# Patient Record
Sex: MALE | Race: WHITE
[De-identification: names, ages, dates, MRNs, and addresses within clinical notes are randomized per-mention and may not be internally consistent; named-entity substitution may affect disease eponyms.]

---

## 2017-08-23 ENCOUNTER — HOSPITAL ENCOUNTER (OUTPATIENT)
Dept: HOSPITAL 47 - RADUSWWP | Age: 77
Discharge: HOME | End: 2017-08-23
Payer: MEDICARE

## 2017-08-23 DIAGNOSIS — N28.89: Primary | ICD-10-CM

## 2017-08-23 DIAGNOSIS — D49.4: ICD-10-CM

## 2017-08-23 PROCEDURE — 76770 US EXAM ABDO BACK WALL COMP: CPT

## 2017-08-23 NOTE — US
EXAMINATION TYPE: US kidneys/renal and bladder

 

DATE OF EXAM: 8/23/2017

 

COMPARISON: 10/4/2016

 

CLINICAL HISTORY: D41.01 Neoplasm of unspecified behavior of Giuseppe.

 

EXAM MEASUREMENTS:

 

Right Kidney:  9.4 x 4.9 x 5.7 cm

Left Kidney: 12.2 x 6.1 x 6.3 cm

 

Right Kidney: 3.6 x 2.4 x 3.8 cm mass upper/mid pole . This has enlarged from the prior exam of 10/4/
2016 where it measured 3.0 x 2.8 x 2.7 cm. 

Left Kidney: No hydronephrosis or masses seen  

Bladder: slightly distended

**Bilateral Jets seen: no

 

There is no evidence for hydronephrosis at this point in time.  No nephrolithiasis is seen.  No vernon
s are identified.  The urinary bladder is anechoic.  Bilateral ureteral jets are seen.

 

IMPRESSION:

Enlarging suspicious right renal mass measuring up to 3.8 cm. Again dynamic enhanced CT or MR are rec
ommended for further characterization.

 

A [place] message has been communicated to Conrad Davenport MD via the [employer] Critical Result 
system on 8/23/2017 10:12 AM, Message ID [id number].

## 2019-02-21 ENCOUNTER — HOSPITAL ENCOUNTER (OUTPATIENT)
Dept: HOSPITAL 47 - RADCTMAIN | Age: 79
Discharge: HOME | End: 2019-02-21
Attending: UROLOGY
Payer: MEDICARE

## 2019-02-21 DIAGNOSIS — N13.30: ICD-10-CM

## 2019-02-21 DIAGNOSIS — N13.4: ICD-10-CM

## 2019-02-21 DIAGNOSIS — C64.1: Primary | ICD-10-CM

## 2019-02-21 LAB — BUN SERPL-SCNC: 36 MG/DL (ref 9–20)

## 2019-02-21 PROCEDURE — 84520 ASSAY OF UREA NITROGEN: CPT

## 2019-02-21 PROCEDURE — 82565 ASSAY OF CREATININE: CPT

## 2019-02-21 PROCEDURE — 36415 COLL VENOUS BLD VENIPUNCTURE: CPT

## 2019-02-21 PROCEDURE — 74160 CT ABDOMEN W/CONTRAST: CPT

## 2019-02-22 NOTE — CT
EXAMINATION TYPE: CT abdomen w con

 

DATE OF EXAM: 2/21/2019

 

COMPARISON: Ultrasound kidneys 8/23/2017. No interval films are available at this location.

 

INDICATION: Malignant neoplasm of RT kidney

 

DLP: 1308 mGycm, Automated exposure control for dose reduction was used.

 

CONTRAST:  80 mL of Isovue 300. 

                        Study performed with Oral Contrast

 

TECHNIQUE: Axial images were obtained from above the diaphragm to the iliac crests in the axial plane
 at 5 mm thick sections.  Reconstructed images are reviewed on the computer in the coronal plane.  

 

FINDINGS:

 

Limited CT sections are obtained the lung bases.  There may be some lingular atelectasis..  Coronary 
artery calcification is noted.

 

CT ABDOMEN:

 

Liver: Normal

 

Spleen: Normal

 

Pancreas: Normal

 

Adrenal glands: The adrenal glands are normal.

 

Gallbladder: Normal  

 

Kidneys: No masses are evident. There is moderate right hydronephrosis. Hydroureter is present to the
 pelvic inlet. The dilated distal ureter extends out of the field-of-view. There is a hypodense lesio
n on the right kidney measuring 1.8 x 1.2 cm measuring 4 Hounsfield units may be a cyst. This appears
 to correspond to prior site of solid mass by ultrasound. Delayed images were obtained through the 
dneys, which remain unremarkable.

 

Aorta: Vascular calcification is within the aorta. 

 

Inferior vena cava: Normal.

 

Loops of bowel distended with oral contrast normal. There are loops of bowel lacking oral contrast or
 with limited distention limiting their evaluation.

 

IMPRESSIONS:

1.  Moderate right hydronephrosis. Hydroureter extends out of the field-of-view on the right. Conside
r evaluation of the distal ureter.

2. Low density lesion on the lateral mid right kidney likely a cyst measuring 4 Hounsfield units. Thi
s appears to be at the prior mass site.

3. No suspicious masses on the kidneys or suspicious changes to suggest metastatic disease.

## 2019-02-25 ENCOUNTER — HOSPITAL ENCOUNTER (OUTPATIENT)
Dept: HOSPITAL 47 - CATHEP | Age: 79
LOS: 1 days | Discharge: HOME | End: 2019-02-26
Attending: INTERNAL MEDICINE
Payer: MEDICARE

## 2019-02-25 VITALS — BODY MASS INDEX: 34.8 KG/M2

## 2019-02-25 VITALS — RESPIRATION RATE: 18 BRPM

## 2019-02-25 DIAGNOSIS — E11.9: ICD-10-CM

## 2019-02-25 DIAGNOSIS — I25.5: Primary | ICD-10-CM

## 2019-02-25 DIAGNOSIS — Z79.899: ICD-10-CM

## 2019-02-25 DIAGNOSIS — Z79.4: ICD-10-CM

## 2019-02-25 DIAGNOSIS — I11.0: ICD-10-CM

## 2019-02-25 DIAGNOSIS — Z95.1: ICD-10-CM

## 2019-02-25 DIAGNOSIS — I50.42: ICD-10-CM

## 2019-02-25 DIAGNOSIS — E78.5: ICD-10-CM

## 2019-02-25 DIAGNOSIS — I25.10: ICD-10-CM

## 2019-02-25 DIAGNOSIS — Z79.82: ICD-10-CM

## 2019-02-25 DIAGNOSIS — Z87.891: ICD-10-CM

## 2019-02-25 DIAGNOSIS — E78.00: ICD-10-CM

## 2019-02-25 LAB
GLUCOSE BLD-MCNC: 187 MG/DL (ref 75–99)
GLUCOSE BLD-MCNC: 318 MG/DL (ref 75–99)
GLUCOSE BLD-MCNC: 368 MG/DL (ref 75–99)

## 2019-02-25 PROCEDURE — 33249 INSJ/RPLCMT DEFIB W/LEAD(S): CPT

## 2019-02-25 PROCEDURE — 71046 X-RAY EXAM CHEST 2 VIEWS: CPT

## 2019-02-25 PROCEDURE — 93641 EP EVL 1/2CHMB PAC CVDFB TST: CPT

## 2019-02-25 RX ADMIN — CEFAZOLIN SCH GM: 10 INJECTION, POWDER, FOR SOLUTION INTRAVENOUS at 23:35

## 2019-02-25 RX ADMIN — VENLAFAXINE HYDROCHLORIDE SCH MG: 75 TABLET ORAL at 20:13

## 2019-02-25 RX ADMIN — CARVEDILOL SCH MG: 3.12 TABLET, FILM COATED ORAL at 17:21

## 2019-02-25 RX ADMIN — CEFAZOLIN SCH GM: 10 INJECTION, POWDER, FOR SOLUTION INTRAVENOUS at 17:21

## 2019-02-25 RX ADMIN — POTASSIUM CHLORIDE SCH: 14.9 INJECTION, SOLUTION INTRAVENOUS at 16:11

## 2019-02-25 RX ADMIN — INSULIN DETEMIR SCH UNIT: 100 INJECTION, SOLUTION SUBCUTANEOUS at 20:13

## 2019-02-25 RX ADMIN — CEFAZOLIN SCH MLS/HR: 330 INJECTION, POWDER, FOR SOLUTION INTRAMUSCULAR; INTRAVENOUS at 12:45

## 2019-02-25 RX ADMIN — LIDOCAINE HYDROCHLORIDE ONE ML: 10 INJECTION, SOLUTION INFILTRATION; PERINEURAL at 11:02

## 2019-02-25 RX ADMIN — SODIUM CHLORIDE, PRESERVATIVE FREE SCH ML: 5 INJECTION INTRAVENOUS at 20:13

## 2019-02-25 RX ADMIN — LIDOCAINE HYDROCHLORIDE ONE ML: 10 INJECTION, SOLUTION INFILTRATION; PERINEURAL at 11:11

## 2019-02-25 NOTE — P.PCN
Date of Procedure: 02/25/19


Preoperative Diagnosis: 


Ischemic cardiomyopathy, congestive heart failure


Postoperative Diagnosis: 


The same


Procedure(s) Performed: 


Axillary venography, insertion of single coil single-chamber AICD


Description of Procedure: 


HISTORY: This is a 79-year-old gentleman with history of ischemic heart disease

, previous bypass surgery, ischemic cardiomyopathy and CHF.  Patient's LV 

function showed ejection fraction less than 30%.  Patient is advised to have 

prophylactic AICD implantation.








CONSENT:I have discussed the risks, benefits and alternative therapies for the 

above-mentioned procedure and for both sedation/analgesia as well as necessary 

blood product administration, if indicated, as they pertain to this patient.  

The patient has indicated understanding and acceptance of the risks and 

procedures discussed.








PROCEDURE: Patient was brought to the lab in a fasting state.  Patient was 

prepped and draped in the usual fashion.  Patient was given sedation by 

department of anesthesia.  The skin below the left clavicle was infiltrated 

with lidocaine.  An incision was made parallel to deltopectoral groove was 

deepened until the pectoral fascia was exposed.  A pocket was created by blunt 

dissection and cautery.  Axillary venography was performed to delineate the 

course of the axillary vein.  1 venous stick was performed into extrathoracic 

portion of the axillary vein and a single sheath  was advanced over the 

guidewire and left in subclavian vein. 


Conscious Sedation: As per anesthesia





Duration []minutes    


LEADS: 


                       


                       VENTRICULAR: This is manufactured by [employer].  The 

model number is [id number] and the serial number is [id number].





THE DEVICE: This is manufactured by [employer].  Model number is [id number] and the 

serial number is [id number].


               


                         The ventricular lead is maneuvered l with help of a 

straight and curved stylets into the left ventricle apical region.  

Satisfactory position was obtained and threshold measurements were made.  


              THRESHOLDS: 


                        VENTRICLE: The minimal patient threshold is 0.5 V at 

pulse width of 0.5 ms.  R-wave: 10 mV and the impedance is 704.  The shock 

impedance is 63.  He did





DFT testing: Patient was on deep anesthesia by department of anesthesia.  Went 

for fibrillation was induced with T shock.  A 15 J shock converted patient back 

to sinus rhythm.  Patient tolerated the procedure well.


                The lead and pulse generator remained in the pocket after it 

was washed with antibiotics.  Pocket was closed in the usual fashion.  The 

fascia was closed with 2-0 Prolene ,the subcutaneous tissue was closed with 3-0 

Prolene and the skin was closed with 4-0 Prolene.





                  PROGRAMMING:     Luis programming                       MODE

: VVI RATE: 40 OUTPUT:     Ventricle: 3.5 V





                                            Tachycardia  Programming: The VF 

zone was programmed to a rate of 200 bpm.  Initial detection is programmed to 

30 out of 40 and the redirect is programmed to follow out of 16.  The therapies 

were programmed to 25 J shock followed by 355.





                                                                                

The VT zone is programmed to a rate of 1 76 bpm.  The initial detection was 

programmed to 16 and the redirect was programmed to 12.  The therapies were 

programmed to burst pacing followed by scooby pacing followed by cardioversion 

with 50 J followed by shocks of 353.  The monitor zone was programmed to a 

rate of 150.








FINAL IMPRESSION: #1.  Axillary venography #2.  Insertion of single coil single-

chamber AICD #3.  DFT testing.  COMPLICATIONS: None


PLAN:.  Patient will be monitored on the telemetry unit.  If stable patient be 

discharged home tomorrow.  Prophylactic antibiotics to be continued.  Chest x-

ray in the morning

## 2019-02-26 VITALS — SYSTOLIC BLOOD PRESSURE: 111 MMHG | DIASTOLIC BLOOD PRESSURE: 53 MMHG | TEMPERATURE: 97.6 F | HEART RATE: 86 BPM

## 2019-02-26 LAB
GLUCOSE BLD-MCNC: 202 MG/DL (ref 75–99)
GLUCOSE BLD-MCNC: 241 MG/DL (ref 75–99)

## 2019-02-26 RX ADMIN — INSULIN DETEMIR SCH UNIT: 100 INJECTION, SOLUTION SUBCUTANEOUS at 07:32

## 2019-02-26 RX ADMIN — CEFAZOLIN SCH GM: 10 INJECTION, POWDER, FOR SOLUTION INTRAVENOUS at 11:45

## 2019-02-26 RX ADMIN — CEFAZOLIN SCH GM: 10 INJECTION, POWDER, FOR SOLUTION INTRAVENOUS at 05:56

## 2019-02-26 RX ADMIN — CARVEDILOL SCH MG: 3.12 TABLET, FILM COATED ORAL at 07:38

## 2019-02-26 RX ADMIN — CEFAZOLIN SCH: 330 INJECTION, POWDER, FOR SOLUTION INTRAMUSCULAR; INTRAVENOUS at 03:15

## 2019-02-26 RX ADMIN — VENLAFAXINE HYDROCHLORIDE SCH MG: 75 TABLET ORAL at 07:38

## 2019-02-26 RX ADMIN — POTASSIUM CHLORIDE SCH: 14.9 INJECTION, SOLUTION INTRAVENOUS at 03:16

## 2019-02-26 RX ADMIN — SODIUM CHLORIDE, PRESERVATIVE FREE SCH ML: 5 INJECTION INTRAVENOUS at 07:39

## 2019-02-26 NOTE — P.DS
Providers


Date of admission: 


02/25/2019





Attending physician: 


Kelsi Fleming





Primary care physician: 


Ruy Duckworth








- Discharge Diagnosis(es)


(1) Ischemic cardiomyopathy


Current Visit: Yes   Status: Acute   





(2) Congestive heart failure


Current Visit: No   Status: Acute   





(3) Coronary artery disease


Current Visit: No   Status: Acute   Priority: High   





(4) Diabetes mellitus


Current Visit: No   Status: Acute   





(5) HTN (hypertension)


Current Visit: No   Status: Acute   





(6) Hyperlipemia


Current Visit: No   Status: Acute   


Hospital Course: 


This patient was brought in for outpatient, prophylactic insertion of AICD for 

ischemic cardiomyopathy and congestive heart failure.  Patient had a single 

coil single-chamber AICD implantation yesterday.  Patient tolerated the 

procedure well.  Patient remained stable overnight.  Had mild headache which is 

resolving.  Vital signs are stable except blood pressure being slightly high.  

Lungs are clear.  Heart is regular.  Chest x-ray showed proper lead position.  

There is no evidence of pneumothorax.  Patient is completing antibiotic course.

  After evaluation by [employer] [facility],  this morning, patient will be discharged 

home to continue home medications.  We'll hold metformin for another 24 hours.  

Will increase the dose of the Coreg to 6.25 mg by mouth twice a day.  Follow-up 

in the office in one week.  Patient is given the usual instructions.  He is 

advised to keep the dressing dry until seen in the office.  Advised the next 

heavy lifting, pushing or pulling and advised to keep the left arm below the 

left shoulder level.  Follow-up in the office in one week








Plan - Discharge Summary


Discharge Rx Participant: No


New Discharge Prescriptions: 


New


   Fluticasone Nasal Spray [Flonase Nasal Spray] 2 spray EA NOSTRIL DAILY PRN  

spr


     PRN Reason: Allergy Symptoms


   Cephalexin [Keflex] 500 mg PO Q8HR #10 cap





Continue


   Venlafaxine HCl [Effexor] 75 mg PO BID  tab


   metFORMIN HCL [Glucophage] 1,000 mg PO BID-W/MEALS  tab


   Atorvastatin [Lipitor] 80 mg PO DAILY  tab


   Digoxin [Digitek] 125 mcg PO DAILY


   Cholecalciferol [Vitamin D3] 50 mcg PO DAILY


   Cyanocobalamin [Vitamin B-12] 1,000 mcg PO DAILY


   Carvedilol [Coreg] 3.125 mg PO BID


   lamoTRIgine [LaMICtal] 100 mg PO HS


   Furosemide [Lasix] 20 mg PO DAILY


   glipiZIDE [Glucotrol] 5 mg PO AC-BID


   Lisinopril [Zestril] 2.5 mg PO DAILY


   Potassium Chloride 10 meq PO DAILY


   Aspirin 162 mg PO DAILY


   Insulin Glargine [Lantus] 70 unit SQ BID


Discharge Medication List





Atorvastatin [Lipitor] 80 mg PO DAILY  tab 04/08/15 [Rx]


Venlafaxine HCl [Effexor] 75 mg PO BID  tab 04/08/15 [Rx]


metFORMIN HCL [Glucophage] 1,000 mg PO BID-W/MEALS  tab 04/08/15 [Rx]


Carvedilol [Coreg] 3.125 mg PO BID 09/27/16 [History]


Cholecalciferol [Vitamin D3] 50 mcg PO DAILY 09/27/16 [History]


Cyanocobalamin [Vitamin B-12] 1,000 mcg PO DAILY 09/27/16 [History]


Digoxin [Digitek] 125 mcg PO DAILY 09/27/16 [History]


lamoTRIgine [LaMICtal] 100 mg PO HS 09/27/16 [History]


Aspirin 162 mg PO DAILY 02/19/19 [History]


Furosemide [Lasix] 20 mg PO DAILY 02/19/19 [History]


Insulin Glargine [Lantus] 70 unit SQ BID 02/19/19 [History]


Lisinopril [Zestril] 2.5 mg PO DAILY 02/19/19 [History]


Potassium Chloride 10 meq PO DAILY 02/19/19 [History]


glipiZIDE [Glucotrol] 5 mg PO AC-BID 02/19/19 [History]


Cephalexin [Keflex] 500 mg PO Q8HR #10 cap 02/26/19 [Rx]


Fluticasone Nasal Spray [Flonase Nasal Spray] 2 spray EA NOSTRIL DAILY PRN  spr 

02/26/19 [Rx]








Follow up Appointment(s)/Referral(s): 


Kelsi Fleming MD [STAFF PHYSICIAN] - 1 Week


Discharge Disposition: HOME SELF-CARE

## 2019-02-26 NOTE — XR
EXAMINATION TYPE: XR chest 2V

 

DATE OF EXAM: 2/26/2019

 

COMPARISON: Chest x-ray November 28, 2016.

 

HISTORY: Lead placement check after defibrillator placement.

 

TECHNIQUE:  Frontal and lateral views of the chest are obtained.

 

FINDINGS: There is chronic parenchymal change with elevated left hemidiaphragm and left basilar linea
r scarring redemonstrated. There is no new focal air space opacity, pleural effusion, or pneumothorax
 seen.  The cardiac silhouette size is mildly enlarged with atherosclerotic thoracic aorta.  Overlyin
g sternal wires and mediastinal clips are redemonstrated. There is new single lead pacemaker/AICD ter
minating in right ventricle. Overlying EKG leads are seen on current study. Bridging spurs in the mid
 to lower thoracic spine are noted.

 

IMPRESSION:  New single lead pacemaker/AICD terminating in right ventricle. Chronic changes and mild 
cardiomegaly without acute pulmonary process.

## 2019-03-14 ENCOUNTER — HOSPITAL ENCOUNTER (OUTPATIENT)
Dept: HOSPITAL 47 - RADCTMAIN | Age: 79
Discharge: HOME | End: 2019-03-14
Attending: UROLOGY
Payer: MEDICARE

## 2019-03-14 DIAGNOSIS — N32.89: Primary | ICD-10-CM

## 2019-03-14 PROCEDURE — 74176 CT ABD & PELVIS W/O CONTRAST: CPT

## 2019-03-14 NOTE — CT
EXAMINATION TYPE: CT abdomen pelvis wo con

 

DATE OF EXAM: 3/14/2019

 

COMPARISON: Prior CT 2/21/2018

 

HISTORY: Hydronephrosis

 

CT DLP: 1383 mGycm

Automated exposure control for dose reduction was used.

 

TECHNIQUE:  Helical acquisition of images from the lung bases through the pelvis.

 

FINDINGS: There are dense coronary artery calcifications. There is a lead within the right ventricle.
 Lack of contrast could compromise sensitivity. Heart size is stable. Patient is post median sternoto
my. Small umbilical hernia contains fat.

 

LUNG BASES: No significant interval change is appreciated. There are interstitial changes present.

 

AORTA:  No significant abnormality is appreciated.

 

LIVER/GB: No significant abnormality is appreciated.

 

PANCREAS: No significant abnormality is seen.

 

SPLEEN: No significant abnormality is seen.

 

ADRENALS: No significant abnormality is seen.

 

KIDNEYS: Again seen is right-sided hydronephrosis, some high attenuation is present along the lateral
 margin of the right kidney with some similar-appearing exophytic abnormal density as on prior. The r
ight hydronephrosis extends to the level of the abnormal thickening along the right aspect of the roxanne
dder wall.

 

 

REPRODUCTIVE ORGANS: Prostate is enlarged. URINARY BLADDER:  There is abnormal thickening along the r
ight lateral margin of the urinary bladder which is not included on the previous exam. Circumferentia
l bladder wall thickening could be due to chronic bladder outlet obstruction

 

 

BOWEL:  Colonic interposition anterior to the liver is again seen. Diverticular change along the sigm
oid colon.

 

FREE AIR:  No Free Air is visible.

 

ASCITES:  None visible.

 

PELVIC ADENOPATHY:  None visualized.

 

RETROPERITONEAL ADENOPATHY:  No Retroperitoneal Adenopathy visible.

 

OSSEOUS STRUCTURES:  No significant interval change is seen. The spinal listhesis, degenerative disc 
changes are noted especially in the lumbar spine. Sclerosis of the sacroiliac joints may be due to st
ress change, osteoarthritic change.

 

IMPRESSION: 

EXTRINSIC ABNORMAL SOFT TISSUE ALONG THE RIGHT LATERAL MARGIN OF THE URINARY BLADDER IS LIKELY BECAUS
E OF PATIENT'S RIGHT-SIDED HYDRONEPHROSIS.

## 2019-07-18 ENCOUNTER — HOSPITAL ENCOUNTER (OUTPATIENT)
Dept: HOSPITAL 47 - RADUSWWP | Age: 79
Discharge: HOME | End: 2019-07-18
Attending: UROLOGY
Payer: MEDICARE

## 2019-07-18 DIAGNOSIS — N13.30: Primary | ICD-10-CM

## 2019-07-18 PROCEDURE — 76770 US EXAM ABDO BACK WALL COMP: CPT

## 2019-07-18 NOTE — US
EXAMINATION TYPE: US kidneys/renal and bladder

 

DATE OF EXAM: 7/18/2019

 

COMPARISON: CT March 14, 2019 and February 21, 2019

 

CLINICAL HISTORY: R93.4 HX OF HYDRONEPHROSIS.

 

EXAM MEASUREMENTS:

 

Right Kidney:  10.7 x 5.3 x 4.3 cm

Left Kidney: 10.8 x 5.3 x 5.8 cm

 

 

 

 

Right Kidney: Difficult to image, there appears to be a soft tissue density laterally that measures 2
.2 x 1.9 x 2.1 cm.    

Left Kidney: No hydronephrosis or masses seen  

Bladder: wnl

**Bilateral Jets seen: No

 

Suboptimal evaluation of right kidney with vague 2.0 cm hypoechoic area marked laterally, previously 
visualized moderate right-sided hydronephrosis is less well seen. Left kidney shows no hydronephrosis
 or concerning masses. Bladder shows no intraluminal mass.

 

 

 

IMPRESSION: Suboptimal study without obvious persistent moderate right-sided hydronephrosis. Eccentri
c wall thickening causing hydronephrosis felt present on prior CT studies could be a product of prior
 bladder infection or neoplasm. Correlate clinically.

What interval treatment has been performed? Was there interval cystoscopy?

## 2020-02-18 ENCOUNTER — HOSPITAL ENCOUNTER (INPATIENT)
Dept: HOSPITAL 47 - 3SCARD | Age: 80
LOS: 4 days | Discharge: HOME | DRG: 292 | End: 2020-02-22
Attending: INTERNAL MEDICINE | Admitting: INTERNAL MEDICINE
Payer: MEDICARE

## 2020-02-18 VITALS — BODY MASS INDEX: 35.4 KG/M2

## 2020-02-18 DIAGNOSIS — K21.9: ICD-10-CM

## 2020-02-18 DIAGNOSIS — Z90.5: ICD-10-CM

## 2020-02-18 DIAGNOSIS — Z79.899: ICD-10-CM

## 2020-02-18 DIAGNOSIS — Z98.41: ICD-10-CM

## 2020-02-18 DIAGNOSIS — I25.5: ICD-10-CM

## 2020-02-18 DIAGNOSIS — Z92.21: ICD-10-CM

## 2020-02-18 DIAGNOSIS — Z80.9: ICD-10-CM

## 2020-02-18 DIAGNOSIS — Z98.42: ICD-10-CM

## 2020-02-18 DIAGNOSIS — Z95.1: ICD-10-CM

## 2020-02-18 DIAGNOSIS — Z80.42: ICD-10-CM

## 2020-02-18 DIAGNOSIS — E78.5: ICD-10-CM

## 2020-02-18 DIAGNOSIS — Z79.4: ICD-10-CM

## 2020-02-18 DIAGNOSIS — Z95.810: ICD-10-CM

## 2020-02-18 DIAGNOSIS — Z85.51: ICD-10-CM

## 2020-02-18 DIAGNOSIS — I48.19: ICD-10-CM

## 2020-02-18 DIAGNOSIS — F31.9: ICD-10-CM

## 2020-02-18 DIAGNOSIS — I50.43: ICD-10-CM

## 2020-02-18 DIAGNOSIS — Z79.01: ICD-10-CM

## 2020-02-18 DIAGNOSIS — Z96.651: ICD-10-CM

## 2020-02-18 DIAGNOSIS — Z87.891: ICD-10-CM

## 2020-02-18 DIAGNOSIS — I25.10: ICD-10-CM

## 2020-02-18 DIAGNOSIS — I11.0: Primary | ICD-10-CM

## 2020-02-18 DIAGNOSIS — E11.9: ICD-10-CM

## 2020-02-18 DIAGNOSIS — Z92.3: ICD-10-CM

## 2020-02-18 LAB
ALBUMIN SERPL-MCNC: 3.8 G/DL (ref 3.5–5)
ALP SERPL-CCNC: 148 U/L (ref 38–126)
ALT SERPL-CCNC: 31 U/L (ref 4–49)
ANION GAP SERPL CALC-SCNC: 11 MMOL/L
AST SERPL-CCNC: 29 U/L (ref 17–59)
BASOPHILS # BLD AUTO: 0 K/UL (ref 0–0.2)
BASOPHILS NFR BLD AUTO: 0 %
BUN SERPL-SCNC: 30 MG/DL (ref 9–20)
CALCIUM SPEC-MCNC: 9.2 MG/DL (ref 8.4–10.2)
CHLORIDE SERPL-SCNC: 104 MMOL/L (ref 98–107)
CO2 SERPL-SCNC: 23 MMOL/L (ref 22–30)
EOSINOPHIL # BLD AUTO: 0.2 K/UL (ref 0–0.7)
EOSINOPHIL NFR BLD AUTO: 2 %
ERYTHROCYTE [DISTWIDTH] IN BLOOD BY AUTOMATED COUNT: 4.32 M/UL (ref 4.3–5.9)
ERYTHROCYTE [DISTWIDTH] IN BLOOD: 16.1 % (ref 11.5–15.5)
GLUCOSE BLD-MCNC: 174 MG/DL (ref 75–99)
GLUCOSE BLD-MCNC: 193 MG/DL (ref 75–99)
GLUCOSE SERPL-MCNC: 173 MG/DL (ref 74–99)
GLUCOSE UR QL: (no result)
HCT VFR BLD AUTO: 33.9 % (ref 39–53)
HGB BLD-MCNC: 10.5 GM/DL (ref 13–17.5)
HYALINE CASTS UR QL AUTO: 1 /LPF (ref 0–2)
LYMPHOCYTES # SPEC AUTO: 1.3 K/UL (ref 1–4.8)
LYMPHOCYTES NFR SPEC AUTO: 14 %
MCH RBC QN AUTO: 24.2 PG (ref 25–35)
MCHC RBC AUTO-ENTMCNC: 30.9 G/DL (ref 31–37)
MCV RBC AUTO: 78.5 FL (ref 80–100)
MONOCYTES # BLD AUTO: 0.7 K/UL (ref 0–1)
MONOCYTES NFR BLD AUTO: 8 %
NEUTROPHILS # BLD AUTO: 6.3 K/UL (ref 1.3–7.7)
NEUTROPHILS NFR BLD AUTO: 72 %
PH UR: 6 [PH] (ref 5–8)
PLATELET # BLD AUTO: 330 K/UL (ref 150–450)
POTASSIUM SERPL-SCNC: 4.8 MMOL/L (ref 3.5–5.1)
PROT SERPL-MCNC: 7 G/DL (ref 6.3–8.2)
PROT UR QL: (no result)
RBC UR QL: 157 /HPF (ref 0–5)
SODIUM SERPL-SCNC: 138 MMOL/L (ref 137–145)
SP GR UR: 1.01 (ref 1–1.03)
SQUAMOUS UR QL AUTO: <1 /HPF (ref 0–4)
UROBILINOGEN UR QL STRIP: <2 MG/DL (ref ?–2)
WBC # BLD AUTO: 8.8 K/UL (ref 3.8–10.6)
WBC #/AREA URNS HPF: 26 /HPF (ref 0–5)

## 2020-02-18 PROCEDURE — 81001 URINALYSIS AUTO W/SCOPE: CPT

## 2020-02-18 PROCEDURE — 80053 COMPREHEN METABOLIC PANEL: CPT

## 2020-02-18 PROCEDURE — 85025 COMPLETE CBC W/AUTO DIFF WBC: CPT

## 2020-02-18 PROCEDURE — 80162 ASSAY OF DIGOXIN TOTAL: CPT

## 2020-02-18 PROCEDURE — 71046 X-RAY EXAM CHEST 2 VIEWS: CPT

## 2020-02-18 PROCEDURE — 83036 HEMOGLOBIN GLYCOSYLATED A1C: CPT

## 2020-02-18 PROCEDURE — 83880 ASSAY OF NATRIURETIC PEPTIDE: CPT

## 2020-02-18 PROCEDURE — 80048 BASIC METABOLIC PNL TOTAL CA: CPT

## 2020-02-18 PROCEDURE — 93306 TTE W/DOPPLER COMPLETE: CPT

## 2020-02-18 RX ADMIN — FUROSEMIDE SCH MG: 10 INJECTION, SOLUTION INTRAMUSCULAR; INTRAVENOUS at 22:55

## 2020-02-18 RX ADMIN — FUROSEMIDE SCH MG: 10 INJECTION, SOLUTION INTRAMUSCULAR; INTRAVENOUS at 18:18

## 2020-02-18 RX ADMIN — ATORVASTATIN CALCIUM SCH MG: 80 TABLET, FILM COATED ORAL at 20:31

## 2020-02-18 RX ADMIN — APIXABAN SCH MG: 5 TABLET, FILM COATED ORAL at 20:31

## 2020-02-18 RX ADMIN — VENLAFAXINE HYDROCHLORIDE SCH MG: 75 TABLET ORAL at 20:31

## 2020-02-18 RX ADMIN — INSULIN ASPART SCH UNIT: 100 INJECTION, SOLUTION INTRAVENOUS; SUBCUTANEOUS at 20:41

## 2020-02-18 RX ADMIN — INSULIN ASPART SCH UNIT: 100 INJECTION, SOLUTION INTRAVENOUS; SUBCUTANEOUS at 18:19

## 2020-02-18 RX ADMIN — INSULIN DETEMIR SCH UNIT: 100 INJECTION, SOLUTION SUBCUTANEOUS at 20:41

## 2020-02-18 RX ADMIN — CARVEDILOL SCH MG: 6.25 TABLET, FILM COATED ORAL at 18:19

## 2020-02-18 NOTE — XR
EXAMINATION TYPE: XR chest 2V

 

DATE OF EXAM: 2/18/2020

 

COMPARISON: 2/26/2019

 

HISTORY: Short of breath

 

TECHNIQUE:

 

FINDINGS: Heart is enlarged. There is left axillary pacemaker. There is mild pulmonary congestion. Th
ere is coarse interstitial infiltrate in the lungs. There is slight blunting of the costophrenic angl
es.

 

IMPRESSION: There is probably mild heart failure. Small pleural effusions and pleural reaction. There
 is probably underlying pulmonary fibrosis. Heart failure appears new compared to last exam.

## 2020-02-18 NOTE — P.HPCAR
History of Present Illness


H&P Date: 20


This is a 79-year-old gentleman with history of ischemic cardiomyopathy, 

previous CABG, status post AICD placement and also chronic atrial fibrillation 

who came to the hospital with complaints of several pounds of weight gain, 

orthopnea, paroxysmal nocturnal dyspnea and also exertional shortness of breath.

 Denied any chest pain.  There is increasing pedal swelling.  Clinically patient

has significant JVD and 3-4+ edema.  His lungs show diminished breath sounds at 

bases.  Patient is advised to be admitted to the hospital for further 

evaluation.  Patient will be initiated on IV Lasix.  He'll be asystole 

echocardiogram.  Chest x-ray and blood work.  Further recommendations depend 

upon clinical course and findings on the above tests








Review of Systems





As per the chart





Physical Exam





GENERAL EXAM: Patient is alert and oriented and doesn't appear to be in any 

acute distress


HEENT: Normocephalic. Normal reaction of pupils, equal size, normal range of 

extraocular motion. No erythema or exudates in the throat.


NECK: JVD plus


CHEST: No chest wall deformity.


LUNGS: Diminished air exchange


HEART: S1 and S2 normal.  Irregular heart sounds


ABDOMEN: No hepatosplenomegaly, normal bowel sounds, no guarding or rigidity.


SKIN: No rashes


CENTRAL NERVOUS SYSTEM: No focal deficits.


EXTREMITIES: 3-4+ edema bilaterally





Past Medical History


Past Medical History: Atrial Fibrillation, Coronary Artery Disease (CAD), 

Cancer, Heart Failure, Diabetes Mellitus, GERD/Reflux, Hyperlipidemia


Additional Past Medical History / Comment(s): See Dr Fleming's H&P, hx 

kidney and bladder cancer- tx with chemo and radiation , "growth on kidney",

history of CABG, cardiomyopathy, AICD implantation


History of Any Multi-Drug Resistant Organisms: None Reported


Past Surgical History: Bladder Surgery, Heart Catheterization, Orthopedic 

Surgery


Additional Past Surgical History / Comment(s): Port-a-cath insertion/later 

removed. Bilateral cataract , ORIF R ankle with 2 screws,"scraped the bladder" 

10-18-16 TOTAL RT KNEE,growth on kidney removed


Past Anesthesia/Blood Transfusion Reactions: No Reported Reaction


Additional Past Anesthesia/Blood Transfusion Reaction / Comment(s): Pt has never

recieved blood.


Past Psychological History: Bipolar, Depression


Additional Psychological History / Comment(s): Pt is bipolar and states he does 

well with his medications.


Smoking Status: Unknown if ever smoked


Past Alcohol Use History: None Reported


Additional Past Alcohol Use History / Comment(s): Pt states he started smoking 

at age 16.  He quit sometime in the s or maybe even sooner.  He was less 

than a pack a day smoker.


Past Drug Use History: None Reported





- Past Family History


  ** Father


Family Medical History: Cancer


Additional Family Medical History / Comment(s): prostate





  ** Mother


Family Medical History: No Reported History


Additional Family Medical History / Comment(s): Mother had bowel perforations.  

She  at 88 yrs of age.





  ** Brother(s)


Family Medical History: Cancer





  ** Sister(s)


Family Medical History: Cancer





Physical Examination


Physical Exam: 





GENERAL EXAM: Patient is alert and oriented and doesn't appear to be in any 

acute distress


HEENT: Normocephalic. Normal reaction of pupils, equal size, normal range of 

extraocular motion. No erythema or exudates in the throat.


NECK: No masses, no nuchal rigidity.  Increased JVD


CHEST: No chest wall deformity.


LUNGS: He wished breath sounds


HEART: S1 and S2 normal.  Regular heart sounds with a gallop rhythm


ABDOMEN: No hepatosplenomegaly, normal bowel sounds, no guarding or rigidity.


SKIN: No rashes


CENTRAL NERVOUS SYSTEM: No focal deficits.


EXTREMITIES: 3-4+ edema





EKG Interpretations (text)





Atrial fibrillation with moderate ventricular response





Assessment and Plan


(1) Acute on chronic diastolic CHF (congestive heart failure)


Status: Acute   Code(s): I50.33 - ACUTE ON CHRONIC DIASTOLIC (CONGESTIVE) HEART 

FAILURE   SNOMED Code(s): 039801989


   





(2) Cardiomyopathy


Status: Acute   Priority: Medium   Code(s): I42.9 - CARDIOMYOPATHY, UNSPECIFIED 

 SNOMED Code(s): 72158321


   





(3) Coronary artery disease


Status: Acute   Priority: High   Code(s): I25.10 - ATHSCL HEART DISEASE OF 

NATIVE CORONARY ARTERY W/O ANG PCTRS   SNOMED Code(s): 45845917


   





(4) Diabetes mellitus


Status: Acute   Code(s): E11.9 - TYPE 2 DIABETES MELLITUS WITHOUT COMPLICATIONS 

 SNOMED Code(s): 72620734


   





(5) HTN (hypertension)


Status: Acute   Code(s): I10 - ESSENTIAL (PRIMARY) HYPERTENSION   SNOMED 

Code(s): 98844909


   





(6) Hyperlipemia


Status: Acute   Code(s): E78.5 - HYPERLIPIDEMIA, UNSPECIFIED   SNOMED Code(s): 

52200476


   





(7) S/P CABG (coronary artery bypass graft)


Status: Acute   Code(s): Z95.1 - PRESENCE OF AORTOCORONARY BYPASS GRAFT   SNOMED

Code(s): 584486447


   


Plan: 


Patient will be started on IV diuretics.  Echocardiogram to obtained.  Chest x-

ray to be done.  We'll resume home medications.  May consider adding Entresto.  

Digoxin level

## 2020-02-19 LAB
ANION GAP SERPL CALC-SCNC: 12 MMOL/L
BUN SERPL-SCNC: 29 MG/DL (ref 9–20)
CALCIUM SPEC-MCNC: 9.2 MG/DL (ref 8.4–10.2)
CHLORIDE SERPL-SCNC: 103 MMOL/L (ref 98–107)
CO2 SERPL-SCNC: 24 MMOL/L (ref 22–30)
GLUCOSE BLD-MCNC: 117 MG/DL (ref 75–99)
GLUCOSE BLD-MCNC: 169 MG/DL (ref 75–99)
GLUCOSE BLD-MCNC: 171 MG/DL (ref 75–99)
GLUCOSE BLD-MCNC: 214 MG/DL (ref 75–99)
GLUCOSE BLD-MCNC: 64 MG/DL (ref 75–99)
GLUCOSE BLD-MCNC: 84 MG/DL (ref 75–99)
GLUCOSE SERPL-MCNC: 53 MG/DL (ref 74–99)
HBA1C MFR BLD: 8 % (ref 4–6)
POTASSIUM SERPL-SCNC: 4.6 MMOL/L (ref 3.5–5.1)
SODIUM SERPL-SCNC: 139 MMOL/L (ref 137–145)

## 2020-02-19 RX ADMIN — INSULIN ASPART SCH: 100 INJECTION, SOLUTION INTRAVENOUS; SUBCUTANEOUS at 09:42

## 2020-02-19 RX ADMIN — ATORVASTATIN CALCIUM SCH MG: 80 TABLET, FILM COATED ORAL at 19:41

## 2020-02-19 RX ADMIN — DIGOXIN SCH MCG: 125 TABLET ORAL at 09:46

## 2020-02-19 RX ADMIN — APIXABAN SCH MG: 5 TABLET, FILM COATED ORAL at 19:41

## 2020-02-19 RX ADMIN — FUROSEMIDE SCH MG: 10 INJECTION, SOLUTION INTRAMUSCULAR; INTRAVENOUS at 16:58

## 2020-02-19 RX ADMIN — FUROSEMIDE SCH MG: 10 INJECTION, SOLUTION INTRAMUSCULAR; INTRAVENOUS at 09:46

## 2020-02-19 RX ADMIN — CARVEDILOL SCH MG: 6.25 TABLET, FILM COATED ORAL at 06:53

## 2020-02-19 RX ADMIN — CARVEDILOL SCH MG: 6.25 TABLET, FILM COATED ORAL at 16:58

## 2020-02-19 RX ADMIN — INSULIN ASPART SCH UNIT: 100 INJECTION, SOLUTION INTRAVENOUS; SUBCUTANEOUS at 21:11

## 2020-02-19 RX ADMIN — INSULIN ASPART SCH UNIT: 100 INJECTION, SOLUTION INTRAVENOUS; SUBCUTANEOUS at 12:37

## 2020-02-19 RX ADMIN — VENLAFAXINE HYDROCHLORIDE SCH MG: 75 TABLET ORAL at 09:46

## 2020-02-19 RX ADMIN — FUROSEMIDE SCH MG: 10 INJECTION, SOLUTION INTRAMUSCULAR; INTRAVENOUS at 23:11

## 2020-02-19 RX ADMIN — VENLAFAXINE HYDROCHLORIDE SCH MG: 75 TABLET ORAL at 19:41

## 2020-02-19 RX ADMIN — INSULIN DETEMIR SCH UNIT: 100 INJECTION, SOLUTION SUBCUTANEOUS at 09:46

## 2020-02-19 RX ADMIN — APIXABAN SCH MG: 5 TABLET, FILM COATED ORAL at 09:46

## 2020-02-19 RX ADMIN — INSULIN ASPART SCH: 100 INJECTION, SOLUTION INTRAVENOUS; SUBCUTANEOUS at 18:00

## 2020-02-19 RX ADMIN — INSULIN DETEMIR SCH UNIT: 100 INJECTION, SOLUTION SUBCUTANEOUS at 21:11

## 2020-02-19 NOTE — P.PN
Subjective


Progress Note Date: 02/19/20


This is a 79-year-old  gentleman with history of ischemic cardio 

myopathy, coronary artery disease with prior bypass surgery, status post AICD, 

persistent atrial fibrillation, diabetes, hyperlipidemia, admitted to the 

hospital by Dr. Fleming with congestive heart failure.  Patient was initiate

d on IV Lasix, he diuresed well through the night last night, his weight is down

3 kg today.  Blood pressure 118/70 with a heart rate in the 80s, 98% on room 

air.  Sodium 139, potassium 4.6, BUN 29, creatinine 1.5, digoxin 0.4.








Objective





- Vital Signs


Vital signs: 


                                   Vital Signs











Temp  97.6 F   02/19/20 08:00


 


Pulse  82   02/19/20 12:00


 


Resp  20   02/19/20 12:00


 


BP  118/73   02/19/20 12:00


 


Pulse Ox  98   02/19/20 12:00








                                 Intake & Output











 02/18/20 02/19/20 02/19/20





 18:59 06:59 18:59


 


Intake Total 120 10 480


 


Output Total  1425 700


 


Balance 120 -1415 -220


 


Weight 115.4 kg 112.2 kg 112.2 kg


 


Intake:   


 


  IV  10 


 


    Invasive Line 1  10 


 


  Oral 120  480


 


Output:   


 


  Urine  1425 700


 


Other:   


 


  Voiding Method  Urinal 














- Exam


PHYSICAL EXAMINATION: 





GENERAL: 79-year-old  gentleman in no acute distress at the time of my 

examining





HEENT: Head is atraumatic, normocephalic.  Pupils equal, round.  Sclera 

anicteric. Conjunctiva are clear.  Mucous membranes of the mouth are moist.  

Neck is supple.  There is no elevated jugular venous pressure.  No carotid  b

ruit is heard.





HEART EXAMINATION: Heart S1 and S2 systolic murmur is heard





CHEST EXAMINATION:'s reveal diminished air entry bilaterally to the bases.





ABDOMEN:  Soft, nontender. Bowel sounds are heard. No organomegaly noted.


 


EXTREMITIES: 2+ peripheral pulses with 2+  evidence of peripheral edema and no 

calf tenderness noted.





NEUROLOGIC patient is awake, alert and oriented 3 .


 


.


 











- Labs


CBC & Chem 7: 


                                 02/18/20 17:47





                                 02/19/20 05:30


Labs: 


                  Abnormal Lab Results - Last 24 Hours (Table)











  02/18/20 02/18/20 02/18/20 Range/Units





  17:41 17:47 17:47 


 


Hgb   10.5 L   (13.0-17.5)  gm/dL


 


Hct   33.9 L   (39.0-53.0)  %


 


MCV   78.5 L   (80.0-100.0)  fL


 


MCH   24.2 L   (25.0-35.0)  pg


 


MCHC   30.9 L   (31.0-37.0)  g/dL


 


RDW   16.1 H   (11.5-15.5)  %


 


BUN    30 H  (9-20)  mg/dL


 


Creatinine    1.42 H  (0.66-1.25)  mg/dL


 


Glucose    173 H  (74-99)  mg/dL


 


POC Glucose (mg/dL)  193 H    (75-99)  mg/dL


 


Hemoglobin A1c     (4.0-6.0)  %


 


Alkaline Phosphatase    148 H  ()  U/L


 


Urine Protein     (Negative)  


 


Urine Glucose (UA)     (Negative)  


 


Urine RBC     (0-5)  /hpf


 


Urine WBC     (0-5)  /hpf


 


Urine Bacteria     (None)  /hpf


 


Urine Mucus     (None)  /hpf














  02/18/20 02/18/20 02/19/20 Range/Units





  19:10 20:39 05:30 


 


Hgb     (13.0-17.5)  gm/dL


 


Hct     (39.0-53.0)  %


 


MCV     (80.0-100.0)  fL


 


MCH     (25.0-35.0)  pg


 


MCHC     (31.0-37.0)  g/dL


 


RDW     (11.5-15.5)  %


 


BUN    29 H  (9-20)  mg/dL


 


Creatinine    1.56 H  (0.66-1.25)  mg/dL


 


Glucose    53 L  (74-99)  mg/dL


 


POC Glucose (mg/dL)   174 H   (75-99)  mg/dL


 


Hemoglobin A1c     (4.0-6.0)  %


 


Alkaline Phosphatase     ()  U/L


 


Urine Protein  1+ H    (Negative)  


 


Urine Glucose (UA)  2+ H    (Negative)  


 


Urine RBC  157 H    (0-5)  /hpf


 


Urine WBC  26 H    (0-5)  /hpf


 


Urine Bacteria  Rare H    (None)  /hpf


 


Urine Mucus  Rare H    (None)  /hpf














  02/19/20 02/19/20 02/19/20 Range/Units





  05:32 07:23 09:45 


 


Hgb     (13.0-17.5)  gm/dL


 


Hct     (39.0-53.0)  %


 


MCV     (80.0-100.0)  fL


 


MCH     (25.0-35.0)  pg


 


MCHC     (31.0-37.0)  g/dL


 


RDW     (11.5-15.5)  %


 


BUN     (9-20)  mg/dL


 


Creatinine     (0.66-1.25)  mg/dL


 


Glucose     (74-99)  mg/dL


 


POC Glucose (mg/dL)   64 L  214 H  (75-99)  mg/dL


 


Hemoglobin A1c  8.0 H    (4.0-6.0)  %


 


Alkaline Phosphatase     ()  U/L


 


Urine Protein     (Negative)  


 


Urine Glucose (UA)     (Negative)  


 


Urine RBC     (0-5)  /hpf


 


Urine WBC     (0-5)  /hpf


 


Urine Bacteria     (None)  /hpf


 


Urine Mucus     (None)  /hpf














  02/19/20 Range/Units





  12:15 


 


Hgb   (13.0-17.5)  gm/dL


 


Hct   (39.0-53.0)  %


 


MCV   (80.0-100.0)  fL


 


MCH   (25.0-35.0)  pg


 


MCHC   (31.0-37.0)  g/dL


 


RDW   (11.5-15.5)  %


 


BUN   (9-20)  mg/dL


 


Creatinine   (0.66-1.25)  mg/dL


 


Glucose   (74-99)  mg/dL


 


POC Glucose (mg/dL)  171 H  (75-99)  mg/dL


 


Hemoglobin A1c   (4.0-6.0)  %


 


Alkaline Phosphatase   ()  U/L


 


Urine Protein   (Negative)  


 


Urine Glucose (UA)   (Negative)  


 


Urine RBC   (0-5)  /hpf


 


Urine WBC   (0-5)  /hpf


 


Urine Bacteria   (None)  /hpf


 


Urine Mucus   (None)  /hpf














Assessment and Plan


Plan: 


Assessment and plan


#1 systolic congestive heart failure acute on chronic


#2 ischemic cardiomyopathy status post AICD


#3 coronary artery disease with prior bypass surgery


#4 hypertension


#5Diabetes


#6 hyperlipidemia





Plan


We will obtain a BNP level, continue current dose of IV Lasix, monitor daily 

intake and output along with daily lytes BUN and creatinine.





DNP note has been reviewed, I agree with a documented findings and plan of care.

 Patient was seen and examined.

## 2020-02-19 NOTE — ECHOF
Referral Reason:CHF/Cardiomyopathy



MEASUREMENTS

--------

HEIGHT: 177.8 cm

WEIGHT: 115.2 kg

BP: 104/54

RVIDd:   3.5 cm     (< 3.3)

IVSd:   1.4 cm     (0.6 - 1.1)

LVIDd:   4.8 cm     (3.9 - 5.3)

LVPWd:   1.3 cm     (0.6 - 1.1)

IVSs:   1.7 cm

LVIDs:   4.1 cm

LVPWs:   1.7 cm

LA Diam:   4.4 cm     (2.7 - 3.8)

LAESV Index (A-L):   41.58 ml/m

Ao Diam:   3.9 cm     (2.0 - 3.7)

AV Cusp:   2.2 cm     (1.5 - 2.6)

MV EXCURSION:   18.395 mm     (> 18.000)

MV EF SLOPE:   131 mm/s     (70 - 150)

EPSS:   1.8 cm

AV maxP.40 mmHg

AV meanP.85 mmHg

AR PHT:   531 ms

RAP:   5.00 mmHg

RVSP:   47.20 mmHg







FINDINGS

--------

The rhythm appears to be atrial flutter.

This was a technically adequate study.

The left ventricular size is normal.   There is moderate concentric left ventricular hypertrophy.   O
verall left ventricular systolic function is severely impaired with, an EF < 20%.

The right ventricle is mildly enlarged.

LA is severely dilated >40 ml/m2

The right atrium is normal in size.

Interatrial and interventricular septum intact.

There is mild to moderate aortic valve sclerosis.   There is moderate aortic regurgitation.   There i
s mild aortic stenosis present.   Peak/mean gradient across the Aortic Valve is 12.40mmHg / 6.85mmHg.


The mitral valve leaflets are mildly thickened.   Mild mitral annular calcification present.   Mild m
itral regurgitation is present.

Mild tricuspid regurgitation present.   There is moderate pulmonary hypertension.   The right ventric
ular systolic pressure, as measured by Doppler, is 47.20mmHg.

The pulmonic valve was not well visualized.

The aortic root is dilated measuring 3.9cm.

Normal inferior vena cava with normal inspiratory collapse consistent with estimated right atrial pre
ssure of  5 mmHg.   The inferior vena cava is mildly dilated.

There is no pericardial effusion.



CONCLUSIONS

--------

1. The rhythm appears to be atrial flutter.

2. This was a technically adequate study.

3. The left ventricular size is normal.

4. There is moderate concentric left ventricular hypertrophy.

5. Overall left ventricular systolic function is severely impaired with, an EF < 20%.

6. The right ventricle is mildly enlarged.

7. LA is severely dilated >40 ml/m2

8. The right atrium is normal in size.

9. Interatrial and interventricular septum intact.

10. There is mild to moderate aortic valve sclerosis.

11. There is moderate aortic regurgitation.

12. There is mild aortic stenosis present.

13. Peak/mean gradient across the Aortic Valve is 12.40mmHg / 6.85mmHg.

14. The mitral valve leaflets are mildly thickened.

15. Mild mitral annular calcification present.

16. Mild mitral regurgitation is present.

17. Mild tricuspid regurgitation present.

18. There is moderate pulmonary hypertension.

19. The right ventricular systolic pressure, as measured by Doppler, is 47.20mmHg.

20. The pulmonic valve was not well visualized.

21. The aortic root is dilated measuring 3.9cm.

22. Normal inferior vena cava with normal inspiratory collapse consistent with estimated right atrial
 pressure of  5 mmHg.

23. The inferior vena cava is mildly dilated.

24. There is no pericardial effusion.





SONOGRAPHER: Yolanda Ibarra RDCS

## 2020-02-20 LAB
ANION GAP SERPL CALC-SCNC: 12 MMOL/L
BUN SERPL-SCNC: 34 MG/DL (ref 9–20)
CALCIUM SPEC-MCNC: 8.7 MG/DL (ref 8.4–10.2)
CHLORIDE SERPL-SCNC: 101 MMOL/L (ref 98–107)
CO2 SERPL-SCNC: 25 MMOL/L (ref 22–30)
GLUCOSE BLD-MCNC: 141 MG/DL (ref 75–99)
GLUCOSE BLD-MCNC: 143 MG/DL (ref 75–99)
GLUCOSE BLD-MCNC: 146 MG/DL (ref 75–99)
GLUCOSE BLD-MCNC: 201 MG/DL (ref 75–99)
GLUCOSE BLD-MCNC: 64 MG/DL (ref 75–99)
GLUCOSE BLD-MCNC: 68 MG/DL (ref 75–99)
GLUCOSE SERPL-MCNC: 143 MG/DL (ref 74–99)
POTASSIUM SERPL-SCNC: 4.6 MMOL/L (ref 3.5–5.1)
SODIUM SERPL-SCNC: 138 MMOL/L (ref 137–145)

## 2020-02-20 RX ADMIN — CARVEDILOL SCH: 6.25 TABLET, FILM COATED ORAL at 21:16

## 2020-02-20 RX ADMIN — INSULIN ASPART SCH: 100 INJECTION, SOLUTION INTRAVENOUS; SUBCUTANEOUS at 21:16

## 2020-02-20 RX ADMIN — FUROSEMIDE SCH MG: 10 INJECTION, SOLUTION INTRAMUSCULAR; INTRAVENOUS at 08:04

## 2020-02-20 RX ADMIN — DIGOXIN SCH MCG: 125 TABLET ORAL at 09:05

## 2020-02-20 RX ADMIN — INSULIN ASPART SCH UNIT: 100 INJECTION, SOLUTION INTRAVENOUS; SUBCUTANEOUS at 17:14

## 2020-02-20 RX ADMIN — VENLAFAXINE HYDROCHLORIDE SCH MG: 75 TABLET ORAL at 09:05

## 2020-02-20 RX ADMIN — APIXABAN SCH MG: 5 TABLET, FILM COATED ORAL at 21:21

## 2020-02-20 RX ADMIN — ATORVASTATIN CALCIUM SCH MG: 80 TABLET, FILM COATED ORAL at 21:21

## 2020-02-20 RX ADMIN — INSULIN DETEMIR SCH UNIT: 100 INJECTION, SOLUTION SUBCUTANEOUS at 09:05

## 2020-02-20 RX ADMIN — APIXABAN SCH MG: 5 TABLET, FILM COATED ORAL at 09:05

## 2020-02-20 RX ADMIN — CARVEDILOL SCH MG: 6.25 TABLET, FILM COATED ORAL at 06:58

## 2020-02-20 RX ADMIN — INSULIN DETEMIR SCH UNIT: 100 INJECTION, SOLUTION SUBCUTANEOUS at 21:21

## 2020-02-20 RX ADMIN — INSULIN ASPART SCH: 100 INJECTION, SOLUTION INTRAVENOUS; SUBCUTANEOUS at 21:18

## 2020-02-20 RX ADMIN — FUROSEMIDE SCH MG: 10 INJECTION, SOLUTION INTRAMUSCULAR; INTRAVENOUS at 23:04

## 2020-02-20 RX ADMIN — INSULIN ASPART SCH: 100 INJECTION, SOLUTION INTRAVENOUS; SUBCUTANEOUS at 06:59

## 2020-02-20 RX ADMIN — VENLAFAXINE HYDROCHLORIDE SCH MG: 75 TABLET ORAL at 21:21

## 2020-02-20 RX ADMIN — FUROSEMIDE SCH MG: 10 INJECTION, SOLUTION INTRAMUSCULAR; INTRAVENOUS at 17:14

## 2020-02-20 NOTE — P.PN
Subjective


Progress Note Date: 02/20/20


This is a 79-year-old  gentleman with history of ischemic cardio 

myopathy, coronary artery disease with prior bypass surgery, status post AICD, 

persistent atrial fibrillation, diabetes, hyperlipidemia, admitted to the 

hospital by Dr. Fleming with congestive heart failure.  Patient was initiate

d on IV Lasix, he diuresed well through the night last night, his weight is down

3 kg today.  Blood pressure 118/70 with a heart rate in the 80s, 98% on room 

air.  Sodium 139, potassium 4.6, BUN 29, creatinine 1.5, digoxin 0.4.





02/20/2020


Patient was seen and examined this morning, he does state that he still feels 

mildly short of breath however significantly improved from admission here.  His 

weight is down another kilogram today.  Blood pressure 112/60, his heart rate is

in the 70s, 97% on room air.  Sodium 138, potassium 4.6, BUN 34, creatinine 1.5.








Objective





- Vital Signs


Vital signs: 


                                   Vital Signs











Temp  98.9 F   02/20/20 08:00


 


Pulse  71   02/20/20 09:11


 


Resp  18   02/20/20 09:11


 


BP  112/65   02/20/20 08:00


 


Pulse Ox  97   02/20/20 08:00








                                 Intake & Output











 02/19/20 02/20/20 02/20/20





 18:59 06:59 18:59


 


Intake Total 720  180


 


Output Total 1200 1500 


 


Balance -480 -1500 180


 


Weight 112.2 kg 111.7 kg 


 


Intake:   


 


  Oral 720  180


 


Output:   


 


  Urine 1200 1500 


 


Other:   


 


  Voiding Method  Urinal 














- Exam


PHYSICAL EXAMINATION: 





GENERAL: 79-year-old  gentleman in no acute distress at the time of my 

examining





HEENT: Head is atraumatic, normocephalic.  Pupils equal, round.  Sclera 

anicteric. Conjunctiva are clear.  Mucous membranes of the mouth are moist.  

Neck is supple.  There is no elevated jugular venous pressure.  No carotid  

bruit is heard.





HEART EXAMINATION: Heart S1 and S2 systolic murmur is heard





CHEST EXAMINATION:'s reveal diminished air entry bilaterally to the bases.





ABDOMEN:  Soft, nontender. Bowel sounds are heard. No organomegaly noted.


 


EXTREMITIES: 2+ peripheral pulses with 2+  evidence of peripheral edema and no 

calf tenderness noted.





NEUROLOGIC patient is awake, alert and oriented 3 .


 


.


 











- Labs


CBC & Chem 7: 


                                 02/18/20 17:47





                                 02/20/20 10:15


Labs: 


                  Abnormal Lab Results - Last 24 Hours (Table)











  02/19/20 02/19/20 02/19/20 Range/Units





  05:32 12:15 17:43 


 


BUN     (9-20)  mg/dL


 


Creatinine     (0.66-1.25)  mg/dL


 


Glucose     (74-99)  mg/dL


 


POC Glucose (mg/dL)   171 H  117 H  (75-99)  mg/dL


 


Hemoglobin A1c  8.0 H    (4.0-6.0)  %














  02/19/20 02/20/20 02/20/20 Range/Units





  21:00 06:56 07:09 


 


BUN     (9-20)  mg/dL


 


Creatinine     (0.66-1.25)  mg/dL


 


Glucose     (74-99)  mg/dL


 


POC Glucose (mg/dL)  169 H  64 L  68 L  (75-99)  mg/dL


 


Hemoglobin A1c     (4.0-6.0)  %














  02/20/20 02/20/20 Range/Units





  07:28 10:15 


 


BUN   34 H  (9-20)  mg/dL


 


Creatinine   1.55 H  (0.66-1.25)  mg/dL


 


Glucose   143 H  (74-99)  mg/dL


 


POC Glucose (mg/dL)  201 H   (75-99)  mg/dL


 


Hemoglobin A1c    (4.0-6.0)  %














Assessment and Plan


Plan: 


Assessment and plan


#1 systolic congestive heart failure acute on chronic


#2 ischemic cardiomyopathy status post AICD


#3 coronary artery disease with prior bypass surgery


#4 hypertension


#5Diabetes


#6 hyperlipidemia





Plan


From cardiology's perspective, we will continue the patient on current IV Lasix 

dose for 24 hours, continue to monitor intake and output, daily weights.


DNP note has been reviewed, I agree with a documented findings and plan of care.

 Patient was seen and examined.

## 2020-02-21 LAB
ANION GAP SERPL CALC-SCNC: 11 MMOL/L
BUN SERPL-SCNC: 39 MG/DL (ref 9–20)
CALCIUM SPEC-MCNC: 8.8 MG/DL (ref 8.4–10.2)
CHLORIDE SERPL-SCNC: 104 MMOL/L (ref 98–107)
CO2 SERPL-SCNC: 25 MMOL/L (ref 22–30)
GLUCOSE BLD-MCNC: 135 MG/DL (ref 75–99)
GLUCOSE BLD-MCNC: 217 MG/DL (ref 75–99)
GLUCOSE BLD-MCNC: 82 MG/DL (ref 75–99)
GLUCOSE BLD-MCNC: 83 MG/DL (ref 75–99)
GLUCOSE BLD-MCNC: 99 MG/DL (ref 75–99)
GLUCOSE SERPL-MCNC: 92 MG/DL (ref 74–99)
POTASSIUM SERPL-SCNC: 4.6 MMOL/L (ref 3.5–5.1)
SODIUM SERPL-SCNC: 140 MMOL/L (ref 137–145)

## 2020-02-21 RX ADMIN — INSULIN ASPART SCH: 100 INJECTION, SOLUTION INTRAVENOUS; SUBCUTANEOUS at 21:42

## 2020-02-21 RX ADMIN — APIXABAN SCH MG: 5 TABLET, FILM COATED ORAL at 08:45

## 2020-02-21 RX ADMIN — INSULIN ASPART SCH: 100 INJECTION, SOLUTION INTRAVENOUS; SUBCUTANEOUS at 06:09

## 2020-02-21 RX ADMIN — INSULIN ASPART SCH UNIT: 100 INJECTION, SOLUTION INTRAVENOUS; SUBCUTANEOUS at 17:23

## 2020-02-21 RX ADMIN — INSULIN ASPART SCH: 100 INJECTION, SOLUTION INTRAVENOUS; SUBCUTANEOUS at 12:30

## 2020-02-21 RX ADMIN — APIXABAN SCH MG: 5 TABLET, FILM COATED ORAL at 21:45

## 2020-02-21 RX ADMIN — CARVEDILOL SCH MG: 6.25 TABLET, FILM COATED ORAL at 17:23

## 2020-02-21 RX ADMIN — SACUBITRIL AND VALSARTAN SCH EACH: 24; 26 TABLET, FILM COATED ORAL at 21:45

## 2020-02-21 RX ADMIN — CARVEDILOL SCH MG: 6.25 TABLET, FILM COATED ORAL at 06:56

## 2020-02-21 RX ADMIN — ATORVASTATIN CALCIUM SCH MG: 80 TABLET, FILM COATED ORAL at 21:45

## 2020-02-21 RX ADMIN — INSULIN DETEMIR SCH UNIT: 100 INJECTION, SOLUTION SUBCUTANEOUS at 06:56

## 2020-02-21 RX ADMIN — INSULIN DETEMIR SCH UNIT: 100 INJECTION, SOLUTION SUBCUTANEOUS at 21:46

## 2020-02-21 RX ADMIN — VENLAFAXINE HYDROCHLORIDE SCH MG: 75 TABLET ORAL at 08:45

## 2020-02-21 RX ADMIN — SACUBITRIL AND VALSARTAN SCH EACH: 24; 26 TABLET, FILM COATED ORAL at 11:45

## 2020-02-21 RX ADMIN — FUROSEMIDE SCH MG: 10 INJECTION, SOLUTION INTRAMUSCULAR; INTRAVENOUS at 07:36

## 2020-02-21 RX ADMIN — DIGOXIN SCH MCG: 125 TABLET ORAL at 08:45

## 2020-02-21 RX ADMIN — VENLAFAXINE HYDROCHLORIDE SCH MG: 75 TABLET ORAL at 21:45

## 2020-02-21 NOTE — P.PN
Subjective


Progress Note Date: 02/21/20


This is a 79-year-old  gentleman with history of ischemic cardio 

myopathy, coronary artery disease with prior bypass surgery, status post AICD, 

persistent atrial fibrillation, diabetes, hyperlipidemia, admitted to the 

hospital by Dr. Fleming with congestive heart failure.  Patient was initiate

d on IV Lasix, he diuresed well through the night last night, his weight is down

3 kg today.  Blood pressure 118/70 with a heart rate in the 80s, 98% on room 

air.  Sodium 139, potassium 4.6, BUN 29, creatinine 1.5, digoxin 0.4.





02/20/2020


Patient was seen and examined this morning, he does state that he still feels 

mildly short of breath however significantly improved from admission here.  His 

weight is down another kilogram today.  Blood pressure 112/60, his heart rate is

in the 70s, 97% on room air.  Sodium 138, potassium 4.6, BUN 34, creatinine 1.5.








02/21/2020


Patient seen and examined this morning sitting up in a chair, edema 

significantly improved, overall the patient feels a lot better.  His creatinine 

today is 1.6.  We will decrease his IV Lasix dose to 80 daily dose, we will also

discontinue the Lanoxin and start the patient on a contrast O today, monitoring 

the blood pressure closely as well as the patient's creatinine. Obtianchest x-

ray today.








Objective





- Vital Signs


Vital signs: 


                                   Vital Signs











Temp  97.9 F   02/21/20 12:00


 


Pulse  59 L  02/21/20 12:00


 


Resp  19   02/21/20 12:00


 


BP  135/80   02/21/20 12:32


 


Pulse Ox  99   02/21/20 12:00








                                 Intake & Output











 02/20/20 02/21/20 02/21/20





 18:59 06:59 18:59


 


Intake Total 540 600 220


 


Output Total 500 1500 


 


Balance 40 -900 220


 


Weight  111.5 kg 


 


Intake:   


 


  Oral 540 600 220


 


Output:   


 


  Urine 500 1500 


 


Other:   


 


  # Voids  1 














- Exam


PHYSICAL EXAMINATION: 





GENERAL: 79-year-old  gentleman in no acute distress at the time of my 

examining





HEENT: Head is atraumatic, normocephalic.  Pupils equal, round.  Sclera 

anicteric. Conjunctiva are clear.  Mucous membranes of the mouth are moist.  

Neck is supple.  There is no elevated jugular venous pressure.  No carotid  

bruit is heard.





HEART EXAMINATION: Heart S1 and S2 systolic murmur is heard





CHEST EXAMINATION:lungs  reveal improvement in air entry bilaterally to the 

bases.





ABDOMEN:  Soft, nontender. Bowel sounds are heard. No organomegaly noted.


 


EXTREMITIES: 2+ peripheral pulses with trace evidence of peripheral edema and no

calf tenderness noted.





NEUROLOGIC patient is awake, alert and oriented 3 .


 


.


 











- Labs


CBC & Chem 7: 


                                 02/18/20 17:47





                                 02/21/20 05:21


Labs: 


                  Abnormal Lab Results - Last 24 Hours (Table)











  02/20/20 02/20/20 02/20/20 Range/Units





  12:37 16:50 20:29 


 


BUN     (9-20)  mg/dL


 


Creatinine     (0.66-1.25)  mg/dL


 


POC Glucose (mg/dL)  141 H  146 H  143 H  (75-99)  mg/dL














  02/21/20 Range/Units





  05:21 


 


BUN  39 H  (9-20)  mg/dL


 


Creatinine  1.63 H  (0.66-1.25)  mg/dL


 


POC Glucose (mg/dL)   (75-99)  mg/dL














Assessment and Plan


Plan: 


Assessment and plan


#1 systolic congestive heart failure acute on chronic


#2 ischemic cardiomyopathy status post AICD


#3 coronary artery disease with prior bypass surgery


#4 hypertension


#5Diabetes


#6 hyperlipidemia





Plan


We will decrease the IV Lasix to a daily dose.  Discontinue the Lanoxin.  Start 

the patient onEntresto.  Monitor blood pressure and renal function closely.





DNP note has been reviewed, I agree with a documented findings and plan of care.

 Patient was seen and examined.

## 2020-02-21 NOTE — XR
EXAMINATION TYPE: XR chest 2V

 

DATE OF EXAM: 2/21/2020

 

COMPARISON: 2/18/2020

 

INDICATION: CHF

 

TECHNIQUE:  Frontal and lateral views of the chest are obtained.

 

FINDINGS:  

The heart size is normal. Pacemaker overlies left chest.

The pulmonary vasculature is normal.

No suspicious focal consolidations are evident. Previous infiltrate has improved..

 

IMPRESSION:  

1. Resolving congestive heart failure

## 2020-02-22 VITALS — SYSTOLIC BLOOD PRESSURE: 100 MMHG | HEART RATE: 76 BPM | DIASTOLIC BLOOD PRESSURE: 59 MMHG

## 2020-02-22 VITALS — TEMPERATURE: 98.3 F

## 2020-02-22 VITALS — RESPIRATION RATE: 18 BRPM

## 2020-02-22 LAB
ANION GAP SERPL CALC-SCNC: 10 MMOL/L
BUN SERPL-SCNC: 38 MG/DL (ref 9–20)
CALCIUM SPEC-MCNC: 8.8 MG/DL (ref 8.4–10.2)
CHLORIDE SERPL-SCNC: 103 MMOL/L (ref 98–107)
CO2 SERPL-SCNC: 25 MMOL/L (ref 22–30)
GLUCOSE BLD-MCNC: 161 MG/DL (ref 75–99)
GLUCOSE BLD-MCNC: 73 MG/DL (ref 75–99)
GLUCOSE BLD-MCNC: 87 MG/DL (ref 75–99)
GLUCOSE SERPL-MCNC: 82 MG/DL (ref 74–99)
POTASSIUM SERPL-SCNC: 4.5 MMOL/L (ref 3.5–5.1)
SODIUM SERPL-SCNC: 138 MMOL/L (ref 137–145)

## 2020-02-22 RX ADMIN — APIXABAN SCH MG: 5 TABLET, FILM COATED ORAL at 09:25

## 2020-02-22 RX ADMIN — SACUBITRIL AND VALSARTAN SCH EACH: 24; 26 TABLET, FILM COATED ORAL at 09:25

## 2020-02-22 RX ADMIN — INSULIN DETEMIR SCH UNIT: 100 INJECTION, SOLUTION SUBCUTANEOUS at 06:50

## 2020-02-22 RX ADMIN — INSULIN ASPART SCH: 100 INJECTION, SOLUTION INTRAVENOUS; SUBCUTANEOUS at 06:39

## 2020-02-22 RX ADMIN — CARVEDILOL SCH MG: 6.25 TABLET, FILM COATED ORAL at 06:50

## 2020-02-22 RX ADMIN — INSULIN ASPART SCH UNIT: 100 INJECTION, SOLUTION INTRAVENOUS; SUBCUTANEOUS at 12:48

## 2020-02-22 RX ADMIN — VENLAFAXINE HYDROCHLORIDE SCH MG: 75 TABLET ORAL at 09:25

## 2020-02-22 NOTE — P.PN
Subjective


Progress Note Date: 02/22/20


  Discharge summary





This is a 79-year-old  gentleman with history of ischemic cardio 

myopathy, coronary artery disease with prior bypass surgery, status post AICD, 

persistent atrial fibrillation, diabetes, hyperlipidemia, admitted to the 

hospital by Dr. Fleming with congestive heart failure.  Patient was 

initiated on IV Lasix, he diuresed well through the night last night, his weight

is down 3 kg today.  Blood pressure 118/70 with a heart rate in the 80s, 98% on 

room air.  Sodium 139, potassium 4.6, BUN 29, creatinine 1.5, digoxin 0.4.





02/20/2020


Patient was seen and examined this morning, he does state that he still feels 

mildly short of breath however significantly improved from admission here.  His 

weight is down another kilogram today.  Blood pressure 112/60, his heart rate is

in the 70s, 97% on room air.  Sodium 138, potassium 4.6, BUN 34, creatinine 1.5.








02/21/2020


Patient seen and examined this morning sitting up in a chair, edema 

significantly improved, overall the patient feels a lot better.  His creatinine 

today is 1.6.  We will decrease his IV Lasix dose to 80 daily dose, we will also

discontinue the Lanoxin and start the patient on a contrast O today, monitoring 

the blood pressure closely as well as the patient's creatinine. Obtianchest x-

ray today.








02/22/2020


Patient seen and examined this morning, he feels well, he sitting up in the 

chair at bedside.  Denies any dizziness or lightheadedness.  Breathing is 

stable.  Continue to diurese well through the night on daily dose of IV Lasix.  

Sodium 138, potassium 4.5, BUN 38, creatinine 1.5.  Repeat chest x-ray was 

performed which revealed resolving congestive heart failure.





Objective





- Vital Signs


Vital signs: 


                                   Vital Signs











Temp  98.3 F   02/22/20 08:00


 


Pulse  107 H  02/22/20 08:00


 


Resp  18   02/22/20 08:00


 


BP  96/53   02/22/20 08:00


 


Pulse Ox  97   02/22/20 08:00








                                 Intake & Output











 02/21/20 02/22/20 02/22/20





 18:59 06:59 18:59


 


Intake Total 575 540 360


 


Output Total 320 1050 


 


Balance 255 -510 360


 


Weight  111.3 kg 


 


Intake:   


 


  Oral 575 540 360


 


Output:   


 


  Urine 320 1050 


 


Other:   


 


  Voiding Method   Urinal


 


  # Voids  2 














- Exam


PHYSICAL EXAMINATION: 





GENERAL: 79-year-old  gentleman in no acute distress at the time of my 

examining





HEENT: Head is atraumatic, normocephalic.  Pupils equal, round.  Sclera 

anicteric. Conjunctiva are clear.  Mucous membranes of the mouth are moist.  

Neck is supple.  There is no elevated jugular venous pressure.  No carotid  

bruit is heard.





HEART EXAMINATION: Heart S1 and S2 systolic murmur is heard





CHEST EXAMINATION:lungs  reveal improvement in air entry bilaterally to the 

bases.





ABDOMEN:  Soft, nontender. Bowel sounds are heard. No organomegaly noted.


 


EXTREMITIES: 2+ peripheral pulses with trace evidence of peripheral edema and no

calf tenderness noted.





NEUROLOGIC patient is awake, alert and oriented 3 .


 


.


 











- Labs


CBC & Chem 7: 


                                 02/18/20 17:47





                                 02/22/20 05:27


Labs: 


                  Abnormal Lab Results - Last 24 Hours (Table)











  02/21/20 02/21/20 02/22/20 Range/Units





  17:07 21:17 05:27 


 


BUN    38 H  (9-20)  mg/dL


 


Creatinine    1.58 H  (0.66-1.25)  mg/dL


 


POC Glucose (mg/dL)  217 H  135 H   (75-99)  mg/dL














  02/22/20 02/22/20 Range/Units





  06:23 12:41 


 


BUN    (9-20)  mg/dL


 


Creatinine    (0.66-1.25)  mg/dL


 


POC Glucose (mg/dL)  73 L  161 H  (75-99)  mg/dL














Assessment and Plan


Plan: 


Assessment and plan


#1 systolic congestive heart failure acute on chronic


#2 ischemic cardiomyopathy status post AICD


#3 coronary artery disease with prior bypass surgery


#4 hypertension


#5Diabetes


#6 hyperlipidemia





Plan


We'll give the patient one time dose of IV Lasix today.  Change him over to oral

diuretics increasing his home dose.  Check lytes BUN and creatinine in 3 days.  

He is quite eager to be discharged home later today.  We will make him a follow-

up appointment with Dr. Fleming in the office in one week.


DNP note has been reviewed, I agree with a documented findings and plan of care.

 Patient was seen and examined.

## 2020-03-02 ENCOUNTER — HOSPITAL ENCOUNTER (OUTPATIENT)
Dept: HOSPITAL 47 - EC | Age: 80
Setting detail: OBSERVATION
LOS: 2 days | Discharge: HOME | End: 2020-03-04
Attending: INTERNAL MEDICINE | Admitting: INTERNAL MEDICINE
Payer: MEDICARE

## 2020-03-02 DIAGNOSIS — Z79.899: ICD-10-CM

## 2020-03-02 DIAGNOSIS — Z98.42: ICD-10-CM

## 2020-03-02 DIAGNOSIS — Z92.21: ICD-10-CM

## 2020-03-02 DIAGNOSIS — Z85.51: ICD-10-CM

## 2020-03-02 DIAGNOSIS — E66.9: ICD-10-CM

## 2020-03-02 DIAGNOSIS — K21.9: ICD-10-CM

## 2020-03-02 DIAGNOSIS — I25.5: ICD-10-CM

## 2020-03-02 DIAGNOSIS — Z79.4: ICD-10-CM

## 2020-03-02 DIAGNOSIS — Z95.810: ICD-10-CM

## 2020-03-02 DIAGNOSIS — N28.9: ICD-10-CM

## 2020-03-02 DIAGNOSIS — I11.0: ICD-10-CM

## 2020-03-02 DIAGNOSIS — Z95.1: ICD-10-CM

## 2020-03-02 DIAGNOSIS — Z98.41: ICD-10-CM

## 2020-03-02 DIAGNOSIS — Z80.42: ICD-10-CM

## 2020-03-02 DIAGNOSIS — Z79.01: ICD-10-CM

## 2020-03-02 DIAGNOSIS — E78.5: ICD-10-CM

## 2020-03-02 DIAGNOSIS — E11.9: ICD-10-CM

## 2020-03-02 DIAGNOSIS — Z92.3: ICD-10-CM

## 2020-03-02 DIAGNOSIS — Z79.82: ICD-10-CM

## 2020-03-02 DIAGNOSIS — F31.9: ICD-10-CM

## 2020-03-02 DIAGNOSIS — Z85.528: ICD-10-CM

## 2020-03-02 DIAGNOSIS — R07.89: Primary | ICD-10-CM

## 2020-03-02 DIAGNOSIS — I25.10: ICD-10-CM

## 2020-03-02 DIAGNOSIS — Z79.84: ICD-10-CM

## 2020-03-02 DIAGNOSIS — I48.91: ICD-10-CM

## 2020-03-02 DIAGNOSIS — I50.23: ICD-10-CM

## 2020-03-02 LAB
ALBUMIN SERPL-MCNC: 4.2 G/DL (ref 3.5–5)
ALP SERPL-CCNC: 155 U/L (ref 38–126)
ALT SERPL-CCNC: 29 U/L (ref 4–49)
AMYLASE SERPL-CCNC: 90 U/L (ref 30–110)
ANION GAP SERPL CALC-SCNC: 13 MMOL/L
APTT BLD: 24.8 SEC (ref 22–30)
AST SERPL-CCNC: 31 U/L (ref 17–59)
BASOPHILS # BLD AUTO: 0 K/UL (ref 0–0.2)
BASOPHILS NFR BLD AUTO: 0 %
BUN SERPL-SCNC: 35 MG/DL (ref 9–20)
CALCIUM SPEC-MCNC: 9.3 MG/DL (ref 8.4–10.2)
CHLORIDE SERPL-SCNC: 106 MMOL/L (ref 98–107)
CO2 SERPL-SCNC: 21 MMOL/L (ref 22–30)
EOSINOPHIL # BLD AUTO: 0.2 K/UL (ref 0–0.7)
EOSINOPHIL NFR BLD AUTO: 2 %
ERYTHROCYTE [DISTWIDTH] IN BLOOD BY AUTOMATED COUNT: 4.34 M/UL (ref 4.3–5.9)
ERYTHROCYTE [DISTWIDTH] IN BLOOD: 16.3 % (ref 11.5–15.5)
GLUCOSE BLD-MCNC: 145 MG/DL (ref 75–99)
GLUCOSE BLD-MCNC: 192 MG/DL (ref 75–99)
GLUCOSE BLD-MCNC: 199 MG/DL (ref 75–99)
GLUCOSE BLD-MCNC: 221 MG/DL (ref 75–99)
GLUCOSE SERPL-MCNC: 153 MG/DL (ref 74–99)
HCT VFR BLD AUTO: 34.2 % (ref 39–53)
HGB BLD-MCNC: 10.7 GM/DL (ref 13–17.5)
INR PPP: 1.2 (ref ?–1.2)
LYMPHOCYTES # SPEC AUTO: 0.9 K/UL (ref 1–4.8)
LYMPHOCYTES NFR SPEC AUTO: 8 %
MAGNESIUM SPEC-SCNC: 2 MG/DL (ref 1.6–2.3)
MCH RBC QN AUTO: 24.5 PG (ref 25–35)
MCHC RBC AUTO-ENTMCNC: 31.1 G/DL (ref 31–37)
MCV RBC AUTO: 78.9 FL (ref 80–100)
MONOCYTES # BLD AUTO: 0.7 K/UL (ref 0–1)
MONOCYTES NFR BLD AUTO: 6 %
NEUTROPHILS # BLD AUTO: 9.2 K/UL (ref 1.3–7.7)
NEUTROPHILS NFR BLD AUTO: 82 %
PLATELET # BLD AUTO: 377 K/UL (ref 150–450)
POTASSIUM SERPL-SCNC: 4.9 MMOL/L (ref 3.5–5.1)
PROT SERPL-MCNC: 7.7 G/DL (ref 6.3–8.2)
PT BLD: 12.2 SEC (ref 9–12)
SODIUM SERPL-SCNC: 140 MMOL/L (ref 137–145)
WBC # BLD AUTO: 11.2 K/UL (ref 3.8–10.6)

## 2020-03-02 PROCEDURE — 96376 TX/PRO/DX INJ SAME DRUG ADON: CPT

## 2020-03-02 PROCEDURE — 96375 TX/PRO/DX INJ NEW DRUG ADDON: CPT

## 2020-03-02 PROCEDURE — 83036 HEMOGLOBIN GLYCOSYLATED A1C: CPT

## 2020-03-02 PROCEDURE — 83690 ASSAY OF LIPASE: CPT

## 2020-03-02 PROCEDURE — 82150 ASSAY OF AMYLASE: CPT

## 2020-03-02 PROCEDURE — 76705 ECHO EXAM OF ABDOMEN: CPT

## 2020-03-02 PROCEDURE — 71046 X-RAY EXAM CHEST 2 VIEWS: CPT

## 2020-03-02 PROCEDURE — 83735 ASSAY OF MAGNESIUM: CPT

## 2020-03-02 PROCEDURE — 36415 COLL VENOUS BLD VENIPUNCTURE: CPT

## 2020-03-02 PROCEDURE — 71045 X-RAY EXAM CHEST 1 VIEW: CPT

## 2020-03-02 PROCEDURE — 85730 THROMBOPLASTIN TIME PARTIAL: CPT

## 2020-03-02 PROCEDURE — 96365 THER/PROPH/DIAG IV INF INIT: CPT

## 2020-03-02 PROCEDURE — 85025 COMPLETE CBC W/AUTO DIFF WBC: CPT

## 2020-03-02 PROCEDURE — 80061 LIPID PANEL: CPT

## 2020-03-02 PROCEDURE — 80053 COMPREHEN METABOLIC PANEL: CPT

## 2020-03-02 PROCEDURE — 84484 ASSAY OF TROPONIN QUANT: CPT

## 2020-03-02 PROCEDURE — 93005 ELECTROCARDIOGRAM TRACING: CPT

## 2020-03-02 PROCEDURE — 99285 EMERGENCY DEPT VISIT HI MDM: CPT

## 2020-03-02 PROCEDURE — 96366 THER/PROPH/DIAG IV INF ADDON: CPT

## 2020-03-02 PROCEDURE — 85610 PROTHROMBIN TIME: CPT

## 2020-03-02 PROCEDURE — 83880 ASSAY OF NATRIURETIC PEPTIDE: CPT

## 2020-03-02 PROCEDURE — 80048 BASIC METABOLIC PNL TOTAL CA: CPT

## 2020-03-02 RX ADMIN — SACUBITRIL AND VALSARTAN SCH EACH: 24; 26 TABLET, FILM COATED ORAL at 10:08

## 2020-03-02 RX ADMIN — FUROSEMIDE SCH MG: 10 INJECTION, SOLUTION INTRAMUSCULAR; INTRAVENOUS at 15:00

## 2020-03-02 RX ADMIN — SACUBITRIL AND VALSARTAN SCH EACH: 24; 26 TABLET, FILM COATED ORAL at 23:05

## 2020-03-02 RX ADMIN — INSULIN ASPART SCH UNIT: 100 INJECTION, SOLUTION INTRAVENOUS; SUBCUTANEOUS at 12:18

## 2020-03-02 RX ADMIN — ATORVASTATIN CALCIUM SCH MG: 80 TABLET, FILM COATED ORAL at 08:58

## 2020-03-02 RX ADMIN — HEPARIN SODIUM SCH MLS/HR: 10000 INJECTION, SOLUTION INTRAVENOUS at 09:37

## 2020-03-02 RX ADMIN — INSULIN ASPART SCH UNIT: 100 INJECTION, SOLUTION INTRAVENOUS; SUBCUTANEOUS at 17:13

## 2020-03-02 RX ADMIN — CARVEDILOL SCH MG: 6.25 TABLET, FILM COATED ORAL at 08:58

## 2020-03-02 RX ADMIN — INSULIN ASPART SCH UNIT: 100 INJECTION, SOLUTION INTRAVENOUS; SUBCUTANEOUS at 23:05

## 2020-03-02 RX ADMIN — CARVEDILOL SCH MG: 6.25 TABLET, FILM COATED ORAL at 17:13

## 2020-03-02 NOTE — HP
HISTORY AND PHYSICAL



DATE OF SERVICE:

03/02/2020



CHIEF COMPLAINT:

Chest pain.



HISTORY OF PRESENT ILLNESS:

This 80-year-old gentleman with a past medical history of multiple medical 
issues,

including atrial fibrillation, history of CAD, history of CHF, diabetes 
mellitus, type

2, hyperlipidemia, history of kidney and bladder cancer, history of bipolar 
depression,

being followed by Dr. Duckworth in the outpatient setting, was complaining of chest
pain.

The pain was felt in the anterior part of the chest, feeling tight substernally 
about 3

hours before presentation.  The patient was admitted for evaluation and 
treatment.  The

troponin was found to be 0.03, indeterminate, and EKG showed tachycardia with 
atrial

fibrillation with a fast ventricular rate as well as QS complex and ST-T changes
also.

Cardiology evaluation is in progress.  A gallbladder ultrasound was also done 
which

showed hepatic steatosis.  There is no history of any fever, rigor or chills. No

history of headache, loss of consciousness, seizures.



PAST MEDICAL HISTORY:

History of CAD, history of GERD, history of CHF, diabetes mellitus, type 2, 
history of

bipolar depression.



HOME MEDICATIONS:

1. Hydralazine 25 mg p.o. b.i.d.

2. Glucotrol 5 mg before meals b.i.d.

3. Entresto 24/26 one p.o. b.i.d.

4. Lasix 20 mg p.o. b.i.d.

5. Coreg 6.25 mg b.i.d.

6. Lipitor 80 mg p.o. daily.

7. Ecotrin 160 mg p.o. daily.

8. Metformin 1000 mg p.o. b.i.d.

9. Lamictal 100 mg p.o. daily.

10.Effexor 75 mg p.o. b.i.d.

11.Lantus  units subcutaneously b.i.d.

12.Levemir 70 units subcutaneously b.i.d.

13.NovoLog before meals and at bedtime.

14.Eliquis 5 mg p.o. b.i.d.



ALLERGIES:

NONE.



FAMILY HISTORY:

History of prostate cancer in the family.



SOCIAL HISTORY:

No history of smoking.  No history of alcohol intake.



REVIEW OF SYSTEMS:

ENT: Diminished hearing. Diminished vision.

CARDIOVASCULAR SYSTEM: As mentioned earlier.

RESPIRATORY SYSTEM: As mentioned earlier.

GI: As mentioned earlier.

: No dysuria or retention.

NERVOUS SYSTEM: No numbness, weakness.

ALLERGY/IMMUNOLOGY: No asthma, hayfever.

MUSCULOSKELETAL: As mentioned earlier.

HEMATOLOGY/ONCOLOGY: No history of anemia.

ENDOCRINE: Diabetes mellitus.

CONSTITUTIONAL: As mentioned earlier.

DERMATOLOGY: Negative.

RHEUMATOLOGY: Negative.

PSYCHIATRY: As mentioned earlier.



PHYSICAL EXAMINATION:

Patient alert and oriented x3. Pulse 105, blood pressure 105/71, respiration 24,

temperature 98.1, pulse ox 90% on room air.

HEENT: Conjunctivae normal. Oral mucosa moist.

NECK: No jugular venous distention. No carotid bruit. No lymph node enlargement.

CARDIOVASCULAR SYSTEM: S1, S2 muffled. No S3. No S4.

RESPIRATORY SYSTEM: Breath sounds diminished at the bases.  A few rhonchi. No 
crackles.

ABDOMEN: Soft, non-tender. No mass palpable.

LEGS: No edema. No swelling.

NERVOUS SYSTEM: Higher functions as mentioned earlier. Moves all 4 limbs. No 
focal

motor or sensory deficit.

LYMPHATICS: No lymph node palpable in neck, axillae or groin.

SKIN: No ulcer, rash, bleeding.

JOINTS: No active deforming arthropathy.



LABS/IMAGING:

WBC 11.2, hemoglobin 10.7. INR is 1.2. Sodium 140, potassium 4.9. Creatinine is 
1.4.

Troponins are noted.



The chest x-ray, which was personally reviewed by me, showed evidence of CHF and

cardiomegaly.



Other labs are noted. EKG noted.



ASSESSMENT:

1. Chest pain; possible unstable angina.  Rule out acute 
non-ST-segment-elevation

    myocardial infarction with troponin 0.039.

2. Congestive heart failure with acute on chronic systolic dysfunction, ejection

    fraction less than 20%, with possible cardiomyopathy.

3. Atrial fibrillation with a fast ventricular rate.

4. History of coronary artery disease.

5. History of diabetes mellitus, type 2.

6. History of gastroesophageal reflux disease.

7. Hyperlipidemia.

8. History of kidney and bladder cancer.

9. History of automated implantable cardioverter defibrillator.

10.History of coronary artery disease, coronary artery bypass grafting.

11.History of Port-A-Cath insertion.

12.History of open reduction internal fixation of the ankle.

13.History of bipolar depression.

14.Remote history of nicotine dependence.

15.Obesity with body mass index of 34.9.

16.NO CODE, NO CPR, NO VENT.



RECOMMENDATIONS AND DISCUSSION:

In this 80-year-old gentleman who presented with multiple complex medical 
issues, we

will monitor the patient closely, continue the current medications, continue

symptomatic treatment. Otherwise, we will initiate symptomatic treatment. Resume
the

home medications.  I would also recommend IV Lasix.  Resume the home medication 
once it

is confirmed.  The prognosis is guarded because of multiple complex medical 
issues.

Further recommendations to follow.





DRAKE / LEELEEN: [id number] / Job#: [id number]

JOSHUA

## 2020-03-02 NOTE — XR
EXAMINATION TYPE: XR chest 2V

 

DATE OF EXAM: 3/2/2020

 

COMPARISON: 2/21/2020

 

HISTORY: Follow-up heart failure. Chest pain.

 

TECHNIQUE:

 

FINDINGS: Heart appears enlarged. There is no heart failure. There is coarsening of the lung markings
. There is a left axillary pacemaker. There is no sign of pleural effusion.

 

IMPRESSION: Coarse lung markings suggestive of fibrosis unchanged compared to old exam. No obvious he
art failure. Stable mild cardiomegaly.

## 2020-03-02 NOTE — P.CRDCN
History of Present Illness


Consult date: 20


Requesting physician: Harjeet Tracy


Consult reason: chest pain


Chief complaint: Chest pain


History of present illness: 


This is a pleasant 80-year-old  gentleman who follows regularly with 

Dr. Fleming in the office, just recently he was in the hospital, last month 

admitted from the office for congestive cardiac failure.  He was discharged home

approximately 5 days ago and overall doing well.  Patient has a known history of

coronary artery disease with prior bypass surgery, persistent atrial 

fibrillation, ischemic cardiomyopathy, diabetes, hypertension, hyperlipidemia, 

ischemic cardiomyopathy with prior AICD implantation.  Patient states that since

his discharge home from the hospital he was overall doing quite well, this 

morning the patient woke up around 3 AM, states that he had chest tightness 

across the entire chest.  According to the patient it was quite severe.  He d

enies any heart racing at that time, just having the chest tightness.  No more 

shortness of breath than his usual.  Chest x-ray on presentation here showed 

coarse lung markings suggestive of fibrosis unchanged from prior exam.  His EKG 

on presentation here showed atrial fibrillation with moderately rapid 

ventricular response.  Ultrasound of the gallbladder was performed, sonographic 

findings most commonly related to hepatic steatosis.  Blood pressure on arrival 

here 112/80 with a heart rate of 20, 96% on room air.  Blood pressure this 

morning 120/70 with a heart rate of 1:30, 92% on room air.  White blood cell 

count 11.2, hemoglobin 10.7, platelet count 377.  Sodium 140, potassium 4.9, BUN

35, creatinine 1.4.  BNP level 2180.  Initial troponin 0.039.  At the time of my

examination this morning, the patient is currently chest pain-free.  His lipase 

was noted to be 525 on admission.  Patient was not reinitiated on home 

medications on arrival here.  We will reinitiate all medications, patient's last

dose of Eliquis was last evening.





Past Medical History


Past Medical History: Atrial Fibrillation, Coronary Artery Disease (CAD), 

Cancer, Heart Failure, Diabetes Mellitus, GERD/Reflux, Hyperlipidemia


Additional Past Medical History / Comment(s): See Dr Fleming's H&P, hx 

kidney and bladder cancer- tx with chemo and radiation , "growth on kidney",

history of CABG, cardiomyopathy, AICD implantation ULCER, KIDNEY STONES


History of Any Multi-Drug Resistant Organisms: None Reported


Past Surgical History: Bladder Surgery, Heart Catheterization, Orthopedic 

Surgery


Additional Past Surgical History / Comment(s): Port-a-cath insertion/later rem

ernestina. Bilateral cataract , ORIF R ankle with 2 screws,"scraped the bladder" 

10-18-16 TOTAL RT KNEE,growth on kidney removed


Past Anesthesia/Blood Transfusion Reactions: No Reported Reaction


Additional Past Anesthesia/Blood Transfusion Reaction / Comment(s): Pt has never

recieved blood.


Past Psychological History: Bipolar, Depression


Additional Psychological History / Comment(s): Pt is bipolar and states he does 

well with his medications.


Smoking Status: Unknown if ever smoked


Past Alcohol Use History: None Reported


Additional Past Alcohol Use History / Comment(s): Pt states he started smoking 

at age 16.  He quit sometime in the  or maybe even sooner.  He was less 

than a pack a day smoker.


Past Drug Use History: None Reported





- Past Family History


  ** Father


Family Medical History: Cancer


Additional Family Medical History / Comment(s): prostate





  ** Mother


Family Medical History: No Reported History


Additional Family Medical History / Comment(s): Mother had bowel perforations.  

She  at 88 yrs of age.





  ** Brother(s)


Family Medical History: Cancer





  ** Sister(s)


Family Medical History: Cancer





Medications and Allergies


                                Home Medications











 Medication  Instructions  Recorded  Confirmed  Type


 


Atorvastatin [Lipitor] 80 mg PO DAILY  tab 04/08/15 02/18/20 Rx


 


Insulin Glargine [Lantus] 80 unit SQ BID 19 History


 


Apixaban [Eliquis] 5 mg PO BID 20 History


 


Aspirin EC [Ecotrin Low Dose] 162 mg PO DAILY 20 History


 


Carvedilol [Coreg] 6.25 mg PO BID 20 History


 


hydrALAZINE HCL [Apresoline] 25 mg PO BID 20 History


 


metFORMIN HCL 1,000 mg PO BID 20 History


 


Furosemide [Lasix] 20 mg PO BID@0900,1600 #60 tab 20  Rx


 


INSULIN ASPART (NovoLOG) [NovoLOG 0 unit SQ ACHS  vial 20  Rx





(formulary)]    


 


Insulin Detemir (Levemir) [Levemir] 70 unit SQ BID@0700,2100  syr 20  Rx


 


Sacubitril/Valsartan [Entresto 24 1 each PO BID #60 tablet 20  Rx





mg-26 mg Tablet]    


 


Venlafaxine HCl [Effexor] 75 mg PO BID  tab 20  Rx


 


glipiZIDE [Glucotrol] 5 mg PO AC-BID #0 tab 20  Rx


 


lamoTRIgine [LaMICtal] 100 mg PO DAILY  tab 20  Rx








                                    Allergies











Allergy/AdvReac Type Severity Reaction Status Date / Time


 


No Known Allergies Allergy   Verified 20 02:45














Physical Exam


Vitals: 


                                   Vital Signs











  Temp Pulse Pulse Resp BP BP Pulse Ox


 


 20 07:45  98.1 F   128 H  22   119/75  92 L


 


 20 06:34  97.7 F   128 H  18   130/83  94 L


 


 20 05:45   120 H   18  126/89   98


 


 20 04:03   100   18  119/58   97


 


 20 03:13   89   18  116/60   99


 


 20 03:00   122 H    88/69  


 


 20 02:41  98.4 F  120 H   18  112/84   96








                                Intake and Output











 20





 22:59 06:59 14:59


 


Other:   


 


  Voiding Method   Toilet





   Urinal


 


  Weight  113.398 kg 














PHYSICAL EXAMINATION: 





GENERAL: 79-year-old  gentleman in no acute distress at the time of my 

examining





HEENT: Head is atraumatic, normocephalic.  Pupils equal, round.  Sclera 

anicteric. Conjunctiva are clear.  Mucous membranes of the mouth are moist.  

Neck is supple.  There is no elevated jugular venous pressure.  No carotid  

bruit is heard.





HEART EXAMINATION: Heart S1 and S2 irregularly irregular systolic murmur is 

heard





CHEST EXAMINATION: Lungs are clear with mild diminished air entry to the bases 

posteriorly.  ABDOMEN:  Soft, nontender. Bowel sounds are heard. No organomegaly

 noted.


 


EXTREMITIES: 2+ peripheral pulses with 1+  evidence of peripheral edema and no 

calf tenderness noted.





NEUROLOGIC patient is awake, alert and oriented 3 .





Results





                                 20 02:53





                                 20 02:53


                                 Cardiac Enzymes











  20 Range/Units





  02:53 02:53 


 


AST  31   (17-59)  U/L


 


Troponin I   0.039 H*  (0.000-0.034)  ng/mL








                                   Coagulation











  20 Range/Units





  02:53 


 


PT  12.2 H  (9.0-12.0)  sec


 


APTT  24.8  (22.0-30.0)  sec








                                       CBC











  20 Range/Units





  02:53 


 


WBC  11.2 H  (3.8-10.6)  k/uL


 


RBC  4.34  (4.30-5.90)  m/uL


 


Hgb  10.7 L  (13.0-17.5)  gm/dL


 


Hct  34.2 L  (39.0-53.0)  %


 


Plt Count  377  (150-450)  k/uL








                          Comprehensive Metabolic Panel











  20 Range/Units





  02:53 


 


Sodium  140  (137-145)  mmol/L


 


Potassium  4.9  (3.5-5.1)  mmol/L


 


Chloride  106  ()  mmol/L


 


Carbon Dioxide  21 L  (22-30)  mmol/L


 


BUN  35 H  (9-20)  mg/dL


 


Creatinine  1.48 H  (0.66-1.25)  mg/dL


 


Glucose  153 H  (74-99)  mg/dL


 


Calcium  9.3  (8.4-10.2)  mg/dL


 


AST  31  (17-59)  U/L


 


ALT  29  (4-49)  U/L


 


Alkaline Phosphatase  155 H  ()  U/L


 


Total Protein  7.7  (6.3-8.2)  g/dL


 


Albumin  4.2  (3.5-5.0)  g/dL








                               Current Medications











Generic Name Dose Route Start Last Admin





  Trade Name Freq  PRN Reason Stop Dose Admin


 


Aspirin  325 mg  20 09:00 





  Aspirin  PO  





  DAILY MORGAN  


 


Morphine Sulfate  4 mg  20 03:42 





  Morphine Sulfate (Inj)  IV  





  Q3H PRN  





  Chest Pain  


 


Nitroglycerin  0.4 mg  20 03:42 





  Nitrostat  SUBLINGUAL  





  Q5M PRN  





  Chest Pain  








                                Intake and Output











 20





 22:59 06:59 14:59


 


Other:   


 


  Voiding Method   Toilet





   Urinal


 


  Weight  113.398 kg 








                                        





                                 20 02:53 





                                 20 02:53 











EKG Interpretations (text)





EKG shows atrial fibrillation with rapid ventricular response





Assessment and Plan


Plan: 


Assessment and plan


#1 chest discomfort, rule out possible acute coronary syndrome.  Initial 

troponin 0.039.


#2 atrial fibrillation with rapid ventricular response


#3 patient has history of persistent atrial fibrillation, on Eliquis for 

anticoagulation


#3 coronary artery disease with prior bypass surgery


#4 ischemic cardio myopathy with prior AICD implant


#5 hypertension


#6 diabetes


#7 hyperlipidemia


#8 recent hospital admission for congestive heart failure, systolic, chronic


#9 renal insufficiency








Plan


We will resume the patient's home medications.  His last dose of Eliquis was 

last evening, we will not resume the Eliquis, we will start the patient on IV 

heparin.  Obtain 2 subsequent troponins.  Optimize heart rate control.  Further 

recommendations to follow.  Echocardiogram with Doppler study was performed on 

 which revealed an ejection fraction of less than 20%, moderate 

aortic regurg mild mitral regurg and moderate pulmonary hypertension.





DNP note has been reviewed, I agree with a documented findings and plan of care.

  Patient was seen and examined.

## 2020-03-02 NOTE — ED
General Adult HPI





- General


Source: patient, RN notes reviewed, old records reviewed


Mode of arrival: wheelchair


Limitations: no limitations





<Teo Stubbs - Last Filed: 20 03:53>





<Yarely Carrero - Last Filed: 20 06:37>





- General


Chief complaint: Chest Pain


Stated complaint: Chest Pain


Time Seen by Provider: 20 02:45





- History of Present Illness


Initial comments: 


80-year-old male patient passed history of ablation, coronary disease, status 

post coronary artery bypass graft, cardiomyopathy, AICD presents to ED with 

cheif complaint chest pain.  Patient reports that he felt what he describes as a

knot, tightness substernally.  Reports this began approximately 3 hours prior to

presentation.  Reports a very mild shortness of breath with it.  Patient is 

anticoagulated on xaralto for which he has been complaint.  Denies any other 

complaints at this time.





Systemic: Pt denies fatigue, fever/chills, rash. Pt denies weakness, night 

sweats, weight loss. 


Neuro: Pt denies headache, visual disturbances, syncope or pre-syncope.


HEENT: Pt denies ocular discharge or irritation, otalgia, rhinorrhea, 

pharyngitis or notable lymphadenopathy. 


Cardiopulmonary: Pt denies heart palpitations, dyspnea on exertion.  


Abdominal/GI: Pt denies abdominal pain, n/v/d. 


: Pt denies dysuria, burning w/ urination, frequency/urgency. Denies new onset

urinary or bowel incontinence.  


MSK: Pt denies myalgia, loss of strength or function in extremities. 


Neuro: Pt denies new onset weakness, paresthesias. 


 (Teo Stubbs)





- Related Data


                                Home Medications











 Medication  Instructions  Recorded  Confirmed


 


Insulin Glargine [Lantus] 80 unit SQ BID 19


 


Apixaban [Eliquis] 5 mg PO BID 20


 


Aspirin EC [Ecotrin Low Dose] 162 mg PO DAILY 20


 


Carvedilol [Coreg] 6.25 mg PO BID 20


 


hydrALAZINE HCL [Apresoline] 25 mg PO BID 20


 


metFORMIN HCL 1,000 mg PO BID 20








                                  Previous Rx's











 Medication  Instructions  Recorded


 


Atorvastatin [Lipitor] 80 mg PO DAILY  tab 04/08/15


 


Furosemide [Lasix] 20 mg PO BID@0900,1600 #60 tab 20


 


INSULIN ASPART (NovoLOG) [NovoLOG 0 unit SQ ACHS  vial 20





(formulary)]  


 


Insulin Detemir (Levemir) [Levemir] 70 unit SQ BID@0700,2100  syr 20


 


Sacubitril/Valsartan [Entresto 24 1 each PO BID #60 tablet 20





mg-26 mg Tablet]  


 


Venlafaxine HCl [Effexor] 75 mg PO BID  tab 20


 


glipiZIDE [Glucotrol] 5 mg PO AC-BID #0 tab 20


 


lamoTRIgine [LaMICtal] 100 mg PO DAILY  tab 20











                                    Allergies











Allergy/AdvReac Type Severity Reaction Status Date / Time


 


No Known Allergies Allergy   Verified 20 02:45














Review of Systems


ROS Other: All systems not noted in ROS Statement are negative.





<Teo Stubbs - Last Filed: 20 03:53>


ROS Other: All systems not noted in ROS Statement are negative.





<Yarely Carrero - Last Filed: 20 06:37>


ROS Statement: 


Those systems with pertinent positive or pertinent negative responses have been 

documented in the HPI.








Past Medical History


Past Medical History: Atrial Fibrillation, Coronary Artery Disease (CAD), 

Cancer, Heart Failure, Diabetes Mellitus, GERD/Reflux, Hyperlipidemia


Additional Past Medical History / Comment(s): See Dr Fleming's H&P, hx 

kidney and bladder cancer- tx with chemo and radiation , "growth on kidney",

 history of CABG, cardiomyopathy, AICD implantation


History of Any Multi-Drug Resistant Organisms: None Reported


Past Surgical History: Bladder Surgery, Heart Catheterization, Orthopedic 

Surgery


Additional Past Surgical History / Comment(s): Port-a-cath insertion/later 

removed. Bilateral cataract , ORIF R ankle with 2 screws,"scraped the bladder" 

10-18-16 TOTAL RT KNEE,growth on kidney removed


Past Anesthesia/Blood Transfusion Reactions: No Reported Reaction


Additional Past Anesthesia/Blood Transfusion Reaction / Comment(s): Pt has never

 recieved blood.


Past Psychological History: Bipolar, Depression


Smoking Status: Unknown if ever smoked


Past Alcohol Use History: None Reported


Past Drug Use History: None Reported





- Past Family History


  ** Father


Family Medical History: Cancer


Additional Family Medical History / Comment(s): prostate





  ** Mother


Family Medical History: No Reported History


Additional Family Medical History / Comment(s): Mother had bowel perforations.  

She  at 88 yrs of age.





  ** Brother(s)


Family Medical History: Cancer





  ** Sister(s)


Family Medical History: Cancer





<FernandorichiTeo DARIAN - Last Filed: 20 03:53>





General Exam


Limitations: no limitations





<FernandorichiTeo DARIAN - Last Filed: 20 03:53>





- General Exam Comments


Initial Comments: 


Constitutional: NAD, AOX3, Pt has pleasant affect. 


HEENT: NC/AT, trachea midline, neck supple, no lymphadenopathy. Posterior 

pharynx non erythematous, without exudates. External ears appear normal, without

 discharge. Mucous membranes moist. Eyes PERRLA, EOM intact. There is no scleral

 icterus. No pallor noted. 


Cardiopulmonary: RRR, no murmurs, rubs or gallops, no JVD noted. Lungs CTAB in 

anterior and posterior fields. No peripheral edema. 


Abdominal exam: Abdomen soft and non-distended. Abdomen non-tender to palpation 

in all 4 quadrants. Bowel sounds active in LLQ. No hepatosplenomegaly. No 

ecchymosis


Neuro: CN II-XII grossly intact. No nuchal rigidity. No raccon eyes, no calabrese 

sign, no hemotympanum. No cervical spinal tenderness. 


MSK: No posterior calf tenderness bilaterally, homans sign negative bilaterally.

 Posterior tibialis and radial pulse +2 bilaterally. Sensation intact in upper 

and lower extremities. Full active ROM in upper and lower extremities, 5/5 

stregnth


 (EnocTeo DARIAN)





Course





                                   Vital Signs











  20





  02:41 03:00 03:13


 


Temperature 98.4 F  


 


Pulse Rate 120 H 122 H 89


 


Respiratory 18  18





Rate   


 


Blood Pressure 112/84 88/69 116/60


 


O2 Sat by Pulse 96  99





Oximetry   














  20





  04:03 05:45


 


Temperature  


 


Pulse Rate 100 120 H


 


Respiratory 18 18





Rate  


 


Blood Pressure 119/58 126/89


 


O2 Sat by Pulse 97 98





Oximetry  














Medical Decision Making





- Lab Data


Result diagrams: 


                                 20 02:53





                                 20 02:53





- EKG Data


-: EKG Interpreted by Me (and Dr. Carrero )





<Teo Stubbs - Last Filed: 20 03:53>





- Lab Data


Result diagrams: 


                                 20 02:53





                                 20 02:53





<Yarely Carrero - Last Filed: 20 06:37>





- Medical Decision Making


80-year-old male patient passed history of ablation, coronary disease, status 

post coronary artery bypass graft, cardiomyopathy, AICD presents to ED with 

cheif complaint chest pain.  Patient reports that he felt what he describes as a

 knot, tightness substernally.  Reports this began approximately 3 hours prior 

to presentation.  Reports a very mild shortness of breath with it.  Patient is 

anticoagulated on xaralto for which he has been complaint.  Denies any other 

complaints at this time.  Patient no signs are stable, afebrile.  Physical exam 

didn't display acute pathology.  Laboratory investigations are significant for 

very mild troponin elevation.  Chest x-ray displayed coarse lung markings 

suggestive of fibrosis unchanged to prior.  No obvious heart failure.  EKG is 

nonischemic.  Patient will be admitted for serial troponins, cardiology 

evaluation.  Case discussed with Dr. Carrero. 


 (Teo Stubbs)


Patient was initially seen and evaluated by the PA.  I personally saw and 

evaluated the patient who is complaining of midepigastric and lower retrosternal

 pain.  Pain was not improved with aspirin and nitro and morphine.  He was not 

improved with GI cocktail.  I added on a lipase and amylase to the patient's lab

 and noted that lipase was elevated, and addition patient was treated with 

Dilaudid.  After the lipase resulted with elevation and ultrasound the gallbl

adder was ordered which will be done inpatient (Yarely Carrero)





- Lab Data





                                   Lab Results











  20 Range/Units





  02:53 02:53 02:53 


 


WBC  11.2 H    (3.8-10.6)  k/uL


 


RBC  4.34    (4.30-5.90)  m/uL


 


Hgb  10.7 L    (13.0-17.5)  gm/dL


 


Hct  34.2 L    (39.0-53.0)  %


 


MCV  78.9 L    (80.0-100.0)  fL


 


MCH  24.5 L    (25.0-35.0)  pg


 


MCHC  31.1    (31.0-37.0)  g/dL


 


RDW  16.3 H    (11.5-15.5)  %


 


Plt Count  377    (150-450)  k/uL


 


Neutrophils %  82    %


 


Lymphocytes %  8    %


 


Monocytes %  6    %


 


Eosinophils %  2    %


 


Basophils %  0    %


 


Neutrophils #  9.2 H    (1.3-7.7)  k/uL


 


Lymphocytes #  0.9 L    (1.0-4.8)  k/uL


 


Monocytes #  0.7    (0-1.0)  k/uL


 


Eosinophils #  0.2    (0-0.7)  k/uL


 


Basophils #  0.0    (0-0.2)  k/uL


 


Hypochromasia  Marked    


 


Anisocytosis  Slight    


 


Microcytosis  Slight    


 


PT   12.2 H   (9.0-12.0)  sec


 


INR   1.2 H   (<1.2)  


 


APTT   24.8   (22.0-30.0)  sec


 


Sodium    140  (137-145)  mmol/L


 


Potassium    4.9  (3.5-5.1)  mmol/L


 


Chloride    106  ()  mmol/L


 


Carbon Dioxide    21 L  (22-30)  mmol/L


 


Anion Gap    13  mmol/L


 


BUN    35 H  (9-20)  mg/dL


 


Creatinine    1.48 H  (0.66-1.25)  mg/dL


 


Est GFR (CKD-EPI)AfAm    51  (>60 ml/min/1.73 sqM)  


 


Est GFR (CKD-EPI)NonAf    44  (>60 ml/min/1.73 sqM)  


 


Glucose    153 H  (74-99)  mg/dL


 


Calcium    9.3  (8.4-10.2)  mg/dL


 


Magnesium    2.0  (1.6-2.3)  mg/dL


 


Total Bilirubin    0.6  (0.2-1.3)  mg/dL


 


AST    31  (17-59)  U/L


 


ALT    29  (4-49)  U/L


 


Alkaline Phosphatase    155 H  ()  U/L


 


Troponin I     (0.000-0.034)  ng/mL


 


NT-Pro-B Natriuret Pep     pg/mL


 


Total Protein    7.7  (6.3-8.2)  g/dL


 


Albumin    4.2  (3.5-5.0)  g/dL


 


Amylase     ()  U/L


 


Lipase     ()  U/L














  20 Range/Units





  02:53 02:53 02:53 


 


WBC     (3.8-10.6)  k/uL


 


RBC     (4.30-5.90)  m/uL


 


Hgb     (13.0-17.5)  gm/dL


 


Hct     (39.0-53.0)  %


 


MCV     (80.0-100.0)  fL


 


MCH     (25.0-35.0)  pg


 


MCHC     (31.0-37.0)  g/dL


 


RDW     (11.5-15.5)  %


 


Plt Count     (150-450)  k/uL


 


Neutrophils %     %


 


Lymphocytes %     %


 


Monocytes %     %


 


Eosinophils %     %


 


Basophils %     %


 


Neutrophils #     (1.3-7.7)  k/uL


 


Lymphocytes #     (1.0-4.8)  k/uL


 


Monocytes #     (0-1.0)  k/uL


 


Eosinophils #     (0-0.7)  k/uL


 


Basophils #     (0-0.2)  k/uL


 


Hypochromasia     


 


Anisocytosis     


 


Microcytosis     


 


PT     (9.0-12.0)  sec


 


INR     (<1.2)  


 


APTT     (22.0-30.0)  sec


 


Sodium     (137-145)  mmol/L


 


Potassium     (3.5-5.1)  mmol/L


 


Chloride     ()  mmol/L


 


Carbon Dioxide     (22-30)  mmol/L


 


Anion Gap     mmol/L


 


BUN     (9-20)  mg/dL


 


Creatinine     (0.66-1.25)  mg/dL


 


Est GFR (CKD-EPI)AfAm     (>60 ml/min/1.73 sqM)  


 


Est GFR (CKD-EPI)NonAf     (>60 ml/min/1.73 sqM)  


 


Glucose     (74-99)  mg/dL


 


Calcium     (8.4-10.2)  mg/dL


 


Magnesium     (1.6-2.3)  mg/dL


 


Total Bilirubin     (0.2-1.3)  mg/dL


 


AST     (17-59)  U/L


 


ALT     (4-49)  U/L


 


Alkaline Phosphatase     ()  U/L


 


Troponin I  0.039 H*    (0.000-0.034)  ng/mL


 


NT-Pro-B Natriuret Pep   2180   pg/mL


 


Total Protein     (6.3-8.2)  g/dL


 


Albumin     (3.5-5.0)  g/dL


 


Amylase    90  ()  U/L


 


Lipase    525 H  ()  U/L














- EKG Data


EKG Comments: 


Ventricular rate 116, , QT//478.  Atrial flutter with variabel AV 

block.  No ST elevations or ischemic changes noted. No concern for acute 

ischemia.


 (Teo Stubbs)





Disposition


Is patient prescribed a controlled substance at d/c from ED?: No





<Teo Stubbs - Last Filed: 20 03:53>





<Yarely Carrero - Last Filed: 20 06:37>


Clinical Impression: 


 Chest pain





Disposition: ADMITTED AS IP TO THIS HOSP


Condition: Serious

## 2020-03-02 NOTE — US
EXAMINATION TYPE: US gallbladder

 

DATE OF EXAM: 3/2/2020

 

COMPARISON: CT 3/14/2019

 

CLINICAL HISTORY: pancreatitis. Pain. Right renal tumor removed 2-3 years ago per patient. Difficult 
and limited exam due to patient body habitus and overlying bowel gas

 

EXAM MEASUREMENTS:

 

Liver Length:  18.2 cm   

Gallbladder Wall:  0.2 cm   

CBD:  0.5 cm

Right Kidney:  10.2 x 4.1 x 4.6 cm

 

 

 

Pancreas:  Obscured by bowel gas

Liver: measuring upper limits of normal. There is increased echogenicity of the hepatic parenchyma wi
th diminished visualization of the portal triads most commonly relating to hepatic steatosis and limi
ting evaluation for underlying hepatic masses. 

Gallbladder:  wnl

**Evidence for sonographic Wills's sign:  No

CBD:  wnl as visualized, distal portion obscured by bowel gas  

Right Kidney:  No hydronephrosis or masses seen. Contour defect at the mid to upper pole of the ana
x. 

 

 

 

IMPRESSION: Limited exam secondary to patient body habitus and overlying bowel gas.

1. Sonographic findings most commonly related to hepatic steatosis. Correlate with liver function lashay
ts.

2. Obscuration of the pancreas by overlying bowel gas.

3. Contour defect of the right renal cortex is seen at the mid pole and upper pole however the right 
kidney is suboptimally viewed.

## 2020-03-03 VITALS — RESPIRATION RATE: 20 BRPM

## 2020-03-03 LAB
AMYLASE SERPL-CCNC: 46 U/L (ref 30–110)
ANION GAP SERPL CALC-SCNC: 13 MMOL/L
BASOPHILS # BLD AUTO: 0 K/UL (ref 0–0.2)
BASOPHILS NFR BLD AUTO: 0 %
BUN SERPL-SCNC: 41 MG/DL (ref 9–20)
CALCIUM SPEC-MCNC: 9 MG/DL (ref 8.4–10.2)
CHLORIDE SERPL-SCNC: 107 MMOL/L (ref 98–107)
CHOLEST SERPL-MCNC: 88 MG/DL (ref ?–200)
CO2 SERPL-SCNC: 19 MMOL/L (ref 22–30)
EOSINOPHIL # BLD AUTO: 0.1 K/UL (ref 0–0.7)
EOSINOPHIL NFR BLD AUTO: 1 %
ERYTHROCYTE [DISTWIDTH] IN BLOOD BY AUTOMATED COUNT: 4.23 M/UL (ref 4.3–5.9)
ERYTHROCYTE [DISTWIDTH] IN BLOOD: 16.8 % (ref 11.5–15.5)
GLUCOSE BLD-MCNC: 214 MG/DL (ref 75–99)
GLUCOSE BLD-MCNC: 251 MG/DL (ref 75–99)
GLUCOSE BLD-MCNC: 79 MG/DL (ref 75–99)
GLUCOSE BLD-MCNC: 86 MG/DL (ref 75–99)
GLUCOSE SERPL-MCNC: 72 MG/DL (ref 74–99)
HBA1C MFR BLD: 7.6 % (ref 4–6)
HCT VFR BLD AUTO: 33.7 % (ref 39–53)
HDLC SERPL-MCNC: 31 MG/DL (ref 40–60)
HGB BLD-MCNC: 10.1 GM/DL (ref 13–17.5)
LDLC SERPL CALC-MCNC: 44 MG/DL (ref 0–99)
LYMPHOCYTES # SPEC AUTO: 0.9 K/UL (ref 1–4.8)
LYMPHOCYTES NFR SPEC AUTO: 7 %
MCH RBC QN AUTO: 23.9 PG (ref 25–35)
MCHC RBC AUTO-ENTMCNC: 30.1 G/DL (ref 31–37)
MCV RBC AUTO: 79.6 FL (ref 80–100)
MONOCYTES # BLD AUTO: 1.3 K/UL (ref 0–1)
MONOCYTES NFR BLD AUTO: 10 %
NEUTROPHILS # BLD AUTO: 10.1 K/UL (ref 1.3–7.7)
NEUTROPHILS NFR BLD AUTO: 80 %
PLATELET # BLD AUTO: 300 K/UL (ref 150–450)
POTASSIUM SERPL-SCNC: 4.7 MMOL/L (ref 3.5–5.1)
SODIUM SERPL-SCNC: 139 MMOL/L (ref 137–145)
TRIGL SERPL-MCNC: 67 MG/DL (ref ?–150)
WBC # BLD AUTO: 12.6 K/UL (ref 3.8–10.6)

## 2020-03-03 RX ADMIN — FUROSEMIDE SCH MG: 40 TABLET ORAL at 16:53

## 2020-03-03 RX ADMIN — APIXABAN SCH MG: 5 TABLET, FILM COATED ORAL at 21:06

## 2020-03-03 RX ADMIN — PANTOPRAZOLE SODIUM SCH MG: 40 TABLET, DELAYED RELEASE ORAL at 07:38

## 2020-03-03 RX ADMIN — CARVEDILOL SCH MG: 6.25 TABLET, FILM COATED ORAL at 07:38

## 2020-03-03 RX ADMIN — SACUBITRIL AND VALSARTAN SCH EACH: 24; 26 TABLET, FILM COATED ORAL at 21:06

## 2020-03-03 RX ADMIN — INSULIN ASPART SCH: 100 INJECTION, SOLUTION INTRAVENOUS; SUBCUTANEOUS at 07:34

## 2020-03-03 RX ADMIN — INSULIN ASPART SCH UNIT: 100 INJECTION, SOLUTION INTRAVENOUS; SUBCUTANEOUS at 21:06

## 2020-03-03 RX ADMIN — HEPARIN SODIUM SCH: 10000 INJECTION, SOLUTION INTRAVENOUS at 14:34

## 2020-03-03 RX ADMIN — ISOSORBIDE MONONITRATE SCH: 30 TABLET, EXTENDED RELEASE ORAL at 19:41

## 2020-03-03 RX ADMIN — INSULIN ASPART SCH: 100 INJECTION, SOLUTION INTRAVENOUS; SUBCUTANEOUS at 11:38

## 2020-03-03 RX ADMIN — FUROSEMIDE SCH MG: 10 INJECTION, SOLUTION INTRAMUSCULAR; INTRAVENOUS at 07:38

## 2020-03-03 RX ADMIN — INSULIN ASPART SCH UNIT: 100 INJECTION, SOLUTION INTRAVENOUS; SUBCUTANEOUS at 16:53

## 2020-03-03 RX ADMIN — ATORVASTATIN CALCIUM SCH MG: 80 TABLET, FILM COATED ORAL at 08:25

## 2020-03-03 RX ADMIN — CARVEDILOL SCH MG: 6.25 TABLET, FILM COATED ORAL at 16:53

## 2020-03-03 RX ADMIN — SACUBITRIL AND VALSARTAN SCH EACH: 24; 26 TABLET, FILM COATED ORAL at 08:25

## 2020-03-03 RX ADMIN — APIXABAN SCH MG: 5 TABLET, FILM COATED ORAL at 11:40

## 2020-03-03 RX ADMIN — FUROSEMIDE SCH: 10 INJECTION, SOLUTION INTRAMUSCULAR; INTRAVENOUS at 07:33

## 2020-03-03 NOTE — P.PN
Subjective


Progress Note Date: 03/03/20





This is a pleasant 80-year-old  gentleman who follows regularly with 

Dr. Fleming in the office, just recently he was in the hospital, last month 

admitted from the office for congestive cardiac failure.  He was discharged home

approximately 5 days ago and overall doing well.  Patient has a known history of

coronary artery disease with prior bypass surgery, persistent atrial 

fibrillation, ischemic cardiomyopathy, diabetes, hypertension, hyperlipidemia, 

ischemic cardiomyopathy with prior AICD implantation.  Patient states that since

his discharge home from the hospital he was overall doing quite well, this 

morning the patient woke up around 3 AM, states that he had chest tightness 

across the entire chest.  According to the patient it was quite severe.  He 

denies any heart racing at that time, just having the chest tightness.  No more 

shortness of breath than his usual.  Chest x-ray on presentation here showed 

coarse lung markings suggestive of fibrosis unchanged from prior exam.  His EKG 

on presentation here showed atrial fibrillation with moderately rapid 

ventricular response.  Ultrasound of the gallbladder was performed, sonographic 

findings most commonly related to hepatic steatosis.  Blood pressure on arrival 

here 112/80 with a heart rate of 20, 96% on room air.  Blood pressure this 

morning 120/70 with a heart rate of 1:30, 92% on room air.  White blood cell 

count 11.2, hemoglobin 10.7, platelet count 377.  Sodium 140, potassium 4.9, BUN

35, creatinine 1.4.  BNP level 2180.  Initial troponin 0.039.  At the time of my

examination this morning, the patient is currently chest pain-free.  His lipase 

was noted to be 525 on admission.  Patient was not reinitiated on home 

medications on arrival here.  We will reinitiate all medications, patient's last

dose of Eliquis was last evening.








03/03/2020


Patient seen and examined this morning, he feels well, breathing is stable, 

denies any further chest discomfort.  Blood pressure 122/60 with a heart rate in

the 90s, 97% on 2 L of oxygen.  White blood cell count 12.6, hemoglobin 10.1, 

platelet count 300.  Sodium 139, potassium 4.7, BUN 41 and creatinine 1.5.  

Troponins 0.039, 0.031, 0.027.  We will discontinue the IV Lasix and start the 

patient on oral diuretics.  Discontinue the IV heparin today plan for possible d

ischarge home in the morning.





Objective





- Vital Signs


Vital signs: 


                                   Vital Signs











Temp  98.6 F   03/03/20 08:00


 


Pulse  113 H  03/03/20 08:00


 


Resp  20   03/03/20 08:00


 


BP  122/67   03/03/20 08:00


 


Pulse Ox  97   03/03/20 08:00








                                 Intake & Output











 03/02/20 03/03/20 03/03/20





 18:59 06:59 18:59


 


Intake Total 500 30 10


 


Output Total   200


 


Balance 500 30 -190


 


Weight  113.9 kg 


 


Intake:   


 


  IV 20 30 10


 


    Invasive Line 2 20 30 10


 


  Oral 480  


 


Output:   


 


  Urine   200


 


Other:   


 


  Voiding Method Urinal Urinal Urinal


 


  # Voids 1 1 














- Exam





PHYSICAL EXAMINATION: 





GENERAL: 79-year-old  gentleman in no acute distress at the time of my 

examining





HEENT: Head is atraumatic, normocephalic.  Pupils equal, round.  Sclera 

anicteric. Conjunctiva are clear.  Mucous membranes of the mouth are moist.  

Neck is supple.  There is no elevated jugular venous pressure.  No carotid  

bruit is heard.





HEART EXAMINATION: Heart S1 and S2 irregularly irregular systolic murmur is 

heard





CHEST EXAMINATION: Lungs are clear to auscultation . 





 ABDOMEN:  Soft, nontender. Bowel sounds are heard. No organomegaly noted.


 


EXTREMITIES: 2+ peripheral pulses with trace  evidence of peripheral edema and 

no calf tenderness noted.





NEUROLOGIC patient is awake, alert and oriented 3 .





- Labs


CBC & Chem 7: 


                                 03/03/20 05:57





                                 03/03/20 05:57


Labs: 


                  Abnormal Lab Results - Last 24 Hours (Table)











  03/02/20 03/02/20 03/02/20 Range/Units





  12:12 16:35 21:15 


 


WBC     (3.8-10.6)  k/uL


 


RBC     (4.30-5.90)  m/uL


 


Hgb     (13.0-17.5)  gm/dL


 


Hct     (39.0-53.0)  %


 


MCV     (80.0-100.0)  fL


 


MCH     (25.0-35.0)  pg


 


MCHC     (31.0-37.0)  g/dL


 


RDW     (11.5-15.5)  %


 


Neutrophils #     (1.3-7.7)  k/uL


 


Lymphocytes #     (1.0-4.8)  k/uL


 


Monocytes #     (0-1.0)  k/uL


 


Carbon Dioxide     (22-30)  mmol/L


 


BUN     (9-20)  mg/dL


 


Creatinine     (0.66-1.25)  mg/dL


 


Glucose     (74-99)  mg/dL


 


POC Glucose (mg/dL)  145 H  192 H  221 H  (75-99)  mg/dL


 


HDL Cholesterol     (40-60)  mg/dL














  03/03/20 03/03/20 Range/Units





  05:57 05:57 


 


WBC   12.6 H  (3.8-10.6)  k/uL


 


RBC   4.23 L  (4.30-5.90)  m/uL


 


Hgb   10.1 L  (13.0-17.5)  gm/dL


 


Hct   33.7 L  (39.0-53.0)  %


 


MCV   79.6 L  (80.0-100.0)  fL


 


MCH   23.9 L  (25.0-35.0)  pg


 


MCHC   30.1 L  (31.0-37.0)  g/dL


 


RDW   16.8 H  (11.5-15.5)  %


 


Neutrophils #   10.1 H  (1.3-7.7)  k/uL


 


Lymphocytes #   0.9 L  (1.0-4.8)  k/uL


 


Monocytes #   1.3 H  (0-1.0)  k/uL


 


Carbon Dioxide  19 L   (22-30)  mmol/L


 


BUN  41 H   (9-20)  mg/dL


 


Creatinine  1.55 H   (0.66-1.25)  mg/dL


 


Glucose  72 L   (74-99)  mg/dL


 


POC Glucose (mg/dL)    (75-99)  mg/dL


 


HDL Cholesterol  31 L   (40-60)  mg/dL














Assessment and Plan


Plan: 


Assessment and plan


#1 chest discomfort, rule out possible acute coronary syndrome. 


#2 atrial fibrillation with rapid ventricular response


#3 patient has history of persistent atrial fibrillation, on Eliquis for 

anticoagulation


#3 coronary artery disease with prior bypass surgery


#4 ischemic cardio myopathy with prior AICD implant


#5 hypertension


#6 diabetes


#7 hyperlipidemia


#8 recent hospital admission for congestive heart failure, systolic, chronic


#9 renal insufficiency








Plan


We will discontinue the IV Lasix today for the patient on Lasix 40 mg by mouth 

twice a day.  Discontinue IV heparin.  Start the patient on Imdur 30 mg daily.  

You to monitor the patient for 24 hours and plan for possible discharge home in 

the morning if stable.


DNP note has been reviewed, I agree with a documented findings and plan of care.

 Patient was seen and examined.

## 2020-03-03 NOTE — PN
PROGRESS NOTE



DATE OF SERVICE:

03/03/2020



This 80-year-old gentleman admitted with chest pain also has history of cardiomyopathy

and CHF. Cardiology is following the patient closely.  IV Lasix has been initiated.

The IV heparin has been discontinued at this time.



PHYSICAL EXAMINATION:

Patient is alert, oriented x3.  Pulse 74, blood pressure 94/56, respiration 22,

temperature 98.6, pulse ox 94% on 2 L.

HEENT: Conjunctivae normal.

NECK: No jugular venous distention.

CARDIOVASCULAR SYSTEM:  S1, S2 muffled.

RESPIRATORY SYSTEM: Breath sounds diminished at the bases.  Bilateral scattered rhonchi

and crackles.

ABDOMEN: Soft, non-tender.

LEGS: Bilateral leg edema.

NERVOUS SYSTEM: Diffusely weak.



LABS:

WBC 12.6, hemoglobin 10.1, sodium 139, potassium 4.7.  Accu-Cheks 72.



ASSESSMENT:

1. Chest pain; possible unstable angina.  Rule out acute non-ST-segment-elevation

    myocardial infarction with troponin 0.039, indeterminate.

2. Congestive heart failure with acute on chronic systolic dysfunction, ejection

    fraction 20%, with possible cardiomyopathy.

3. Atrial fibrillation with fast ventricular rate.

4. History of coronary artery disease.

5. Diabetes mellitus, type 2.

6. Gastroesophageal reflux disease.

7. Hyperlipidemia.

8. History of kidney and bladder cancer.

9. History of automated implantable cardioverter defibrillator.

10.History of coronary artery disease, coronary artery bypass grafting.

11.History of Port-A-Cath insertion.

12.History of open reduction internal fixation of the ankle.

13.History of bipolar depression.

14.Remote history of nicotine dependence.

15.Obesity with body mass index of 34.9.

16.NO CODE, NO CPR, NO VENT.



RECOMMENDATIONS AND DISCUSSION:

I recommend to continue current medications, continue with symptomatic treatment.

Continue with IV Lasix.  Monitor fluid/electrolyte balance closely.  Gallbladder

ultrasound has been done which showed no significant findings at this time. I will

repeat a chest x-ray and evaluate the fluid balance once more and continue to monitor.

Closely follow with Cardiology.  Further recommendations to follow.





MMODL / LEELEEN: [id number] / Job#: [id number]

## 2020-03-04 VITALS — TEMPERATURE: 98.3 F | HEART RATE: 102 BPM | SYSTOLIC BLOOD PRESSURE: 104 MMHG | DIASTOLIC BLOOD PRESSURE: 64 MMHG

## 2020-03-04 LAB
ANION GAP SERPL CALC-SCNC: 12 MMOL/L
BASOPHILS # BLD AUTO: 0 K/UL (ref 0–0.2)
BASOPHILS NFR BLD AUTO: 0 %
BUN SERPL-SCNC: 51 MG/DL (ref 9–20)
CALCIUM SPEC-MCNC: 8.6 MG/DL (ref 8.4–10.2)
CHLORIDE SERPL-SCNC: 106 MMOL/L (ref 98–107)
CO2 SERPL-SCNC: 18 MMOL/L (ref 22–30)
EOSINOPHIL # BLD AUTO: 0.2 K/UL (ref 0–0.7)
EOSINOPHIL NFR BLD AUTO: 3 %
ERYTHROCYTE [DISTWIDTH] IN BLOOD BY AUTOMATED COUNT: 4.08 M/UL (ref 4.3–5.9)
ERYTHROCYTE [DISTWIDTH] IN BLOOD: 16.7 % (ref 11.5–15.5)
GLUCOSE BLD-MCNC: 137 MG/DL (ref 75–99)
GLUCOSE BLD-MCNC: 233 MG/DL (ref 75–99)
GLUCOSE SERPL-MCNC: 130 MG/DL (ref 74–99)
HCT VFR BLD AUTO: 32.1 % (ref 39–53)
HGB BLD-MCNC: 9.9 GM/DL (ref 13–17.5)
LYMPHOCYTES # SPEC AUTO: 0.9 K/UL (ref 1–4.8)
LYMPHOCYTES NFR SPEC AUTO: 10 %
MCH RBC QN AUTO: 24.3 PG (ref 25–35)
MCHC RBC AUTO-ENTMCNC: 30.8 G/DL (ref 31–37)
MCV RBC AUTO: 78.8 FL (ref 80–100)
MONOCYTES # BLD AUTO: 0.9 K/UL (ref 0–1)
MONOCYTES NFR BLD AUTO: 11 %
NEUTROPHILS # BLD AUTO: 6.4 K/UL (ref 1.3–7.7)
NEUTROPHILS NFR BLD AUTO: 73 %
PLATELET # BLD AUTO: 299 K/UL (ref 150–450)
POTASSIUM SERPL-SCNC: 4.5 MMOL/L (ref 3.5–5.1)
SODIUM SERPL-SCNC: 136 MMOL/L (ref 137–145)
WBC # BLD AUTO: 8.7 K/UL (ref 3.8–10.6)

## 2020-03-04 RX ADMIN — CARVEDILOL SCH MG: 6.25 TABLET, FILM COATED ORAL at 06:32

## 2020-03-04 RX ADMIN — INSULIN ASPART SCH UNIT: 100 INJECTION, SOLUTION INTRAVENOUS; SUBCUTANEOUS at 12:58

## 2020-03-04 RX ADMIN — ISOSORBIDE MONONITRATE SCH MG: 30 TABLET, EXTENDED RELEASE ORAL at 09:46

## 2020-03-04 RX ADMIN — PANTOPRAZOLE SODIUM SCH MG: 40 TABLET, DELAYED RELEASE ORAL at 06:32

## 2020-03-04 RX ADMIN — ATORVASTATIN CALCIUM SCH MG: 80 TABLET, FILM COATED ORAL at 09:46

## 2020-03-04 RX ADMIN — APIXABAN SCH MG: 5 TABLET, FILM COATED ORAL at 09:46

## 2020-03-04 RX ADMIN — INSULIN ASPART SCH: 100 INJECTION, SOLUTION INTRAVENOUS; SUBCUTANEOUS at 06:24

## 2020-03-04 RX ADMIN — FUROSEMIDE SCH MG: 40 TABLET ORAL at 09:45

## 2020-03-04 RX ADMIN — SACUBITRIL AND VALSARTAN SCH EACH: 24; 26 TABLET, FILM COATED ORAL at 09:45

## 2020-03-04 NOTE — DS
DISCHARGE SUMMARY



FINAL DIAGNOSES:

1. Chest pain possible unstable angina.  Myocardial infarction ruled out.

2. Troponin 0.03 indeterminate.

3. Congestive heart failure with acute on chronic systolic dysfunction, ejection

    fraction 20% with possible cardiomyopathy.

4. Atrial fibrillation with fast ventricular rate.

5. History of coronary artery disease.

6. Diabetes mellitus type 2.

7. Gastroesophageal reflux disease.

8. Hyperlipidemia.

9. History of kidney and bladder cancer.

10.History of AICD.

11.History of coronary artery disease, coronary artery bypass grafting.

12.History of Port-A-Cath insertion.

13.History ORIF of the ankle.

14.History of bipolar depression.

15.Remote history of nicotine dependence.

16.Obesity with body mass of 34.9.

17.NO CODE, NO CPR, NO VENT.



DISCHARGE DISPOSITION:

The patient is being discharged in stable with guarded prognosis.  Discharge cleared by

Cardiology.



HISTORY OF PRESENT ILLNESS:

This 80-year-old gentleman with a past medical history of multiple medical problems

admitted to the hospital with chest pain.  Troponins are indeterminate, around 0.039.

Cardiology saw the patient and recommended medical management at this time and follow

up with Cardiology in the outpatient setting BUN and creatinine to be followed up in

the outpatient closely.



Otherwise, the patient being discharged in stable condition.  Guarded prognosis.  The

most recent chest x-ray done today which was personally reviewed by me showed some CHF

also.



DISCHARGE ADVICE AND MEDICATIONS:

1. Diet is cardiac diet.

2. Activity limited until followup.

3. Follow up with Dr. Duckworth in 2-3 days.

4. Follow up with cardiologist as mentioned.

5. Followup chest x-ray and evaluation per primary and Cardiology.



DISCHARGE MEDICATIONS:

1. Apresoline 25 mg p.o. b.i.d.

2. Eliquis 5 mg p.o. b.i.d.

3. Lantus 40 units subcu b.i.d.

4. Metformin 1000 mg p.o. b.i.d.

5. Aspirin 81 mg p.o. daily.

6. Coreg 6.25 mg b.i.d.

7. Effexor 75 mg p.o. b.i.d.

8. Entresto 24/26 1 p.o. b.i.d.

9. Glucotrol 5 mg b.i.d.

10.Imdur ER 30 mg p.o. daily.

11.Lamictal 100 mg p.o. daily.

12.Lasix 40 mg p.o. b.i.d.

13.Lipitor 80 mg p.o. daily.

Once again the patient is being discharged in stable condition with guarded prognosis.





MMGEOVANNA / IJN: [id number] / Job#: [id number]

## 2020-03-04 NOTE — P.PN
Subjective


Progress Note Date: 03/04/20





This is a pleasant 80-year-old  gentleman who follows regularly with 

Dr. Fleming in the office, just recently he was in the hospital, last month 

admitted from the office for congestive cardiac failure.  He was discharged home

approximately 5 days ago and overall doing well.  Patient has a known history of

coronary artery disease with prior bypass surgery, persistent atrial 

fibrillation, ischemic cardiomyopathy, diabetes, hypertension, hyperlipidemia, 

ischemic cardiomyopathy with prior AICD implantation.  Patient states that since

his discharge home from the hospital he was overall doing quite well, this 

morning the patient woke up around 3 AM, states that he had chest tightness 

across the entire chest.  According to the patient it was quite severe.  He 

denies any heart racing at that time, just having the chest tightness.  No more 

shortness of breath than his usual.  Chest x-ray on presentation here showed 

coarse lung markings suggestive of fibrosis unchanged from prior exam.  His EKG 

on presentation here showed atrial fibrillation with moderately rapid 

ventricular response.  Ultrasound of the gallbladder was performed, sonographic 

findings most commonly related to hepatic steatosis.  Blood pressure on arrival 

here 112/80 with a heart rate of 20, 96% on room air.  Blood pressure this 

morning 120/70 with a heart rate of 1:30, 92% on room air.  White blood cell 

count 11.2, hemoglobin 10.7, platelet count 377.  Sodium 140, potassium 4.9, BUN

35, creatinine 1.4.  BNP level 2180.  Initial troponin 0.039.  At the time of my

examination this morning, the patient is currently chest pain-free.  His lipase 

was noted to be 525 on admission.  Patient was not reinitiated on home 

medications on arrival here.  We will reinitiate all medications, patient's last

dose of Eliquis was last evening.








03/03/2020


Patient seen and examined this morning, he feels well, breathing is stable, 

denies any further chest discomfort.  Blood pressure 122/60 with a heart rate in

the 90s, 97% on 2 L of oxygen.  White blood cell count 12.6, hemoglobin 10.1, 

platelet count 300.  Sodium 139, potassium 4.7, BUN 41 and creatinine 1.5.  

Troponins 0.039, 0.031, 0.027.  We will discontinue the IV Lasix and start the 

patient on oral diuretics.  Discontinue the IV heparin today plan for possible d

ischarge home in the morning.








03/04/2020


Patient seen and examined this morning sitting up in the chair at bedside.  

Breathing is stable.  Denies any chest discomfort.  Hemodynamically stable.  

Blood pressure 103/56 with a heart rate in the 60s, 99% on room air.  BUN today 

is 51 with a creatinine of 1.8.





Objective





- Vital Signs


Vital signs: 


                                   Vital Signs











Temp  98.2 F   03/04/20 08:00


 


Pulse  60   03/04/20 08:00


 


Resp  20   03/04/20 08:00


 


BP  102/56   03/04/20 08:00


 


Pulse Ox  99   03/04/20 08:00








                                 Intake & Output











 03/03/20 03/04/20 03/04/20





 18:59 06:59 18:59


 


Intake Total 370 100 240


 


Output Total 200  


 


Balance 170 100 240


 


Weight  112.6 kg 


 


Intake:   


 


  IV 10  


 


    Invasive Line 2 10  


 


  Oral 360 100 240


 


Output:   


 


  Urine 200  


 


Other:   


 


  Voiding Method Urinal Urinal Urinal


 


  # Voids 1 0 














- Exam





PHYSICAL EXAMINATION: 





GENERAL: 79-year-old  gentleman in no acute distress at the time of my 

examining





HEENT: Head is atraumatic, normocephalic.  Pupils equal, round.  Sclera 

anicteric. Conjunctiva are clear.  Mucous membranes of the mouth are moist.  

Neck is supple.  There is no elevated jugular venous pressure.  No carotid  

bruit is heard.





HEART EXAMINATION: Heart S1 and S2 irregularly irregular systolic murmur is 

heard





CHEST EXAMINATION: Lungs are clear to auscultation . 





 ABDOMEN:  Soft, nontender. Bowel sounds are heard. No organomegaly noted.


 


EXTREMITIES: 2+ peripheral pulses with trace  evidence of peripheral edema and 

no calf tenderness noted.





NEUROLOGIC patient is awake, alert and oriented 3 .





- Labs


CBC & Chem 7: 


                                 03/04/20 05:32





                                 03/04/20 05:32


Labs: 


                  Abnormal Lab Results - Last 24 Hours (Table)











  03/03/20 03/03/20 03/03/20 Range/Units





  05:57 16:24 20:35 


 


RBC     (4.30-5.90)  m/uL


 


Hgb     (13.0-17.5)  gm/dL


 


Hct     (39.0-53.0)  %


 


MCV     (80.0-100.0)  fL


 


MCH     (25.0-35.0)  pg


 


MCHC     (31.0-37.0)  g/dL


 


RDW     (11.5-15.5)  %


 


Lymphocytes #     (1.0-4.8)  k/uL


 


Sodium     (137-145)  mmol/L


 


Carbon Dioxide     (22-30)  mmol/L


 


BUN     (9-20)  mg/dL


 


Creatinine     (0.66-1.25)  mg/dL


 


Glucose     (74-99)  mg/dL


 


POC Glucose (mg/dL)   251 H  214 H  (75-99)  mg/dL


 


Hemoglobin A1c  7.6 H    (4.0-6.0)  %














  03/04/20 03/04/20 03/04/20 Range/Units





  05:32 05:32 06:12 


 


RBC  4.08 L    (4.30-5.90)  m/uL


 


Hgb  9.9 L    (13.0-17.5)  gm/dL


 


Hct  32.1 L    (39.0-53.0)  %


 


MCV  78.8 L    (80.0-100.0)  fL


 


MCH  24.3 L    (25.0-35.0)  pg


 


MCHC  30.8 L    (31.0-37.0)  g/dL


 


RDW  16.7 H    (11.5-15.5)  %


 


Lymphocytes #  0.9 L    (1.0-4.8)  k/uL


 


Sodium   136 L   (137-145)  mmol/L


 


Carbon Dioxide   18 L   (22-30)  mmol/L


 


BUN   51 H   (9-20)  mg/dL


 


Creatinine   1.80 H   (0.66-1.25)  mg/dL


 


Glucose   130 H   (74-99)  mg/dL


 


POC Glucose (mg/dL)    137 H  (75-99)  mg/dL


 


Hemoglobin A1c     (4.0-6.0)  %














Assessment and Plan


Plan: 


Assessment and plan


#1 chest discomfort, rule out possible acute coronary syndrome. 


#2 atrial fibrillation with rapid ventricular response


#3 patient has history of persistent atrial fibrillation, on Eliquis for 

anticoagulation


#3 coronary artery disease with prior bypass surgery


#4 ischemic cardio myopathy with prior AICD implant


#5 hypertension


#6 diabetes


#7 hyperlipidemia


#8 recent hospital admission for congestive heart failure, systolic, chronic


#9 renal insufficiency








Plan





Cardiology's perspective, patient may be able to be discharged home today.  

We'll make him a follow-up appointment to see Dr. Fleming in the office post

discharge.  We will also order lytes BUN and creatinine in the next 3 days.


DNP note has been reviewed, I agree with a documented findings and plan of care.

 Patient was seen and examined.

## 2020-03-04 NOTE — XR
EXAMINATION TYPE: XR chest 1V portable

 

DATE OF EXAM: 3/4/2020

 

COMPARISON: 3/2/2020

 

HISTORY: Congestive heart failure. Follow-up exam.

 

TECHNIQUE: Single frontal view of the chest is obtained.

 

FINDINGS:  There are persistent patchy opacities in the right lateral lower lung and overlying the le
ft heart border. Cardiomediastinal silhouette is enlarged with post CABG changes and single lead left
-sided cardiac device. No sizable pneumothorax. Trace pleural effusions. No acute osseous pathology.

 

IMPRESSION:  Stable basilar opacities in comparison to 3/2/2020 that may represent atelectasis or con
fluent pulmonary edema. Trace pleural effusions also remain.

## 2020-04-05 ENCOUNTER — HOSPITAL ENCOUNTER (INPATIENT)
Dept: HOSPITAL 47 - EC | Age: 80
LOS: 16 days | Discharge: NURSING, REHAB OR LONG-TERM CARE | DRG: 291 | End: 2020-04-21
Attending: HOSPITALIST | Admitting: HOSPITALIST
Payer: MEDICARE

## 2020-04-05 VITALS — BODY MASS INDEX: 35.3 KG/M2

## 2020-04-05 DIAGNOSIS — Z95.810: ICD-10-CM

## 2020-04-05 DIAGNOSIS — I25.10: ICD-10-CM

## 2020-04-05 DIAGNOSIS — Z85.528: ICD-10-CM

## 2020-04-05 DIAGNOSIS — Z66: ICD-10-CM

## 2020-04-05 DIAGNOSIS — Z98.41: ICD-10-CM

## 2020-04-05 DIAGNOSIS — Z98.890: ICD-10-CM

## 2020-04-05 DIAGNOSIS — Z92.21: ICD-10-CM

## 2020-04-05 DIAGNOSIS — Z79.2: ICD-10-CM

## 2020-04-05 DIAGNOSIS — K21.9: ICD-10-CM

## 2020-04-05 DIAGNOSIS — E66.9: ICD-10-CM

## 2020-04-05 DIAGNOSIS — Z80.8: ICD-10-CM

## 2020-04-05 DIAGNOSIS — K26.4: ICD-10-CM

## 2020-04-05 DIAGNOSIS — F17.210: ICD-10-CM

## 2020-04-05 DIAGNOSIS — Z79.82: ICD-10-CM

## 2020-04-05 DIAGNOSIS — Z87.442: ICD-10-CM

## 2020-04-05 DIAGNOSIS — I13.0: Primary | ICD-10-CM

## 2020-04-05 DIAGNOSIS — Z79.01: ICD-10-CM

## 2020-04-05 DIAGNOSIS — N18.3: ICD-10-CM

## 2020-04-05 DIAGNOSIS — Z79.899: ICD-10-CM

## 2020-04-05 DIAGNOSIS — Z79.4: ICD-10-CM

## 2020-04-05 DIAGNOSIS — I48.21: ICD-10-CM

## 2020-04-05 DIAGNOSIS — Z85.51: ICD-10-CM

## 2020-04-05 DIAGNOSIS — I50.23: ICD-10-CM

## 2020-04-05 DIAGNOSIS — T50.2X5A: ICD-10-CM

## 2020-04-05 DIAGNOSIS — K29.60: ICD-10-CM

## 2020-04-05 DIAGNOSIS — N39.0: ICD-10-CM

## 2020-04-05 DIAGNOSIS — Z98.42: ICD-10-CM

## 2020-04-05 DIAGNOSIS — Z83.79: ICD-10-CM

## 2020-04-05 DIAGNOSIS — E87.4: ICD-10-CM

## 2020-04-05 DIAGNOSIS — D62: ICD-10-CM

## 2020-04-05 DIAGNOSIS — E86.0: ICD-10-CM

## 2020-04-05 DIAGNOSIS — Z80.42: ICD-10-CM

## 2020-04-05 DIAGNOSIS — Z95.1: ICD-10-CM

## 2020-04-05 DIAGNOSIS — Z90.5: ICD-10-CM

## 2020-04-05 DIAGNOSIS — Z92.3: ICD-10-CM

## 2020-04-05 DIAGNOSIS — E78.5: ICD-10-CM

## 2020-04-05 DIAGNOSIS — F31.9: ICD-10-CM

## 2020-04-05 DIAGNOSIS — E11.22: ICD-10-CM

## 2020-04-05 DIAGNOSIS — E87.6: ICD-10-CM

## 2020-04-05 DIAGNOSIS — N17.0: ICD-10-CM

## 2020-04-05 DIAGNOSIS — I25.5: ICD-10-CM

## 2020-04-05 DIAGNOSIS — E87.5: ICD-10-CM

## 2020-04-05 LAB
ALBUMIN SERPL-MCNC: 3.8 G/DL (ref 3.5–5)
ALP SERPL-CCNC: 157 U/L (ref 38–126)
ALT SERPL-CCNC: 19 U/L (ref 4–49)
ANION GAP SERPL CALC-SCNC: 16 MMOL/L
APTT BLD: 25.8 SEC (ref 22–30)
AST SERPL-CCNC: 24 U/L (ref 17–59)
BASOPHILS # BLD AUTO: 0 K/UL (ref 0–0.2)
BASOPHILS NFR BLD AUTO: 0 %
BUN SERPL-SCNC: 52 MG/DL (ref 9–20)
CALCIUM SPEC-MCNC: 9.1 MG/DL (ref 8.4–10.2)
CHLORIDE SERPL-SCNC: 104 MMOL/L (ref 98–107)
CK SERPL-CCNC: 77 U/L (ref 55–170)
CO2 SERPL-SCNC: 22 MMOL/L (ref 22–30)
EOSINOPHIL # BLD AUTO: 0.3 K/UL (ref 0–0.7)
EOSINOPHIL NFR BLD AUTO: 3 %
ERYTHROCYTE [DISTWIDTH] IN BLOOD BY AUTOMATED COUNT: 4.54 M/UL (ref 4.3–5.9)
ERYTHROCYTE [DISTWIDTH] IN BLOOD: 17.2 % (ref 11.5–15.5)
GLUCOSE BLD-MCNC: 195 MG/DL (ref 75–99)
GLUCOSE BLD-MCNC: 66 MG/DL (ref 75–99)
GLUCOSE BLD-MCNC: 69 MG/DL (ref 75–99)
GLUCOSE BLD-MCNC: 80 MG/DL (ref 75–99)
GLUCOSE SERPL-MCNC: 133 MG/DL (ref 74–99)
HCT VFR BLD AUTO: 34.9 % (ref 39–53)
HGB BLD-MCNC: 10.6 GM/DL (ref 13–17.5)
INR PPP: 1.3 (ref ?–1.2)
LYMPHOCYTES # SPEC AUTO: 0.8 K/UL (ref 1–4.8)
LYMPHOCYTES NFR SPEC AUTO: 10 %
MAGNESIUM SPEC-SCNC: 2.1 MG/DL (ref 1.6–2.3)
MCH RBC QN AUTO: 23.4 PG (ref 25–35)
MCHC RBC AUTO-ENTMCNC: 30.4 G/DL (ref 31–37)
MCV RBC AUTO: 76.9 FL (ref 80–100)
MONOCYTES # BLD AUTO: 0.8 K/UL (ref 0–1)
MONOCYTES NFR BLD AUTO: 9 %
NEUTROPHILS # BLD AUTO: 6.1 K/UL (ref 1.3–7.7)
NEUTROPHILS NFR BLD AUTO: 74 %
PLATELET # BLD AUTO: 286 K/UL (ref 150–450)
POTASSIUM SERPL-SCNC: 5.2 MMOL/L (ref 3.5–5.1)
PROT SERPL-MCNC: 7.4 G/DL (ref 6.3–8.2)
PT BLD: 13.3 SEC (ref 9–12)
SODIUM SERPL-SCNC: 142 MMOL/L (ref 137–145)
WBC # BLD AUTO: 8.2 K/UL (ref 3.8–10.6)

## 2020-04-05 PROCEDURE — 93005 ELECTROCARDIOGRAM TRACING: CPT

## 2020-04-05 PROCEDURE — 84484 ASSAY OF TROPONIN QUANT: CPT

## 2020-04-05 PROCEDURE — 80048 BASIC METABOLIC PNL TOTAL CA: CPT

## 2020-04-05 PROCEDURE — 85027 COMPLETE CBC AUTOMATED: CPT

## 2020-04-05 PROCEDURE — 86920 COMPATIBILITY TEST SPIN: CPT

## 2020-04-05 PROCEDURE — 81001 URINALYSIS AUTO W/SCOPE: CPT

## 2020-04-05 PROCEDURE — 71045 X-RAY EXAM CHEST 1 VIEW: CPT

## 2020-04-05 PROCEDURE — 86900 BLOOD TYPING SEROLOGIC ABO: CPT

## 2020-04-05 PROCEDURE — 84100 ASSAY OF PHOSPHORUS: CPT

## 2020-04-05 PROCEDURE — 76770 US EXAM ABDO BACK WALL COMP: CPT

## 2020-04-05 PROCEDURE — 71046 X-RAY EXAM CHEST 2 VIEWS: CPT

## 2020-04-05 PROCEDURE — 85610 PROTHROMBIN TIME: CPT

## 2020-04-05 PROCEDURE — 43255 EGD CONTROL BLEEDING ANY: CPT

## 2020-04-05 PROCEDURE — 83540 ASSAY OF IRON: CPT

## 2020-04-05 PROCEDURE — 87340 HEPATITIS B SURFACE AG IA: CPT

## 2020-04-05 PROCEDURE — 83605 ASSAY OF LACTIC ACID: CPT

## 2020-04-05 PROCEDURE — 86850 RBC ANTIBODY SCREEN: CPT

## 2020-04-05 PROCEDURE — 36415 COLL VENOUS BLD VENIPUNCTURE: CPT

## 2020-04-05 PROCEDURE — 88305 TISSUE EXAM BY PATHOLOGIST: CPT

## 2020-04-05 PROCEDURE — 86706 HEP B SURFACE ANTIBODY: CPT

## 2020-04-05 PROCEDURE — 84132 ASSAY OF SERUM POTASSIUM: CPT

## 2020-04-05 PROCEDURE — 96374 THER/PROPH/DIAG INJ IV PUSH: CPT

## 2020-04-05 PROCEDURE — 43243 EGD INJECTION VARICES: CPT

## 2020-04-05 PROCEDURE — 85730 THROMBOPLASTIN TIME PARTIAL: CPT

## 2020-04-05 PROCEDURE — 36410 VNPNXR 3YR/> PHY/QHP DX/THER: CPT

## 2020-04-05 PROCEDURE — 86901 BLOOD TYPING SEROLOGIC RH(D): CPT

## 2020-04-05 PROCEDURE — 87086 URINE CULTURE/COLONY COUNT: CPT

## 2020-04-05 PROCEDURE — 84145 PROCALCITONIN (PCT): CPT

## 2020-04-05 PROCEDURE — 86704 HEP B CORE ANTIBODY TOTAL: CPT

## 2020-04-05 PROCEDURE — 85025 COMPLETE CBC W/AUTO DIFF WBC: CPT

## 2020-04-05 PROCEDURE — 76937 US GUIDE VASCULAR ACCESS: CPT

## 2020-04-05 PROCEDURE — 83735 ASSAY OF MAGNESIUM: CPT

## 2020-04-05 PROCEDURE — 80053 COMPREHEN METABOLIC PANEL: CPT

## 2020-04-05 PROCEDURE — 82550 ASSAY OF CK (CPK): CPT

## 2020-04-05 PROCEDURE — 43239 EGD BIOPSY SINGLE/MULTIPLE: CPT

## 2020-04-05 PROCEDURE — 83550 IRON BINDING TEST: CPT

## 2020-04-05 PROCEDURE — 96361 HYDRATE IV INFUSION ADD-ON: CPT

## 2020-04-05 PROCEDURE — 99291 CRITICAL CARE FIRST HOUR: CPT

## 2020-04-05 PROCEDURE — 83880 ASSAY OF NATRIURETIC PEPTIDE: CPT

## 2020-04-05 RX ADMIN — APIXABAN SCH MG: 5 TABLET, FILM COATED ORAL at 20:43

## 2020-04-05 RX ADMIN — FUROSEMIDE SCH MG: 10 INJECTION, SOLUTION INTRAMUSCULAR; INTRAVENOUS at 23:08

## 2020-04-05 RX ADMIN — INSULIN DETEMIR SCH UNIT: 100 INJECTION, SOLUTION SUBCUTANEOUS at 20:43

## 2020-04-05 RX ADMIN — CEFAZOLIN SCH MLS/HR: 330 INJECTION, POWDER, FOR SOLUTION INTRAMUSCULAR; INTRAVENOUS at 14:28

## 2020-04-05 RX ADMIN — VENLAFAXINE HYDROCHLORIDE SCH MG: 75 TABLET ORAL at 20:43

## 2020-04-05 RX ADMIN — CARVEDILOL SCH MG: 12.5 TABLET, FILM COATED ORAL at 17:52

## 2020-04-05 RX ADMIN — ATORVASTATIN CALCIUM SCH MG: 80 TABLET, FILM COATED ORAL at 20:43

## 2020-04-05 NOTE — P.HPIM
History of Present Illness


H&P Date: 20


Chief Complaint: Shortness of breath





Patient is a 80-year-old male with a known history of chronic atrial 

fibrillation on anticoagulation with Eliquis, coronary artery disease status 

post bypass graft, cardiomyopathy status post AICD placement, history of 

nephrectomy and unilateral kidney, CK D stage III, hypertension, hyperlipidemia,

GERD, diabetes type 2 and history of bipolar/depression came to ER with 

complaints of worsening shortness of breath and exertional dyspnea and bilateral

lower extremity increases swelling for the past 2 weeks.  Patient does have 

minimal cough without any sputum production.  No sputum production.  No 

complaints of fever or chills.  Denied any sick contacts at home.  No recent 

travel.





EKG showed wide QRS tachycardia.


Chest x-ray showed continuing changes of CHF


No leukocytosis.  Hemoglobin 10.9, MCV 76.6, elevated RDW.


Potassium 5.2, 52 and 2.2, lactic acid 3.1


BNP 2710.








Review of Systems





Constitutional: Patient denies any fever or chills .  No generalized weakness or

weight loss.  


Abdomen: Patient denied nausea vomiting and diarrhea and abdominal pain.


Cardiovascular: Patient denies any chest pain.  Patient does have short of 

breath no palpitations.  Leg swelling..


Respiratory: patient denied any cough is from production.  No shortness of 

breath


Neurologic: Patient denied any numbness or tingling headache.


Musculoskeletal: Patient denies any complaints of joint swelling or deformity.


Skin: Negative


Psychiatric: Negative


Endocrine: No heat or cold intolerance.  No recent weight gain.


Genitourinary: No dysuria or hematuria.


All other 14 point ROS negative except the above





Past Medical History


Past Medical History: Atrial Fibrillation, Coronary Artery Disease (CAD), 

Cancer, Heart Failure, Diabetes Mellitus, GERD/Reflux, Hyperlipidemia


Additional Past Medical History / Comment(s): See Dr Fleming's H&P, hx 

kidney and bladder cancer- tx with chemo and radiation , "growth on kidney",

history of CABG, cardiomyopathy, AICD implantation ULCER, KIDNEY STONES


History of Any Multi-Drug Resistant Organisms: None Reported


Past Surgical History: Bladder Surgery, Heart Catheterization, Orthopedic 

Surgery


Additional Past Surgical History / Comment(s): Port-a-cath insertion/later 

removed. Bilateral cataract , ORIF R ankle with 2 screws,"scraped the bladder" 

10-18-16 TOTAL RT KNEE,growth on kidney removed


Past Anesthesia/Blood Transfusion Reactions: No Reported Reaction


Additional Past Anesthesia/Blood Transfusion Reaction / Comment(s): Pt has never

recieved blood.


Past Psychological History: Bipolar, Depression


Additional Psychological History / Comment(s): Pt is bipolar and states he does 

well with his medications.


Smoking Status: Former smoker


Additional Past Alcohol Use History / Comment(s): Pt states he started smoking 

at age 16.  He quit sometime in the  or maybe even sooner.  He was less 

than a pack a day smoker.


Past Drug Use History: None Reported





- Past Family History


  ** Father


Family Medical History: Cancer


Additional Family Medical History / Comment(s): prostate





  ** Mother


Family Medical History: No Reported History


Additional Family Medical History / Comment(s): Mother had bowel perforations.  

She  at 88 yrs of age.





  ** Brother(s)


Family Medical History: Cancer





  ** Sister(s)


Family Medical History: Cancer





Medications and Allergies


                                Home Medications











 Medication  Instructions  Recorded  Confirmed  Type


 


Insulin Glargine [Lantus] 40 unit SQ HS 19 History


 


Apixaban [Eliquis] 5 mg PO BID 20 History


 


metFORMIN HCL 1,000 mg PO BID 20 History


 


Venlafaxine HCl [Effexor] 75 mg PO BID  tab 20 Rx


 


glipiZIDE [Glucotrol] 5 mg PO AC-BID #0 tab 20 Rx


 


Aspirin 81 mg PO DAILY 30 Days #30 chew 20 Rx


 


Furosemide [Lasix] 40 mg PO BID@0900,1600  tab 20 Rx


 


Atorvastatin [Lipitor] 80 mg PO HS 20 History


 


Carvedilol [Coreg] 12.5 mg PO AC-BID 20 History


 


Cholecalciferol [Vitamin D3 (25 2,000 unit PO DAILY 20 History





Mcg = 1000 Iu)]    


 


Cyanocobalamin (Vitamin B-12) 1,000 mcg PO DAILY 20 History





[Vitamin B-12]    


 


Insulin Glargine [Lantus] 10 unit SQ QAM 20 History


 


lamoTRIgine [LaMICtal] 100 mg PO HS 20 History








                                    Allergies











Allergy/AdvReac Type Severity Reaction Status Date / Time


 


No Known Allergies Allergy   Verified 20 12:58














Physical Exam


Vitals: 


                                   Vital Signs











  Temp Pulse Pulse Resp BP BP Pulse Ox


 


 20 16:00    132 H  18   


 


 20 15:05  98.5 F  130 H   18  117/82   100


 


 20 14:24    132 H  18   113/75  100


 


 20 13:30   129 H   26 H  111/80   100


 


 20 13:00   129 H   32 H  125/82   99


 


 20 12:30   128 H   33 H  121/80  


 


 20 11:52  97.4 F L  122 H   20  106/71   97








                                Intake and Output











 20





 06:59 14:59 22:59


 


Other:   


 


  Weight  113.398 kg 113.398 kg














PHYSICAL EXAMINATION: 


Patient is lying in the bed comfortably, no acute distress, awake alert and 

oriented.. 


HEENT: Normocephalic. Neck is supple. Pupils reactive. Nostrils clear. Oral 

cavity is moist. Ears reveal no drainage. 


Neck reveals no JVD, carotid bruits, or thyromegaly. 


CHEST EXAMINATION: Trachea is central. Symmetrical expansion.  Bibasilar 

diminished air entry and crackles present.  No wheezing.  Lung fields clear to 

auscultation and percussion. 


CARDIAC: Normal S1, S2 with no gallops. No murmurs .  Irregular rhythm.


ABDOMEN: Soft. Bowel sounds normal. No organomegaly. No abdominal bruits. 


Extremities: 2+ bilateral pedal edema.  No clubbing or cyanosis


Neurologically awake, alert, oriented x3 with well-coordinated movements.  No 

focal deficits noted


Skin: No rash or skin lesions. 


Psychiatric: Coperative.  Nonsuicidal


Musculoskeletal: No joint swelling or deformity.  Normal range of motion.








Results


CBC & Chem 7: 


                                 20 12:24





                                 20 12:24


Labs: 


                  Abnormal Lab Results - Last 24 Hours (Table)











  20 Range/Units





  12:24 12:24 12:24 


 


Hgb  10.6 L    (13.0-17.5)  gm/dL


 


Hct  34.9 L    (39.0-53.0)  %


 


MCV  76.9 L    (80.0-100.0)  fL


 


MCH  23.4 L    (25.0-35.0)  pg


 


MCHC  30.4 L    (31.0-37.0)  g/dL


 


RDW  17.2 H    (11.5-15.5)  %


 


Lymphocytes #  0.8 L    (1.0-4.8)  k/uL


 


PT   13.3 H   (9.0-12.0)  sec


 


INR   1.3 H   (<1.2)  


 


Potassium    5.2 H  (3.5-5.1)  mmol/L


 


BUN    52 H  (9-20)  mg/dL


 


Creatinine    2.00 H  (0.66-1.25)  mg/dL


 


Glucose    133 H  (74-99)  mg/dL


 


POC Glucose (mg/dL)     (75-99)  mg/dL


 


Plasma Lactic Acid Jeanmarie     (0.7-2.0)  mmol/L


 


Alkaline Phosphatase    157 H  ()  U/L














  20 Range/Units





  12:24 16:02 16:58 


 


Hgb     (13.0-17.5)  gm/dL


 


Hct     (39.0-53.0)  %


 


MCV     (80.0-100.0)  fL


 


MCH     (25.0-35.0)  pg


 


MCHC     (31.0-37.0)  g/dL


 


RDW     (11.5-15.5)  %


 


Lymphocytes #     (1.0-4.8)  k/uL


 


PT     (9.0-12.0)  sec


 


INR     (<1.2)  


 


Potassium     (3.5-5.1)  mmol/L


 


BUN     (9-20)  mg/dL


 


Creatinine     (0.66-1.25)  mg/dL


 


Glucose     (74-99)  mg/dL


 


POC Glucose (mg/dL)    64 L  (75-99)  mg/dL


 


Plasma Lactic Acid Jeanmarie  3.1 H*  2.3 H*   (0.7-2.0)  mmol/L


 


Alkaline Phosphatase     ()  U/L














  20 Range/Units





  17:00 17:22 19:25 


 


Hgb     (13.0-17.5)  gm/dL


 


Hct     (39.0-53.0)  %


 


MCV     (80.0-100.0)  fL


 


MCH     (25.0-35.0)  pg


 


MCHC     (31.0-37.0)  g/dL


 


RDW     (11.5-15.5)  %


 


Lymphocytes #     (1.0-4.8)  k/uL


 


PT     (9.0-12.0)  sec


 


INR     (<1.2)  


 


Potassium     (3.5-5.1)  mmol/L


 


BUN     (9-20)  mg/dL


 


Creatinine     (0.66-1.25)  mg/dL


 


Glucose     (74-99)  mg/dL


 


POC Glucose (mg/dL)  69 L  66 L   (75-99)  mg/dL


 


Plasma Lactic Acid Jeanmarie    2.3 H*  (0.7-2.0)  mmol/L


 


Alkaline Phosphatase     ()  U/L














  20 Range/Units





  20:15 


 


Hgb   (13.0-17.5)  gm/dL


 


Hct   (39.0-53.0)  %


 


MCV   (80.0-100.0)  fL


 


MCH   (25.0-35.0)  pg


 


MCHC   (31.0-37.0)  g/dL


 


RDW   (11.5-15.5)  %


 


Lymphocytes #   (1.0-4.8)  k/uL


 


PT   (9.0-12.0)  sec


 


INR   (<1.2)  


 


Potassium   (3.5-5.1)  mmol/L


 


BUN   (9-20)  mg/dL


 


Creatinine   (0.66-1.25)  mg/dL


 


Glucose   (74-99)  mg/dL


 


POC Glucose (mg/dL)  195 H  (75-99)  mg/dL


 


Plasma Lactic Acid Jeanmarie   (0.7-2.0)  mmol/L


 


Alkaline Phosphatase   ()  U/L














Thrombosis Risk Factor Assmnt





- DVT/VTE Prophylaxis


DVT/VTE Prophylaxis: Pharmacologic Prophylaxis ordered





- Choose All That Apply


Each Risk Factor Represents 3 Points: Age 75 years or older


Thrombosis Risk Factor Assessment Total Risk Factor Score: 3


Thrombosis Risk Factor Assessment Level: Moderate Risk





Assessment and Plan


Assessment: 





Worsening shortness of breath and leg swelling secondary to CHF


Acute on chronic CHF with systolic dysfunction


Ischemic cardiomyopathy


Chronic atrial fibrillation on anticoagulation with Eliquis


Acute on chronic kidney disease stage III.  Possible cardiorenal.


Mild hyperkalemia secondary to acute kidney injury


Lactic acidosis


History of nephrectomy due to renal cancer and bladder cancer history


Diabetes type 2.  Insulin-dependent and also on metformin and glipizide.


Hyperlipidemia


Coronary artery disease with history of multiple stents placement


Bipolar disorder and depression


Previous history of smoking


GERD


Obesity with BMI 34.9





Plan:


Patient will be continued on IV Lasix for milligrams twice daily and continue 

with aspirin statins Coreg and Eliquis.  Monitor renal function closely.


Metformin is on hold due to lactic acidosis.  Continue with Levemir and insulin 

sliding scale.


Follow-up electrolytes and CBC in the morning.  Iron profile was ordered.


Cardiology will be consulted and further recommendations based on the clinical 

course.  Prognosis is guarded.


Time with Patient: Greater than 30

## 2020-04-05 NOTE — ED
SOB HPI





- General


Chief Complaint: Shortness of Breath


Stated Complaint: leg swelling


Time Seen by Provider: 20 12:03


Source: patient, family, RN notes reviewed, old records reviewed


Mode of arrival: wheelchair


Limitations: no limitations





- History of Present Illness


Initial Comments: 





This is a 80-year-old male with a history of CHF kidney cancer a former smoker 

among other medical issues who states for the past week he's had progressively 

worsening shortness of breath exertional dyspnea with edema to his lower 

extremities.  No chest pain no palpitations no fevers chills nausea vomiting 

sweats no overt cough or phlegm production.


MD Complaint: shortness of breath





- Related Data


                                Home Medications











 Medication  Instructions  Recorded  Confirmed


 


Insulin Glargine [Lantus] 40 unit SQ HS 19


 


Apixaban [Eliquis] 5 mg PO BID 20


 


metFORMIN HCL 1,000 mg PO BID 20


 


Atorvastatin [Lipitor] 80 mg PO HS 20


 


Carvedilol [Coreg] 12.5 mg PO AC-BID 20


 


Cholecalciferol [Vitamin D3 (25 2,000 unit PO DAILY 20





Mcg = 1000 Iu)]   


 


Cyanocobalamin (Vitamin B-12) 1,000 mcg PO DAILY 20





[Vitamin B-12]   


 


Insulin Glargine [Lantus] 10 unit SQ QAM 20


 


lamoTRIgine [LaMICtal] 100 mg PO HS 20








                                  Previous Rx's











 Medication  Instructions  Recorded


 


Venlafaxine HCl [Effexor] 75 mg PO BID  tab 20


 


glipiZIDE [Glucotrol] 5 mg PO AC-BID #0 tab 20


 


Aspirin 81 mg PO DAILY 30 Days #30 chew 20


 


Furosemide [Lasix] 40 mg PO BID@0900,1600  tab 20











                                    Allergies











Allergy/AdvReac Type Severity Reaction Status Date / Time


 


No Known Allergies Allergy   Verified 20 12:58














Review of Systems


ROS Statement: 


Those systems with pertinent positive or pertinent negative responses have been 

documented in the HPI.





ROS Other: All systems not noted in ROS Statement are negative.





Past Medical History


Past Medical History: Atrial Fibrillation, Coronary Artery Disease (CAD), 

Cancer, Heart Failure, Diabetes Mellitus, GERD/Reflux, Hyperlipidemia


Additional Past Medical History / Comment(s): See Dr Fleming's H&P, hx 

kidney and bladder cancer- tx with chemo and radiation , "growth on kidney",

 history of CABG, cardiomyopathy, AICD implantation ULCER, KIDNEY STONES


History of Any Multi-Drug Resistant Organisms: None Reported


Past Surgical History: Bladder Surgery, Heart Catheterization, Orthopedic Surge

ry


Additional Past Surgical History / Comment(s): Port-a-cath insertion/later 

removed. Bilateral cataract , ORIF R ankle with 2 screws,"scraped the bladder" 

10-18-16 TOTAL RT KNEE,growth on kidney removed


Past Anesthesia/Blood Transfusion Reactions: No Reported Reaction


Additional Past Anesthesia/Blood Transfusion Reaction / Comment(s): Pt has never

 recieved blood.


Past Psychological History: Bipolar, Depression


Smoking Status: Unknown if ever smoked


Past Alcohol Use History: None Reported


Past Drug Use History: None Reported





- Past Family History


  ** Father


Family Medical History: Cancer


Additional Family Medical History / Comment(s): prostate





  ** Mother


Family Medical History: No Reported History


Additional Family Medical History / Comment(s): Mother had bowel perforations.  

She  at 88 yrs of age.





  ** Brother(s)


Family Medical History: Cancer





  ** Sister(s)


Family Medical History: Cancer





General Exam





- General Exam Comments


Initial Comments: 





This is a well-developed well-nourished awake alert oriented 3 male her breath 

this with talking


Limitations: no limitations


General appearance: alert


Head exam: Present: atraumatic, normocephalic, normal inspection


Eye exam: Present: normal appearance, PERRL, EOMI.  Absent: scleral icterus, 

conjunctival injection, periorbital swelling


ENT exam: Present: normal exam, mucous membranes moist


Neck exam: Present: normal inspection, full ROM, other (No overt stridor JVD or 

bruits).  Absent: tenderness, meningismus, lymphadenopathy


Respiratory exam: Present: rales, decreased breath sounds.  Absent: respiratory 

distress, wheezes, rhonchi, stridor


Cardiovascular Exam: Present: normal rhythm, tachycardia, normal heart sounds.  

Absent: systolic murmur, diastolic murmur, rubs, gallop, clicks


GI/Abdominal exam: Present: soft, normal bowel sounds.  Absent: distended, 

tenderness, guarding, rebound, rigid


Extremities exam: Present: full ROM, normal capillary refill, pedal edema.  

Absent: tenderness, joint swelling, calf tenderness


Back exam: Present: normal inspection


Neurological exam: Present: alert, oriented X3, CN II-XII intact


Psychiatric exam: Present: normal affect, normal mood


Skin exam: Present: warm, dry, intact, normal color.  Absent: rash





Course


                                   Vital Signs











  20





  11:52 12:30 13:00


 


Temperature 97.4 F L  


 


Pulse Rate 122 H 128 H 129 H


 


Respiratory 20 33 H 32 H





Rate   


 


Blood Pressure 106/71 121/80 125/82


 


O2 Sat by Pulse 97  99





Oximetry   














- Reevaluation(s)


Reevaluation #1: 





20 13:46


Reevaluation patient finds feels somewhat improved





Medical Decision Making





- Medical Decision Making





I did discuss findings with patient family as well as with Dr. Rodriguez.  Patient 

be admitted cardiology consultation patient does have CHF.  Also has renal 

insufficiency the elevated lactic acid is likely secondary to the renal status 

as well as intervascular dehydration.





- Lab Data


Result diagrams: 


                                 20 12:24





                                 20 12:24


                                   Lab Results











  20 Range/Units





  12:24 12:24 12:24 


 


WBC  8.2    (3.8-10.6)  k/uL


 


RBC  4.54    (4.30-5.90)  m/uL


 


Hgb  10.6 L    (13.0-17.5)  gm/dL


 


Hct  34.9 L    (39.0-53.0)  %


 


MCV  76.9 L    (80.0-100.0)  fL


 


MCH  23.4 L    (25.0-35.0)  pg


 


MCHC  30.4 L    (31.0-37.0)  g/dL


 


RDW  17.2 H    (11.5-15.5)  %


 


Plt Count  286    (150-450)  k/uL


 


Neutrophils %  74    %


 


Lymphocytes %  10    %


 


Monocytes %  9    %


 


Eosinophils %  3    %


 


Basophils %  0    %


 


Neutrophils #  6.1    (1.3-7.7)  k/uL


 


Lymphocytes #  0.8 L    (1.0-4.8)  k/uL


 


Monocytes #  0.8    (0-1.0)  k/uL


 


Eosinophils #  0.3    (0-0.7)  k/uL


 


Basophils #  0.0    (0-0.2)  k/uL


 


Hypochromasia  Marked    


 


Poikilocytosis  Slight    


 


Anisocytosis  Slight    


 


Microcytosis  Slight    


 


PT   13.3 H   (9.0-12.0)  sec


 


INR   1.3 H   (<1.2)  


 


APTT   25.8   (22.0-30.0)  sec


 


Sodium    142  (137-145)  mmol/L


 


Potassium    5.2 H  (3.5-5.1)  mmol/L


 


Chloride    104  ()  mmol/L


 


Carbon Dioxide    22  (22-30)  mmol/L


 


Anion Gap    16  mmol/L


 


BUN    52 H  (9-20)  mg/dL


 


Creatinine    2.00 H  (0.66-1.25)  mg/dL


 


Est GFR (CKD-EPI)AfAm    35  (>60 ml/min/1.73 sqM)  


 


Est GFR (CKD-EPI)NonAf    31  (>60 ml/min/1.73 sqM)  


 


Glucose    133 H  (74-99)  mg/dL


 


Plasma Lactic Acid Jeanmarie     (0.7-2.0)  mmol/L


 


Calcium    9.1  (8.4-10.2)  mg/dL


 


Magnesium    2.1  (1.6-2.3)  mg/dL


 


Total Bilirubin    0.6  (0.2-1.3)  mg/dL


 


AST    24  (17-59)  U/L


 


ALT    19  (4-49)  U/L


 


Alkaline Phosphatase    157 H  ()  U/L


 


Creatine Kinase    77  ()  U/L


 


Troponin I     (0.000-0.034)  ng/mL


 


NT-Pro-B Natriuret Pep     pg/mL


 


Total Protein    7.4  (6.3-8.2)  g/dL


 


Albumin    3.8  (3.5-5.0)  g/dL














  20 Range/Units





  12:24 12:24 12:24 


 


WBC     (3.8-10.6)  k/uL


 


RBC     (4.30-5.90)  m/uL


 


Hgb     (13.0-17.5)  gm/dL


 


Hct     (39.0-53.0)  %


 


MCV     (80.0-100.0)  fL


 


MCH     (25.0-35.0)  pg


 


MCHC     (31.0-37.0)  g/dL


 


RDW     (11.5-15.5)  %


 


Plt Count     (150-450)  k/uL


 


Neutrophils %     %


 


Lymphocytes %     %


 


Monocytes %     %


 


Eosinophils %     %


 


Basophils %     %


 


Neutrophils #     (1.3-7.7)  k/uL


 


Lymphocytes #     (1.0-4.8)  k/uL


 


Monocytes #     (0-1.0)  k/uL


 


Eosinophils #     (0-0.7)  k/uL


 


Basophils #     (0-0.2)  k/uL


 


Hypochromasia     


 


Poikilocytosis     


 


Anisocytosis     


 


Microcytosis     


 


PT     (9.0-12.0)  sec


 


INR     (<1.2)  


 


APTT     (22.0-30.0)  sec


 


Sodium     (137-145)  mmol/L


 


Potassium     (3.5-5.1)  mmol/L


 


Chloride     ()  mmol/L


 


Carbon Dioxide     (22-30)  mmol/L


 


Anion Gap     mmol/L


 


BUN     (9-20)  mg/dL


 


Creatinine     (0.66-1.25)  mg/dL


 


Est GFR (CKD-EPI)AfAm     (>60 ml/min/1.73 sqM)  


 


Est GFR (CKD-EPI)NonAf     (>60 ml/min/1.73 sqM)  


 


Glucose     (74-99)  mg/dL


 


Plasma Lactic Acid Jeanmarie  3.1 H*    (0.7-2.0)  mmol/L


 


Calcium     (8.4-10.2)  mg/dL


 


Magnesium     (1.6-2.3)  mg/dL


 


Total Bilirubin     (0.2-1.3)  mg/dL


 


AST     (17-59)  U/L


 


ALT     (4-49)  U/L


 


Alkaline Phosphatase     ()  U/L


 


Creatine Kinase     ()  U/L


 


Troponin I   0.023   (0.000-0.034)  ng/mL


 


NT-Pro-B Natriuret Pep    2710  pg/mL


 


Total Protein     (6.3-8.2)  g/dL


 


Albumin     (3.5-5.0)  g/dL














- EKG Data


-: EKG Interpreted by Me (EKG shows wide complex tachycardia QRS demonstrated Y 

complex 120 6H QRS 12)





- Radiology Data


Radiology results: report reviewed (Review the imaging that show evidence of 

congestive heart failure.  There is evidence of cardiomegaly.), image reviewed





Critical Care Time


Critical Care Time: Yes


Critical Care Time: 





32 minutes of critical care time which includes initial presentation with 

history physical labs x-rays several reevaluation the patient discussed with the

 patient family regarding findings review of old charting was available 

discussed with the admitting physician admission orders and documentation of the

 above





Disposition


Clinical Impression: 


 Systolic congestive heart failure, Renal insufficiency syndrome, Peripheral 

edema, Lactic acidosis, Dehydration





Disposition: ADMITTED AS IP TO THIS HOSP


Condition: Fair


Referrals: 


Ruy Duckworth MD [Primary Care Provider] - 1-2 days

## 2020-04-06 LAB
ANION GAP SERPL CALC-SCNC: 13 MMOL/L
BASOPHILS # BLD AUTO: 0 K/UL (ref 0–0.2)
BASOPHILS NFR BLD AUTO: 1 %
BUN SERPL-SCNC: 54 MG/DL (ref 9–20)
CALCIUM SPEC-MCNC: 8.9 MG/DL (ref 8.4–10.2)
CHLORIDE SERPL-SCNC: 106 MMOL/L (ref 98–107)
CO2 SERPL-SCNC: 20 MMOL/L (ref 22–30)
EOSINOPHIL # BLD AUTO: 0.3 K/UL (ref 0–0.7)
EOSINOPHIL NFR BLD AUTO: 4 %
ERYTHROCYTE [DISTWIDTH] IN BLOOD BY AUTOMATED COUNT: 4.28 M/UL (ref 4.3–5.9)
ERYTHROCYTE [DISTWIDTH] IN BLOOD: 16.9 % (ref 11.5–15.5)
GLUCOSE BLD-MCNC: 122 MG/DL (ref 75–99)
GLUCOSE BLD-MCNC: 71 MG/DL (ref 75–99)
GLUCOSE BLD-MCNC: 81 MG/DL (ref 75–99)
GLUCOSE BLD-MCNC: 85 MG/DL (ref 75–99)
GLUCOSE SERPL-MCNC: 74 MG/DL (ref 74–99)
HCT VFR BLD AUTO: 33.2 % (ref 39–53)
HGB BLD-MCNC: 9.9 GM/DL (ref 13–17.5)
IRON SERPL-MCNC: 15 UG/DL (ref 65–175)
LYMPHOCYTES # SPEC AUTO: 0.8 K/UL (ref 1–4.8)
LYMPHOCYTES NFR SPEC AUTO: 11 %
MCH RBC QN AUTO: 23.1 PG (ref 25–35)
MCHC RBC AUTO-ENTMCNC: 29.8 G/DL (ref 31–37)
MCV RBC AUTO: 77.6 FL (ref 80–100)
MONOCYTES # BLD AUTO: 0.8 K/UL (ref 0–1)
MONOCYTES NFR BLD AUTO: 11 %
NEUTROPHILS # BLD AUTO: 5 K/UL (ref 1.3–7.7)
NEUTROPHILS NFR BLD AUTO: 70 %
PLATELET # BLD AUTO: 236 K/UL (ref 150–450)
POTASSIUM SERPL-SCNC: 4.6 MMOL/L (ref 3.5–5.1)
SODIUM SERPL-SCNC: 139 MMOL/L (ref 137–145)
TIBC SERPL-MCNC: 368 UG/DL (ref 228–460)
WBC # BLD AUTO: 7.2 K/UL (ref 3.8–10.6)

## 2020-04-06 RX ADMIN — FUROSEMIDE SCH MG: 10 INJECTION, SOLUTION INTRAMUSCULAR; INTRAVENOUS at 09:47

## 2020-04-06 RX ADMIN — Medication SCH UNIT: at 09:46

## 2020-04-06 RX ADMIN — FUROSEMIDE SCH: 10 INJECTION, SOLUTION INTRAMUSCULAR; INTRAVENOUS at 23:30

## 2020-04-06 RX ADMIN — CEFAZOLIN SCH: 330 INJECTION, POWDER, FOR SOLUTION INTRAMUSCULAR; INTRAVENOUS at 16:49

## 2020-04-06 RX ADMIN — APIXABAN SCH MG: 5 TABLET, FILM COATED ORAL at 09:46

## 2020-04-06 RX ADMIN — VENLAFAXINE HYDROCHLORIDE SCH MG: 75 TABLET ORAL at 09:47

## 2020-04-06 RX ADMIN — AMIODARONE HYDROCHLORIDE SCH MLS/HR: 50 INJECTION, SOLUTION INTRAVENOUS at 19:07

## 2020-04-06 RX ADMIN — CYANOCOBALAMIN TAB 500 MCG SCH MCG: 500 TAB at 09:46

## 2020-04-06 RX ADMIN — ATORVASTATIN CALCIUM SCH MG: 80 TABLET, FILM COATED ORAL at 20:23

## 2020-04-06 RX ADMIN — LOSARTAN POTASSIUM SCH: 25 TABLET, FILM COATED ORAL at 11:50

## 2020-04-06 RX ADMIN — ASPIRIN 81 MG CHEWABLE TABLET SCH MG: 81 TABLET CHEWABLE at 09:46

## 2020-04-06 RX ADMIN — CARVEDILOL SCH MG: 12.5 TABLET, FILM COATED ORAL at 16:53

## 2020-04-06 RX ADMIN — INSULIN DETEMIR SCH UNIT: 100 INJECTION, SOLUTION SUBCUTANEOUS at 09:46

## 2020-04-06 RX ADMIN — APIXABAN SCH MG: 5 TABLET, FILM COATED ORAL at 20:23

## 2020-04-06 RX ADMIN — INSULIN DETEMIR SCH: 100 INJECTION, SOLUTION SUBCUTANEOUS at 21:49

## 2020-04-06 RX ADMIN — CARVEDILOL SCH MG: 12.5 TABLET, FILM COATED ORAL at 06:26

## 2020-04-06 RX ADMIN — VENLAFAXINE HYDROCHLORIDE SCH MG: 75 TABLET ORAL at 20:23

## 2020-04-06 NOTE — P.CRDCN
History of Present Illness


Consult date: 20


Consult reason: congestive heart failure


Chief complaint: Shortness of breath


History of present illness: 


This is a pleasant 80-year-old  gentleman who follows regularly with 

Dr. Fleming in the office.  He has a known history of coronary artery 

disease with prior bypass surgery, persistent atrial fibrillation, ischemic 

cardiomyopathy with prior AICD implant, this was performed in 2019, 

history of diabetes, hypertension, hyperlipidemia.  Patient also has history of 

kidney and bladder cancer with prior chemo and radiation.  Presents to the 

hospital with at least one week duration of progressively worsening shortness of

breath.  He states that he also noticed a significant increase in his bilateral 

lower extremity edema.  The patient denies any fever or chills at home.  Chest 

x-ray on presentation here showed congestive cardiac failure.  EKG showed atrial

fibrillation with a rapid ventricular response.  Blood pressure 106/70 with a 

heart rate of 120, afebrile, 98% on room air.  White blood cell count is normal,

hemoglobin 9.9, platelet count 236.  Sodium 139, potassium 4.6, chloride 106, 

CO2 20, BUN 54, creatinine 1.8.  Plasma lactic acid 2.3 on admission, 1.4 this 

morning, BNP level 2710.  Troponin 0.02, 0.03, 0.03.  Patient was initiated on 

IV Lasix in the emergency room.  He has been overall diuresing quite well and 

his weight is down today.  Patient continues to be quite short of breath, 

continues to have lower extremity edema.  The patient is currently on Eliquis 5 

mg one tablet by mouth twice a day, he is 80 years of age and his creatinine on 

admission was 2, we will monitor that creatinine closely, if it remains in the 2

range we will consider decreasing the Eliquis at 2-1/2 mg one tablet by mouth 

twice a day.  We will continue the patient on IV Lasix.  We will increase the 

dose of Coreg, patient is not currently on an ACE inhibitor or an angiotensin 

blocker because of the creatinine.





Past Medical History


Past Medical History: Atrial Fibrillation, Coronary Artery Disease (CAD), 

Cancer, Heart Failure, Diabetes Mellitus, GERD/Reflux, Hyperlipidemia


Additional Past Medical History / Comment(s): See Dr Fleming's H&P, hx 

kidney and bladder cancer- tx with chemo and radiation , "growth on kidney",

history of CABG, cardiomyopathy, AICD implantation ULCER, KIDNEY STONES


History of Any Multi-Drug Resistant Organisms: None Reported


Past Surgical History: Bladder Surgery, Heart Catheterization, Orthopedic 

Surgery


Additional Past Surgical History / Comment(s): Port-a-cath insertion/later 

removed. Bilateral cataract , ORIF R ankle with 2 screws,"scraped the bladder" 

10-18-16 TOTAL RT KNEE,growth on kidney removed


Past Anesthesia/Blood Transfusion Reactions: No Reported Reaction


Additional Past Anesthesia/Blood Transfusion Reaction / Comment(s): Pt has never

recieved blood.


Past Psychological History: Bipolar, Depression


Additional Psychological History / Comment(s): Pt is bipolar and states he does 

well with his medications.


Smoking Status: Former smoker


Additional Past Alcohol Use History / Comment(s): Pt states he started smoking 

at age 16.  He quit sometime in the  or maybe even sooner.  He was less 

than a pack a day smoker.


Past Drug Use History: None Reported





- Past Family History


  ** Father


Family Medical History: Cancer


Additional Family Medical History / Comment(s): prostate





  ** Mother


Family Medical History: No Reported History


Additional Family Medical History / Comment(s): Mother had bowel perforations.  

She  at 88 yrs of age.





  ** Brother(s)


Family Medical History: Cancer





  ** Sister(s)


Family Medical History: Cancer





Medications and Allergies


                                Home Medications











 Medication  Instructions  Recorded  Confirmed  Type


 


Insulin Glargine [Lantus] 40 unit SQ HS 19 History


 


Apixaban [Eliquis] 5 mg PO BID 20 History


 


metFORMIN HCL 1,000 mg PO BID 20 History


 


Venlafaxine HCl [Effexor] 75 mg PO BID  tab 20 Rx


 


glipiZIDE [Glucotrol] 5 mg PO AC-BID #0 tab 20 Rx


 


Aspirin 81 mg PO DAILY 30 Days #30 chew 20 Rx


 


Furosemide [Lasix] 40 mg PO BID@0900,1600  tab 20 Rx


 


Atorvastatin [Lipitor] 80 mg PO HS 20 History


 


Carvedilol [Coreg] 12.5 mg PO AC-BID 20 History


 


Cholecalciferol [Vitamin D3 (25 2,000 unit PO DAILY 20 History





Mcg = 1000 Iu)]    


 


Cyanocobalamin (Vitamin B-12) 1,000 mcg PO DAILY 20 History





[Vitamin B-12]    


 


Insulin Glargine [Lantus] 10 unit SQ QAM 20 History


 


lamoTRIgine [LaMICtal] 100 mg PO HS 20 History








                                    Allergies











Allergy/AdvReac Type Severity Reaction Status Date / Time


 


No Known Allergies Allergy   Verified 20 12:58














Physical Exam


Vitals: 


                                   Vital Signs











  Temp Pulse Pulse Resp BP BP Pulse Ox


 


 20 04:00    120 H  19   106/71  99


 


 20 00:00  97.6 F   109 H  20   106/75  95


 


 20 20:00  97.6 F   120 H  20   116/84  95


 


 20 16:00    132 H  18   


 


 20 15:05  98.5 F  130 H   18  117/82   100


 


 20 14:24    132 H  18   113/75  100


 


 20 13:30   129 H   26 H  111/80   100


 


 20 13:00   129 H   32 H  125/82   99


 


 20 12:30   128 H   33 H  121/80  


 


 20 11:52  97.4 F L  122 H   20  106/71   97








                                Intake and Output











 20





 22:59 06:59 14:59


 


Output Total  600 300


 


Balance  -600 -300


 


Output:   


 


  Urine  600 300


 


Other:   


 


  Voiding Method Toilet Toilet 


 


  # Voids 2  


 


  Weight 113.398 kg 110.6 kg 














PHYSICAL EXAMINATION: 





GENERAL: 80-year-old  gentleman in no acute distress at the time of my 

examination





HEENT: Head is atraumatic, normocephalic.  Pupils equal, round.  Sclera 

anicteric. Conjunctiva are clear.  Mucous membranes of the mouth are moist.  

Neck is supple.  There is  elevated jugular venous pressure.  No carotid  bruit 

is heard.





HEART EXAMINATION: Heart S1-S2 irregularly irregular a systolic murmur is heard





CHEST EXAMINATION: Lungs are  clear with diminished air entry to the bases 

bilaterally





ABDOMEN:  Soft, obese, nontender. Bowel sounds are heard. No organomegaly noted.


 


EXTREMITIES: 2+ peripheral pulses with 2+  evidence of peripheral edema and no 

calf tenderness noted.





NEUROLOGIC patient is awake, alert and oriented 3 


 


.


 








Results





                                 20 06:24





                                 20 06:24


                                 Cardiac Enzymes











  20 Range/Units





  12:24 12:24 19:25 


 


AST  24    (17-59)  U/L


 


Troponin I   0.023  0.026  (0.000-0.034)  ng/mL














  20 Range/Units





  00:32 


 


AST   (17-59)  U/L


 


Troponin I  0.031  (0.000-0.034)  ng/mL








                                   Coagulation











  20 Range/Units





  12:24 


 


PT  13.3 H  (9.0-12.0)  sec


 


APTT  25.8  (22.0-30.0)  sec








                                       CBC











  20 Range/Units





  12:24 06:24 


 


WBC  8.2  7.2  (3.8-10.6)  k/uL


 


RBC  4.54  4.28 L  (4.30-5.90)  m/uL


 


Hgb  10.6 L  9.9 L  (13.0-17.5)  gm/dL


 


Hct  34.9 L  33.2 L  (39.0-53.0)  %


 


Plt Count  286  236  (150-450)  k/uL








                          Comprehensive Metabolic Panel











  20 Range/Units





  12:24 06:24 


 


Sodium  142  139  (137-145)  mmol/L


 


Potassium  5.2 H  4.6  (3.5-5.1)  mmol/L


 


Chloride  104  106  ()  mmol/L


 


Carbon Dioxide  22  20 L  (22-30)  mmol/L


 


BUN  52 H  54 H  (9-20)  mg/dL


 


Creatinine  2.00 H  1.83 H  (0.66-1.25)  mg/dL


 


Glucose  133 H  74  (74-99)  mg/dL


 


Calcium  9.1  8.9  (8.4-10.2)  mg/dL


 


AST  24   (17-59)  U/L


 


ALT  19   (4-49)  U/L


 


Alkaline Phosphatase  157 H   ()  U/L


 


Total Protein  7.4   (6.3-8.2)  g/dL


 


Albumin  3.8   (3.5-5.0)  g/dL








                               Current Medications











Generic Name Dose Route Start Last Admin





  Trade Name Freq  PRN Reason Stop Dose Admin


 


Apixaban  5 mg  20 21:00  20 20:43





  Eliquis  PO   5 mg





  BID [facility]   Administration


 


Aspirin  81 mg  20 09:00 





  Aspirin  PO  





  DAILY [facility]  


 


Atorvastatin Calcium  80 mg  20 21:00  20 20:43





  Lipitor  PO   80 mg





  HS [facility]   Administration


 


Carvedilol  12.5 mg  20 17:30  20 06:26





  Coreg  PO   12.5 mg





  AC-BID [facility]   Administration


 


Cholecalciferol  2,000 unit  20 09:00 





  Vitamin D3 (25 Mcg = 1000 Iu)  PO  





  DAILY [facility]  


 


Cyanocobalamin  1,000 mcg  20 09:00 





  Vitamin B-12  PO  





  DAILY [facility]  


 


Furosemide  40 mg  20 00:00  20 23:08





  Lasix  IV   40 mg





  Q12H [facility]   Administration


 


Glipizide  5 mg  20 17:30  20 06:26





  Glucotrol  PO   Not Given





  AC-BID [facility]  


 


Sodium Chloride  1,000 mls @ 20 mls/hr  20 14:00  20 14:28





  Saline 0.9%  IV   20 mls/hr





  .Q24H [facility]   Administration


 


Insulin Detemir  40 unit  20 21:00  20 20:43





  Levemir  SQ   40 unit





  HS [facility]   Administration


 


Insulin Detemir  10 unit  20 09:00 





  Levemir  SQ  





  QAM [facility]  


 


Lamotrigine  100 mg  20 21:00  20 20:43





  Lamictal  PO   100 mg





  HS [facility]   Administration


 


Venlafaxine HCl  75 mg  20 21:00  20 20:43





  Effexor  PO   75 mg





  BID [facility]   Administration








                                Intake and Output











 20





 22:59 06:59 14:59


 


Output Total  600 300


 


Balance  -600 -300


 


Output:   


 


  Urine  600 300


 


Other:   


 


  Voiding Method Toilet Toilet 


 


  # Voids 2  


 


  Weight 113.398 kg 110.6 kg 








                                        





                                 20 06:24 





                                 20 06:24 











EKG Interpretations (text)





EKG shows atrial fibrillation with a moderately rapid ventricular response





Assessment and Plan


Plan: 


Assessment and plan


#1 systolic congestive heart failure acute on chronic


#2 coronary artery disease with prior bypass surgery


#3 chronic persistent atrial fibrillation


#4 hypertension


#5 diabetes


#6 hyperlipidemia


#7 ischemic cardiomyopathy with prior AICD


#8 acute on chronic renal insufficiency





Plan


We will obtain an echocardiogram with Doppler study, increase the dose of Coreg 

to optimize heart rate control.  Continue IV Lasix, continue to monitor the 

intake and output along with daily weights and daily lytes BUN and creatinine.  

Further recommendations to follow.





DNP note has been reviewed, I agree with a documented findings and plan of care.

  Patient was seen and examined.

## 2020-04-06 NOTE — P.PN
Progress Note - Text


Progress Note Date: 04/06/20


Chief Complaint: Shortness of breath





Patient is a 80-year-old patient of Dr. Duckworth.   history of chronic atrial 

fibrillation on anticoagulation with Eliquis, coronary artery disease status 

post bypass graft, cardiomyopathy status post AICD placement, history of 

nephrectomy and unilateral kidney, CK D stage III, hypertension, hyperlipidemia,

GERD, diabetes type 2 and history of bipolar/depression came to ER with 

complaints of worsening shortness of breath and exertional dyspnea and bilateral

lower extremity increases swelling for the past 2 weeks.  Patient does have 

minimal cough without any sputum production.  No sputum production.  No 

complaints of fever or chills.  Denied any sick contacts at home.  No recent 

travel.


Admitted with CHF exacerbation, atrial fibrillation with a rapid ventricular 

rate.  Started on IV Lasix and IV amiodarone.


Today-she short of breath.  Edema present.  Tolerating a diet.  Sitting upon a 

chair.





Review of systems: Was done for constitutional, cardiovascular, GI, pulmonary. 

relevant finding as above





Active Medications





Apixaban (Eliquis)  5 mg PO BID [facility]


   Last Admin: 04/06/20 09:46 Dose:  5 mg


   Documented by: 


Aspirin (Aspirin)  81 mg PO DAILY [facility]


   Last Admin: 04/06/20 09:46 Dose:  81 mg


   Documented by: 


Atorvastatin Calcium (Lipitor)  80 mg PO HS [facility]


   Last Admin: 04/05/20 20:43 Dose:  80 mg


   Documented by: 


Carvedilol (Coreg)  25 mg PO AC-BID [facility]


   Last Admin: 04/06/20 16:53 Dose:  25 mg


   Documented by: 


Cholecalciferol (Vitamin D3 (25 Mcg = 1000 Iu))  2,000 unit PO DAILY [facility]


   Last Admin: 04/06/20 09:46 Dose:  2,000 unit


   Documented by: 


Cyanocobalamin (Vitamin B-12)  1,000 mcg PO DAILY [facility]


   Last Admin: 04/06/20 09:46 Dose:  1,000 mcg


   Documented by: 


Furosemide (Lasix)  40 mg IV Q8HR [facility]


   Last Admin: 04/06/20 16:52 Dose:  40 mg


   Documented by: 


Glipizide (Glucotrol)  5 mg PO AC-BID [facility]


   Last Admin: 04/06/20 16:53 Dose:  5 mg


   Documented by: 


Sodium Chloride (Saline 0.9%)  1,000 mls @ 20 mls/hr IV .Q24H [facility]


   Last Admin: 04/06/20 16:49 Dose:  Not Given


   Documented by: 


Amiodarone HCl 300 mg/ (Dextrose/Water)  250 mls @ 25 mls/hr IV .Q10H [facility]; 

Protocol


   Stop: 04/07/20 12:29


   Last Admin: 04/06/20 19:07 Dose:  0.5 mg/min, 25 mls/hr


   Documented by: 


Insulin Detemir (Levemir)  40 unit SQ HS [facility]


   Last Admin: 04/05/20 20:43 Dose:  40 unit


   Documented by: 


Insulin Detemir (Levemir)  10 unit SQ QAM [facility]


   Last Admin: 04/06/20 09:46 Dose:  10 unit


   Documented by: 


Lamotrigine (Lamictal)  100 mg PO [facility]


   Last Admin: 04/05/20 20:43 Dose:  100 mg


   Documented by: 


Losartan Potassium (Cozaar)  25 mg PO DAILY [facility]


   Last Admin: 04/06/20 11:50 Dose:  Not Given


   Documented by: 


Venlafaxine HCl (Effexor)  75 mg PO BID [facility]


   Last Admin: 04/06/20 09:47 Dose:  75 mg


   Documented by: 





On examination:


VITAL SIGNS: 97.6, 129, 22, 87/63, 100% on 2 L


GENERAL APPEARANCE: BMI 34, sitting up in a chair, short of breath


HEENT: Normal external appearance of nose and ear.  Oral cavity normal


EYES: Pupils equal. Conjunctiva normal. 


NECK: JVD unable to assess. Mass not palpable. 


RESPIRATORY: Respiratory effort increased.  Decreased breath sounds. 


CARDIOVASCULAR: Heart sounds irregular, significant edema present. 


ABDOMEN: Soft. Liver and spleen not palpable. No tenderness. No mass palpable. 


PSYCHIATRY: Alert and oriented x3. Mood and affect normal. 





INVESTIGATIONS, reviewed in the clinical context:


White count 7.2 hemoglobin 9.9 potassium 4.6 bun 54 creatinine 1.83





Previous testing:


White count 8.2 hemoglobin 10.6 potassium 5.2 bun 52 creatinine 2.0 troponin I 

0.0-3


EKG-possible A. fib


Chest x-ray film personally reviewed by me-pulmonary edema cardiomegaly


BUN/creatinine on February 22-1.58





Assessment:


Acute on chronic congestive heart failure exacerbation from systolic 

dysfunction, EF not known, slow to respond.


Ischemic cardiomyopathy


Persistent atrial fibrillation on anticoagulation with Eliquis, rate 

uncontrolled


Acute kidney injury secondary to cardiorenal syndrome, prerenal


-chronic kidney disease stage III.  Possible cardiorenal.


Mild hyperkalemia secondary to acute kidney injury


Lactic acidosis-type II


History of nephrectomy due to renal cancer and bladder cancer history


Diabetes type 2.  Insulin-dependent and also on metformin and glipizide.


Hyperlipidemia


Coronary artery disease with history of multiple stents placement


Bipolar disorder and depression


GERD


Obesity with BMI 34.9





Plan:


We'll put the patient on Lasix drip overnight.  Keep a close eye on the 

electrolytes.  Other medication treatment plan to continue.

## 2020-04-07 LAB
ANION GAP SERPL CALC-SCNC: 14 MMOL/L
BUN SERPL-SCNC: 70 MG/DL (ref 9–20)
CALCIUM SPEC-MCNC: 8.9 MG/DL (ref 8.4–10.2)
CHLORIDE SERPL-SCNC: 103 MMOL/L (ref 98–107)
CO2 SERPL-SCNC: 20 MMOL/L (ref 22–30)
GLUCOSE BLD-MCNC: 122 MG/DL (ref 75–99)
GLUCOSE BLD-MCNC: 52 MG/DL (ref 75–99)
GLUCOSE BLD-MCNC: 54 MG/DL (ref 75–99)
GLUCOSE BLD-MCNC: 59 MG/DL (ref 75–99)
GLUCOSE BLD-MCNC: 72 MG/DL (ref 75–99)
GLUCOSE BLD-MCNC: 73 MG/DL (ref 75–99)
GLUCOSE BLD-MCNC: 80 MG/DL (ref 75–99)
GLUCOSE SERPL-MCNC: 77 MG/DL (ref 74–99)
GRAN CASTS UR QL COMP ASSIST: 6 /LPF
HYALINE CASTS UR QL AUTO: 56 /LPF (ref 0–2)
PH UR: 5 [PH] (ref 5–8)
POTASSIUM SERPL-SCNC: 5.3 MMOL/L (ref 3.5–5.1)
PROT UR QL: (no result)
RBC UR QL: >182 /HPF (ref 0–5)
SODIUM SERPL-SCNC: 137 MMOL/L (ref 137–145)
SP GR UR: 1.01 (ref 1–1.03)
SQUAMOUS UR QL AUTO: 1 /HPF (ref 0–4)
UROBILINOGEN UR QL STRIP: <2 MG/DL (ref ?–2)
WBC #/AREA URNS HPF: 115 /HPF (ref 0–5)

## 2020-04-07 RX ADMIN — CYANOCOBALAMIN TAB 500 MCG SCH MCG: 500 TAB at 09:06

## 2020-04-07 RX ADMIN — FUROSEMIDE SCH MLS/HR: 10 INJECTION, SOLUTION INTRAMUSCULAR; INTRAVENOUS at 13:12

## 2020-04-07 RX ADMIN — CEFAZOLIN SCH: 330 INJECTION, POWDER, FOR SOLUTION INTRAMUSCULAR; INTRAVENOUS at 11:23

## 2020-04-07 RX ADMIN — APIXABAN SCH MG: 5 TABLET, FILM COATED ORAL at 09:06

## 2020-04-07 RX ADMIN — ATORVASTATIN CALCIUM SCH MG: 80 TABLET, FILM COATED ORAL at 20:12

## 2020-04-07 RX ADMIN — CARVEDILOL SCH: 12.5 TABLET, FILM COATED ORAL at 06:03

## 2020-04-07 RX ADMIN — FUROSEMIDE SCH MG: 10 INJECTION, SOLUTION INTRAMUSCULAR; INTRAVENOUS at 09:06

## 2020-04-07 RX ADMIN — FUROSEMIDE SCH MLS/HR: 10 INJECTION, SOLUTION INTRAMUSCULAR; INTRAVENOUS at 20:19

## 2020-04-07 RX ADMIN — VENLAFAXINE HYDROCHLORIDE SCH MG: 75 TABLET ORAL at 09:06

## 2020-04-07 RX ADMIN — AMIODARONE HYDROCHLORIDE SCH: 50 INJECTION, SOLUTION INTRAVENOUS at 06:03

## 2020-04-07 RX ADMIN — AMIODARONE HYDROCHLORIDE SCH MG: 200 TABLET ORAL at 20:12

## 2020-04-07 RX ADMIN — APIXABAN SCH MG: 5 TABLET, FILM COATED ORAL at 20:12

## 2020-04-07 RX ADMIN — MIDODRINE HYDROCHLORIDE SCH MG: 5 TABLET ORAL at 16:46

## 2020-04-07 RX ADMIN — INSULIN DETEMIR SCH: 100 INJECTION, SOLUTION SUBCUTANEOUS at 08:59

## 2020-04-07 RX ADMIN — ASPIRIN 81 MG CHEWABLE TABLET SCH MG: 81 TABLET CHEWABLE at 09:06

## 2020-04-07 RX ADMIN — CARVEDILOL SCH MG: 12.5 TABLET, FILM COATED ORAL at 16:46

## 2020-04-07 RX ADMIN — AMIODARONE HYDROCHLORIDE SCH MG: 200 TABLET ORAL at 16:46

## 2020-04-07 RX ADMIN — VENLAFAXINE HYDROCHLORIDE SCH: 75 TABLET ORAL at 22:49

## 2020-04-07 RX ADMIN — LOSARTAN POTASSIUM SCH: 25 TABLET, FILM COATED ORAL at 09:05

## 2020-04-07 RX ADMIN — Medication SCH UNIT: at 09:06

## 2020-04-07 RX ADMIN — INSULIN DETEMIR SCH: 100 INJECTION, SOLUTION SUBCUTANEOUS at 22:49

## 2020-04-07 NOTE — P.PN
Progress Note - Text


Progress Note Date: 04/07/20





Chief Complaint: Shortness of breath





Patient is a 80-year-old patient of Dr. Duckworth.   history of chronic atrial 

fibrillation on anticoagulation with Eliquis, coronary artery disease status 

post bypass graft, cardiomyopathy status post AICD placement, history of nephrec

guy and unilateral kidney, CK D stage III, hypertension, hyperlipidemia, GERD, 

diabetes type 2 and history of bipolar/depression came to ER with complaints of 

worsening shortness of breath and exertional dyspnea and bilateral lower 

extremity increases swelling for the past 2 weeks.  Patient does have minimal 

cough without any sputum production.  No sputum production.  No complaints of 

fever or chills.  Denied any sick contacts at home.  No recent travel.


Admitted with CHF exacerbation, atrial fibrillation with a rapid ventricular 

rate.  Started on IV Lasix and IV amiodarone.


Today-continues to be short of breath.  Significant edema.  Changed over to 

Lasix drip.





Review of systems: Was done for constitutional, cardiovascular, GI, pulmonary. 

relevant finding as above





Active Medications





Amiodarone HCl (Cordarone)  200 mg PO TID [facility]


   Last Admin: 04/07/20 16:46 Dose:  200 mg


   Documented by: 


Apixaban (Eliquis)  5 mg PO BID [facility]


   Last Admin: 04/07/20 09:06 Dose:  5 mg


   Documented by: 


Aspirin (Aspirin)  81 mg PO DAILY [facility]


   Last Admin: 04/07/20 09:06 Dose:  81 mg


   Documented by: 


Atorvastatin Calcium (Lipitor)  80 mg PO HS [facility]


   Last Admin: 04/06/20 20:23 Dose:  80 mg


   Documented by: 


Carvedilol (Coreg)  25 mg PO AC-BID [facility]


   Last Admin: 04/07/20 16:46 Dose:  25 mg


   Documented by: 


Cholecalciferol (Vitamin D3 (25 Mcg = 1000 Iu))  2,000 unit PO DAILY [facility]


   Last Admin: 04/07/20 09:06 Dose:  2,000 unit


   Documented by: 


Cyanocobalamin (Vitamin B-12)  1,000 mcg PO DAILY [facility]


   Last Admin: 04/07/20 09:06 Dose:  1,000 mcg


   Documented by: 


Sodium Chloride (Saline 0.9%)  1,000 mls @ 20 mls/hr IV .Q24H [facility]


   Last Admin: 04/07/20 11:23 Dose:  Not Given


   Documented by: 


Furosemide 100 mg/ Sodium (Chloride)  100 mls @ 10 mls/hr IV .Q10H [facility]


   Last Admin: 04/07/20 13:12 Dose:  10 mg/hr, 10 mls/hr


   Documented by: 


Insulin Detemir (Levemir)  40 unit SQ [facility]


   Last Admin: 04/06/20 21:49 Dose:  Not Given


   Documented by: 


Insulin Detemir (Levemir)  10 unit SQ QAM [facility]


   Last Admin: 04/07/20 08:59 Dose:  Not Given


   Documented by: 


Lamotrigine (Lamictal)  100 mg PO [facility]


   Last Admin: 04/06/20 20:23 Dose:  100 mg


   Documented by: 


Midodrine (Proamatine)  10 mg PO AC-BID [facility]


   Last Admin: 04/07/20 16:46 Dose:  10 mg


   Documented by: 


Venlafaxine HCl (Effexor)  75 mg PO BID [facility]


   Last Admin: 04/07/20 09:06 Dose:  75 mg


   Documented by: 








On examination:


VITAL SIGNS: 97.9, 107, 18, 89/52, 98% on 4 L


GENERAL APPEARANCE: Sitting up in a chair, short of breath


HEENT: Normal external appearance of nose and ear.  Oral cavity normal


EYES: Pupils equal. Conjunctiva normal. 


NECK: JVD unable to assess. Mass not palpable. 


RESPIRATORY: Respiratory effort increased.  Decreased breath sounds.  Basilar 

crackles 


CARDIOVASCULAR: Heart sounds irregular, significant edema present. 


ABDOMEN: Soft. Liver and spleen not palpable. No tenderness. No mass palpable. 


PSYCHIATRY: Alert and oriented x3. Mood and affect normal. 





INVESTIGATIONS, reviewed in the clinical context:


Progression 5.3 bun 70 creatinine 2.57





Previous testing:


White count 8.2 hemoglobin 10.6 potassium 5.2 bun 52 creatinine 2.0 troponin I 

0.0-3


EKG-possible A. fib


Chest x-ray film personally reviewed by me-pulmonary edema cardiomegaly


BUN/creatinine on February 22-1.58





Assessment:


Acute on chronic congestive heart failure exacerbation from systolic 

dysfunction, EF not known, slow to respond.


Ischemic cardiomyopathy


Persistent atrial fibrillation on anticoagulation with Eliquis, rate around 100


Acute kidney injury secondary to cardiorenal syndrome, prerenal


-chronic kidney disease stage III.  Possible cardiorenal.


Mild hyperkalemia secondary to acute kidney injury


Lactic acidosis-type II


History of nephrectomy due to renal cancer and bladder cancer history


Diabetes type 2.  Insulin-dependent and also on metformin and glipizide.


Hyperlipidemia


Coronary artery disease with history of multiple stents placement


Bipolar disorder and depression


GERD


Obesity with BMI 34.9





Plan:


Patient started on Lasix drip earlier today.  Hopefully that should improve the 

renal function and the blood pressure both.  Also put the patient will fluid 

restriction 1600 mL day.  Follow electrolytes closely.  Discussed with patient.

## 2020-04-07 NOTE — CONS
CONSULTATION



REASON FOR CONSULT:

Renal failure.



HISTORY OF PRESENT ILLNESS:

Patient is an 80-year-old male with history of severe cardiomyopathy, EF less than 20%,

chronic lower extremity edema, history of atrial fibrillation, who was admitted to the

hospital with complaints of increased shortness of breath and increased lower extremity

edema.  Patient also has a history of underlying bladder cancer.  Patient has a history

of right hydronephrosis and follows with Urology.



His serum creatinine has been increasing. It was 1.8 yesterday and it has gone up to

2.57.  Patient has not had significant urine output.  His previous creatinine was about

1.5 to 1.4 mg/dL in February of 2020 and March of 2020.  Blood pressure has been

running low, with systolic in the 90s to 89 mmHg.  Patient denies use of any

nonsteroidal anti-inflammatory agents prior to admission.



PAST MEDICAL HISTORY:

Atrial fibrillation, coronary artery disease, CHF, cardiomyopathy, EF about 20%,

gastroesophageal reflux disease, hyperlipidemia, history of bladder cancer, history of

right hydronephrosis, previous history of chemo and radiation therapy, history of

nephrolithiasis.



MEDICATIONS:

Medications prior to admission included insulin, Eliquis, metformin, Effexor,

Glucotrol, aspirin, Lasix, Lipitor, Coreg, vitamin D, Lamictal.



ALLERGIES:

NONE.



PHYSICAL EXAMINATION:

Patient is comfortable, awake. He is not in any acute distress.  Alert and oriented.

Blood pressure was 89/52, heart rate 107 per minute. Patient is afebrile.

EXAMINATION OF THE HEART: S1 and S2.

EXAMINATION OF LUNGS: Bilateral breath sounds are heard.

ABDOMEN:  Soft, non-tender. Obese.

Examination of lower extremities shows 3+ edema bilaterally. Chronic skin changes

noted.

CNS exam is grossly intact.



LABS:

Sodium 137, potassium 5.3, chloride 103, CO2 is 20, BUN 70, creatinine 2.57, calcium

8.9.



ASSESSMENT:

1. Acute kidney injury, mostly cardiorenal and associated with hypotension,

    hypoperfusion.  Patient is oliguric.  However, we will check a postvoid scan to

    rule out urine retention with his previous history of bladder cancer.  Check

    ultrasound of the kidneys.  Check urinalysis.  Start the patient on Lasix drip if

    there is no evidence of obstruction.  If urine output and renal function do not

    improve, we will need to add dobutamine as well.

2. Chronic atrial fibrillation, controlled ventricular response, maintained on

    Eliquis.

3. History of bladder cancer.

4. Previous history of right hydronephrosis; details not available.  We will check

    ultrasound of the kidneys.

5. Severe cardiomyopathy, ejection fraction 20%.

6. Chronic kidney disease, stage III, secondary to nephrosclerosis. Previous

    creatinine at about 1.5 to 1.4 in March of 2020 and February of 2020.



PLAN:

Check UA. Check postvoid bladder scan.  Start Lasix drip. Repeat labs in a.m.  Hold off

on angiotensin receptor blockers.  Avoid hypotension.  Patient may need dobutamine if

renal function and urine output do not improve.



Thank you for this consultation.  Will continue to follow the patient with you during

his hospitalization.





MMODL / IJN: [id number] / Job#: [id number]

## 2020-04-07 NOTE — P.PN
Subjective


Progress Note Date: 04/07/20





This is a pleasant 80-year-old  gentleman who follows regularly with 

Dr. Fleming in the office.  He has a known history of coronary artery 

disease with prior bypass surgery, persistent atrial fibrillation, ischemic 

cardiomyopathy with prior AICD implant, this was performed in February 2019, h

istory of diabetes, hypertension, hyperlipidemia.  Patient also has history of 

kidney and bladder cancer with prior chemo and radiation.  Presents to the 

hospital with at least one week duration of progressively worsening shortness of

breath.  He states that he also noticed a significant increase in his bilateral 

lower extremity edema.  The patient denies any fever or chills at home.  Chest 

x-ray on presentation here showed congestive cardiac failure.  EKG showed atrial

fibrillation with a rapid ventricular response.  Blood pressure 106/70 with a 

heart rate of 120, afebrile, 98% on room air.  White blood cell count is normal,

hemoglobin 9.9, platelet count 236.  Sodium 139, potassium 4.6, chloride 106, 

CO2 20, BUN 54, creatinine 1.8.  Plasma lactic acid 2.3 on admission, 1.4 this 

morning, BNP level 2710.  Troponin 0.02, 0.03, 0.03.  Patient was initiated on 

IV Lasix in the emergency room.  He has been overall diuresing quite well and 

his weight is down today.  Patient continues to be quite short of breath, 

continues to have lower extremity edema.  The patient is currently on Eliquis 5 

mg one tablet by mouth twice a day, he is 80 years of age and his creatinine on 

admission was 2, we will monitor that creatinine closely, if it remains in the 2

range we will consider decreasing the Eliquis at 2-1/2 mg one tablet by mouth 

twice a day.  We will continue the patient on IV Lasix.  We will increase the 

dose of Coreg, patient is not currently on an ACE inhibitor or an angiotensin 

blocker because of the creatinine.





04/07/2020


Patient was seen and examined this morning, he continues to feel significantly 

short of breath, states he does not notice much improvement from his admission 

here.  Labs are pending.  Blood pressure 95/50, heart rate 108.








Objective





- Vital Signs


Vital signs: 


                                   Vital Signs











Temp  97.3 F L  04/07/20 08:00


 


Pulse  111 H  04/07/20 11:19


 


Resp  18   04/07/20 11:19


 


BP  95/57   04/07/20 08:00


 


Pulse Ox  100   04/07/20 08:00








                                 Intake & Output











 04/06/20 04/07/20 04/07/20





 18:59 06:59 18:59


 


Intake Total 720  


 


Output Total 300 450 


 


Balance 420 -450 


 


Weight 110.6 kg 111.2 kg 


 


Intake:   


 


  Oral 720  


 


Output:   


 


  Urine 300 450 


 


Other:   


 


  Voiding Method Toilet Toilet 














- Exam





PHYSICAL EXAMINATION: 





GENERAL: 80-year-old  gentleman in no acute distress at the time of my 

examination





HEENT: Head is atraumatic, normocephalic.  Pupils equal, round.  Sclera 

anicteric. Conjunctiva are clear.  Mucous membranes of the mouth are moist.  

Neck is supple.  There is  elevated jugular venous pressure.  No carotid  bruit 

is heard.





HEART EXAMINATION: Heart S1-S2 irregularly irregular a systolic murmur is heard





CHEST EXAMINATION: Lungs are  clear with diminished air entry to the bases 

bilaterally





ABDOMEN:  Soft, obese, nontender. Bowel sounds are heard. No organomegaly noted.


 


EXTREMITIES: 2+ peripheral pulses with 2+  evidence of peripheral edema and no 

calf tenderness noted.





NEUROLOGIC patient is awake, alert and oriented 3 


 


.





- Labs


CBC & Chem 7: 


                                 04/06/20 06:24





                                 04/07/20 09:05


Labs: 


                  Abnormal Lab Results - Last 24 Hours (Table)











  04/06/20 04/06/20 04/06/20 Range/Units





  06:24 11:19 16:33 


 


Potassium     (3.5-5.1)  mmol/L


 


Carbon Dioxide     (22-30)  mmol/L


 


BUN     (9-20)  mg/dL


 


Creatinine     (0.66-1.25)  mg/dL


 


POC Glucose (mg/dL)   71 L  122 H  (75-99)  mg/dL


 


Iron  15 L    ()  ug/dL


 


% Saturation  4.08 L    (15.00-50.00)   














  04/07/20 04/07/20 Range/Units





  06:41 09:05 


 


Potassium   5.3 H  (3.5-5.1)  mmol/L


 


Carbon Dioxide   20 L  (22-30)  mmol/L


 


BUN   70 H  (9-20)  mg/dL


 


Creatinine   2.57 H  (0.66-1.25)  mg/dL


 


POC Glucose (mg/dL)  73 L   (75-99)  mg/dL


 


Iron    ()  ug/dL


 


% Saturation    (15.00-50.00)   














Assessment and Plan


Plan: 


Assessment and plan


#1 systolic congestive heart failure acute on chronic


#2 coronary artery disease with prior bypass surgery


#3 chronic persistent atrial fibrillation


#4 hypertension


#5 diabetes


#6 hyperlipidemia


#7 ischemic cardiomyopathy with prior AICD


#8 acute on chronic renal insufficiency





Plan


We will discontinue the patient's Cozaar, discontinue IV push Lasix and start 

the patient on a Lasix drip at 10 mg per hour.  Check lytes BUN and creatinine 

daily and continue to monitor the intake and output.  Monitor daily weights.  

Request consultation with nephrology.


DNP note has been reviewed, I agree with a documented findings and plan of care.

 Patient was seen and examined.

## 2020-04-07 NOTE — US
EXAMINATION TYPE: US kidneys/renal and bladder

 

DATE OF EXAM: 4/7/2020

 

COMPARISON: NONE

 

CLINICAL HISTORY: rf. Renal failure. Exam limitations due to body habitus. 

 

EXAM MEASUREMENTS:

 

Right Kidney:  8.3 x 3.7 x 3.8  cm

Left Kidney: 11.1 x 4.2 x 3.3  cm

 

 

 

Right Kidney: Solid mass lower pole 2.7 x 1.9 x 2.8 cm.  

Left Kidney: No hydronephrosis or masses seen  

Bladder: Not fully distended. 

**Bilateral Jets seen: No

 

 

There is no evidence for hydronephrosis at this point in time.  No nephrolithiasis is seen.    The ur
inary bladder is anechoic.

 

 

 

IMPRESSION:

Atrophic right kidney with solid appearing mass lower pole

## 2020-04-08 LAB
ANION GAP SERPL CALC-SCNC: 11 MMOL/L
BUN SERPL-SCNC: 79 MG/DL (ref 9–20)
CALCIUM SPEC-MCNC: 8.7 MG/DL (ref 8.4–10.2)
CHLORIDE SERPL-SCNC: 103 MMOL/L (ref 98–107)
CO2 SERPL-SCNC: 21 MMOL/L (ref 22–30)
GLUCOSE BLD-MCNC: 165 MG/DL (ref 75–99)
GLUCOSE BLD-MCNC: 167 MG/DL (ref 75–99)
GLUCOSE BLD-MCNC: 186 MG/DL (ref 75–99)
GLUCOSE BLD-MCNC: 188 MG/DL (ref 75–99)
GLUCOSE BLD-MCNC: 191 MG/DL (ref 75–99)
GLUCOSE SERPL-MCNC: 155 MG/DL (ref 74–99)
POTASSIUM SERPL-SCNC: 4.9 MMOL/L (ref 3.5–5.1)
SODIUM SERPL-SCNC: 135 MMOL/L (ref 137–145)

## 2020-04-08 RX ADMIN — ASPIRIN 81 MG CHEWABLE TABLET SCH MG: 81 TABLET CHEWABLE at 10:39

## 2020-04-08 RX ADMIN — APIXABAN SCH MG: 2.5 TABLET, FILM COATED ORAL at 21:24

## 2020-04-08 RX ADMIN — MIDODRINE HYDROCHLORIDE SCH MG: 5 TABLET ORAL at 16:34

## 2020-04-08 RX ADMIN — MIDODRINE HYDROCHLORIDE SCH: 5 TABLET ORAL at 15:27

## 2020-04-08 RX ADMIN — MIDODRINE HYDROCHLORIDE SCH MG: 5 TABLET ORAL at 06:03

## 2020-04-08 RX ADMIN — APIXABAN SCH MG: 2.5 TABLET, FILM COATED ORAL at 10:41

## 2020-04-08 RX ADMIN — Medication SCH UNIT: at 10:39

## 2020-04-08 RX ADMIN — DOBUTAMINE HYDROCHLORIDE SCH MLS/HR: 200 INJECTION INTRAVENOUS at 10:49

## 2020-04-08 RX ADMIN — INSULIN DETEMIR SCH UNIT: 100 INJECTION, SOLUTION SUBCUTANEOUS at 21:24

## 2020-04-08 RX ADMIN — CARVEDILOL SCH MG: 12.5 TABLET, FILM COATED ORAL at 06:02

## 2020-04-08 RX ADMIN — AMIODARONE HYDROCHLORIDE SCH MG: 200 TABLET ORAL at 10:39

## 2020-04-08 RX ADMIN — CEFAZOLIN SCH: 330 INJECTION, POWDER, FOR SOLUTION INTRAMUSCULAR; INTRAVENOUS at 15:27

## 2020-04-08 RX ADMIN — INSULIN DETEMIR SCH UNIT: 100 INJECTION, SOLUTION SUBCUTANEOUS at 10:39

## 2020-04-08 RX ADMIN — VENLAFAXINE HYDROCHLORIDE SCH MG: 75 TABLET ORAL at 21:23

## 2020-04-08 RX ADMIN — FUROSEMIDE SCH MLS/HR: 10 INJECTION, SOLUTION INTRAMUSCULAR; INTRAVENOUS at 16:37

## 2020-04-08 RX ADMIN — CARVEDILOL SCH MG: 12.5 TABLET, FILM COATED ORAL at 16:35

## 2020-04-08 RX ADMIN — ATORVASTATIN CALCIUM SCH MG: 80 TABLET, FILM COATED ORAL at 21:24

## 2020-04-08 RX ADMIN — CYANOCOBALAMIN TAB 500 MCG SCH MCG: 500 TAB at 10:39

## 2020-04-08 RX ADMIN — AMIODARONE HYDROCHLORIDE SCH MG: 200 TABLET ORAL at 21:24

## 2020-04-08 RX ADMIN — VENLAFAXINE HYDROCHLORIDE SCH MG: 75 TABLET ORAL at 10:39

## 2020-04-08 RX ADMIN — FUROSEMIDE SCH MLS/HR: 10 INJECTION, SOLUTION INTRAMUSCULAR; INTRAVENOUS at 06:04

## 2020-04-08 RX ADMIN — AMIODARONE HYDROCHLORIDE SCH MG: 200 TABLET ORAL at 16:35

## 2020-04-08 NOTE — PN
PROGRESS NOTE



Patient is seen for followup for acute kidney injury.  He has severe underlying

cardiomyopathy, ejection fraction about 20%.  Patient was started on Lasix drip

yesterday for significant lower extremity edema and some shortness of breath.  Urine

output has not been much.  Patient had a Cortes catheter placed as he did have urine

retention.  Renal function has worsened.  Creatinine has increased further to 2.7 mg/dL

from 2.5 yesterday.  Overall, patient states he is feeling more short of breath today.



On examination today, blood pressure was 188/61, heart rate 109 per minute, he is

afebrile.

Examination of the heart S1, S2.  Examination of the lungs, bilateral breath sounds are

heard.  Abdomen is soft, obese. Examination of the lower extremities shows edema 2 to

3+ bilaterally with chronic skin changes bilaterally. CNS exam grossly intact.



LABS:

Show sodium 135, potassium 4.9, chloride 103, CO2 is 21, BUN 79, creatinine 2.7.  UA

shows more than 182 RBCs, WBCs 115, protein 1+, blood large.



ASSESSMENT:

1. Acute kidney injury, associated with hypotension, hypoperfusion as well as

    cardiorenal syndrome.  The patient also had some degree of urine retention

    currently with indwelling Cortes catheter.  Urine output remains borderline.  The

    patient remains volume overloaded.  I agree with adding dobutamine to help with the

    diuresis.  Continue with the midodrine for the hypotension.  Continue to avoid

    nephrotoxic agents.  Continue with the Lasix drip for now.

2. Severe cardiomyopathy, ejection fraction 20%.

3. Atrial fibrillation, controlled ventricular response maintained on Eliquis.

4. Hematuria, most likely traumatic associated with Cortes catheter placement.

5. Rule out urinary tract infection.  The patient is currently asymptomatic.  White

    count has not been elevated.

6. Mild hyperkalemia associated with acute kidney injury.  Potassium is down to 4.9

    today.



PLAN:

Agree with dobutamine, continue with Lasix drip.  Continue midodrine, repeat labs in

a.m.  Overall prognosis is guarded.  Check urine cultures.



MMODL / IJN: [id number] / Job#: [id number]

## 2020-04-08 NOTE — P.PN
Progress Note - Text


Progress Note Date: 04/08/20





Chief Complaint: Shortness of breath





Patient is a 80-year-old patient of Dr. Duckworth.   history of chronic atrial 

fibrillation on anticoagulation with Eliquis, coronary artery disease status 

post bypass graft, cardiomyopathy status post AICD placement, history of nephrec

guy and unilateral kidney, CK D stage III, hypertension, hyperlipidemia, GERD, 

diabetes type 2 and history of bipolar/depression came to ER with complaints of 

worsening shortness of breath and exertional dyspnea and bilateral lower 

extremity increases swelling for the past 2 weeks.  Patient does have minimal 

cough without any sputum production.  No sputum production.  No complaints of 

fever or chills.  Denied any sick contacts at home.  No recent travel.





Admitted with CHF exacerbation, atrial fibrillation with a rapid ventricular 

rate.  Started on IV Lasix and IV amiodarone.


Today-tired.  Short of breath.  Edema present.  On Lasix drip.  Slight 

hematuria.  Started on dobutamine drip earlier





Review of systems: Was done for constitutional, cardiovascular, GI, pulmonary. 

relevant finding as above





Active Medications





Amiodarone HCl (Cordarone)  200 mg PO TID [facility]


   Last Admin: 04/08/20 21:24 Dose:  200 mg


   Documented by: 


Apixaban (Eliquis)  2.5 mg PO BID [facility]


   Last Admin: 04/08/20 21:24 Dose:  2.5 mg


   Documented by: 


Aspirin (Aspirin)  81 mg PO DAILY [facility]


   Last Admin: 04/08/20 10:39 Dose:  81 mg


   Documented by: 


Atorvastatin Calcium (Lipitor)  80 mg PO HS [facility]


   Last Admin: 04/08/20 21:24 Dose:  80 mg


   Documented by: 


Carvedilol (Coreg)  25 mg PO AC-BID [facility]


   Last Admin: 04/08/20 16:35 Dose:  25 mg


   Documented by: 


Cholecalciferol (Vitamin D3 (25 Mcg = 1000 Iu))  2,000 unit PO DAILY [facility]


   Last Admin: 04/08/20 10:39 Dose:  2,000 unit


   Documented by: 


Cyanocobalamin (Vitamin B-12)  1,000 mcg PO DAILY [facility]


   Last Admin: 04/08/20 10:39 Dose:  1,000 mcg


   Documented by: 


Guaifenesin (Mucinex)  1,200 mg PO Q12HR [facility]


   Last Admin: 04/08/20 21:23 Dose:  1,200 mg


   Documented by: 


Sodium Chloride (Saline 0.9%)  1,000 mls @ 20 mls/hr IV .Q24H [facility]


   Last Admin: 04/08/20 15:27 Dose:  Not Given


   Documented by: 


Furosemide 100 mg/ Sodium (Chloride)  100 mls @ 10 mls/hr IV .Q10H [facility]


   Last Admin: 04/08/20 16:37 Dose:  10 mg/hr, 10 mls/hr


   Documented by: 


Dobutamine HCl/Dextrose 500 mg (/ IV Solution)  250 mls @ 8.685 mls/hr IV .Q24H 

[facility]


   Last Admin: 04/08/20 10:49 Dose:  2.5 mcg/kg/min, 8.685 mls/hr


   Documented by: 


Ceftriaxone Sodium 1 gm/ (Sodium Chloride)  50 mls @ 100 mls/hr IVPB Q24HR [facility]


   Last Admin: 04/08/20 16:35 Dose:  100 mls/hr


   Documented by: 


Insulin Detemir (Levemir)  40 unit SQ [facility]


   Last Admin: 04/08/20 21:24 Dose:  40 unit


   Documented by: 


Insulin [facility]Levemir)  [facility]


   Last Admin: 04/08/20 10:39 Dose:  10 unit


   Documented by: 


Lamotrigine (Lamictal)  100 mg PO [facility]


   Last Admin: 04/08/20 21:24 Dose:  100 mg


   Documented by: 


Midodrine (Proamatine)  10 mg PO AC-TID [facility]


   Last Admin: 04/08/20 16:34 Dose:  10 mg


   Documented by: 


Venlafaxine HCl (Effexor)  75 mg PO BID [facility]


   Last Admin: 04/08/20 21:23 Dose:  75 mg


   Documented by: 











On examination:


VITAL SIGNS: Afebrile, heart rate 106, 20, 103/76, 99% on 4 L


GENERAL APPEARANCE: Propped up in a recliner, tired, short of breath


HEENT: Normal external appearance of nose and ear.  Oral cavity normal


EYES: Pupils equal. Conjunctiva normal. 


NECK: JVD raised. Mass not palpable. 


RESPIRATORY: Respiratory effort increased.  Decreased breath sounds.  Basilar 

crackles 


CARDIOVASCULAR: Heart sounds irregular, significant edema present. 


ABDOMEN: Soft. Liver and spleen not palpable. No tenderness. No mass palpable.  

Cortes catheter with some hematuria


PSYCHIATRY: Alert and oriented x3. Mood and affect normal. 





INVESTIGATIONS, reviewed in the clinical context:


Pressure 4.9 bun 79 crit is 2.70





Previous testing:


White count 8.2 hemoglobin 10.6 potassium 5.2 bun 52 creatinine 2.0 troponin I 

0.0-3


EKG-possible A. fib


Chest x-ray film personally reviewed by me-pulmonary edema cardiomegaly


BUN/creatinine on February 22-1.58





Assessment:


Acute on chronic congestive heart failure exacerbation from systolic 

dysfunction, EF not known, slow to respond.


Ischemic cardiomyopathy


Persistent atrial fibrillation on anticoagulation with Eliquis, rate 

uncontrolled


Acute kidney injury secondary to cardiorenal syndrome, prerenal-worsening


-chronic kidney disease stage III.  Possible cardiorenal.


Mild hyperkalemia secondary to acute kidney injury


Lactic acidosis-type II


History of nephrectomy due to renal cancer and bladder cancer history


Diabetes type 2.  Insulin-dependent and also on metformin and glipizide.


Hyperlipidemia


Coronary artery disease with history of multiple stents placement


Bipolar disorder and depression


GERD


Obesity with BMI 34.9





Plan:


Told the nurse to flush the Cortes catheter.  Continue with Lasix drip.  Other 

medications to continue.  Strict I's and O's.  Follow labs

## 2020-04-08 NOTE — P.PN
Subjective


Progress Note Date: 04/08/20





This is a pleasant 80-year-old  gentleman who follows regularly with 

Dr. Fleming in the office.  He has a known history of coronary artery 

disease with prior bypass surgery, persistent atrial fibrillation, ischemic 

cardiomyopathy with prior AICD implant, this was performed in February 2019, h

istory of diabetes, hypertension, hyperlipidemia.  Patient also has history of 

kidney and bladder cancer with prior chemo and radiation.  Presents to the 

hospital with at least one week duration of progressively worsening shortness of

breath.  He states that he also noticed a significant increase in his bilateral 

lower extremity edema.  The patient denies any fever or chills at home.  Chest 

x-ray on presentation here showed congestive cardiac failure.  EKG showed atrial

fibrillation with a rapid ventricular response.  Blood pressure 106/70 with a 

heart rate of 120, afebrile, 98% on room air.  White blood cell count is normal,

hemoglobin 9.9, platelet count 236.  Sodium 139, potassium 4.6, chloride 106, 

CO2 20, BUN 54, creatinine 1.8.  Plasma lactic acid 2.3 on admission, 1.4 this 

morning, BNP level 2710.  Troponin 0.02, 0.03, 0.03.  Patient was initiated on 

IV Lasix in the emergency room.  He has been overall diuresing quite well and 

his weight is down today.  Patient continues to be quite short of breath, 

continues to have lower extremity edema.  The patient is currently on Eliquis 5 

mg one tablet by mouth twice a day, he is 80 years of age and his creatinine on 

admission was 2, we will monitor that creatinine closely, if it remains in the 2

range we will consider decreasing the Eliquis at 2-1/2 mg one tablet by mouth 

twice a day.  We will continue the patient on IV Lasix.  We will increase the 

dose of Coreg, patient is not currently on an ACE inhibitor or an angiotensin 

blocker because of the creatinine.





04/07/2020


Patient was seen and examined this morning, he continues to feel significantly 

short of breath, states he does not notice much improvement from his admission 

here.  Labs are pending.  Blood pressure 95/50, heart rate 108.





04/08/2020


Patient seen and examined this morning, states that he feels more short of 

breath today than he did on arrival here.  His urine output is minimal.  

Continues to be hypotensive.  Sodium 135, potassium 4.9, BUN 79, creatinine 2.7.

 We will start the patient on dobutamine drip at 2.5 g, after 10 minutes we'll 

increase to 5 mics.  Obtain a repeat stat chest x-ray.  This was discussed by 

Dr. KARINE Urias with nephrology this morning as well.








Objective





- Vital Signs


Vital signs: 


                                   Vital Signs











Temp  97.7 F   04/08/20 03:42


 


Pulse  116 H  04/08/20 03:42


 


Resp  18   04/08/20 03:42


 


BP  108/68   04/08/20 03:42


 


Pulse Ox  95   04/08/20 03:42








                                 Intake & Output











 04/07/20 04/08/20 04/08/20





 18:59 06:59 18:59


 


Intake Total  488.667 


 


Output Total   325


 


Balance  488.667 -325


 


Weight  115.8 kg 


 


Intake:   


 


  Intake, IV Titration  168.667 





  Amount   


 


    Furosemide 100 mg In  168.667 





    Sodium Chloride 0.9% 90   





    ml @ 10 MG/HR 10 mls/hr   





    IV .Q10H [facility] Rx#:   





    [id number]   


 


  Oral  320 


 


Output:   


 


  Urine   325


 


Other:   


 


  Voiding Method  Indwelling Catheter 


 


  # Voids  1 


 


  # Bowel Movements  1 














- Exam





PHYSICAL EXAMINATION: 





GENERAL: 80-year-old  gentleman in no acute distress at the time of my 

examination





HEENT: Head is atraumatic, normocephalic.  Pupils equal, round.  Sclera 

anicteric. Conjunctiva are clear.  Mucous membranes of the mouth are moist.  Nec

k is supple.  There is  elevated jugular venous pressure.  No carotid  bruit is 

heard.





HEART EXAMINATION: Heart S1-S2 irregularly irregular a systolic murmur is heard





CHEST EXAMINATION: Lungs are   diminished  to the bases bilaterally





ABDOMEN:  Soft, obese, nontender. Bowel sounds are heard. No organomegaly noted.


 


EXTREMITIES: 2+ peripheral pulses with 2+  evidence of peripheral edema and no 

calf tenderness noted.





NEUROLOGIC patient is awake, alert and oriented 3 


 


.





- Labs


CBC & Chem 7: 


                                 04/06/20 06:24





                                 04/08/20 04:34


Labs: 


                  Abnormal Lab Results - Last 24 Hours (Table)











  04/07/20 04/07/20 04/07/20 Range/Units





  16:41 20:30 20:43 


 


Sodium     (137-145)  mmol/L


 


Carbon Dioxide     (22-30)  mmol/L


 


BUN     (9-20)  mg/dL


 


Creatinine     (0.66-1.25)  mg/dL


 


Glucose     (74-99)  mg/dL


 


POC Glucose (mg/dL)  122 H  59 L  54 L  (75-99)  mg/dL


 


Urine Protein     (Negative)  


 


Urine Blood     (Negative)  


 


Ur Leukocyte Esterase     (Negative)  


 


Urine RBC     (0-5)  /hpf


 


Urine WBC     (0-5)  /hpf


 


Urine WBC Clumps     (None)  /hpf


 


Amorphous Sediment     (None)  /hpf


 


Urine Bacteria     (None)  /hpf


 


Hyaline Casts     (0-2)  /lpf


 


Urine Mucus     (None)  /hpf














  04/07/20 04/07/20 04/07/20 Range/Units





  21:00 21:28 Unknown 


 


Sodium     (137-145)  mmol/L


 


Carbon Dioxide     (22-30)  mmol/L


 


BUN     (9-20)  mg/dL


 


Creatinine     (0.66-1.25)  mg/dL


 


Glucose     (74-99)  mg/dL


 


POC Glucose (mg/dL)  52 L  72 L   (75-99)  mg/dL


 


Urine Protein    1+ H  (Negative)  


 


Urine Blood    Large H  (Negative)  


 


Ur Leukocyte Esterase    Large H  (Negative)  


 


Urine RBC    >182 H  (0-5)  /hpf


 


Urine WBC    115 H  (0-5)  /hpf


 


Urine WBC Clumps    Many H  (None)  /hpf


 


Amorphous Sediment    Rare H  (None)  /hpf


 


Urine Bacteria    Occasional H  (None)  /hpf


 


Hyaline Casts    56 H  (0-2)  /lpf


 


Urine Mucus    Rare H  (None)  /hpf














  04/08/20 04/08/20 04/08/20 Range/Units





  02:52 04:34 06:01 


 


Sodium   135 L   (137-145)  mmol/L


 


Carbon Dioxide   21 L   (22-30)  mmol/L


 


BUN   79 H   (9-20)  mg/dL


 


Creatinine   2.70 H   (0.66-1.25)  mg/dL


 


Glucose   155 H   (74-99)  mg/dL


 


POC Glucose (mg/dL)  188 H   191 H  (75-99)  mg/dL


 


Urine Protein     (Negative)  


 


Urine Blood     (Negative)  


 


Ur Leukocyte Esterase     (Negative)  


 


Urine RBC     (0-5)  /hpf


 


Urine WBC     (0-5)  /hpf


 


Urine WBC Clumps     (None)  /hpf


 


Amorphous Sediment     (None)  /hpf


 


Urine Bacteria     (None)  /hpf


 


Hyaline Casts     (0-2)  /lpf


 


Urine Mucus     (None)  /hpf














Assessment and Plan


Plan: 


Assessment and plan


#1 systolic congestive heart failure acute on chronic


#2 coronary artery disease with prior bypass surgery


#3 chronic persistent atrial fibrillation


#4 hypertension


#5 diabetes


#6 hyperlipidemia


#7 ischemic cardiomyopathy with prior AICD


#8 acute on chronic renal insufficiency





Plan


From cardiology's perspective, we will continue with the Lasix drip, add 

dobutamine to his medication regime.  Repeat a stat chest x-ray.





DNP note has been reviewed, I agree with a documented findings and plan of care.

 Patient was seen and examined.

## 2020-04-08 NOTE — P.CNPUL
History of Present Illness


Consult date: 20


Requesting physician: Lorena Toure


Reason for consult: dyspnea


Chief complaint: Exertional dyspnea, anasarca


History of present illness: 


 80-year-old male patient of Dr. Duckworth, with known history of severe ischemic 

cardiomyopathy, and ejection fraction of less than 20%, status post AICD place

ment, hypertension, diabetes mellitus, hyperlipidemia, persistent atrial 

fibrillation, coronary artery disease status post bypass grafting, ex-smoker, 

chronic congestive heart failure with systolic dysfunction, history of kidney 

and bladder cancer status post chemo and radiation, who presented to the 

emergency department on 2020 with complaints of worsening exertional 

dyspnea, and increased swelling in his lower extremities.  He denied any chest 

pain, palpitations, no fever or chills, no nausea vomiting, no diaphoresis, no 

cough or phlegm production.  Admission chest x-ray showed creased vascular 

congestion, and pulmonary edema and a small left pleural effusion.  Admission 

blood work was negative for any leukocytosis, white blood cell count was 8.2, 

hemoglobin is 10.6, mild lymphopenia, INR is 1.3, potassium is 5.2, the rest of 

the electrolytes were within normal limits, BUN is 52 creatinine is 2.0, patient

does have history of chronic kidney disease stage III, this showed signs of 

infection although patient denies any symptoms, troponins were 0.031, 0.026, 

0.031, proBNP was 2710.  We were asked to see the patient in consultation.  

Patient has been started on infusion of Lasix, early infusing at a rate of 10 mg

per hour, and dobutamine drip infusing at 5 mics per kilo per minute.  A is on 

oral Eliquis for history of chronic atrial fibrillation he remains in A. fib 

with a controlled rate of 93 bpm.  He is sitting up in the recliner, in no acute

distress, on 4 L of oxygen with a pulse ox of 95-98%, he is been afebrile, he 

has not diuresed significantly despite the Lasix infusion, and today's blood 

work shows worsening of his renal profile would be on up to 79 and creatinine is

up to 2.7.  Nephrology is following. 








Review of Systems


All systems: negative


Constitutional: Denies chills, Denies fever


Eyes: denies blurred vision, denies pain


Ears, nose, mouth and throat: Denies headache, Denies sore throat


Cardiovascular: Reports decreased exercise tolerance, Reports dyspnea on 

exertion, Reports leg edema, Reports orthopnea, Denies chest pain, Denies 

shortness of breath


Respiratory: Reports dyspnea, Denies cough


Gastrointestinal: Denies abdominal pain, Denies diarrhea, Denies nausea, Denies 

vomiting


Musculoskeletal: Denies myalgias


Integumentary: Denies pruritus, Denies rash


Neurological: Denies numbness, Denies weakness


Psychiatric: Denies anxiety, Denies depression


Endocrine: Denies fatigue, Denies weight change





Past Medical History


Past Medical History: Atrial Fibrillation, Coronary Artery Disease (CAD), 

Cancer, Heart Failure, Diabetes Mellitus, GERD/Reflux, Hyperlipidemia


Additional Past Medical History / Comment(s): See Dr Fleming's H&P, hx 

kidney and bladder cancer- tx with chemo and radiation , "growth on kidney",

history of CABG, cardiomyopathy, AICD implantation ULCER, KIDNEY STONES


History of Any Multi-Drug Resistant Organisms: None Reported


Past Surgical History: Bladder Surgery, Heart Catheterization, Orthopedic 

Surgery


Additional Past Surgical History / Comment(s): Port-a-cath insertion/later 

removed. Bilateral cataract , ORIF R ankle with 2 screws,"scraped the bladder" 

10-18-16 TOTAL RT KNEE,growth on kidney removed


Past Anesthesia/Blood Transfusion Reactions: No Reported Reaction


Additional Past Anesthesia/Blood Transfusion Reaction / Comment(s): Pt has never

recieved blood.


Past Psychological History: Bipolar, Depression


Additional Psychological History / Comment(s): Pt is bipolar and states he does 

well with his medications.


Smoking Status: Former smoker


Additional Past Alcohol Use History / Comment(s): Pt states he started smoking 

at age 16.  He quit sometime in the  or maybe even sooner.  He was less 

than a pack a day smoker.


Past Drug Use History: None Reported





- Past Family History


  ** Father


Family Medical History: Cancer


Additional Family Medical History / Comment(s): prostate





  ** Mother


Family Medical History: No Reported History


Additional Family Medical History / Comment(s): Mother had bowel perforations.  

She  at 88 yrs of age.





  ** Brother(s)


Family Medical History: Cancer





  ** Sister(s)


Family Medical History: Cancer





Medications and Allergies


                                Home Medications











 Medication  Instructions  Recorded  Confirmed  Type


 


Insulin Glargine [Lantus] 40 unit SQ HS 19 History


 


Apixaban [Eliquis] 5 mg PO BID 20 History


 


metFORMIN HCL 1,000 mg PO BID 20 History


 


Venlafaxine HCl [Effexor] 75 mg PO BID  tab 20 Rx


 


glipiZIDE [Glucotrol] 5 mg PO AC-BID #0 tab 20 Rx


 


Aspirin 81 mg PO DAILY 30 Days #30 chew 20 Rx


 


Furosemide [Lasix] 40 mg PO BID@0900,1600  tab 20 Rx


 


Atorvastatin [Lipitor] 80 mg PO HS 20 History


 


Carvedilol [Coreg] 12.5 mg PO AC-BID 20 History


 


Cholecalciferol [Vitamin D3 (25 2,000 unit PO DAILY 20 History





Mcg = 1000 Iu)]    


 


Cyanocobalamin (Vitamin B-12) 1,000 mcg PO DAILY 20 History





[Vitamin B-12]    


 


Insulin Glargine [Lantus] 10 unit SQ QAM 20 History


 


lamoTRIgine [LaMICtal] 100 mg PO HS 20 History








                                    Allergies











Allergy/AdvReac Type Severity Reaction Status Date / Time


 


No Known Allergies Allergy   Verified 20 12:58














Physical Exam


Vitals: 


                                   Vital Signs











  Temp Pulse Resp BP Pulse Ox


 


 20 03:42  97.7 F  116 H  18  108/68  95


 


 20 00:02  97.8 F  109 H  18  88/61  98


 


 20 22:53  97.4 F L  111 H  20  98/68  98


 


 20 15:01   93  18  


 


 20 14:56  98.2 F  93  18  99/62  96








                                Intake and Output











 20





 22:59 06:59 14:59


 


Intake Total 391.167 97.5 


 


Output Total   325


 


Balance 391.167 97.5 -325


 


Intake:   


 


  Intake, IV Titration 71.167 97.5 





  Amount   


 


    Furosemide 100 mg In 71.167 97.5 





    Sodium Chloride 0.9% 90   





    ml @ 10 MG/HR 10 mls/hr   





    IV .Q10H [facility] Rx#:   





    [id number]   


 


  Oral 320  


 


Output:   


 


  Urine   325


 


Other:   


 


  Voiding Method Indwelling Catheter Indwelling Catheter 


 


  # Voids  1 


 


  # Bowel Movements  1 2


 


  Weight  115.8 kg 











 GENERAL EXAM: Alert, very pleasant, 80-year-old  male, on 4 L of 

oxygen with a pulse ox of 95-98%, sitting up in the recliner, appears to have 

quite significant generalized edema, anasarca, but no acute respiratory distress

 


HEAD: Normocephalic/atraumatic.


EYES: Normal reaction of pupils, equal size.  Conjunctiva pink, sclera white.


NOSE: Clear with pink turbinates.


THROAT: No erythema or exudates.


NECK: No masses, no JVD, no thyroid enlargement, no adenopathy.


CHEST: No chest wall deformity.  Symmetrical expansion. 


LUNGS: Equal air entry with fine basilar crackles at bilateral bases


CVS: Irregular rate and rhythm, normal S1 and S2, no gallops, no murmurs, no 

rubs


ABDOMEN: Soft, nontender.  No hepatosplenomegaly, normal bowel sounds, no 

guarding or rigidity.


EXTREMITIES: No clubbing, significant 2+ lower extremity edema, no cyanosis, 2+ 

pulses and upper and lower extremities.


MUSCULOSKELETAL: Muscle strength and tone normal.


SPINE: No scoliosis or deformity


SKIN: No rashes


CENTRAL NERVOUS SYSTEM: Alert and oriented -3.  No focal deficits, tone is 

normal in all 4 extremities.


PSYCHIATRIC: Alert and oriented -3.  Appropriate affect.  Intact judgment and 

insight.











Results





- Laboratory Findings


CBC and BMP: 


                                 20 06:24





                                 20 04:34


PT/INR, D-dimer











PT  13.3 sec (9.0-12.0)  H  20  12:24    


 


INR  1.3  (<1.2)  H  20  12:24    








Abnormal lab findings: 


                                  Abnormal Labs











  20





  12:24 12:24 12:24


 


RBC   


 


Hgb  10.6 L  


 


Hct  34.9 L  


 


MCV  76.9 L  


 


MCH  23.4 L  


 


MCHC  30.4 L  


 


RDW  17.2 H  


 


Lymphocytes #  0.8 L  


 


PT   13.3 H 


 


INR   1.3 H 


 


Sodium   


 


Potassium    5.2 H


 


Carbon Dioxide   


 


BUN    52 H


 


Creatinine    2.00 H


 


Glucose    133 H


 


POC Glucose (mg/dL)   


 


Plasma Lactic Acid Jeanmarie   


 


Iron   


 


% Saturation   


 


Alkaline Phosphatase    157 H


 


Urine Protein   


 


Urine Blood   


 


Ur Leukocyte Esterase   


 


Urine RBC   


 


Urine WBC   


 


Urine WBC Clumps   


 


Amorphous Sediment   


 


Urine Bacteria   


 


Hyaline Casts   


 


Urine Mucus   














  20





  12:24 16:02 16:58


 


RBC   


 


Hgb   


 


Hct   


 


MCV   


 


MCH   


 


MCHC   


 


RDW   


 


Lymphocytes #   


 


PT   


 


INR   


 


Sodium   


 


Potassium   


 


Carbon Dioxide   


 


BUN   


 


Creatinine   


 


Glucose   


 


POC Glucose (mg/dL)    64 L


 


Plasma Lactic Acid Jeanmarie  3.1 H*  2.3 H* 


 


Iron   


 


% Saturation   


 


Alkaline Phosphatase   


 


Urine Protein   


 


Urine Blood   


 


Ur Leukocyte Esterase   


 


Urine RBC   


 


Urine WBC   


 


Urine WBC Clumps   


 


Amorphous Sediment   


 


Urine Bacteria   


 


Hyaline Casts   


 


Urine Mucus   














  20





  17:00 17:22 19:25


 


RBC   


 


Hgb   


 


Hct   


 


MCV   


 


MCH   


 


MCHC   


 


RDW   


 


Lymphocytes #   


 


PT   


 


INR   


 


Sodium   


 


Potassium   


 


Carbon Dioxide   


 


BUN   


 


Creatinine   


 


Glucose   


 


POC Glucose (mg/dL)  69 L  66 L 


 


Plasma Lactic Acid Jeanmarie    2.3 H*


 


Iron   


 


% Saturation   


 


Alkaline Phosphatase   


 


Urine Protein   


 


Urine Blood   


 


Ur Leukocyte Esterase   


 


Urine RBC   


 


Urine WBC   


 


Urine WBC Clumps   


 


Amorphous Sediment   


 


Urine Bacteria   


 


Hyaline Casts   


 


Urine Mucus   














  20





  20:15 06:24 06:24


 


RBC   4.28 L 


 


Hgb   9.9 L 


 


Hct   33.2 L 


 


MCV   77.6 L 


 


MCH   23.1 L 


 


MCHC   29.8 L 


 


RDW   16.9 H 


 


Lymphocytes #   0.8 L 


 


PT   


 


INR   


 


Sodium   


 


Potassium   


 


Carbon Dioxide    20 L


 


BUN    54 H


 


Creatinine    1.83 H


 


Glucose   


 


POC Glucose (mg/dL)  195 H  


 


Plasma Lactic Acid Jeanmarie   


 


Iron    15 L


 


% Saturation    4.08 L


 


Alkaline Phosphatase   


 


Urine Protein   


 


Urine Blood   


 


Ur Leukocyte Esterase   


 


Urine RBC   


 


Urine WBC   


 


Urine WBC Clumps   


 


Amorphous Sediment   


 


Urine Bacteria   


 


Hyaline Casts   


 


Urine Mucus   














  20





  11:19 16:33 06:41


 


RBC   


 


Hgb   


 


Hct   


 


MCV   


 


MCH   


 


MCHC   


 


RDW   


 


Lymphocytes #   


 


PT   


 


INR   


 


Sodium   


 


Potassium   


 


Carbon Dioxide   


 


BUN   


 


Creatinine   


 


Glucose   


 


POC Glucose (mg/dL)  71 L  122 H  73 L


 


Plasma Lactic Acid Jeanmarie   


 


Iron   


 


% Saturation   


 


Alkaline Phosphatase   


 


Urine Protein   


 


Urine Blood   


 


Ur Leukocyte Esterase   


 


Urine RBC   


 


Urine WBC   


 


Urine WBC Clumps   


 


Amorphous Sediment   


 


Urine Bacteria   


 


Hyaline Casts   


 


Urine Mucus   














  20





  09:05 16:41 20:30


 


RBC   


 


Hgb   


 


Hct   


 


MCV   


 


MCH   


 


MCHC   


 


RDW   


 


Lymphocytes #   


 


PT   


 


INR   


 


Sodium   


 


Potassium  5.3 H  


 


Carbon Dioxide  20 L  


 


BUN  70 H  


 


Creatinine  2.57 H  


 


Glucose   


 


POC Glucose (mg/dL)   122 H  59 L


 


Plasma Lactic Acid Jeanmarie   


 


Iron   


 


% Saturation   


 


Alkaline Phosphatase   


 


Urine Protein   


 


Urine Blood   


 


Ur Leukocyte Esterase   


 


Urine RBC   


 


Urine WBC   


 


Urine WBC Clumps   


 


Amorphous Sediment   


 


Urine Bacteria   


 


Hyaline Casts   


 


Urine Mucus   














  20





  20:43 21:00 21:28


 


RBC   


 


Hgb   


 


Hct   


 


MCV   


 


MCH   


 


MCHC   


 


RDW   


 


Lymphocytes #   


 


PT   


 


INR   


 


Sodium   


 


Potassium   


 


Carbon Dioxide   


 


BUN   


 


Creatinine   


 


Glucose   


 


POC Glucose (mg/dL)  54 L  52 L  72 L


 


Plasma Lactic Acid Jeanmarie   


 


Iron   


 


% Saturation   


 


Alkaline Phosphatase   


 


Urine Protein   


 


Urine Blood   


 


Ur Leukocyte Esterase   


 


Urine RBC   


 


Urine WBC   


 


Urine WBC Clumps   


 


Amorphous Sediment   


 


Urine Bacteria   


 


Hyaline Casts   


 


Urine Mucus   














  20





  Unknown 02:52 04:34


 


RBC   


 


Hgb   


 


Hct   


 


MCV   


 


MCH   


 


MCHC   


 


RDW   


 


Lymphocytes #   


 


PT   


 


INR   


 


Sodium    135 L


 


Potassium   


 


Carbon Dioxide    21 L


 


BUN    79 H


 


Creatinine    2.70 H


 


Glucose    155 H


 


POC Glucose (mg/dL)   188 H 


 


Plasma Lactic Acid Jeanmarie   


 


Iron   


 


% Saturation   


 


Alkaline Phosphatase   


 


Urine Protein  1+ H  


 


Urine Blood  Large H  


 


Ur Leukocyte Esterase  Large H  


 


Urine RBC  >182 H  


 


Urine WBC  115 H  


 


Urine WBC Clumps  Many H  


 


Amorphous Sediment  Rare H  


 


Urine Bacteria  Occasional H  


 


Hyaline Casts  56 H  


 


Urine Mucus  Rare H  














  20





  06:01 11:45


 


RBC  


 


Hgb  


 


Hct  


 


MCV  


 


MCH  


 


MCHC  


 


RDW  


 


Lymphocytes #  


 


PT  


 


INR  


 


Sodium  


 


Potassium  


 


Carbon Dioxide  


 


BUN  


 


Creatinine  


 


Glucose  


 


POC Glucose (mg/dL)  191 H  167 H


 


Plasma Lactic Acid Jeanmarie  


 


Iron  


 


% Saturation  


 


Alkaline Phosphatase  


 


Urine Protein  


 


Urine Blood  


 


Ur Leukocyte Esterase  


 


Urine RBC  


 


Urine WBC  


 


Urine WBC Clumps  


 


Amorphous Sediment  


 


Urine Bacteria  


 


Hyaline Casts  


 


Urine Mucus  














- Diagnostic Findings


Chest x-ray: report reviewed, image reviewed





Assessment and Plan


Plan: 


 Assessment:





#1.  Acute exacerbation of chronic congestive heart failure with systolic 

dysfunction





#2.  Acute kidney injury likely related to cardiorenal syndrome, diuretic 

therapy, nephrology is following





#3.  Acute urinary tract infection, although patient is asymptomatic, we'll 

start on empiric Rocephin





#4.  Ischemic cardiomyopathy, with EF of less than 20%, status post AICD 

implantation





#5.  Diabetes mellitus





#6.  History of chronic persistent atrial fibrillation on oral anticoagulation





#7.  History of chronic kidney disease stage III at baseline, history of right 

hydronephrosis and patient follows with urology





#8.  Coronary artery disease status post bypass grafting





#9.  Hypertension





#10.  Hyperlipidemia





#11.  History of bladder cancer





Plan:





Continue current medical treatment, agree with dobutamine and Lasix infusion, 

patient still has significant generalized edema, but no acute respiratory 

distress, today's chest x-ray has been reviewed showing stable findings of scat

tered bilateral interstitial infiltrates, related to acute exacerbation of CHF. 

 We'll continue medical treatment, renal profile has worsened, and nephrology is

 on the case.  We'll start IV Rocephin for possibility of underlying urinary 

tract infection, send a urine culture.  Acute intake and output, daily weights. 

 We'll continue to follow and make further recommendations depending on clinical

 course 





I performed a history & physical examination of the patient and discussed their 

management with my nurse practitioner, Coco Lanza.  I reviewed the nurse 

practitioner's note and agree with the documented findings and plan of care.  

Lung sounds are positive for fine rales.  The findings and the impression was 

discussed with the patient.  I attest to the documentation by the nurse practiti

andrew. 





Time with Patient: Greater than 30

## 2020-04-08 NOTE — XR
EXAMINATION TYPE: XR chest 1V portable

 

DATE OF EXAM: 4/8/2020

 

HISTORY: Shortness of breath.

 

COMPARISON: 4/5/2020

 

TECHNIQUE: Single view of the chest is submitted.

 

FINDINGS:

Demonstrated are scattered senescent parenchymal change.  

 

Scattered bilateral interstitial infiltrates are nonspecific. Overall appearance is essentially stabl
e.

 

The heart is stable.

 

Hilar and mediastinal structures are within normal limits.  

 

Degenerative changes are seen of the dorsal spine. 

 

 IMPRESSION: 

 

1.  Scattered bilateral interstitial infiltrates are nonspecific. Overall appearance is essentially s
table.

## 2020-04-09 LAB
ANION GAP SERPL CALC-SCNC: 11 MMOL/L
BUN SERPL-SCNC: 78 MG/DL (ref 9–20)
CALCIUM SPEC-MCNC: 7.9 MG/DL (ref 8.4–10.2)
CHLORIDE SERPL-SCNC: 106 MMOL/L (ref 98–107)
CO2 SERPL-SCNC: 22 MMOL/L (ref 22–30)
GLUCOSE BLD-MCNC: 130 MG/DL (ref 75–99)
GLUCOSE BLD-MCNC: 136 MG/DL (ref 75–99)
GLUCOSE BLD-MCNC: 140 MG/DL (ref 75–99)
GLUCOSE BLD-MCNC: 167 MG/DL (ref 75–99)
GLUCOSE SERPL-MCNC: 120 MG/DL (ref 74–99)
POTASSIUM SERPL-SCNC: 4 MMOL/L (ref 3.5–5.1)
SODIUM SERPL-SCNC: 139 MMOL/L (ref 137–145)

## 2020-04-09 RX ADMIN — ATORVASTATIN CALCIUM SCH MG: 80 TABLET, FILM COATED ORAL at 20:45

## 2020-04-09 RX ADMIN — MIDODRINE HYDROCHLORIDE SCH MG: 5 TABLET ORAL at 17:21

## 2020-04-09 RX ADMIN — AMIODARONE HYDROCHLORIDE SCH MG: 200 TABLET ORAL at 09:38

## 2020-04-09 RX ADMIN — VENLAFAXINE HYDROCHLORIDE SCH MG: 75 TABLET ORAL at 20:46

## 2020-04-09 RX ADMIN — AMIODARONE HYDROCHLORIDE SCH MG: 200 TABLET ORAL at 20:45

## 2020-04-09 RX ADMIN — CEFAZOLIN SCH MLS/HR: 330 INJECTION, POWDER, FOR SOLUTION INTRAMUSCULAR; INTRAVENOUS at 15:11

## 2020-04-09 RX ADMIN — APIXABAN SCH MG: 2.5 TABLET, FILM COATED ORAL at 09:37

## 2020-04-09 RX ADMIN — INSULIN DETEMIR SCH UNIT: 100 INJECTION, SOLUTION SUBCUTANEOUS at 09:37

## 2020-04-09 RX ADMIN — INSULIN DETEMIR SCH UNIT: 100 INJECTION, SOLUTION SUBCUTANEOUS at 20:45

## 2020-04-09 RX ADMIN — ASPIRIN 81 MG CHEWABLE TABLET SCH MG: 81 TABLET CHEWABLE at 09:37

## 2020-04-09 RX ADMIN — CARVEDILOL SCH MG: 12.5 TABLET, FILM COATED ORAL at 06:54

## 2020-04-09 RX ADMIN — DOBUTAMINE HYDROCHLORIDE SCH MLS/HR: 200 INJECTION INTRAVENOUS at 04:14

## 2020-04-09 RX ADMIN — MIDODRINE HYDROCHLORIDE SCH MG: 5 TABLET ORAL at 14:20

## 2020-04-09 RX ADMIN — AMIODARONE HYDROCHLORIDE SCH MG: 200 TABLET ORAL at 15:00

## 2020-04-09 RX ADMIN — VENLAFAXINE HYDROCHLORIDE SCH MG: 75 TABLET ORAL at 09:38

## 2020-04-09 RX ADMIN — MIDODRINE HYDROCHLORIDE SCH MG: 5 TABLET ORAL at 06:54

## 2020-04-09 RX ADMIN — APIXABAN SCH MG: 2.5 TABLET, FILM COATED ORAL at 20:45

## 2020-04-09 RX ADMIN — Medication SCH UNIT: at 09:37

## 2020-04-09 RX ADMIN — CARVEDILOL SCH MG: 12.5 TABLET, FILM COATED ORAL at 17:20

## 2020-04-09 RX ADMIN — CYANOCOBALAMIN TAB 500 MCG SCH MCG: 500 TAB at 09:37

## 2020-04-09 RX ADMIN — FUROSEMIDE SCH MLS/HR: 10 INJECTION, SOLUTION INTRAMUSCULAR; INTRAVENOUS at 14:19

## 2020-04-09 RX ADMIN — FUROSEMIDE SCH MLS/HR: 10 INJECTION, SOLUTION INTRAMUSCULAR; INTRAVENOUS at 03:11

## 2020-04-09 NOTE — PN
PROGRESS NOTE



Patient is seen for followup for acute kidney injury, mainly cardiorenal and associated

with hypotension and hypoperfusion.  Renal function is improved.  The patient is

currently maintained on dobutamine and Lasix drip at 10 mg an hour.  His urine output

seems to have picked up.  He currently has an indwelling Cortes catheter; 24-hour urine

output 2.7 liters.



This morning patient is not significantly short of breath.  He is comfortable.  Blood

pressure was 92/43, heart rate 70 per minute, patient is afebrile.

EXAMINATION OF THE HEART:  S1 and S2.

EXAMINATION OF THE LUNGS: Decreased breath sounds at the bases.

ABDOMEN:  Soft, nontender.

Examination of the extremities shows edema 2+ bilaterally.  Chronic skin changes noted.

CNS exam grossly intact.



LABS:

Sodium 139, potassium 4.0, chloride 106, BUN 78, creatinine 2.48.



ASSESSMENT:

1. Acute kidney injury associated with hypotension, hypoperfusion, as well as

    cardiorenal syndrome, currently improving.  Continue with dobutamine and Lasix

    drip.

2. Hypotension, mainly associated with cardiomyopathy, maintained on midodrine,

    somewhat improved.

3. Volume overload.  Continue to diurese patient.

4. Chronic kidney disease stage 3 with previous creatinine as low as 1.2 mg/dL in

    February 2019.

5. Severe cardiomyopathy, ejection fraction of 20%.

6. Atrial fibrillation with controlled ventricular response, maintained on Eliquis.

7. Hematuria, possibly related to traumatic Cortes insertion with anticoagulation.

8. History of bladder cancer with current ultrasound showing atrophic right kidney

    with solid-appearing mass in the lower pole of the right kidney.  This will need to

    be worked up and followed up as outpatient.  The patient normally sees Urology as

    outpatient.



PLAN:

Continue dobutamine and Lasix drip.  Check labs in a.m.  Continue to monitor urine

output closely.  Avoid hypotension and avoid nephrotoxic meds.





MMODL / IJN: [id number] / Job#: [id number]

## 2020-04-09 NOTE — P.PN
Subjective


Progress Note Date: 04/09/20





This is a pleasant 80-year-old  gentleman who follows regularly with 

Dr. Fleming in the office.  He has a known history of coronary artery 

disease with prior bypass surgery, persistent atrial fibrillation, ischemic 

cardiomyopathy with prior AICD implant, this was performed in February 2019, h

istory of diabetes, hypertension, hyperlipidemia.  Patient also has history of 

kidney and bladder cancer with prior chemo and radiation.  Presents to the 

hospital with at least one week duration of progressively worsening shortness of

breath.  He states that he also noticed a significant increase in his bilateral 

lower extremity edema.  The patient denies any fever or chills at home.  Chest 

x-ray on presentation here showed congestive cardiac failure.  EKG showed atrial

fibrillation with a rapid ventricular response.  Blood pressure 106/70 with a 

heart rate of 120, afebrile, 98% on room air.  White blood cell count is normal,

hemoglobin 9.9, platelet count 236.  Sodium 139, potassium 4.6, chloride 106, 

CO2 20, BUN 54, creatinine 1.8.  Plasma lactic acid 2.3 on admission, 1.4 this 

morning, BNP level 2710.  Troponin 0.02, 0.03, 0.03.  Patient was initiated on 

IV Lasix in the emergency room.  He has been overall diuresing quite well and 

his weight is down today.  Patient continues to be quite short of breath, 

continues to have lower extremity edema.  The patient is currently on Eliquis 5 

mg one tablet by mouth twice a day, he is 80 years of age and his creatinine on 

admission was 2, we will monitor that creatinine closely, if it remains in the 2

range we will consider decreasing the Eliquis at 2-1/2 mg one tablet by mouth 

twice a day.  We will continue the patient on IV Lasix.  We will increase the 

dose of Coreg, patient is not currently on an ACE inhibitor or an angiotensin 

blocker because of the creatinine.





04/07/2020


Patient was seen and examined this morning, he continues to feel significantly 

short of breath, states he does not notice much improvement from his admission 

here.  Labs are pending.  Blood pressure 95/50, heart rate 108.





04/08/2020


Patient seen and examined this morning, states that he feels more short of 

breath today than he did on arrival here.  His urine output is minimal.  

Continues to be hypotensive.  Sodium 135, potassium 4.9, BUN 79, creatinine 2.7.

 We will start the patient on dobutamine drip at 2.5 g, after 10 minutes we'll 

increase to 5 mics.  Obtain a repeat stat chest x-ray.  This was discussed by 

Dr. KARINE Urias with nephrology this morning as well.  





04/09/2020





Patient seen and examined this morning, sitting up in the chair at bedside.  He 

states he does not feel a lot different than yesterday, maybe mildly better.  

This is the first day the patient has acknowledged to feeling some mild 

improvement in his breathing.  He diuresed much better through the night last 

night after being initiated on the dobutamine.  Blood pressure 92/40 with a 

heart rate in the 70s.  Laboratory data from today, sodium 139, potassium 4.0, 

BUN 78, creatinine 2.4.








Objective





- Vital Signs


Vital signs: 


                                   Vital Signs











Temp  97.6 F   04/09/20 08:00


 


Pulse  70   04/09/20 08:00


 


Resp  20   04/09/20 08:00


 


BP  92/43   04/09/20 08:00


 


Pulse Ox  96   04/09/20 08:00








                                 Intake & Output











 04/08/20 04/09/20 04/09/20





 18:59 06:59 18:59


 


Intake Total 336 251.264 180


 


Output Total 1225 1500 


 


Balance -889 -1248.736 180


 


Weight  110.3 kg 


 


Intake:   


 


  Intake, IV Titration 100 251.264 





  Amount   


 


    DOBUTamine DRIP 500 mg In  151.264 





    Dextrose/Water 1 250ml.   





    bag @ 2.5 MCG/KG/MIN 8.   





    685 mls/hr IV .Q24H [facility]   





    Rx#:[id number]   


 


    Furosemide 100 mg In 100 100 





    Sodium Chloride 0.9% 90   





    ml @ 10 MG/HR 10 mls/hr   





    IV .Q10H [facility] Rx#:   





    [id number]   


 


  Oral 236  180


 


Output:   


 


  Urine 1225 1500 


 


Other:   


 


  Voiding Method Indwelling Catheter Indwelling Catheter Indwelling Catheter


 


  # Bowel Movements 2  














- Exam





PHYSICAL EXAMINATION: 





GENERAL: 80-year-old  gentleman in no acute distress at the time of my 

examination





HEENT: Head is atraumatic, normocephalic.  Pupils equal, round.  Sclera 

anicteric. Conjunctiva are clear.  Mucous membranes of the mouth are moist.  

Neck is supple.  There is  elevated jugular venous pressure.  No carotid  bruit 

is heard.





HEART EXAMINATION: Heart S1-S2 irregularly irregular a systolic murmur is heard





CHEST EXAMINATION: Lungs are   diminished  to the bases bilaterally





ABDOMEN:  Soft, obese, nontender. Bowel sounds are heard. No organomegaly noted.


 


EXTREMITIES: 2+ peripheral pulses with 2+  evidence of peripheral edema and no 

calf tenderness noted.





NEUROLOGIC patient is awake, alert and oriented 3 


 


.





- Labs


CBC & Chem 7: 


                                 04/06/20 06:24





                                 04/09/20 05:42


Labs: 


                  Abnormal Lab Results - Last 24 Hours (Table)











  04/08/20 04/08/20 04/08/20 Range/Units





  04:34 11:45 16:46 


 


BUN     (9-20)  mg/dL


 


Creatinine     (0.66-1.25)  mg/dL


 


Glucose     (74-99)  mg/dL


 


POC Glucose (mg/dL)   167 H  165 H  (75-99)  mg/dL


 


Calcium     (8.4-10.2)  mg/dL


 


Procalcitonin  0.29 H    (0.02-0.09)  ng/mL














  04/08/20 04/09/20 04/09/20 Range/Units





  20:46 05:42 06:34 


 


BUN   78 H   (9-20)  mg/dL


 


Creatinine   2.48 H   (0.66-1.25)  mg/dL


 


Glucose   120 H   (74-99)  mg/dL


 


POC Glucose (mg/dL)  186 H   140 H  (75-99)  mg/dL


 


Calcium   7.9 L   (8.4-10.2)  mg/dL


 


Procalcitonin     (0.02-0.09)  ng/mL








                      Microbiology - Last 24 Hours (Table)











 04/08/20 23:15 Urine Culture - Preliminary





 Urine,Catheterized 














Assessment and Plan


Plan: 


Assessment and plan


#1 systolic congestive heart failure acute on chronic


#2 coronary artery disease with prior bypass surgery


#3 chronic persistent atrial fibrillation


#4 hypertension


#5 diabetes


#6 hyperlipidemia


#7 ischemic cardiomyopathy with prior AICD


#8 acute on chronic renal insufficiency





Plan


From cardiology's perspective, we will continue with the Lasix drip, continue 

dobutamine gtt. continue to closely monitor intake and output along with daily 

weights and daily lytes BUN and creatinine.





DNP note has been reviewed, I agree with a documented findings and plan of care.

 Patient was seen and examined.

## 2020-04-09 NOTE — XR
EXAMINATION TYPE: XR chest 1V portable

 

DATE OF EXAM: 4/9/2020

 

COMPARISON: 4/8/2020

 

INDICATION: Congestive heart failure

 

TECHNIQUE: Single frontal view of the chest is obtained.

 

FINDINGS:  

The heart size is moderately prominent.  

The pulmonary vasculature is upper limits of normal.  

There is diffuse increased lung markings greater in the lingular region. Findings are worsening over 
the interval. Pulmonary edema and infection could be considered.  

Pacemaker overlies left chest.

 

IMPRESSION:  

1. Cardiomegaly with mild prominence of pulmonary vascular markings with diffuse increasing lung fiel
d markings can be compatible with congestive heart failure in the proper clinical setting.

## 2020-04-09 NOTE — P.PN
Progress Note - Text


Progress Note Date: 04/09/20





Chief Complaint: Shortness of breath





Patient is a 80-year-old patient of Dr. Duckworth.   history of chronic atrial 

fibrillation on anticoagulation with Eliquis, coronary artery disease status 

post bypass graft, cardiomyopathy status post AICD placement, history of nephrec

guy and unilateral kidney, CK D stage III, hypertension, hyperlipidemia, GERD, 

diabetes type 2 and history of bipolar/depression came to ER with complaints of 

worsening shortness of breath and exertional dyspnea and bilateral lower 

extremity increases swelling for the past 2 weeks.  Patient does have minimal 

cough without any sputum production.  No sputum production.  No complaints of 

fever or chills.  Denied any sick contacts at home.  No recent travel.





Admitted with CHF exacerbation, atrial fibrillation with a rapid ventricular 

rate.  Started on IV Lasix and IV amiodarone.


Today-.  Initiate better.  Over 2 L in negative fluid balance.  Started on 

dobutamine yesterday.  Slightly short of breath.  Tired.  Decreased appetite.





Review of systems: Was done for constitutional, cardiovascular, GI, pulmonary. 

relevant finding as above





Active Medications





Amiodarone HCl (Cordarone)  200 mg PO TID [facility]


   Last Admin: 04/09/20 15:00 Dose:  200 mg


   Documented by: 


Apixaban (Eliquis)  2.5 mg PO BID [facility]


   Last Admin: 04/09/20 09:37 Dose:  2.5 mg


   Documented by: 


Aspirin (Aspirin)  81 mg PO DAILY [facility]


   Last Admin: 04/09/20 09:37 Dose:  81 mg


   Documented by: 


Atorvastatin Calcium (Lipitor)  80 mg PO HS [facility]


   Last Admin: 04/08/20 21:24 Dose:  80 mg


   Documented by: 


Carvedilol (Coreg)  25 mg PO AC-BID [facility]


   Last Admin: 04/09/20 17:20 Dose:  25 mg


   Documented by: 


Cholecalciferol (Vitamin D3 (25 Mcg = 1000 Iu))  2,000 unit PO DAILY [facility]


   Last Admin: 04/09/20 09:37 Dose:  2,000 unit


   Documented by: 


Cyanocobalamin (Vitamin B-12)  1,000 mcg PO DAILY [facility]


   Last Admin: 04/09/20 09:37 Dose:  1,000 mcg


   Documented by: 


Guaifenesin (Mucinex)  1,200 mg PO Q12HR [facility]


   Last Admin: 04/09/20 09:37 Dose:  1,200 mg


   Documented by: 


Sodium Chloride (Saline 0.9%)  1,000 mls @ 20 mls/hr IV .Q24H [facility]


   Last Admin: 04/09/20 15:11 Dose:  20 mls/hr


   Documented by: 


Furosemide 100 mg/ Sodium (Chloride)  100 mls @ 10 mls/hr IV .Q10H [facility]


   Last Admin: 04/09/20 14:19 Dose:  10 mg/hr, 10 mls/hr


   Documented by: 


Dobutamine HCl/Dextrose 500 mg (/ IV Solution)  250 mls @ 8.685 mls/hr IV .Q24H 

[facility]


   Last Admin: 04/09/20 04:14 Dose:  2.5 mcg/kg/min, 8.685 mls/hr


   Documented by: 


Ceftriaxone Sodium 1 gm/ (Sodium Chloride)  50 mls @ 100 mls/hr IVPB Q24HR [facility]


   Last Admin: 04/09/20 09:38 Dose:  100 mls/hr


   Documented by: 


Insulin Detemir (Levemir)  40 unit SQ [facility]


   Last Admin: 04/08/20 21:24 Dose:  40 unit


   Documented by: 


[facility]ulin [facility]ir ([facility]mir)  [facility]


   Last Admin: 04/09/20 09:37 Dose:  10 unit


   Documented by: 


Lamotrigine (Lamictal)  100 mg PO [facility]


   Last Admin: 04/08/20 21:24 Dose:  100 mg


   Documented by: 


Midodrine (Proamatine)  10 mg PO AC-TID [facility]


   Last Admin: 04/09/20 17:21 Dose:  10 mg


   Documented by: 


Venlafaxine HCl (Effexor)  75 mg PO BID [facility]


   Last Admin: 04/09/20 09:38 Dose:  75 mg


   Documented by: 














On examination:


VITAL SIGNS: 97.4, 68, 18, 98/54, 99% on 4 L


GENERAL APPEARANCE: Sitting up, tired, short of breath


HEENT: Normal external appearance of nose and ear.  Oral cavity normal


EYES: Pupils equal. Conjunctiva normal. 


NECK: JVD raised. Mass not palpable. 


RESPIRATORY: Respiratory effort increased.  Decreased breath sounds.  Basilar 

crackles 


CARDIOVASCULAR: Heart sounds irregular, significant edema present. 


ABDOMEN: Soft. Liver and spleen not palpable. No tenderness. No mass palpable.  

Cortes catheter with some hematuria


PSYCHIATRY: Alert and oriented x3. Mood and affect normal. 





INVESTIGATIONS, reviewed in the clinical context:


Percussion for bun 78 creatinine 2.48





Previous testing:


White count 8.2 hemoglobin 10.6 potassium 5.2 bun 52 creatinine 2.0 troponin I 

0.0-3


EKG-possible A. fib


Chest x-ray film personally reviewed by me-pulmonary edema cardiomegaly


BUN/creatinine on February 22-1.58





Assessment:


Acute on chronic congestive heart failure exacerbation from systolic 

dysfunction, EF not known, slow to respond.


Ischemic cardiomyopathy


Persistent atrial fibrillation on anticoagulation with Eliquis, rate 

uncontrolled


Acute kidney injury secondary to cardiorenal syndrome, started to turn around


-chronic kidney disease stage III.


Mild hyperkalemia secondary to acute kidney injury


Lactic acidosis-type II


History of nephrectomy due to renal cancer and bladder cancer history


Diabetes type 2.  Insulin-dependent and also on metformin and glipizide.


Hyperlipidemia


Coronary artery disease with history of multiple stents placement


Bipolar disorder and depression


GERD


Obesity with BMI 34.9





Plan:


Patient started responding to IV Lasix drip.  Continue current medication 

treatment plan.  Follow electrolytes.  Continue with dobutamine IV.

## 2020-04-10 LAB
ANION GAP SERPL CALC-SCNC: 13 MMOL/L
BUN SERPL-SCNC: 84 MG/DL (ref 9–20)
CALCIUM SPEC-MCNC: 8.4 MG/DL (ref 8.4–10.2)
CHLORIDE SERPL-SCNC: 105 MMOL/L (ref 98–107)
CO2 SERPL-SCNC: 22 MMOL/L (ref 22–30)
GLUCOSE BLD-MCNC: 106 MG/DL (ref 75–99)
GLUCOSE BLD-MCNC: 156 MG/DL (ref 75–99)
GLUCOSE BLD-MCNC: 164 MG/DL (ref 75–99)
GLUCOSE BLD-MCNC: 68 MG/DL (ref 75–99)
GLUCOSE BLD-MCNC: 73 MG/DL (ref 75–99)
GLUCOSE SERPL-MCNC: 99 MG/DL (ref 74–99)
POTASSIUM SERPL-SCNC: 3.9 MMOL/L (ref 3.5–5.1)
SODIUM SERPL-SCNC: 140 MMOL/L (ref 137–145)

## 2020-04-10 RX ADMIN — MIDODRINE HYDROCHLORIDE SCH MG: 5 TABLET ORAL at 11:29

## 2020-04-10 RX ADMIN — CARVEDILOL SCH MG: 12.5 TABLET, FILM COATED ORAL at 06:25

## 2020-04-10 RX ADMIN — AMIODARONE HYDROCHLORIDE SCH MG: 200 TABLET ORAL at 21:00

## 2020-04-10 RX ADMIN — AMIODARONE HYDROCHLORIDE SCH MG: 200 TABLET ORAL at 08:33

## 2020-04-10 RX ADMIN — MIDODRINE HYDROCHLORIDE SCH MG: 5 TABLET ORAL at 16:43

## 2020-04-10 RX ADMIN — CEFAZOLIN SCH: 330 INJECTION, POWDER, FOR SOLUTION INTRAMUSCULAR; INTRAVENOUS at 17:10

## 2020-04-10 RX ADMIN — ASPIRIN 81 MG CHEWABLE TABLET SCH MG: 81 TABLET CHEWABLE at 08:33

## 2020-04-10 RX ADMIN — VENLAFAXINE HYDROCHLORIDE SCH MG: 75 TABLET ORAL at 08:34

## 2020-04-10 RX ADMIN — DOBUTAMINE HYDROCHLORIDE SCH MLS/HR: 200 INJECTION INTRAVENOUS at 11:29

## 2020-04-10 RX ADMIN — FUROSEMIDE SCH MLS/HR: 10 INJECTION, SOLUTION INTRAMUSCULAR; INTRAVENOUS at 11:28

## 2020-04-10 RX ADMIN — INSULIN DETEMIR SCH UNIT: 100 INJECTION, SOLUTION SUBCUTANEOUS at 08:33

## 2020-04-10 RX ADMIN — FUROSEMIDE SCH MLS/HR: 10 INJECTION, SOLUTION INTRAMUSCULAR; INTRAVENOUS at 04:48

## 2020-04-10 RX ADMIN — APIXABAN SCH MG: 2.5 TABLET, FILM COATED ORAL at 21:00

## 2020-04-10 RX ADMIN — VENLAFAXINE HYDROCHLORIDE SCH MG: 75 TABLET ORAL at 21:02

## 2020-04-10 RX ADMIN — INSULIN DETEMIR SCH: 100 INJECTION, SOLUTION SUBCUTANEOUS at 21:02

## 2020-04-10 RX ADMIN — ATORVASTATIN CALCIUM SCH MG: 80 TABLET, FILM COATED ORAL at 21:00

## 2020-04-10 RX ADMIN — CYANOCOBALAMIN TAB 500 MCG SCH MCG: 500 TAB at 08:33

## 2020-04-10 RX ADMIN — CARVEDILOL SCH MG: 12.5 TABLET, FILM COATED ORAL at 16:43

## 2020-04-10 RX ADMIN — Medication SCH MG: at 21:00

## 2020-04-10 RX ADMIN — AMIODARONE HYDROCHLORIDE SCH MG: 200 TABLET ORAL at 16:43

## 2020-04-10 RX ADMIN — Medication SCH UNIT: at 08:33

## 2020-04-10 RX ADMIN — APIXABAN SCH MG: 2.5 TABLET, FILM COATED ORAL at 08:33

## 2020-04-10 RX ADMIN — MIDODRINE HYDROCHLORIDE SCH MG: 5 TABLET ORAL at 06:26

## 2020-04-10 NOTE — XR
EXAMINATION TYPE: XR chest 1V portable

 

DATE OF EXAM: 4/10/2020

 

CLINICAL HISTORY: Difficulty breathing and CHF progress study.  

 

TECHNIQUE: Single AP portable upright view of the chest is obtained.

 

COMPARISON: Chest x-ray from one day earlier and older studies.

 

FINDINGS:  Persistent cardiomegaly with single lead pacemaker/AICD. Atherosclerotic thoracic aorta re
demonstrated. Overlying sternal wires and mediastinal clips again seen. Elevated left hemidiaphragm w
ith chronic parenchymal changes and left basilar opacity. Osseous structures are intact.

 

IMPRESSION: Persistent elevated left hemidiaphragm and cardiomegaly with chronic parenchymal changes 
and perhaps patchy left basilar atelectasis and/or infiltrate. No significant change from one day ear
lier.

## 2020-04-10 NOTE — PN
PROGRESS NOTE



This gentleman has ischemic cardiomyopathy, ejection fraction less than 30%, previous

bypass surgery, ICD.  He came in with what seems to be an exacerbation of congestive

heart failure, mostly systolic with some volume overload type picture.  On a

combination of dobutamine drip and Lasix drip, he seems to be doing better.  Labs are

pending today.  Urine output has been good.  Weight is down.  He feels a bit weak, but

overall his breathing has improved.



JVD is 1 cm.  No carotid bruit. S1, S2 heard normally, irregular rate and rhythm noted

with underlying atrial fib, rate is controlled.  Lungs reveal improved air entry.

Abdomen is soft. Lower extremity edema has shown modest improvement.

Blood pressure is running about 95 to 105 systolic.  Urine output has improved

remarkably.



We will continue current therapy at this time.





MMODL / IJN: [id number] / Job#: [id number]

## 2020-04-10 NOTE — P.PN
Progress Note - Text


Progress Note Date: 04/10/20





Chief Complaint: Shortness of breath





Patient is a 80-year-old patient of Dr. Duckworth.   history of chronic atrial 

fibrillation on anticoagulation with Eliquis, coronary artery disease status 

post bypass graft, cardiomyopathy status post AICD placement, history of nephrec

guy and unilateral kidney, CK D stage III, hypertension, hyperlipidemia, GERD, 

diabetes type 2 and history of bipolar/depression came to ER with complaints of 

worsening shortness of breath and exertional dyspnea and bilateral lower 

extremity increases swelling for the past 2 weeks.  Patient does have minimal 

cough without any sputum production.  No sputum production.  No complaints of 

fever or chills.  Denied any sick contacts at home.  No recent travel.





Admitted with CHF exacerbation, atrial fibrillation with a rapid ventricular 

rate.  Started on IV Lasix and IV amiodarone.


Today-.  Remains on IV Lasix and dobutamine drip.  Over 3.5 L in negative fluid 

balance.  Not much of an appetite.  Tired.  Breathing a shade better.  A. fib 

better controlled





Review of systems: Was done for constitutional, cardiovascular, GI, pulmonary. 

relevant finding as above





Active Medications





Amiodarone HCl (Cordarone)  200 mg PO TID [facility]


   Last Admin: 04/10/20 16:43 Dose:  200 mg


   Documented by: 


Apixaban (Eliquis)  2.5 mg PO BID [facility]


   Last Admin: 04/10/20 08:33 Dose:  2.5 mg


   Documented by: 


Aspirin (Aspirin)  81 mg PO DAILY [facility]


   Last Admin: 04/10/20 08:33 Dose:  81 mg


   Documented by: 


Atorvastatin Calcium (Lipitor)  80 mg PO HS [facility]


   Last Admin: 04/09/20 20:45 Dose:  80 mg


   Documented by: 


Carvedilol (Coreg)  25 mg PO AC-BID [facility]


   Last Admin: 04/10/20 16:43 Dose:  25 mg


   Documented by: 


Cholecalciferol (Vitamin D3 (25 Mcg = 1000 Iu))  2,000 unit PO DAILY [facility]


   Last Admin: 04/10/20 08:33 Dose:  2,000 unit


   Documented by: 


Cyanocobalamin (Vitamin B-12)  1,000 mcg PO DAILY [facility]


   Last Admin: 04/10/20 08:33 Dose:  1,000 mcg


   Documented by: 


Guaifenesin (Mucinex)  1,200 mg PO Q12HR [facility]


   Last Admin: 04/10/20 08:33 Dose:  1,200 mg


   Documented by: 


Sodium Chloride (Saline 0.9%)  1,000 mls @ 20 mls/hr IV .Q24H [facility]


   Last Admin: 04/10/20 17:10 Dose:  Not Given


   Documented by: 


Furosemide 100 mg/ Sodium (Chloride)  100 mls @ 5 mls/hr IV .Q20H [facility]


   Last Admin: 04/10/20 11:28 Dose:  10 mg/hr, 10 mls/hr


   Documented by: 


Dobutamine HCl/Dextrose 500 mg (/ IV Solution)  250 mls @ 8.685 mls/hr IV .Q24H 

[facility]


   Last Admin: 04/10/20 11:29 Dose:  2.5 mcg/kg/min, 8.685 mls/hr


   Documented by: 


Ceftriaxone Sodium 1 gm/ (Sodium Chloride)  50 mls @ 100 mls/hr IVPB Q24HR [facility]


   Last Admin: 04/10/20 08:33 Dose:  100 mls/hr


   Documented by: 


Insulin Detemir (Levemir)  40 unit SQ [facility]


   Last Admin: 04/09/20 20:45 Dose:  40 unit


   Documented by: 


Insulin Detemir (Levemir)  [facility]


   Last Admin: 04/10/20 08:33 Dose:  10 unit


   Documented by: 


Lamotrigine (Lamictal)  100 mg PO [facility]


   Last Admin: 04/09/20 20:45 Dose:  100 mg


   Documented by: 


Melatonin (Melatonin)  3 mg PO [facility]


Midodrine (Proamatine)  10 mg PO AC-TID [facility]


   Last Admin: 04/10/20 16:43 Dose:  10 mg


   Documented by: 


Ondansetron HCl (Zofran)  4 mg IVP Q6HR PRN


   PRN Reason: Nausea And Vomiting


   Last Admin: 04/10/20 16:43 Dose:  4 mg


   Documented by: 


Venlafaxine HCl (Effexor)  75 mg PO BID [facility]


   Last Admin: 04/10/20 08:34 Dose:  75 mg


   Documented by: 








On examination:


VITAL SIGNS: 96.4, 81, 20, 87/51, 95% on 4 L


GENERAL APPEARANCE: Sitting up in a recliner, tired, less short of breath


HEENT: Normal external appearance of nose and ear.  Oral cavity normal


EYES: Pupils equal. Conjunctiva normal. 


NECK: JVD raised. Mass not palpable. 


RESPIRATORY: Respiratory effort increased.  Decreased breath sounds.  Decreased 

crackles 


CARDIOVASCULAR: Heart sounds irregular, edema decreasing. 


ABDOMEN: Soft. Liver and spleen not palpable. No tenderness. No mass palpable.  

Cortes catheter with some hematuria


PSYCHIATRY: Alert and oriented x3. Mood and affect normal. 





INVESTIGATIONS, reviewed in the clinical context:


Potassium 3.9 creatinine 2.37





Previous testing:


White count 8.2 hemoglobin 10.6 potassium 5.2 bun 52 creatinine 2.0 troponin I 

0.0-3


EKG-possible A. fib


Chest x-ray film personally reviewed by me-pulmonary edema cardiomegaly


BUN/creatinine on February 22-1.58





Assessment:


Acute on chronic congestive heart failure exacerbation from systolic 

dysfunction, EF less than 30% per cardiology, slow to respond.


-Hypotension multifactorial


Ischemic cardiomyopathy


Persistent atrial fibrillation on anticoagulation with Eliquis, rate better 

controlled


Acute kidney injury secondary to cardiorenal syndrome, started to turn around


-chronic kidney disease stage III.


Mild hyperkalemia secondary to acute kidney injury


Lactic acidosis-type II


History of nephrectomy due to renal cancer and bladder cancer history


Diabetes type 2.  Insulin-dependent and also on metformin and glipizide.


Hyperlipidemia


Coronary artery disease with history of multiple stents placement


Bipolar disorder and depression


GERD


Obesity with BMI 34.9





Plan:


Remains on IV Lasix and IV dobutamine drip.  Breathing started to improve.  Fol

low electrolytes closely.  Discussed with patient.

## 2020-04-10 NOTE — P.PN
Subjective


Progress Note Date: 04/10/20


Principal diagnosis: 





Exertional dyspnea, anasarca





This is an 80-year-old male patient of Dr. Duckworth, with known history of severe 

ischemic cardiomyopathy, and ejection fraction of less than 20%, status post 

AICD placement, hypertension, diabetes mellitus, hyperlipidemia, persistent 

atrial fibrillation, coronary artery disease status post bypass grafting, ex-

smoker, chronic congestive heart failure with systolic dysfunction, history of 

kidney and bladder cancer status post chemo and radiation, who presented to the 

emergency department on 04/05/2020 with complaints of worsening exertional 

dyspnea, and increased swelling in his lower extremities.  He denied any chest 

pain, palpitations, no fever or chills, no nausea vomiting, no diaphoresis, no 

cough or phlegm production.  Admission chest x-ray showed creased vascular 

congestion, and pulmonary edema and a small left pleural effusion.  Admission 

blood work was negative for any leukocytosis, white blood cell count was 8.2, 

hemoglobin is 10.6, mild lymphopenia, INR is 1.3, potassium is 5.2, the rest of 

the electrolytes were within normal limits, BUN is 52 creatinine is 2.0, patient

does have history of chronic kidney disease stage III, this showed signs of 

infection although patient denies any symptoms, troponins were 0.031, 0.026, 

0.031, proBNP was 2710.  We were asked to see the patient in consultation.  

Patient has been started on infusion of Lasix, early infusing at a rate of 10 mg

per hour, and dobutamine drip infusing at 5 mics per kilo per minute.  A is on 

oral Eliquis for history of chronic atrial fibrillation he remains in A. fib 

with a controlled rate of 93 bpm.  He is sitting up in the recliner, in no acute

distress, on 4 L of oxygen with a pulse ox of 95-98%, he is been afebrile, he 

has not diuresed significantly despite the Lasix infusion, and today's blood 

work shows worsening of his renal profile would be on up to 79 and creatinine is

up to 2.7.  Nephrology is following. 





04/09/2020, the patient was seen and examined at his bedside on the cardiac 

stepdown unit with Dr. Trevino.  He reports he is slightly better today, denies 

any complaints of pain or shortness of breath just sitting.  He is sitting up to

the bedside chair and is in no acute distress.  Lasix drip at 10 mg per hour and

dopamine drip at 2.5 mcg/kg/m continue infusing.  He remained hemodynamically 

stable and currently has oxygen saturations are 96% on 4 L nasal cannula.  

Repeat chest x-ray was completed this morning which demonstrated cardiomegaly 

with mild prominence of pulmonary vasculature marking with diffuse increasing 

lung field markings compatible with congestive heart failure.  He has been 

afebrile the last 24 hours.  Lab results today show a BUN 78, creatinine 2.48 

and a BNP level 1740.  A urine culture was sent yesterday with results pending.





On 04/10/2020 the patient was seen and examined with Dr. Trevino.  Denies pain, 

shortness of breath.  Sitting up in bed and appears comfortable.  Remains 

afebrile.  Hematologically stable.  Currently on 4 L nasal cannula with oxygen 

saturation 97%.  Continues on dobutamine at 2.5 mcg and Lasix at 10 mg an hour. 

Chest x-ray seems to be improving a bit, still has atelectasis present.  BUN 84,

creatinine 2.37.  Urine culture negative.  Fluid balance -4 L for the last 48 

hours.





Objective





- Vital Signs


Vital signs: 


                                   Vital Signs











Temp  97.4 F L  04/10/20 08:00


 


Pulse  65   04/10/20 08:00


 


Resp  18   04/10/20 08:00


 


BP  96/52   04/10/20 08:00


 


Pulse Ox  97   04/10/20 08:00








                                 Intake & Output











 04/09/20 04/10/20 04/10/20





 18:59 06:59 18:59


 


Intake Total 460 100 240


 


Output Total  2526 1


 


Balance 460 -2426 239


 


Weight  112.6 kg 


 


Intake:   


 


  Intake, IV Titration 100 100 





  Amount   


 


    Furosemide 100 mg In 100 100 





    Sodium Chloride 0.9% 90   





    ml @ 10 MG/HR 10 mls/hr   





    IV .Q10H [facility] Rx#:   





    [id number]   


 


  Oral 360  240


 


Output:   


 


  Urine  2525 


 


  Stool  1 1


 


Other:   


 


  Voiding Method Indwelling Catheter Indwelling Catheter Indwelling Catheter


 


  # Voids   0


 


  # Bowel Movements   0














- Constitutional


Constitutional Comment(s): 





Sitting up in bed, appears comfortable


General appearance: Present: cooperative, no acute distress, obese





- Respiratory


Details: 





Lungs sounds diminished with a few scattered crackles in the left base.  

Respirations even, nonlabored.  Currently on 4 L nasal cannula with oxygen 

saturation 97%.





- Cardiovascular


Details: 





S1, S2 present.  Irregular rate and rhythm, atrial fibrillation on telemetry.  

Palpable peripheral pulses bilaterally.  Bilateral lower extremity 1-2+ edema 

present.  No calf pain or tenderness noted.





- Gastrointestinal


Gastrointestinal Comment(s): 





Abdomen soft, nontender, nondistended.  No organomegaly present.  Active bowel 

sounds present has 4 quadrants.  Tolerating diet.





- Genitourinary


Genitourinary Comment(s): 





Cortes present draining clear, yellow urine.





- Integumentary


Integumentary Comment(s): 





Skin is warm and dry with evidence of good perfusion





- Neurologic


Neurologic: Present: CNII-XII intact





- Musculoskeletal


Musculoskeletal: Present: generalized weakness, strength equal bilaterally





- Psychiatric


Psychiatric: Present: A&O x's 3, appropriate affect, intact judgment & insight





- Allied health notes


Allied health notes reviewed: nursing





- Labs


CBC & Chem 7: 


                                 04/06/20 06:24





                                 04/10/20 07:26


Labs: 


                  Abnormal Lab Results - Last 24 Hours (Table)











  04/09/20 04/09/20 04/09/20 Range/Units





  11:44 16:52 20:45 


 


BUN     (9-20)  mg/dL


 


Creatinine     (0.66-1.25)  mg/dL


 


POC Glucose (mg/dL)  136 H  130 H  167 H  (75-99)  mg/dL














  04/10/20 04/10/20 04/10/20 Range/Units





  06:09 06:27 06:50 


 


BUN     (9-20)  mg/dL


 


Creatinine     (0.66-1.25)  mg/dL


 


POC Glucose (mg/dL)  53 L  68 L  73 L  (75-99)  mg/dL














  04/10/20 Range/Units





  07:26 


 


BUN  84 H  (9-20)  mg/dL


 


Creatinine  2.37 H  (0.66-1.25)  mg/dL


 


POC Glucose (mg/dL)   (75-99)  mg/dL








                      Microbiology - Last 24 Hours (Table)











 04/08/20 23:15 Urine Culture - Final





 Urine,Catheterized 














- Imaging and Cardiology


Chest x-ray: report reviewed, image reviewed





Assessment and Plan


Assessment: 





1.  Acute exacerbation of chronic congestive heart failure with systolic 

dysfunction





2.  Acute kidney injury likely related to cardiorenal syndrome, diuretic 

therapy, nephrology is following





3.  Acute urinary tract infection, placed on ceftriaxone, urine culture negative





4.  Ischemic cardiomyopathy, with EF of less than 20%, status post AICD 

implantation





5.  Diabetes mellitus





6.  History of chronic persistent atrial fibrillation on oral anticoagulation





7.  History of chronic kidney disease stage III at baseline, history of right 

hydronephrosis and patient follows with urology





8.  Coronary artery disease status post bypass grafting





9.  Hypertension





10.  Hyperlipidemia





11.  History of bladder cancer


Plan: 





We'll decrease Lasix IV to 5 mg per hour.  Continue dobutamine


Repeat chest x-ray in a.m.


Wean oxygen as tolerated to keep sats greater than or equal to 92%.


Atrial fibrillation and CHF management per cardiology recommendations.


Continue Rocephin 


Continue to monitor daily weights





I, the cosigning physician, performed a history & physical examination of the 

patient. Lungs sounds revealed crackles in the left base.  Maintaining good O2 

saturations in the 90s on 4 L nasal cannula.  I discussed the assessment and 

plan of care with my nurse practitioner, Elif Fong. I attest to the above note 

as dictated by her.


Time with Patient: Greater than 30

## 2020-04-10 NOTE — PN
PROGRESS NOTE



Patient is seen for followup for acute kidney injury, mainly cardiorenal, currently

improving.  Patient is maintained on Lasix drip and dobutamine drip.  He has an

indwelling Cortes catheter, 24 hour urine output of about 2.7 L.  Weight has decreased.

Shortness of breath has improved.



PHYSICAL EXAMINATION:

On examination today, blood pressure was 96/52, heart rate 81 per minute.  Patient is

afebrile.

Examination of the heart S1, S2.  Examination of the lungs, decreased breath sounds at

bases.  Abdomen is soft, nontender.  Examination of the lower extremities shows edema

2+ bilaterally. CNS exam grossly intact.



LABS:

Show sodium 140, potassium 3.9, BUN 84, serum creatinine 2.37, calcium is 8.4.



ASSESSMENT:

1. Acute kidney injury, cardiorenal, and associated with hypotension, hypoperfusion

    currently slowly improving.  Lasix drip is maintained at 10 mg an hour, which I

    will continue.  The patient is also on the dobutamine.  His weight is slowly

    decreasing.

2. Volume overload, slowly improving.

3. Severe cardiomyopathy.

4. Hypotension, mainly secondary to severe cardiomyopathy, maintained on midodrine.

5. Mild hyperkalemia, now improved.

6. History of bladder cancer with ultrasound showing atrophic right kidney with solid

    appearing mass in the lower pole of the right kidney.  Needs further followup as

    outpatient.  Patient does follow with Urology.

7. Atrial fibrillation with controlled ventricular response.

8. Chronic kidney disease stage III, previous creatinine 1.2 in February of 2019.



PLAN:

Continue with the Lasix drip and dobutamine drip.  Continue midodrine, repeat labs in

a.m.  Avoid any nephrotoxic medications and patient is advised regarding avoiding food

containing large amounts of sodium.





MMODL / IJN: [id number] / Job#: [id number]

## 2020-04-11 LAB
ANION GAP SERPL CALC-SCNC: 12 MMOL/L
BUN SERPL-SCNC: 83 MG/DL (ref 9–20)
CALCIUM SPEC-MCNC: 8.5 MG/DL (ref 8.4–10.2)
CHLORIDE SERPL-SCNC: 106 MMOL/L (ref 98–107)
CO2 SERPL-SCNC: 23 MMOL/L (ref 22–30)
GLUCOSE BLD-MCNC: 121 MG/DL (ref 75–99)
GLUCOSE BLD-MCNC: 134 MG/DL (ref 75–99)
GLUCOSE BLD-MCNC: 204 MG/DL (ref 75–99)
GLUCOSE BLD-MCNC: 92 MG/DL (ref 75–99)
GLUCOSE SERPL-MCNC: 83 MG/DL (ref 74–99)
POTASSIUM SERPL-SCNC: 3.8 MMOL/L (ref 3.5–5.1)
SODIUM SERPL-SCNC: 141 MMOL/L (ref 137–145)

## 2020-04-11 RX ADMIN — APIXABAN SCH MG: 2.5 TABLET, FILM COATED ORAL at 07:55

## 2020-04-11 RX ADMIN — VENLAFAXINE HYDROCHLORIDE SCH MG: 75 TABLET ORAL at 08:28

## 2020-04-11 RX ADMIN — CEFAZOLIN SCH: 330 INJECTION, POWDER, FOR SOLUTION INTRAMUSCULAR; INTRAVENOUS at 17:17

## 2020-04-11 RX ADMIN — MIDODRINE HYDROCHLORIDE SCH MG: 5 TABLET ORAL at 12:04

## 2020-04-11 RX ADMIN — FUROSEMIDE SCH: 10 INJECTION, SOLUTION INTRAMUSCULAR; INTRAVENOUS at 17:17

## 2020-04-11 RX ADMIN — AMIODARONE HYDROCHLORIDE SCH MG: 200 TABLET ORAL at 07:56

## 2020-04-11 RX ADMIN — APIXABAN SCH MG: 2.5 TABLET, FILM COATED ORAL at 22:15

## 2020-04-11 RX ADMIN — Medication SCH UNIT: at 07:55

## 2020-04-11 RX ADMIN — MIDODRINE HYDROCHLORIDE SCH MG: 5 TABLET ORAL at 17:20

## 2020-04-11 RX ADMIN — Medication SCH MG: at 22:14

## 2020-04-11 RX ADMIN — FUROSEMIDE SCH MLS/HR: 10 INJECTION, SOLUTION INTRAMUSCULAR; INTRAVENOUS at 23:30

## 2020-04-11 RX ADMIN — FUROSEMIDE SCH MLS/HR: 10 INJECTION, SOLUTION INTRAMUSCULAR; INTRAVENOUS at 03:13

## 2020-04-11 RX ADMIN — INSULIN DETEMIR SCH UNIT: 100 INJECTION, SOLUTION SUBCUTANEOUS at 07:56

## 2020-04-11 RX ADMIN — MIDODRINE HYDROCHLORIDE SCH MG: 5 TABLET ORAL at 06:23

## 2020-04-11 RX ADMIN — ASPIRIN 81 MG CHEWABLE TABLET SCH MG: 81 TABLET CHEWABLE at 07:55

## 2020-04-11 RX ADMIN — AMIODARONE HYDROCHLORIDE SCH MG: 200 TABLET ORAL at 17:20

## 2020-04-11 RX ADMIN — DOBUTAMINE HYDROCHLORIDE SCH: 200 INJECTION INTRAVENOUS at 11:59

## 2020-04-11 RX ADMIN — VENLAFAXINE HYDROCHLORIDE SCH MG: 75 TABLET ORAL at 22:17

## 2020-04-11 RX ADMIN — CYANOCOBALAMIN TAB 500 MCG SCH MCG: 500 TAB at 07:56

## 2020-04-11 RX ADMIN — ATORVASTATIN CALCIUM SCH MG: 80 TABLET, FILM COATED ORAL at 22:14

## 2020-04-11 RX ADMIN — CARVEDILOL SCH MG: 12.5 TABLET, FILM COATED ORAL at 06:23

## 2020-04-11 RX ADMIN — INSULIN DETEMIR SCH UNIT: 100 INJECTION, SOLUTION SUBCUTANEOUS at 22:14

## 2020-04-11 RX ADMIN — CARVEDILOL SCH MG: 12.5 TABLET, FILM COATED ORAL at 17:21

## 2020-04-11 RX ADMIN — AMIODARONE HYDROCHLORIDE SCH MG: 200 TABLET ORAL at 22:15

## 2020-04-11 NOTE — P.PN
Subjective


Progress Note Date: 04/11/20


Principal diagnosis: 





Exertional dyspnea, anasarca





This is an 80-year-old male patient of Dr. Duckworth, with known history of severe 

ischemic cardiomyopathy, and ejection fraction of less than 20%, status post 

AICD placement, hypertension, diabetes mellitus, hyperlipidemia, persistent 

atrial fibrillation, coronary artery disease status post bypass grafting, ex-

smoker, chronic congestive heart failure with systolic dysfunction, history of 

kidney and bladder cancer status post chemo and radiation, who presented to the 

emergency department on 04/05/2020 with complaints of worsening exertional 

dyspnea, and increased swelling in his lower extremities.  He denied any chest 

pain, palpitations, no fever or chills, no nausea vomiting, no diaphoresis, no 

cough or phlegm production.  Admission chest x-ray showed creased vascular 

congestion, and pulmonary edema and a small left pleural effusion.  Admission 

blood work was negative for any leukocytosis, white blood cell count was 8.2, 

hemoglobin is 10.6, mild lymphopenia, INR is 1.3, potassium is 5.2, the rest of 

the electrolytes were within normal limits, BUN is 52 creatinine is 2.0, patient

does have history of chronic kidney disease stage III, this showed signs of 

infection although patient denies any symptoms, troponins were 0.031, 0.026, 

0.031, proBNP was 2710.  We were asked to see the patient in consultation.  

Patient has been started on infusion of Lasix, early infusing at a rate of 10 mg

per hour, and dobutamine drip infusing at 5 mics per kilo per minute.  A is on 

oral Eliquis for history of chronic atrial fibrillation he remains in A. fib 

with a controlled rate of 93 bpm.  He is sitting up in the recliner, in no acute

distress, on 4 L of oxygen with a pulse ox of 95-98%, he is been afebrile, he 

has not diuresed significantly despite the Lasix infusion, and today's blood 

work shows worsening of his renal profile would be on up to 79 and creatinine is

up to 2.7.  Nephrology is following. 





04/09/2020, the patient was seen and examined at his bedside on the cardiac 

stepdown unit with Dr. Trevino.  He reports he is slightly better today, denies 

any complaints of pain or shortness of breath just sitting.  He is sitting up to

the bedside chair and is in no acute distress.  Lasix drip at 10 mg per hour and

dopamine drip at 2.5 mcg/kg/m continue infusing.  He remained hemodynamically 

stable and currently has oxygen saturations are 96% on 4 L nasal cannula.  

Repeat chest x-ray was completed this morning which demonstrated cardiomegaly 

with mild prominence of pulmonary vasculature marking with diffuse increasing 

lung field markings compatible with congestive heart failure.  He has been 

afebrile the last 24 hours.  Lab results today show a BUN 78, creatinine 2.48 

and a BNP level 1740.  A urine culture was sent yesterday with results pending.





On 04/10/2020 the patient was seen and examined with Dr. Trevino.  Denies pain, 

shortness of breath.  Sitting up in bed and appears comfortable.  Remains 

afebrile.  Hematologically stable.  Currently on 4 L nasal cannula with oxygen 

saturation 97%.  Continues on dobutamine at 2.5 mcg and Lasix at 10 mg an hour. 

Chest x-ray seems to be improving a bit, still has atelectasis present.  BUN 84,

creatinine 2.37.  Urine culture negative.  Fluid balance -4 L for the last 48 

hours.





the patient is seen today 04/11/2020 in follow-up on the selective care unit.  

He is currently awake and alert in no acute distress.  Denies any worsening 

shortness of breath, cough or congestion.  He is still quite edematous 

especially the lower extremities.he had remained in a negative balance.currently

maintaining O2 saturations in the high 90s on 3 L/m per nasal cannula.  He is 

afebrile.  Hemodynamically stable.urine culture reveals no growth.sodium 141.  

Potassium 3.8.  Bicarb 23.  Creatinine 2.52.  He is continued on dobutamine 

at2.5 mg/kg/m.  Lasix drip at 5 mg per hour.  Antibiotics in the form of 

ceftriaxone.  Anticoagulated with Eliquis.





Objective





- Vital Signs


Vital signs: 


                                   Vital Signs











Temp  98.4 F   04/11/20 12:15


 


Pulse  67   04/11/20 12:15


 


Resp  20   04/11/20 12:15


 


BP  111/59   04/11/20 12:15


 


Pulse Ox  99   04/11/20 12:15








                                 Intake & Output











 04/10/20 04/11/20 04/11/20





 18:59 06:59 18:59


 


Intake Total 916.667 100 


 


Output Total 1 3 2


 


Balance 915.667 97 -2


 


Weight  120.2 kg 


 


Intake:   


 


  Intake, IV Titration 316.667 100 





  Amount   


 


    DOBUTamine DRIP 500 mg In 250  





    Dextrose/Water 1 250ml.   





    bag @ 2.5 MCG/KG/MIN 8.   





    685 mls/hr IV .Q24H [facility]   





    Rx#:[id number]   


 


    Furosemide 100 mg In 66.667 100 





    Sodium Chloride 0.9% 90   





    ml @ 5 MG/HR 5 mls/hr IV   





    .Q20H [facility] Rx#:[id number]   


 


  Oral 600  


 


Output:   


 


  Stool 1 3 2


 


Other:   


 


  Voiding Method Indwelling Catheter Indwelling Catheter Indwelling Catheter


 


  # Voids 0  


 


  # Bowel Movements 0  














- Exam





GENERAL EXAM: Alert, pleasant 80-year-old gentleman, up in a chair at the 

bedside, on 2 L nasal cannula comfortable in no apparent distress.


HEAD: Normocephalic.


EYES: Normal reaction of pupils, equal size.


NOSE: Clear with pink turbinates.


THROAT: No erythema or exudates.


NECK: No masses, no JVD.


CHEST: No chest wall deformity.


LUNGS: Equal air entry with crackles through the mid lung fields and lower lung 

fields.


CVS: S1 and S2 normal with no audible murmur, irregular rhythm.


ABDOMEN: No hepatosplenomegaly, normal bowel sounds, no guarding or rigidity.


SPINE: No scoliosis or deformity


SKIN: No rashes


CENTRAL NERVOUS SYSTEM: No focal deficits, tone is normal in all 4 extremities.


EXTREMITIES: There is 1-2+ peripheral edema.  No clubbing, no cyanosis.  

Peripheral pulses are intact.





- Labs


CBC & Chem 7: 


                                 04/06/20 06:24





                                 04/11/20 06:27


Labs: 


                  Abnormal Lab Results - Last 24 Hours (Table)











  04/10/20 04/10/20 04/11/20 Range/Units





  16:56 20:25 06:27 


 


BUN    83 H  (9-20)  mg/dL


 


Creatinine    2.52 H  (0.66-1.25)  mg/dL


 


POC Glucose (mg/dL)  106 H  156 H   (75-99)  mg/dL














  04/11/20 Range/Units





  11:28 


 


BUN   (9-20)  mg/dL


 


Creatinine   (0.66-1.25)  mg/dL


 


POC Glucose (mg/dL)  134 H  (75-99)  mg/dL








                      Microbiology - Last 24 Hours (Table)











 04/08/20 23:15 Urine Culture - Final





 Urine,Catheterized 














Assessment and Plan


Assessment: 





1.  Acute exacerbation of chronic congestive heart failure with systolic 

dysfunction





2.  Acute kidney injury likely related to cardiorenal syndrome, diuretic 

therapy, nephrology is following





3.  Acute urinary tract infection, placed on ceftriaxone, urine culture negative





4.  Ischemic cardiomyopathy, with EF of less than 20%, status post AICD 

implantation





5.  Diabetes mellitus





6.  History of chronic persistent atrial fibrillation on oral anticoagulation





7.  History of chronic kidney disease stage III at baseline, history of right 

hydronephrosis and patient follows with urology





8.  Coronary artery disease status post bypass grafting





9.  Hypertension





10.  Hyperlipidemia





11.  History of bladder cancer





Plan:





The patient was seen and evaluated by Dr. Trevino.  


Chest x-ray and labs reviewed.


Continue dobutamine and Lasix drip per cardiology


Increase his activity as tolerated


Titrate down the FiO2 as tolerated


We'll continue to follow





I, the cosigning physician, performed a history & physical examination of the 

patient. Lungs sounds with crackles in the mid and lower lung fields 

bilaterally.  Maintaining good O2 saturations in the 90s on 2 L/m per nasal 

cannula.  I discussed the assessment and plan of care with my nurse 

practitioner, Kavya Llanos. I attest to the above note as dictated by her.

## 2020-04-11 NOTE — P.PN
Progress Note - Text


Progress Note Date: 04/11/20





Chief Complaint: Shortness of breath





Patient is a 80-year-old patient of Dr. Duckworth.   history of chronic atrial 

fibrillation on anticoagulation with Eliquis, coronary artery disease status 

post bypass graft, cardiomyopathy status post AICD placement, history of nephrec

guy and unilateral kidney, CK D stage III, hypertension, hyperlipidemia, GERD, 

diabetes type 2 and history of bipolar/depression came to ER with complaints of 

worsening shortness of breath and exertional dyspnea and bilateral lower 

extremity increases swelling for the past 2 weeks.  Patient does have minimal 

cough without any sputum production.  No sputum production.  No complaints of 

fever or chills.  Denied any sick contacts at home.  No recent travel.





Admitted with CHF exacerbation, atrial fibrillation with a rapid ventricular 

rate.  Started on IV Lasixdrip and IV amiodarone.then IV dobutamine was added.


Today-.  Remains on IV Lasix and dobutamine drip.  4 LL in negative fluid 

balance.  remains tired.  2 breathing is better.  Up in a chair.oral intake 

better





Review of systems: Was done for constitutional, cardiovascular, GI, pulmonary. 

relevant finding as above





Active Medications





Amiodarone HCl (Cordarone)  200 mg PO TID [facility]


   Last Admin: 04/11/20 07:56 Dose:  200 mg


   Documented by: 


Apixaban (Eliquis)  2.5 mg PO BID [facility]


   Last Admin: 04/11/20 07:55 Dose:  2.5 mg


   Documented by: 


Aspirin (Aspirin)  81 mg PO DAILY [facility]


   Last Admin: 04/11/20 07:55 Dose:  81 mg


   Documented by: 


Atorvastatin Calcium (Lipitor)  80 mg PO HS [facility]


   Last Admin: 04/10/20 21:00 Dose:  80 mg


   Documented by: 


Carvedilol (Coreg)  25 mg PO AC-BID [facility]


   Last Admin: 04/11/20 06:23 Dose:  25 mg


   Documented by: 


Cholecalciferol (Vitamin D3 (25 Mcg = 1000 Iu))  2,000 unit PO DAILY [facility]


   Last Admin: 04/11/20 07:55 Dose:  2,000 unit


   Documented by: 


Cyanocobalamin (Vitamin B-12)  1,000 mcg PO DAILY [facility]


   Last Admin: 04/11/20 07:56 Dose:  1,000 mcg


   Documented by: 


Guaifenesin (Mucinex)  1,200 mg PO Q12HR [facility]


   Last Admin: 04/11/20 07:55 Dose:  1,200 mg


   Documented by: 


Sodium Chloride (Saline 0.9%)  1,000 mls @ 20 mls/hr IV .Q24H [facility]


   Last Admin: 04/10/20 17:10 Dose:  Not Given


   Documented by: 


Furosemide 100 mg/ Sodium (Chloride)  100 mls @ 5 mls/hr IV .Q20H [facility]


   Last Admin: 04/11/20 03:13 Dose:  5 mg/hr, 5 mls/hr


   Documented by: 


Dobutamine HCl/Dextrose 500 mg (/ IV Solution)  250 mls @ 8.685 mls/hr IV .Q24H 

[facility]


   Last Admin: 04/11/20 11:59 Dose:  Not Given


   Documented by: 


Ceftriaxone Sodium 1 gm/ (Sodium Chloride)  50 mls @ 100 mls/hr IVPB Q24HR [facility]


   Last Admin: 04/11/20 07:56 Dose:  100 mls/hr


   Documented by: 


Insulin Detemir (Levemir)  40 unit SQ [facility]


   Last Admin: 04/10/20 21:02 Dose:  Not Given


   Documented by: 


[facility]ulin [facility]vemir)  [facility]


   Last Admin: 04/11/20 07:56 Dose:  10 unit


   Documented by: 


Lamotrigine (Lamictal)  100 mg PO [facility]


   Last Admin: 04/10/20 21:00 Dose:  100 mg


   Documented by: 


Melatonin (Melatonin)  3 mg PO [facility]


   Last Admin: 04/10/20 21:00 Dose:  3 mg


   Documented by: 


Midodrine (Proamatine)  10 mg PO AC-TID [facility]


   Last Admin: 04/11/20 12:04 Dose:  10 mg


   Documented by: 


Ondansetron HCl (Zofran)  4 mg IVP Q6HR PRN


   PRN Reason: Nausea And Vomiting


   Last Admin: 04/10/20 16:43 Dose:  4 mg


   Documented by: 


Venlafaxine HCl (Effexor)  75 mg PO BID [facility]


   Last Admin: 04/11/20 08:28 Dose:  75 mg


   Documented by: 











On examination:


VITAL SIGNS: 98.3, 62, 16, 93/57, 98% on 4 L


GENERAL APPEARANCE: Sitting up in a recliner, tired, less short of breath


HEENT: Normal external appearance of nose and ear.  Oral cavity normal


EYES: Pupils equal. Conjunctiva normal. 


NECK: JVD raised. Mass not palpable. 


RESPIRATORY: Respiratory effort increased.  Decreased breath sounds.  Decreased 

crackles 


CARDIOVASCULAR: Heart sounds irregular, edema decreasing. 


ABDOMEN: Soft. Liver and spleen not palpable. No tenderness. No mass palpable.  

Cortes catheter with some hematuria


PSYCHIATRY: Alert and oriented x3. Mood and affect normal. 





INVESTIGATIONS, reviewed in the clinical context:


bun 83 creatinine 2.5 to





Previous testing:


White count 8.2 hemoglobin 10.6 potassium 5.2 bun 52 creatinine 2.0 troponin I 

0.0-3


EKG-possible A. fib


Chest x-ray film personally reviewed by me-pulmonary edema cardiomegaly


BUN/creatinine on February 22-1.58





Assessment:


Acute on chronic congestive heart failure exacerbation from systolic 

dysfunction, EF less than 30% per cardiology, slow to respond.


-Hypotension multifactorial


Ischemic cardiomyopathy


Persistent atrial fibrillation on anticoagulation with Eliquis, rate better 

controlled


Acute kidney injury secondary to cardiorenal syndrome, 


-chronic kidney disease stage III.


Mild hyperkalemia secondary to acute kidney injury


Lactic acidosis-type II


History of nephrectomy due to renal cancer and bladder cancer history


Diabetes type 2.  Insulin-dependent and also on metformin and glipizide.


Hyperlipidemia


Coronary artery disease with history of multiple stents placement


Bipolar disorder and depression


GERD


Obesity with BMI 34.9


-





Plan:


Remains on IV Lasix and IV dobutamine drip.  overall prognosis guarded.  

Discussed with the patient.  Encouraged oral intake..

## 2020-04-11 NOTE — P.PN
Subjective





This is a pleasant 80-year-old  male past medical history significant 

for ischemic cardiomyopathy status post AICD, chronic systolic heart failure and

coronary artery disease status post bypass grafting, kidney and bladder cancer 

status post chemo and radiation and chronic persistent atrial fibrillation on 

long-term anticoagulation.  He follows in the office with Dr. Fleming.  He 

is seen and examined sitting up in the chair in no acute distress.  He continues

to feel overall weak and short of breath.  Chest x-ray this morning reveals 

improved aeration with ongoing vascular congestion and mild edema with small 

bilateral effusions noted.  Laboratory data reviewed, sodium 141, potassium 3.8,

creatinine 2.5 to.  Blood pressure 93/57 heart rate 62 afebrile maintaining 

oxygen saturation on room air.  Currently maintained on amiodarone 200 mg 3 

times a day, Eliquis 2.5 mg twice a day, aspirin 81 mg daily, atorvastatin 80 mg

daily, carvedilol 25 mg twice a day, dobutamine infusion and Lasix infusion.  

Over 2500 mL of urine output in the previous 24 hours. Unsure why amiodarone was

initiated.





GENERAL: Well-appearing, well-nourished and in no acute distress.


NECK: Supple with JVD noted bilaterally or thyromegaly.


LUNGS:  Bibasilar rales, no wheezes or rhonchi.  Diminished bilaterally.  

Respiration equal and unlabored.  


HEART: Regular rate and rhythm without murmurs, rubs or gallops. S1 and S2 

heard.


EXTREMITIES: Normal range of motion, 1+ bilateral lower extremity pitting edema.

 No clubbing or cyanosis. Peripheral pulses intact.





ASSESSMENT


Acute on chronic systolic heart failure, ejection fraction less than 20%


Chronic persistent atrial fibrillation


Acute kidney injury


Coronary artery disease status post bypass grafting


Ischemic cardiomyopathy


Hypertension


Dyslipidemia


Diabetes mellitus





PLAN


Continue current medical regimen.


Intake and output documentation daily.


We will continue to follow and make recommendations accordingly.





Nurse Practitioner note has been reviewed, I agree with a documented findings 

and plan of care.  Patient was seen and examined.








Objective





- Vital Signs


Vital signs: 


                                   Vital Signs











Temp  98.3 F   04/11/20 08:31


 


Pulse  62   04/11/20 08:31


 


Resp  20   04/11/20 08:31


 


BP  93/57   04/11/20 08:31


 


Pulse Ox  98   04/11/20 08:31








                                 Intake & Output











 04/10/20 04/11/20 04/11/20





 18:59 06:59 18:59


 


Intake Total 916.667 100 


 


Output Total 1 3 1


 


Balance 915.667 97 -1


 


Weight  120.2 kg 


 


Intake:   


 


  Intake, IV Titration 316.667 100 





  Amount   


 


    DOBUTamine DRIP 500 mg In 250  





    Dextrose/Water 1 250ml.   





    bag @ 2.5 MCG/KG/MIN 8.   





    685 mls/hr IV .Q24H [facility]   





    Rx#:[id number]   


 


    Furosemide 100 mg In 66.667 100 





    Sodium Chloride 0.9% 90   





    ml @ 5 MG/HR 5 mls/hr IV   





    .Q20H [facility] Rx#:[id number]   


 


  Oral 600  


 


Output:   


 


  Stool 1 3 1


 


Other:   


 


  Voiding Method Indwelling Catheter Indwelling Catheter Indwelling Catheter


 


  # Voids 0  


 


  # Bowel Movements 0  














- Labs


CBC & Chem 7: 


                                 04/06/20 06:24





                                 04/11/20 06:27


Labs: 


                  Abnormal Lab Results - Last 24 Hours (Table)











  04/10/20 04/10/20 04/10/20 Range/Units





  12:06 16:56 20:25 


 


BUN     (9-20)  mg/dL


 


Creatinine     (0.66-1.25)  mg/dL


 


POC Glucose (mg/dL)  164 H  106 H  156 H  (75-99)  mg/dL














  04/11/20 Range/Units





  06:27 


 


BUN  83 H  (9-20)  mg/dL


 


Creatinine  2.52 H  (0.66-1.25)  mg/dL


 


POC Glucose (mg/dL)   (75-99)  mg/dL








                      Microbiology - Last 24 Hours (Table)











 04/08/20 23:15 Urine Culture - Final





 Urine,Catheterized

## 2020-04-11 NOTE — XR
EXAMINATION TYPE: XR chest 1V portable

 

DATE OF EXAM: 4/11/2020

 

HISTORY: heart failure.

 

REFERENCE: Previous study dated 4/10/2020.

 

FINDINGS: There has been a midline sternotomy. Bipolar pacemaker remains in place on the left.

 

The heart is enlarged. There is improved aeration of the left lung. There is vascular congestion and 
mild edema. There are small bilateral effusions.

 

IMPRESSION: 

1. CONTINUING CHANGES OF HEART FAILURE.

2. IMPROVED AERATION, LEFT LUNG BASE.

3. SMALL, BILATERAL EFFUSIONS.

## 2020-04-11 NOTE — PN
PROGRESS NOTE



Patient is seen for followup for acute kidney injury cardiorenal, and volume overload,

maintained on Lasix drip and dobutamine drip.  Urine output is not accurately charted.



PHYSICAL EXAMINATION:

Blood pressure is from 111/59 to 93/57 earlier this morning, heart rate 62 per minute.

Patient is afebrile.

EXAMINATION OF THE HEART: S1, S2.

EXAMINATION OF THE LUNGS: Decreased breath sounds at bases.

ABDOMEN:  Soft, obese.

Examination of lower extremities shows edema 2 to 3+ bilaterally.

CNS exam grossly intact.



LABS:

Labs show sodium 141, potassium 3.8, chloride 106, CO2 is 23. BUN 83, serum creatinine

2.52.



ASSESSMENT:

1. Acute kidney injury, cardiorenal, maintained on dobutamine and Lasix drip.  The

    urine output is not accurately charted.  It says 2 mL and I doubt that this is

    accurate.  Serum creatinine is a bit higher today.  The patient remains

    significantly volume overloaded.  I will continue with the dobutamine and Lasix

    drip for now.

2. Severe cardiomyopathy, ejection fraction 25%.

3. Chronic persistent atrial fibrillation.

4. Coronary artery disease with history of coronary artery bypass surgery.

5. Chronic kidney disease, stage 3, with previous creatinine around 0.9 to 1.2 mg/dL

    in February of 2019.

6. History of bladder cancer.

7. Right atrophic kidney with solid appearing mass in the lower pole of the right

    kidney to be followed as outpatient with Urology.



PLAN:

Continue with the Lasix and dobutamine drip.  I will discuss with nursing staff

regarding his urine output.





MMODL / IJN: [id number] / Job#: [id number]

## 2020-04-12 LAB
ANION GAP SERPL CALC-SCNC: 12 MMOL/L
BUN SERPL-SCNC: 77 MG/DL (ref 9–20)
CALCIUM SPEC-MCNC: 8.7 MG/DL (ref 8.4–10.2)
CHLORIDE SERPL-SCNC: 104 MMOL/L (ref 98–107)
CO2 SERPL-SCNC: 25 MMOL/L (ref 22–30)
GLUCOSE BLD-MCNC: 115 MG/DL (ref 75–99)
GLUCOSE BLD-MCNC: 157 MG/DL (ref 75–99)
GLUCOSE BLD-MCNC: 205 MG/DL (ref 75–99)
GLUCOSE BLD-MCNC: 212 MG/DL (ref 75–99)
GLUCOSE SERPL-MCNC: 129 MG/DL (ref 74–99)
POTASSIUM SERPL-SCNC: 3.8 MMOL/L (ref 3.5–5.1)
SODIUM SERPL-SCNC: 141 MMOL/L (ref 137–145)

## 2020-04-12 RX ADMIN — AMIODARONE HYDROCHLORIDE SCH MG: 200 TABLET ORAL at 08:23

## 2020-04-12 RX ADMIN — CEFAZOLIN SCH: 330 INJECTION, POWDER, FOR SOLUTION INTRAMUSCULAR; INTRAVENOUS at 15:43

## 2020-04-12 RX ADMIN — AMIODARONE HYDROCHLORIDE SCH MG: 200 TABLET ORAL at 15:45

## 2020-04-12 RX ADMIN — DOBUTAMINE HYDROCHLORIDE SCH: 200 INJECTION INTRAVENOUS at 20:35

## 2020-04-12 RX ADMIN — CYANOCOBALAMIN TAB 500 MCG SCH MCG: 500 TAB at 08:22

## 2020-04-12 RX ADMIN — INSULIN DETEMIR SCH UNIT: 100 INJECTION, SOLUTION SUBCUTANEOUS at 20:25

## 2020-04-12 RX ADMIN — MIDODRINE HYDROCHLORIDE SCH MG: 5 TABLET ORAL at 06:23

## 2020-04-12 RX ADMIN — VENLAFAXINE HYDROCHLORIDE SCH MG: 75 TABLET ORAL at 20:25

## 2020-04-12 RX ADMIN — CARVEDILOL SCH MG: 12.5 TABLET, FILM COATED ORAL at 06:23

## 2020-04-12 RX ADMIN — MIDODRINE HYDROCHLORIDE SCH MG: 5 TABLET ORAL at 16:41

## 2020-04-12 RX ADMIN — VENLAFAXINE HYDROCHLORIDE SCH MG: 75 TABLET ORAL at 08:23

## 2020-04-12 RX ADMIN — AMIODARONE HYDROCHLORIDE SCH MG: 200 TABLET ORAL at 20:25

## 2020-04-12 RX ADMIN — APIXABAN SCH MG: 2.5 TABLET, FILM COATED ORAL at 08:23

## 2020-04-12 RX ADMIN — ATORVASTATIN CALCIUM SCH MG: 80 TABLET, FILM COATED ORAL at 20:25

## 2020-04-12 RX ADMIN — APIXABAN SCH MG: 2.5 TABLET, FILM COATED ORAL at 20:25

## 2020-04-12 RX ADMIN — INSULIN DETEMIR SCH UNIT: 100 INJECTION, SOLUTION SUBCUTANEOUS at 08:23

## 2020-04-12 RX ADMIN — Medication SCH UNIT: at 08:22

## 2020-04-12 RX ADMIN — Medication SCH MG: at 20:25

## 2020-04-12 RX ADMIN — MIDODRINE HYDROCHLORIDE SCH MG: 5 TABLET ORAL at 12:11

## 2020-04-12 RX ADMIN — ASPIRIN 81 MG CHEWABLE TABLET SCH MG: 81 TABLET CHEWABLE at 08:23

## 2020-04-12 RX ADMIN — CARVEDILOL SCH MG: 12.5 TABLET, FILM COATED ORAL at 16:41

## 2020-04-12 NOTE — PN
PROGRESS NOTE



Juan José is an 80-year-old gentleman who is admitted to hospital with acute exacerbation

of chronic systolic heart failure  Has history of CAD, cardiomyopathy, AICD, prior

bypass surgery, renal insufficiency, bladder cancer status post chemo radiation and

chronic persistent atrial fibrillation.  The patient states that he is less short of

breath today.  He is on Lasix drip along with the dobutamine at 2.5.  He had been on

dobutamine for about 4 days.



On exam today, heart rate is 60 beats per minute, blood pressure is 93/60, respiratory

rate is 16, O2 saturation is 99% on 2 L.  Chest exam reveals diminished air entry at

the bases.  I do not hear any crackles.  Heart exam reveals first and second heart

sounds, irregular rhythm.  Exam of extremities reveals bilateral leg edema that has

improved compared to where we were yesterday.



LAB:

Show a BUN of 77, creatinine of 2.2, which is an improvement from yesterday when the

BUN was 83.



ASSESSMENT:

1. Acute exacerbation of chronic systolic heart failure.

2. Chronic renal insufficiency.

3. Permanent atrial fibrillation.

4. Coronary artery disease status post coronary artery bypass graft.



PLAN:

I will stop the dobutamine today.  Continue the Lasix drip.  Continue the aspirin,

Eliquis, Lipitor, amiodarone primarily for rate control and reassess him tomorrow.  If

his urine output continues to be good and symptomatically he improves we may be able to

switch him from Lasix drip to p.o. Lasix over the next 24-48.





MMJOSEL / LEELEEN: [id number] / Job#: [id number]

## 2020-04-12 NOTE — PN
PROGRESS NOTE



Patient is seen for followup for acute kidney injury, cardiorenal syndrome, and a

severe volume overload.  Currently maintained, maintained on IV dobutamine and Lasix

drip.  The patient has been diuresing.  He has an indwelling Cortes catheter.  His 24

hour urine output is not accurately charted.  His weight is down.  Overall, he states

he feels about the same.



PHYSICAL EXAMINATION:

On examination today, blood pressure was 93/61, heart rate 62 per minute.  He is

afebrile.  Examination of the heart S1, S2.  Examination of the lungs, decreased breath

sounds at bases.

ABDOMEN:  Soft. Morbidly obese.  Examination of lower extremities shows 3+ edema

bilaterally. CNS exam grossly intact.



LAB:

Show sodium 141, potassium 3.8, chloride 104, CO2 is 25, BUN 77, creatinine 2.23.



ASSESSMENT:

1. Acute kidney injury cardiorenal, currently improving.  Continue with Lasix and

    dobutamine drip for now.

2. Severe cardiomyopathy, ejection fraction of about 25%.

3. Chronic kidney disease stage 3.  Previous creatinine 0.9-1.2 mg/dL in February of 2019.

4. History of bladder cancer.

5. Chronic persistent atrial fibrillation.

6. Coronary artery disease with history of coronary artery bypass surgery.

7. Atrophic right kidney with solid appearing mass in the lower pole of the right

    kidney to be followed up by Urology as outpatient.  Patient does see Urology on a

    regular basis.



PLAN:

Continue with Lasix and dobutamine.  Can likely switch to IV push Lasix tomorrow.

Continue accurate I&Os and daily weights.





MMODL / IJN: [id number] / Job#: [id number]

## 2020-04-12 NOTE — P.PN
Progress Note - Text


Progress Note Date: 04/12/20





Chief Complaint: Shortness of breath





Patient is a 80-year-old patient of Dr. Duckworth.   history of chronic atrial 

fibrillation on anticoagulation with Eliquis, coronary artery disease status 

post bypass graft, cardiomyopathy status post AICD placement, history of nephrec

guy and unilateral kidney, CK D stage III, hypertension, hyperlipidemia, GERD, 

diabetes type 2 and history of bipolar/depression came to ER with complaints of 

worsening shortness of breath and exertional dyspnea and bilateral lower 

extremity increases swelling for the past 2 weeks.  Patient does have minimal 

cough without any sputum production.  No sputum production.  No complaints of 

fever or chills.  Denied any sick contacts at home.  No recent travel.





Admitted with CHF exacerbation, atrial fibrillation with a rapid ventricular 

rate.  Started on IV Lasixdrip and IV amiodarone.then IV dobutamine was added.


Today-.  IV dobutamine was discontinued.  Remains on IV Lasix drip.  Breathing 

better.  Edema slowly going down.  Appetite improving slowly





Review of systems: Was done for constitutional, cardiovascular, GI, pulmonary. 

relevant finding as above





Active Medications





Amiodarone HCl (Cordarone)  200 mg PO TID [facility]


   Last Admin: 04/12/20 15:45 Dose:  200 mg


   Documented by: 


Apixaban (Eliquis)  2.5 mg PO BID [facility]


   Last Admin: 04/12/20 08:23 Dose:  2.5 mg


   Documented by: 


Aspirin (Aspirin)  81 mg PO DAILY [facility]


   Last Admin: 04/12/20 08:23 Dose:  81 mg


   Documented by: 


Atorvastatin Calcium (Lipitor)  80 mg PO HS [facility]


   Last Admin: 04/11/20 22:14 Dose:  80 mg


   Documented by: 


Carvedilol (Coreg)  25 mg PO AC-BID [facility]


   Last Admin: 04/12/20 06:23 Dose:  25 mg


   Documented by: 


Cholecalciferol (Vitamin D3 (25 Mcg = 1000 Iu))  2,000 unit PO DAILY [facility]


   Last Admin: 04/12/20 08:22 Dose:  2,000 unit


   Documented by: 


Cyanocobalamin (Vitamin B-12)  1,000 mcg PO DAILY [facility]


   Last Admin: 04/12/20 08:22 Dose:  1,000 mcg


   Documented by: 


Guaifenesin (Mucinex)  1,200 mg PO Q12HR [facility]


   Last Admin: 04/12/20 08:22 Dose:  1,200 mg


   Documented by: 


Sodium Chloride (Saline 0.9%)  1,000 mls @ 20 mls/hr IV .Q24H [facility]


   Last Admin: 04/12/20 15:43 Dose:  Not Given


   Documented by: 


Furosemide 100 mg/ Sodium (Chloride)  100 mls @ 5 mls/hr IV .Q20H [facility]


   Last Admin: 04/11/20 23:30 Dose:  5 mg/hr, 5 mls/hr


   Documented by: 


Ceftriaxone Sodium 1 gm/ (Sodium Chloride)  50 mls @ 100 mls/hr IVPB Q24HR [facility]


   Last Admin: 04/12/20 08:23 Dose:  100 mls/hr


   Documented by: 


Insulin Detemir (Levemir)  40 unit SQ [facility]


   Last Admin: 04/11/20 22:14 Dose:  40 unit


   Documented by: 


Insulin Detemir (Levemir)  [facility]


   Last Admin: 04/12/20 08:23 Dose:  10 unit


   Documented by: 


Lamotrigine (Lamictal)  100 mg PO [facility]


   Last Admin: 04/11/20 22:15 Dose:  100 mg


   Documented by: 


Melatonin (Melatonin)  3 mg PO [facility]


   Last Admin: 04/11/20 22:14 Dose:  3 mg


   Documented by: 


Midodrine (Proamatine)  10 mg PO AC-TID [facility]


   Last Admin: 04/12/20 12:11 Dose:  10 mg


   Documented by: 


Ondansetron HCl (Zofran)  4 mg IVP Q6HR PRN


   PRN Reason: Nausea And Vomiting


   Last Admin: 04/10/20 16:43 Dose:  4 mg


   Documented by: 


Venlafaxine HCl (Effexor)  75 mg PO BID [facility]


   Last Admin: 04/12/20 08:23 Dose:  75 mg


   Documented by: 














On examination:


VITAL SIGNS: 98.3, 62, 16, 93/61, 99% on 2 L


GENERAL APPEARANCE: Sitting up in a recliner, looking better


HEENT: Normal external appearance of nose and ear.  Oral cavity normal


EYES: Pupils equal. Conjunctiva normal. 


NECK: JVD raised. Mass not palpable. 


RESPIRATORY: Respiratory effort increased.  Decreased breath sounds.


CARDIOVASCULAR: Heart sounds irregular, edema decreased


ABDOMEN: Soft. Liver and spleen not palpable. No tenderness. No mass palpable.  

Cortes catheter-hematuria cleared


PSYCHIATRY: Alert and oriented x3. Mood and affect normal. 





INVESTIGATIONS, reviewed in the clinical context:


Bun 77 creatinine 2.23





Previous testing:


White count 8.2 hemoglobin 10.6 potassium 5.2 bun 52 creatinine 2.0 troponin I 

0.0-3


EKG-possible A. fib


Chest x-ray film personally reviewed by me-pulmonary edema cardiomegaly


BUN/creatinine on February 22-1.58





Assessment:


Acute on chronic congestive heart failure exacerbation from systolic 

dysfunction, EF less than 30% per cardiology, slow to respond.


-Hypotension multifactorial


Ischemic cardiomyopathy


Persistent atrial fibrillation on anticoagulation with Eliquis, rate better 

controlled


Acute kidney injury secondary to cardiorenal syndrome, 


-chronic kidney disease stage III.


Mild hyperkalemia secondary to acute kidney injury


Lactic acidosis-type II


History of nephrectomy due to renal cancer and bladder cancer history


Diabetes type 2.  Insulin-dependent and also on metformin and glipizide.


Hyperlipidemia


Coronary artery disease with history of multiple stents placement


Bipolar disorder and depression


GERD


Obesity with BMI 34.9


-





Plan:


Remains on IV Lasix drip.  IV dobutamine discontinued.  Other medication 

treatment plan to continue.  Discussed with patient.

## 2020-04-12 NOTE — P.PN
Subjective


Progress Note Date: 04/12/20


Principal diagnosis: 





Exertional dyspnea, anasarca





This is an 80-year-old male patient of Dr. Duckworth, with known history of severe 

ischemic cardiomyopathy, and ejection fraction of less than 20%, status post 

AICD placement, hypertension, diabetes mellitus, hyperlipidemia, persistent 

atrial fibrillation, coronary artery disease status post bypass grafting, ex-

smoker, chronic congestive heart failure with systolic dysfunction, history of 

kidney and bladder cancer status post chemo and radiation, who presented to the 

emergency department on 04/05/2020 with complaints of worsening exertional 

dyspnea, and increased swelling in his lower extremities.  He denied any chest 

pain, palpitations, no fever or chills, no nausea vomiting, no diaphoresis, no 

cough or phlegm production.  Admission chest x-ray showed creased vascular 

congestion, and pulmonary edema and a small left pleural effusion.  Admission 

blood work was negative for any leukocytosis, white blood cell count was 8.2, 

hemoglobin is 10.6, mild lymphopenia, INR is 1.3, potassium is 5.2, the rest of 

the electrolytes were within normal limits, BUN is 52 creatinine is 2.0, patient

does have history of chronic kidney disease stage III, this showed signs of 

infection although patient denies any symptoms, troponins were 0.031, 0.026, 

0.031, proBNP was 2710.  We were asked to see the patient in consultation.  

Patient has been started on infusion of Lasix, early infusing at a rate of 10 mg

per hour, and dobutamine drip infusing at 5 mics per kilo per minute.  A is on 

oral Eliquis for history of chronic atrial fibrillation he remains in A. fib 

with a controlled rate of 93 bpm.  He is sitting up in the recliner, in no acute

distress, on 4 L of oxygen with a pulse ox of 95-98%, he is been afebrile, he 

has not diuresed significantly despite the Lasix infusion, and today's blood 

work shows worsening of his renal profile would be on up to 79 and creatinine is

up to 2.7.  Nephrology is following. 





04/09/2020, the patient was seen and examined at his bedside on the cardiac 

stepdown unit with Dr. Trevino.  He reports he is slightly better today, denies 

any complaints of pain or shortness of breath just sitting.  He is sitting up to

the bedside chair and is in no acute distress.  Lasix drip at 10 mg per hour and

dopamine drip at 2.5 mcg/kg/m continue infusing.  He remained hemodynamically 

stable and currently has oxygen saturations are 96% on 4 L nasal cannula.  

Repeat chest x-ray was completed this morning which demonstrated cardiomegaly 

with mild prominence of pulmonary vasculature marking with diffuse increasing 

lung field markings compatible with congestive heart failure.  He has been 

afebrile the last 24 hours.  Lab results today show a BUN 78, creatinine 2.48 

and a BNP level 1740.  A urine culture was sent yesterday with results pending.





On 04/10/2020 the patient was seen and examined with Dr. Trevino.  Denies pain, 

shortness of breath.  Sitting up in bed and appears comfortable.  Remains 

afebrile.  Hematologically stable.  Currently on 4 L nasal cannula with oxygen 

saturation 97%.  Continues on dobutamine at 2.5 mcg and Lasix at 10 mg an hour. 

Chest x-ray seems to be improving a bit, still has atelectasis present.  BUN 84,

creatinine 2.37.  Urine culture negative.  Fluid balance -4 L for the last 48 

hours.





the patient is seen today 04/11/2020 in follow-up on the selective care unit.  

He is currently awake and alert in no acute distress.  Denies any worsening 

shortness of breath, cough or congestion.  He is still quite edematous 

especially the lower extremities.he had remained in a negative balance.currently

maintaining O2 saturations in the high 90s on 3 L/m per nasal cannula.  He is 

afebrile.  Hemodynamically stable.urine culture reveals no growth.sodium 141.  

Potassium 3.8.  Bicarb 23.  Creatinine 2.52.  He is continued on dobutamine at 

2.5 mg/kg/m.  Lasix drip at 5 mg per hour.  Antibiotics in the form of 

ceftriaxone.  Anticoagulated with Eliquis.





The patient is seen today 04/12/2020 in follow-up on the selective care unit.  

Sitting up in a chair at the bedside.  Awake and alert in no acute distress.  

Breathing a bit easier today compared to yesterday.  Maintaining O2 saturations 

at 99% on 2 L/m per nasal cannula.  He's been afebrile.  Urine culture revealed 

no growth.  Sodium 141.  Potassium 3.8.  Creatinine 2.23.  Dobutamine has been 

discontinued.  He is on a Lasix drip at 5 mg per hour.  Currently in a positive 

balance.  Weight is down 2 kg.  Cortes catheter remains in place. Antibiotics in 

the form of ceftriaxone.  Anticoagulated with Eliquis.





Objective





- Vital Signs


Vital signs: 


                                   Vital Signs











Temp  98.3 F   04/12/20 08:29


 


Pulse  62   04/12/20 08:29


 


Resp  16   04/12/20 08:29


 


BP  93/61   04/12/20 08:29


 


Pulse Ox  99   04/12/20 08:29








                                 Intake & Output











 04/11/20 04/12/20 04/12/20





 18:59 06:59 18:59


 


Intake Total 720 100 120


 


Output Total 703 3 1


 


Balance 17 97 119


 


Weight  118.3 kg 


 


Intake:   


 


  Intake, IV Titration  100 





  Amount   


 


    Furosemide 100 mg In  100 





    Sodium Chloride 0.9% 90   





    ml @ 5 MG/HR 5 mls/hr IV   





    .Q20H [facility] Rx#:[id number]   


 


  Oral 720  120


 


Output:   


 


  Urine 700  


 


  Stool 3 3 1


 


Other:   


 


  Voiding Method Indwelling Catheter Indwelling Catheter Indwelling Catheter














- Exam





GENERAL EXAM: Alert, 80-year-old gentleman, up in a chair at the bedside, on 2 L

nasal cannula with an O2 saturation of 99%, comfortable in no apparent distress.


HEAD: Normocephalic.


EYES: Normal reaction of pupils, equal size.


NOSE: Clear with pink turbinates.


THROAT: No erythema or exudates.


NECK: No masses, no JVD.


CHEST: No chest wall deformity.


LUNGS: Equal air entry with crackles through the mid lung fields and lower lung 

fields.


CVS: S1 and S2 normal with no audible murmur, irregular rhythm.


ABDOMEN: No hepatosplenomegaly, normal bowel sounds, no guarding or rigidity.


SPINE: No scoliosis or deformity


SKIN: No rashes


CENTRAL NERVOUS SYSTEM: No focal deficits, tone is normal in all 4 extremities.


EXTREMITIES: There is 1-2+ peripheral edema.  No clubbing, no cyanosis.  

Peripheral pulses are intact.





- Labs


CBC & Chem 7: 


                                 04/06/20 06:24





                                 04/12/20 09:08


Labs: 


                  Abnormal Lab Results - Last 24 Hours (Table)











  04/11/20 04/11/20 04/12/20 Range/Units





  16:37 20:38 05:53 


 


BUN     (9-20)  mg/dL


 


Creatinine     (0.66-1.25)  mg/dL


 


Glucose     (74-99)  mg/dL


 


POC Glucose (mg/dL)  121 H  204 H  157 H  (75-99)  mg/dL














  04/12/20 04/12/20 Range/Units





  09:08 11:41 


 


BUN  77 H   (9-20)  mg/dL


 


Creatinine  2.23 H   (0.66-1.25)  mg/dL


 


Glucose  129 H   (74-99)  mg/dL


 


POC Glucose (mg/dL)   115 H  (75-99)  mg/dL














Assessment and Plan


Assessment: 





1.  Acute exacerbation of chronic congestive heart failure with systolic 

dysfunction





2.  Acute kidney injury likely related to cardiorenal syndrome, diuretic 

therapy, nephrology is following





3.  Acute urinary tract infection, placed on ceftriaxone, urine culture negative





4.  Ischemic cardiomyopathy, with EF of less than 20%, status post AICD 

implantation





5.  Diabetes mellitus





6.  History of chronic persistent atrial fibrillation on oral anticoagulation





7.  History of chronic kidney disease stage III at baseline, history of right 

hydronephrosis and patient follows with urology





8.  Coronary artery disease status post bypass grafting





9.  Hypertension





10.  Hyperlipidemia





11.  History of bladder cancer





Plan:





The patient was seen and evaluated by Dr. Trevino.  


Continue Lasix drip


Repeat chest x-ray in the a.m.


Increase his activity as tolerated


Titrate down the FiO2 as tolerated


We'll continue to follow





I, the cosigning physician, performed a history & physical examination of the 

patient. Lungs sounds with crackles in the mid and lower lung fields 

bilaterally.  Maintaining good O2 saturations in the 90s on 2 L/m per nasal 

cannula.  I discussed the assessment and plan of care with my nurse 

practitioner, Kavya Llanos. I attest to the above note as dictated by her.

## 2020-04-13 LAB
GLUCOSE BLD-MCNC: 166 MG/DL (ref 75–99)
GLUCOSE BLD-MCNC: 167 MG/DL (ref 75–99)
GLUCOSE BLD-MCNC: 214 MG/DL (ref 75–99)
GLUCOSE BLD-MCNC: 240 MG/DL (ref 75–99)

## 2020-04-13 RX ADMIN — Medication SCH UNIT: at 09:24

## 2020-04-13 RX ADMIN — VENLAFAXINE HYDROCHLORIDE SCH MG: 75 TABLET ORAL at 20:29

## 2020-04-13 RX ADMIN — AMIODARONE HYDROCHLORIDE SCH MG: 200 TABLET ORAL at 09:26

## 2020-04-13 RX ADMIN — APIXABAN SCH MG: 2.5 TABLET, FILM COATED ORAL at 20:29

## 2020-04-13 RX ADMIN — FUROSEMIDE SCH MLS/HR: 10 INJECTION, SOLUTION INTRAMUSCULAR; INTRAVENOUS at 06:04

## 2020-04-13 RX ADMIN — MIDODRINE HYDROCHLORIDE SCH MG: 5 TABLET ORAL at 12:46

## 2020-04-13 RX ADMIN — AMIODARONE HYDROCHLORIDE SCH MG: 200 TABLET ORAL at 23:25

## 2020-04-13 RX ADMIN — CYANOCOBALAMIN TAB 500 MCG SCH MCG: 500 TAB at 09:25

## 2020-04-13 RX ADMIN — Medication SCH MG: at 20:29

## 2020-04-13 RX ADMIN — FUROSEMIDE SCH MG: 10 INJECTION, SOLUTION INTRAMUSCULAR; INTRAVENOUS at 09:27

## 2020-04-13 RX ADMIN — FUROSEMIDE SCH MG: 10 INJECTION, SOLUTION INTRAMUSCULAR; INTRAVENOUS at 20:29

## 2020-04-13 RX ADMIN — CEFAZOLIN SCH MLS/HR: 330 INJECTION, POWDER, FOR SOLUTION INTRAMUSCULAR; INTRAVENOUS at 17:35

## 2020-04-13 RX ADMIN — INSULIN DETEMIR SCH UNIT: 100 INJECTION, SOLUTION SUBCUTANEOUS at 21:40

## 2020-04-13 RX ADMIN — VENLAFAXINE HYDROCHLORIDE SCH MG: 75 TABLET ORAL at 09:33

## 2020-04-13 RX ADMIN — APIXABAN SCH MG: 2.5 TABLET, FILM COATED ORAL at 09:24

## 2020-04-13 RX ADMIN — AMIODARONE HYDROCHLORIDE SCH MG: 200 TABLET ORAL at 15:18

## 2020-04-13 RX ADMIN — ATORVASTATIN CALCIUM SCH MG: 80 TABLET, FILM COATED ORAL at 20:29

## 2020-04-13 RX ADMIN — CARVEDILOL SCH MG: 12.5 TABLET, FILM COATED ORAL at 06:04

## 2020-04-13 RX ADMIN — MIDODRINE HYDROCHLORIDE SCH MG: 5 TABLET ORAL at 17:36

## 2020-04-13 RX ADMIN — MIDODRINE HYDROCHLORIDE SCH MG: 5 TABLET ORAL at 06:04

## 2020-04-13 RX ADMIN — INSULIN DETEMIR SCH UNIT: 100 INJECTION, SOLUTION SUBCUTANEOUS at 09:29

## 2020-04-13 RX ADMIN — METOLAZONE SCH MG: 5 TABLET ORAL at 12:46

## 2020-04-13 RX ADMIN — ASPIRIN 81 MG CHEWABLE TABLET SCH MG: 81 TABLET CHEWABLE at 09:24

## 2020-04-13 RX ADMIN — CARVEDILOL SCH MG: 12.5 TABLET, FILM COATED ORAL at 17:36

## 2020-04-13 NOTE — P.PN
Subjective





Patient is seen in follow-up for acute kidney injury on chronic kidney disease. 

He is currently maintained on Lasix drip.  Complains of upset stomach.  He is 

maintained on Lasix drip.  Edema slowly improving.  Good urine output.





Vital signs are stable.


General: The patient appeared well nourished and normally developed. 


HEENT: Head exam is unremarkable. Neck is without jugular venous distension.


LUNGS: Lungs are clear to auscultation and percussion. Breath sounds decreased.


HEART: Rate and Rhythm are regular. 


ABDOMEN: Nontender.  Nondistended.


EXTREMITITES: 2+ edema.





Objective





- Vital Signs


Vital signs: 


                                   Vital Signs











Temp  98.5 F   04/13/20 04:00


 


Pulse  74   04/13/20 04:00


 


Resp  18   04/13/20 04:00


 


BP  114/72   04/13/20 04:00


 


Pulse Ox  97   04/13/20 04:00








                                 Intake & Output











 04/12/20 04/13/20 04/13/20





 18:59 06:59 18:59


 


Intake Total 720 100 


 


Output Total 3 501 


 


Balance 717 -401 


 


Weight  116.8 kg 


 


Intake:   


 


  Intake, IV Titration  100 





  Amount   


 


    Furosemide 100 mg In  100 





    Sodium Chloride 0.9% 90   





    ml @ 5 MG/HR 5 mls/hr IV   





    .Q20H [facility] Rx#:[id number]   


 


  Oral 720  


 


Output:   


 


  Urine  500 


 


  Stool 3 1 


 


Other:   


 


  Voiding Method Indwelling Catheter Indwelling Catheter 














- Labs


CBC & Chem 7: 


                                 04/06/20 06:24





                                 04/12/20 09:08


Labs: 


                  Abnormal Lab Results - Last 24 Hours (Table)











  04/12/20 04/12/20 04/12/20 Range/Units





  09:08 11:41 17:08 


 


BUN  77 H    (9-20)  mg/dL


 


Creatinine  2.23 H    (0.66-1.25)  mg/dL


 


Glucose  129 H    (74-99)  mg/dL


 


POC Glucose (mg/dL)   115 H  212 H  (75-99)  mg/dL














  04/12/20 04/13/20 Range/Units





  20:24 05:42 


 


BUN    (9-20)  mg/dL


 


Creatinine    (0.66-1.25)  mg/dL


 


Glucose    (74-99)  mg/dL


 


POC Glucose (mg/dL)  205 H  167 H  (75-99)  mg/dL














Assessment and Plan


Plan: 





Assessment:


1.  Acute kidney injury secondary to ATN secondary to cardiorenal syndrome.  

Renal function stable with creatinine in the range of 2-2.5.


2.  Chronic kidney disease stage III with baseline creatinine in the range of 

0.9-1.2.


3.  Acute on chronic systolic CHF with ejection fraction of 25%.


4.  Volume overload.


5.  Atrophic right kidney with mass.  Patient will follow-up with urology 

outpatient.


6.  Insulin-dependent diabetes mellitus.





Plan:


Discontinue Lasix drip.


Start IV Lasix 60 mg twice daily.


Add metolazone 5 mg once daily.


Low-salt diet and 1500 mL fluid restriction.


Daily weights.


Repeat electrolytes in the morning.

## 2020-04-13 NOTE — PN
PROGRESS NOTE



Mr. Tariq is a patient with ischemic cardiomyopathy, was in heart failure.  We had placed

him on a dobutamine and Lasix drip.  Both of these are discontinued.  He looks better.

Complains of fatigue.  No chest pain.  He is in atrial fib. Rate control is good.



Vitals are stable. JVD is evident. S1, S2 heard normally, irregular rate and rhythm.

Short systolic murmur. Lungs reveal improved entry. Abdomen is soft. Lower extremity

edema has improved.



Plan is to leave him on IV push Lasix.  Check a BMP tomorrow.  Continue all his other

medications as before.  He is off the Lasix drip, but the patient has improved

clinically and his creatinine is lower.  His weight has also improved somewhat compared

to when he came in.  We will therefore continue his current medications which include

Coreg 25 mg b.i.d. and atorvastatin and aspirin and apixaban 2.5 mg b.i.d.  His

Zaroxolyn 5 mg daily and the Lasix IV push has been resumed by nephrologist, Dr. Conde.  We will continue to follow him, but no intervention from a cardiac standpoint

at this time.





MMODL / IJN: [id number] / Job#: [id number]

## 2020-04-13 NOTE — P.PN
Progress Note - Text


Progress Note Date: 04/13/20





Chief Complaint: Shortness of breath





Patient is a 80-year-old patient of Dr. Duckworth.   history of chronic atrial 

fibrillation on anticoagulation with Eliquis, coronary artery disease status 

post bypass graft, cardiomyopathy status post AICD placement, history of nephrec

guy and unilateral kidney, CK D stage III, hypertension, hyperlipidemia, GERD, 

diabetes type 2 and history of bipolar/depression came to ER with complaints of 

worsening shortness of breath and exertional dyspnea and bilateral lower 

extremity increases swelling for the past 2 weeks.  Patient does have minimal 

cough without any sputum production.  No sputum production.  No complaints of 

fever or chills.  Denied any sick contacts at home.  No recent travel.





Admitted with CHF exacerbation, atrial fibrillation with a rapid ventricular 

rate.  Started on IV Lasixdrip and IV amiodarone.then IV dobutamine was added.


Today-.  Patient just feels tired.  Has been social to IV Lasix push.  Did 

tolerate some diet.  Decreased edema





Review of systems: Was done for constitutional, cardiovascular, GI, pulmonary. 

relevant finding as above





Active Medications





Amiodarone HCl (Cordarone)  200 mg PO TID [facility]


   Last Admin: 04/13/20 15:18 Dose:  200 mg


   Documented by: 


Apixaban (Eliquis)  2.5 mg PO BID [facility]


   Last Admin: 04/13/20 09:24 Dose:  2.5 mg


   Documented by: 


Aspirin (Aspirin)  81 mg PO DAILY [facility]


   Last Admin: 04/13/20 09:24 Dose:  81 mg


   Documented by: 


Atorvastatin Calcium (Lipitor)  80 mg PO HS [facility]


   Last Admin: 04/12/20 20:25 Dose:  80 mg


   Documented by: 


Carvedilol (Coreg)  25 mg PO AC-BID [facility]


   Last Admin: 04/13/20 17:36 Dose:  25 mg


   Documented by: 


Cholecalciferol (Vitamin D3 (25 Mcg = 1000 Iu))  2,000 unit PO DAILY [facility]


   Last Admin: 04/13/20 09:24 Dose:  2,000 unit


   Documented by: 


Cyanocobalamin (Vitamin B-12)  1,000 mcg PO DAILY [facility]


   Last Admin: 04/13/20 09:25 Dose:  1,000 mcg


   Documented by: 


Furosemide (Lasix)  60 mg IV Q12HR [facility]


   Last Admin: 04/13/20 09:27 Dose:  60 mg


   Documented by: 


Guaifenesin (Mucinex)  1,200 mg PO Q12HR [facility]


   Last Admin: 04/13/20 09:25 Dose:  1,200 mg


   Documented by: 


Sodium Chloride (Saline 0.9%)  1,000 mls @ 20 mls/hr IV .Q24H [facility]


   Last Admin: 04/13/20 17:35 Dose:  20 mls/hr


   Documented by: 


Ceftriaxone Sodium 1 gm/ (Sodium Chloride)  50 mls @ 100 mls/hr IVPB Q24HR [facility]


   Last Admin: 04/13/20 09:28 Dose:  100 mls/hr


   Documented by: 


Insulin Detemir (Levemir)  40 unit SQ [facility]


   Last Admin: 04/12/20 20:25 Dose:  40 unit


   Documented by: 


[facility]ulin [facility] ([facility])  [facility]


   Last Admin: 04/13/20 09:29 Dose:  10 unit


   Documented by: 


Lamotrigine (Lamictal)  100 mg PO [facility]


   Last Admin: 04/12/20 20:24 Dose:  100 mg


   Documented by: 


Melatonin (Melatonin)  3 mg PO [facility]


   Last Admin: 04/12/20 20:25 Dose:  3 mg


   Documented by: 


Metolazone (Zaroxolyn)  5 mg PO DAILY [facility]


   Last Admin: 04/13/20 12:46 Dose:  5 mg


   Documented by: 


Midodrine (Proamatine)  10 mg PO AC-TID [facility]


   Last Admin: 04/13/20 17:36 Dose:  10 mg


   Documented by: 


Ondansetron HCl (Zofran)  4 mg IVP Q6HR PRN


   PRN Reason: Nausea And Vomiting


   Last Admin: 04/10/20 16:43 Dose:  4 mg


   Documented by: 


Venlafaxine HCl (Effexor)  75 mg PO BID [facility]


   Last Admin: 04/13/20 09:33 Dose:  75 mg


   Documented by: 








On examination:


VITAL SIGNS: 97.9, 82, 16, 118/58, 97% on 2 L


GENERAL APPEARANCE: Propped up in recliner, not short of breath


HEENT: Normal external appearance of nose and ear.  Oral cavity normal


EYES: Pupils equal. Conjunctiva normal. 


NECK: JVD raised. Mass not palpable. 


RESPIRATORY: Respiratory effort increased.  Decreased breath sounds.


CARDIOVASCULAR: Heart sounds irregular, edema decreased


ABDOMEN: Soft. Liver and spleen not palpable. No tenderness. No mass palpable.  

Cortes catheter-hematuria cleared


PSYCHIATRY: Alert and oriented x3. Mood and affect normal. 





INVESTIGATIONS, reviewed in the clinical context:


Bun 77 creatinine 2.23





Previous testing:


White count 8.2 hemoglobin 10.6 potassium 5.2 bun 52 creatinine 2.0 troponin I 

0.0-3


EKG-possible A. fib


Chest x-ray film personally reviewed by me-pulmonary edema cardiomegaly


BUN/creatinine on February 22-1.58





Assessment:


Acute on chronic congestive heart failure exacerbation from systolic 

dysfunction, EF less than 30% per cardiology, improving


-Hypotension multifactorial


Ischemic cardiomyopathy


Persistent atrial fibrillation on anticoagulation with Eliquis, rate better 

controlled


Acute kidney injury secondary to cardiorenal syndrome, 


-chronic kidney disease stage III.


Mild hyperkalemia secondary to acute kidney injury


Lactic acidosis-type II


History of nephrectomy due to renal cancer and bladder cancer history


Diabetes type 2.  Insulin-dependent and also on metformin and glipizide.


Hyperlipidemia


Coronary artery disease with history of multiple stents placement


Bipolar disorder and depression


GERD


Obesity with BMI 34.9


-





Plan:


Patient on IV Lasix bolus.  Repeat labs in the morning.  Encouraged to ambulate.

 Other meds to continue

## 2020-04-13 NOTE — P.PN
Subjective


Progress Note Date: 04/13/20


80-year-old male patient of Dr. Duckworth, with known history of severe ischemic 

cardiomyopathy, and ejection fraction of less than 20%, status post AICD 

placement, hypertension, diabetes mellitus, hyperlipidemia, persistent atrial 

fibrillation, coronary artery disease status post bypass grafting, ex-smoker, 

chronic congestive heart failure with systolic dysfunction, history of kidney 

and bladder cancer status post chemo and radiation, who presented to the 

emergency department on 04/05/2020 with complaints of worsening exertional 

dyspnea, and increased swelling in his lower extremities.  He denied any chest 

pain, palpitations, no fever or chills, no nausea vomiting, no diaphoresis, no 

cough or phlegm production.  Admission chest x-ray showed creased vascular 

congestion, and pulmonary edema and a small left pleural effusion.  Admission 

blood work was negative for any leukocytosis, white blood cell count was 8.2, 

hemoglobin is 10.6, mild lymphopenia, INR is 1.3, potassium is 5.2, the rest of 

the electrolytes were within normal limits, BUN is 52 creatinine is 2.0, patient

does have history of chronic kidney disease stage III, this showed signs of 

infection although patient denies any symptoms, troponins were 0.031, 0.026, 

0.031, proBNP was 2710.  We were asked to see the patient in consultation.  

Patient has been started on infusion of Lasix, early infusing at a rate of 10 mg

per hour, and dobutamine drip infusing at 5 mics per kilo per minute.  A is on 

oral Eliquis for history of chronic atrial fibrillation he remains in A. fib 

with a controlled rate of 93 bpm.  He is sitting up in the recliner, in no acute

distress, on 4 L of oxygen with a pulse ox of 95-98%, he is been afebrile, he 

has not diuresed significantly despite the Lasix infusion, and today's blood 

work shows worsening of his renal profile would be on up to 79 and creatinine is

up to 2.7.  Nephrology is following. 





On 04/13/2020 patient seen in follow-up on selective care unit, he is awake and 

alert, in no acute distress, sitting up in the recliner, he is currently on 2 L 

of oxygen, his pulse ox 96%, his been afebrile, hemodynamically patient is 

stable, he is currently off dobutamine and Lasix drip, his breathing has 

significantly improved, lower extremity edema improved.  Today's chest x-ray 

showed extensive interstitial and alveolar infiltrates with some improvement.  

There were also small pleural effusions no new labs today, patient remains on 

diuretics at 60 mg every 12 hours, nephrology is managing, he remains on 

Rocephin for possible urinary tract infection, his been afebrile, no urinary 

symptoms.








Objective





- Vital Signs


Vital signs: 


                                   Vital Signs











Temp  97.6 F   04/13/20 08:00


 


Pulse  95   04/13/20 08:00


 


Resp  16   04/13/20 08:00


 


BP  97/53   04/13/20 08:00


 


Pulse Ox  96   04/13/20 08:00








                                 Intake & Output











 04/12/20 04/13/20 04/13/20





 18:59 06:59 18:59


 


Intake Total 720 100 125


 


Output Total 3 501 


 


Balance 717 -401 125


 


Weight  116.8 kg 


 


Intake:   


 


  Intake, IV Titration  100 





  Amount   


 


    Furosemide 100 mg In  100 





    Sodium Chloride 0.9% 90   





    ml @ 5 MG/HR 5 mls/hr IV   





    .Q20H [facility] Rx#:[id number]   


 


  Oral 720  125


 


Output:   


 


  Urine  500 


 


  Stool 3 1 


 


Other:   


 


  Voiding Method Indwelling Catheter Indwelling Catheter Indwelling Catheter














- Exam


GENERAL EXAM: Alert, very pleasant, 80-year-old  male, on 2 L of oxygen

with a pulse ox of 96 %, sitting up in the recliner, appears to have quite 

significant generalized edema, anasarca, but no acute respiratory distress 


HEAD: Normocephalic/atraumatic.


EYES: Normal reaction of pupils, equal size.  Conjunctiva pink, sclera white.


NOSE: Clear with pink turbinates.


THROAT: No erythema or exudates.


NECK: No masses, no JVD, no thyroid enlargement, no adenopathy.


CHEST: No chest wall deformity.  Symmetrical expansion. 


LUNGS: Equal air entry with fine basilar crackles at bilateral bases


CVS: Irregular rate and rhythm, normal S1 and S2, no gallops, no murmurs, no 

rubs


ABDOMEN: Soft, nontender.  No hepatosplenomegaly, normal bowel sounds, no 

guarding or rigidity.


EXTREMITIES: No clubbing, significant 2+ lower extremity edema, no cyanosis, 2+ 

pulses and upper and lower extremities.


MUSCULOSKELETAL: Muscle strength and tone normal.


SPINE: No scoliosis or deformity


SKIN: No rashes


CENTRAL NERVOUS SYSTEM: Alert and oriented -3.  No focal deficits, tone is 

normal in all 4 extremities.


PSYCHIATRIC: Alert and oriented -3.  Appropriate affect.  Intact judgment and 

insight.











- Labs


CBC & Chem 7: 


                                 04/06/20 06:24





                                 04/12/20 09:08


Labs: 


                  Abnormal Lab Results - Last 24 Hours (Table)











  04/12/20 04/12/20 04/13/20 Range/Units





  17:08 20:24 05:42 


 


POC Glucose (mg/dL)  212 H  205 H  167 H  (75-99)  mg/dL














  04/13/20 Range/Units





  11:29 


 


POC Glucose (mg/dL)  166 H  (75-99)  mg/dL














Assessment and Plan


Plan: 


 Assessment:





#1.  Acute exacerbation of chronic congestive heart failure with systolic 

dysfunction





#2.  Acute kidney injury likely related to cardiorenal syndrome, diuretic 

therapy, nephrology is following





#3.  Acute urinary tract infection, although patient is asymptomatic, we'll 

start on empiric Rocephin





#4.  Ischemic cardiomyopathy, with EF of less than 20%, status post AICD 

implantation





#5.  Diabetes mellitus





#6.  History of chronic persistent atrial fibrillation on oral anticoagulation





#7.  History of chronic kidney disease stage III at baseline, history of right 

hydronephrosis and patient follows with urology





#8.  Coronary artery disease status post bypass grafting





#9.  Hypertension





#10.  Hyperlipidemia





#11.  History of bladder cancer





Plan:





Continue current medical management, diuretics per nephrology, patient is 

maintaining negative fluid balance, his chest x-ray shows improvement in the 

appearance of interstitial edema, small pleural effusions.  Lower extremity 

edema is improving, we'll continue to follow





I performed a history & physical examination of the patient and discussed their 

management with my nurse practitioner, Coco Lanza.  I reviewed the nurse 

practitioner's note and agree with the documented findings and plan of care.  

Lung sounds are positive for fine rales.  The findings and the impression was 

discussed with the patient.  I attest to the documentation by the nurse 

practitioner. 





Time with Patient: Less than 30

## 2020-04-13 NOTE — XR
EXAMINATION TYPE: XR chest 1V portable

 

DATE OF EXAM: 4/13/2020

 

HISTORY: Shortness of breath.

 

COMPARISON: 4/11/2020

 

TECHNIQUE: Single view of the chest is submitted.

 

FINDINGS:

Demonstrated are scattered senescent parenchymal change.  

 

Extensive interstitial and alveolar infiltrates persist although there appears to be mild interval im
provement. Small Pleural effusions noted.

 

The heart is stable.

 

Hilar and mediastinal structures are within normal limits.  

 

Degenerative changes are seen of the dorsal spine. 

 

 IMPRESSION: 

 

1.  Extensive interstitial and alveolar infiltrates persist although there appears to be mild interva
l improvement. Small Pleural effusions noted.

## 2020-04-14 LAB
ANION GAP SERPL CALC-SCNC: 7 MMOL/L
BUN SERPL-SCNC: 86 MG/DL (ref 9–20)
CALCIUM SPEC-MCNC: 8.8 MG/DL (ref 8.4–10.2)
CHLORIDE SERPL-SCNC: 100 MMOL/L (ref 98–107)
CO2 SERPL-SCNC: 32 MMOL/L (ref 22–30)
GLUCOSE BLD-MCNC: 237 MG/DL (ref 75–99)
GLUCOSE BLD-MCNC: 262 MG/DL (ref 75–99)
GLUCOSE BLD-MCNC: 301 MG/DL (ref 75–99)
GLUCOSE SERPL-MCNC: 183 MG/DL (ref 74–99)
MAGNESIUM SPEC-SCNC: 2.2 MG/DL (ref 1.6–2.3)
POTASSIUM SERPL-SCNC: 3.2 MMOL/L (ref 3.5–5.1)
SODIUM SERPL-SCNC: 139 MMOL/L (ref 137–145)

## 2020-04-14 RX ADMIN — VENLAFAXINE HYDROCHLORIDE SCH MG: 75 TABLET ORAL at 21:44

## 2020-04-14 RX ADMIN — APIXABAN SCH MG: 2.5 TABLET, FILM COATED ORAL at 21:05

## 2020-04-14 RX ADMIN — MIDODRINE HYDROCHLORIDE SCH MG: 5 TABLET ORAL at 06:27

## 2020-04-14 RX ADMIN — MIDODRINE HYDROCHLORIDE SCH MG: 5 TABLET ORAL at 13:11

## 2020-04-14 RX ADMIN — ASPIRIN 81 MG CHEWABLE TABLET SCH MG: 81 TABLET CHEWABLE at 08:18

## 2020-04-14 RX ADMIN — Medication SCH UNIT: at 08:18

## 2020-04-14 RX ADMIN — AMIODARONE HYDROCHLORIDE SCH MG: 200 TABLET ORAL at 08:18

## 2020-04-14 RX ADMIN — CEFAZOLIN SCH MLS/HR: 330 INJECTION, POWDER, FOR SOLUTION INTRAMUSCULAR; INTRAVENOUS at 16:49

## 2020-04-14 RX ADMIN — AMIODARONE HYDROCHLORIDE SCH MG: 200 TABLET ORAL at 21:05

## 2020-04-14 RX ADMIN — Medication SCH MG: at 21:05

## 2020-04-14 RX ADMIN — ATORVASTATIN CALCIUM SCH MG: 80 TABLET, FILM COATED ORAL at 21:05

## 2020-04-14 RX ADMIN — CARVEDILOL SCH MG: 12.5 TABLET, FILM COATED ORAL at 06:27

## 2020-04-14 RX ADMIN — INSULIN DETEMIR SCH UNIT: 100 INJECTION, SOLUTION SUBCUTANEOUS at 21:06

## 2020-04-14 RX ADMIN — AMIODARONE HYDROCHLORIDE SCH MG: 200 TABLET ORAL at 16:48

## 2020-04-14 RX ADMIN — APIXABAN SCH MG: 2.5 TABLET, FILM COATED ORAL at 08:18

## 2020-04-14 RX ADMIN — FUROSEMIDE SCH MG: 10 INJECTION, SOLUTION INTRAMUSCULAR; INTRAVENOUS at 21:06

## 2020-04-14 RX ADMIN — METOLAZONE SCH MG: 5 TABLET ORAL at 08:19

## 2020-04-14 RX ADMIN — VENLAFAXINE HYDROCHLORIDE SCH MG: 75 TABLET ORAL at 08:19

## 2020-04-14 RX ADMIN — MIDODRINE HYDROCHLORIDE SCH MG: 5 TABLET ORAL at 16:48

## 2020-04-14 RX ADMIN — CYANOCOBALAMIN TAB 500 MCG SCH MCG: 500 TAB at 08:18

## 2020-04-14 RX ADMIN — FUROSEMIDE SCH MG: 10 INJECTION, SOLUTION INTRAMUSCULAR; INTRAVENOUS at 09:56

## 2020-04-14 RX ADMIN — INSULIN DETEMIR SCH UNIT: 100 INJECTION, SOLUTION SUBCUTANEOUS at 08:18

## 2020-04-14 RX ADMIN — CARVEDILOL SCH MG: 12.5 TABLET, FILM COATED ORAL at 16:47

## 2020-04-14 NOTE — P.PN
Progress Note - Text


Progress Note Date: 04/14/20





Chief Complaint: Shortness of breath





Patient is a 80-year-old patient of Dr. Duckworth.   history of chronic atrial 

fibrillation on anticoagulation with Eliquis, coronary artery disease status 

post bypass graft, cardiomyopathy status post AICD placement, history of nephrec

guy and unilateral kidney, CK D stage III, hypertension, hyperlipidemia, GERD, 

diabetes type 2 and history of bipolar/depression came to ER with complaints of 

worsening shortness of breath and exertional dyspnea and bilateral lower 

extremity increases swelling for the past 2 weeks.  Patient does have minimal 

cough without any sputum production.  No sputum production.  No complaints of 

fever or chills.  Denied any sick contacts at home.  No recent travel.





Admitted with CHF exacerbation, atrial fibrillation with a rapid ventricular 

rate.  Started on IV Lasixdrip and IV amiodarone.then IV dobutamine was added.  

Switch to IV Lasix bolus.


Today-.  Continues to feel tired.  Decreased appetite.  Some edema.  Breathing 

is better.  On IV Lasix bolus.  Appears to walk 40 feet with walker.





Review of systems: Was done for constitutional, cardiovascular, GI, pulmonary. 

relevant finding as above





Active Medications





Amiodarone HCl (Cordarone)  200 mg PO TID [facility]


   Last Admin: 04/14/20 16:48 Dose:  200 mg


   Documented by: 


Apixaban (Eliquis)  2.5 mg PO BID [facility]


   Last Admin: 04/14/20 08:18 Dose:  2.5 mg


   Documented by: 


Aspirin (Aspirin)  81 mg PO DAILY [facility]


   Last Admin: 04/14/20 08:18 Dose:  81 mg


   Documented by: 


Atorvastatin Calcium (Lipitor)  80 mg PO HS [facility]


   Last Admin: 04/13/20 20:29 Dose:  80 mg


   Documented by: 


Carvedilol (Coreg)  25 mg PO AC-BID [facility]


   Last Admin: 04/14/20 16:47 Dose:  25 mg


   Documented by: 


Cholecalciferol (Vitamin D3 (25 Mcg = 1000 Iu))  2,000 unit PO DAILY [facility]


   Last Admin: 04/14/20 08:18 Dose:  2,000 unit


   Documented by: 


Cyanocobalamin (Vitamin B-12)  1,000 mcg PO DAILY [facility]


   Last Admin: 04/14/20 08:18 Dose:  1,000 mcg


   Documented by: 


Furosemide (Lasix)  60 mg IV Q12HR [facility]


   Last Admin: 04/14/20 09:56 Dose:  60 mg


   Documented by: 


Guaifenesin (Mucinex)  1,200 mg PO Q12HR [facility]


   Last Admin: 04/14/20 08:18 Dose:  1,200 mg


   Documented by: 


Sodium Chloride (Saline 0.9%)  1,000 mls @ 20 mls/hr IV .Q24H [facility]


   Last Admin: 04/14/20 16:49 Dose:  20 mls/hr


   Documented by: 


Insulin Detemir (Levemir)  40 unit SQ HS [facility]


   Last Admin: 04/13/20 21:40 Dose:  40 unit


   Documented by: 


Insulin Detemir (Levemir)  10 unit SQ QAM [facility]


   Last Admin: 04/14/20 08:18 Dose:  10 unit


   Documented by: 


Lamotrigine (Lamictal)  100 mg PO [facility]


   Last Admin: 04/13/20 20:29 Dose:  100 mg


   Documented by: 


Melatonin (Melatonin)  3 mg PO [facility]


   Last Admin: 04/13/20 20:29 Dose:  3 mg


   Documented by: 


Metolazone (Zaroxolyn)  5 mg PO DAILY [facility]


   Last Admin: 04/14/20 08:19 Dose:  5 mg


   Documented by: 


Midodrine (Proamatine)  10 mg PO AC-TID [facility]


   Last Admin: 04/14/20 16:48 Dose:  10 mg


   Documented by: 


Ondansetron HCl (Zofran)  4 mg IVP Q6HR PRN


   PRN Reason: Nausea And Vomiting


   Last Admin: 04/10/20 16:43 Dose:  4 mg


   Documented by: 


Venlafaxine HCl (Effexor)  75 mg PO BID [facility]


   Last Admin: 04/14/20 08:19 Dose:  75 mg


   Documented by: 








On examination:


VITAL SIGNS: 98, 91, 18, 129/62, 99% on 2 L


GENERAL APPEARANCE: up in recliner, tired


HEENT: Normal external appearance of nose and ear.  Oral cavity normal


EYES: Pupils equal. Conjunctiva normal. 


NECK: JVD raised. Mass not palpable. 


RESPIRATORY: Respiratory effort increased.  Decreased breath sounds.


CARDIOVASCULAR: Heart sounds irregular, edema decreased


ABDOMEN: Soft. Liver and spleen not palpable. No tenderness. No mass palpable.  

Cortes catheter-hematuria cleared


PSYCHIATRY: Alert and oriented x3. Mood and affect tired





INVESTIGATIONS, reviewed in the clinical context:


Bun 86 creatinine 2.03





Previous testing:


White count 8.2 hemoglobin 10.6 potassium 5.2 bun 52 creatinine 2.0 troponin I 

0.0-3


EKG-possible A. fib


Chest x-ray film personally reviewed by me-pulmonary edema cardiomegaly


BUN/creatinine on February 22-1.58





Assessment:


Acute on chronic congestive heart failure exacerbation from systolic 

dysfunction, EF less than 30% per cardiology, on IV Lasix bolus


-Hypotension multifactorial


Ischemic cardiomyopathy


Persistent atrial fibrillation on anticoagulation with Eliquis, rate better 

controlled


Acute kidney injury secondary to cardiorenal syndrome, 


-chronic kidney disease stage III.


Mild hyperkalemia secondary to acute kidney injury


Lactic acidosis-type II


History of nephrectomy due to renal cancer and bladder cancer history


Diabetes type 2.  Insulin-dependent and also on metformin and glipizide.


Hyperlipidemia


Coronary artery disease with history of multiple stents placement


Bipolar disorder and depression


GERD


Obesity with BMI 34.9


-





Plan:


Continue on IV Lasix bolus.  Follow labs.  Encouraged to ambulate.  Other meds 

to continue

## 2020-04-14 NOTE — P.PN
Subjective


Progress Note Date: 04/14/20





This is a pleasant 80-year-old  gentleman who follows regularly with 

Dr. Fleming in the office.  He has a known history of coronary artery 

disease with prior bypass surgery, persistent atrial fibrillation, ischemic 

cardiomyopathy with prior AICD implant, this was performed in February 2019, h

istory of diabetes, hypertension, hyperlipidemia.  Patient also has history of 

kidney and bladder cancer with prior chemo and radiation.  Presents to the 

hospital with at least one week duration of progressively worsening shortness of

breath.  He states that he also noticed a significant increase in his bilateral 

lower extremity edema.  The patient denies any fever or chills at home.  Chest 

x-ray on presentation here showed congestive cardiac failure.  EKG showed atrial

fibrillation with a rapid ventricular response.  Blood pressure 106/70 with a 

heart rate of 120, afebrile, 98% on room air.  White blood cell count is normal,

hemoglobin 9.9, platelet count 236.  Sodium 139, potassium 4.6, chloride 106, 

CO2 20, BUN 54, creatinine 1.8.  Plasma lactic acid 2.3 on admission, 1.4 this 

morning, BNP level 2710.  Troponin 0.02, 0.03, 0.03.  Patient was initiated on 

IV Lasix in the emergency room.  He has been overall diuresing quite well and 

his weight is down today.  Patient continues to be quite short of breath, 

continues to have lower extremity edema.  The patient is currently on Eliquis 5 

mg one tablet by mouth twice a day, he is 80 years of age and his creatinine on 

admission was 2, we will monitor that creatinine closely, if it remains in the 2

range we will consider decreasing the Eliquis at 2-1/2 mg one tablet by mouth 

twice a day.  We will continue the patient on IV Lasix.  We will increase the 

dose of Coreg, patient is not currently on an ACE inhibitor or an angiotensin 

blocker because of the creatinine.





04/07/2020


Patient was seen and examined this morning, he continues to feel significantly 

short of breath, states he does not notice much improvement from his admission 

here.  Labs are pending.  Blood pressure 95/50, heart rate 108.





04/08/2020


Patient seen and examined this morning, states that he feels more short of 

breath today than he did on arrival here.  His urine output is minimal.  

Continues to be hypotensive.  Sodium 135, potassium 4.9, BUN 79, creatinine 2.7.

 We will start the patient on dobutamine drip at 2.5 g, after 10 minutes we'll 

increase to 5 mics.  Obtain a repeat stat chest x-ray.  This was discussed by 

Dr. KARINE Urias with nephrology this morning as well.  





04/09/2020





Patient seen and examined this morning, sitting up in the chair at bedside.  He 

states he does not feel a lot different than yesterday, maybe mildly better.  

This is the first day the patient has acknowledged to feeling some mild 

improvement in his breathing.  He diuresed much better through the night last 

night after being initiated on the dobutamine.  Blood pressure 92/40 with a 

heart rate in the 70s.  Laboratory data from today, sodium 139, potassium 4.0, 

BUN 78, creatinine 2.4.








04/14/2020


Patient seen and examined this morning, still complains of feeling short of 

breath, but does admit to feeling mildly better overall.  He diuresed over 4 L 

in the past 24 hours.  Sodium 139, potassium 3.2, BUN 86, creatinine 2.0.  We 

will continue the patient at this time on IV Lasix.  Repeat lytes BUN and 

creatinine tomorrow.  Replace potassium.








Objective





- Vital Signs


Vital signs: 


                                   Vital Signs











Temp  98.1 F   04/14/20 08:00


 


Pulse  82   04/14/20 08:00


 


Resp  18   04/14/20 08:00


 


BP  119/56   04/14/20 08:00


 


Pulse Ox  97   04/14/20 08:00








                                 Intake & Output











 04/13/20 04/14/20 04/14/20





 18:59 06:59 18:59


 


Intake Total 125 360 240


 


Output Total 1800 4700 


 


Balance -8821 -9355 240


 


Weight 116.8 kg 113 kg 


 


Intake:   


 


  Oral 125 360 240


 


Output:   


 


  Urine 1800 4700 


 


Other:   


 


  Voiding Method Indwelling Catheter Indwelling Catheter Indwelling Catheter














- Exam





PHYSICAL EXAMINATION: 





GENERAL: 80-year-old  gentleman in no acute distress at the time of my 

examination





HEENT: Head is atraumatic, normocephalic.  Pupils equal, round.  Sclera 

anicteric. Conjunctiva are clear.  Mucous membranes of the mouth are moist.  

Neck is supple.  There is  elevated jugular venous pressure.  No carotid  bruit 

is heard.





HEART EXAMINATION: Heart S1-S2 irregularly irregular a systolic murmur is heard





CHEST EXAMINATION: Lungs are   diminished  to the bases bilaterally





ABDOMEN:  Soft, obese, nontender. Bowel sounds are heard. No organomegaly noted.


 


EXTREMITIES: 2+ peripheral pulses with 2+  evidence of peripheral edema and no 

calf tenderness noted.





NEUROLOGIC patient is awake, alert and oriented 3 


 


.





- Labs


CBC & Chem 7: 


                                 04/06/20 06:24





                                 04/14/20 05:59


Labs: 


                  Abnormal Lab Results - Last 24 Hours (Table)











  04/13/20 04/13/20 04/13/20 Range/Units





  11:29 16:33 20:08 


 


Potassium     (3.5-5.1)  mmol/L


 


Carbon Dioxide     (22-30)  mmol/L


 


BUN     (9-20)  mg/dL


 


Creatinine     (0.66-1.25)  mg/dL


 


Glucose     (74-99)  mg/dL


 


POC Glucose (mg/dL)  166 H  214 H  240 H  (75-99)  mg/dL














  04/14/20 04/14/20 Range/Units





  05:51 05:59 


 


Potassium   3.2 L  (3.5-5.1)  mmol/L


 


Carbon Dioxide   32 H  (22-30)  mmol/L


 


BUN   86 H  (9-20)  mg/dL


 


Creatinine   2.03 H  (0.66-1.25)  mg/dL


 


Glucose   183 H  (74-99)  mg/dL


 


POC Glucose (mg/dL)  237 H   (75-99)  mg/dL














Assessment and Plan


Plan: 


Assessment and plan


#1 systolic congestive heart failure acute on chronic


#2 coronary artery disease with prior bypass surgery


#3 chronic persistent atrial fibrillation


#4 hypertension


#5 diabetes


#6 hyperlipidemia


#7 ischemic cardiomyopathy with prior AICD


#8 acute on chronic renal insufficiency





Plan


From cardiology's perspective, we will continue current dose of IV push Lasix.  

Replace potassium.  Check lytes BUN and creatinine in the morning.





DNP note has been reviewed, I agree with a documented findings and plan of care.

 Patient was seen and examined.

## 2020-04-14 NOTE — P.PN
Subjective


Progress Note Date: 04/14/20


Principal diagnosis: 





This is an 80-year-old male patient of Dr. Duckworth, with known history of severe 

ischemic cardiomyopathy, and ejection fraction of less than 20%, status post AI

CD placement, hypertension, diabetes mellitus, hyperlipidemia, persistent atrial

fibrillation, coronary artery disease status post bypass grafting, ex-smoker, 

chronic congestive heart failure with systolic dysfunction, history of kidney 

and bladder cancer status post chemo and radiation, who presented to the 

emergency department on 04/05/2020 with complaints of worsening exertional 

dyspnea, and increased swelling in his lower extremities.  He denied any chest 

pain, palpitations, no fever or chills, no nausea vomiting, no diaphoresis, no 

cough or phlegm production.  Admission chest x-ray showed creased vascular 

congestion, and pulmonary edema and a small left pleural effusion.  Admission 

blood work was negative for any leukocytosis, white blood cell count was 8.2, 

hemoglobin is 10.6, mild lymphopenia, INR is 1.3, potassium is 5.2, the rest of 

the electrolytes were within normal limits, BUN is 52 creatinine is 2.0, patient

does have history of chronic kidney disease stage III, this showed signs of 

infection although patient denies any symptoms, troponins were 0.031, 0.026, 

0.031, proBNP was 2710.  We were asked to see the patient in consultation.  

Patient has been started on infusion of Lasix, early infusing at a rate of 10 mg

per hour, and dobutamine drip infusing at 5 mics per kilo per minute.  A is on 

oral Eliquis for history of chronic atrial fibrillation he remains in A. fib 

with a controlled rate of 93 bpm.  He is sitting up in the recliner, in no acute

distress, on 4 L of oxygen with a pulse ox of 95-98%, he is been afebrile, he 

has not diuresed significantly despite the Lasix infusion, and today's blood 

work shows worsening of his renal profile would be on up to 79 and creatinine is

up to 2.7.  Nephrology is following. 





04/09/2020, the patient was seen and examined at his bedside on the cardiac 

stepdown unit with Dr. Trevino.  He reports he is slightly better today, denies 

any complaints of pain or shortness of breath just sitting.  He is sitting up to

the bedside chair and is in no acute distress.  Lasix drip at 10 mg per hour and

dopamine drip at 2.5 mcg/kg/m continue infusing.  He remained hemodynamically 

stable and currently has oxygen saturations are 96% on 4 L nasal cannula.  

Repeat chest x-ray was completed this morning which demonstrated cardiomegaly 

with mild prominence of pulmonary vasculature marking with diffuse increasing 

lung field markings compatible with congestive heart failure.  He has been 

afebrile the last 24 hours.  Lab results today show a BUN 78, creatinine 2.48 

and a BNP level 1740.  A urine culture was sent yesterday with results pending.





On 04/10/2020 the patient was seen and examined with Dr. Trevino.  Denies pain, 

shortness of breath.  Sitting up in bed and appears comfortable.  Remains 

afebrile.  Hematologically stable.  Currently on 4 L nasal cannula with oxygen 

saturation 97%.  Continues on dobutamine at 2.5 mcg and Lasix at 10 mg an hour. 

Chest x-ray seems to be improving a bit, still has atelectasis present.  BUN 84,

creatinine 2.37.  Urine culture negative.  Fluid balance -4 L for the last 48 

hours.





the patient is seen today 04/11/2020 in follow-up on the selective care unit.  

He is currently awake and alert in no acute distress.  Denies any worsening 

shortness of breath, cough or congestion.  He is still quite edematous 

especially the lower extremities.he had remained in a negative balance.currently

maintaining O2 saturations in the high 90s on 3 L/m per nasal cannula.  He is 

afebrile.  Hemodynamically stable.urine culture reveals no growth.sodium 141.  

Potassium 3.8.  Bicarb 23.  Creatinine 2.52.  He is continued on dobutamine at 

2.5 mg/kg/m.  Lasix drip at 5 mg per hour.  Antibiotics in the form of 

ceftriaxone.  Anticoagulated with Eliquis.





The patient is seen today 04/12/2020 in follow-up on the selective care unit.  

Sitting up in a chair at the bedside.  Awake and alert in no acute distress.  

Breathing a bit easier today compared to yesterday.  Maintaining O2 saturations 

at 99% on 2 L/m per nasal cannula.  He's been afebrile.  Urine culture revealed 

no growth.  Sodium 141.  Potassium 3.8.  Creatinine 2.23.  Dobutamine has been 

discontinued.  He is on a Lasix drip at 5 mg per hour.  Currently in a positive 

balance.  Weight is down 2 kg.  Cortes catheter remains in place. Antibiotics in 

the form of ceftriaxone.  Anticoagulated with Eliquis.





On 04/13/2020 patient seen in follow-up on selective care unit, he is awake and 

alert, in no acute distress, sitting up in the recliner, he is currently on 2 L 

of oxygen, his pulse ox 96%, his been afebrile, hemodynamically patient is 

stable, he is currently off dobutamine and Lasix drip, his breathing has 

significantly improved, lower extremity edema improved.  Today's chest x-ray 

showed extensive interstitial and alveolar infiltrates with some improvement.  

There were also small pleural effusions no new labs today, patient remains on 

diuretics at 60 mg every 12 hours, nephrology is managing, he remains on 

Rocephin for possible urinary tract infection, his been afebrile, no urinary 

symptoms.





On 04/14/2020 patient seen in follow-up on selective care unit, he is awake and 

alert, in no acute distress, sitting up in the recliner.  Oxygen saturations are

currently 99% on 2 L nasal cannula.  He remains hemodynamically stable and is 

currently in no acute distress.  He's been afebrile the last 24 hours.  

Continues to remain off the dobutamine and Lasix drip and is on 0.9% normal 

saline at 20 mL per hour.  There was no repeat chest x-ray today.  The patient 

reports overall he is feeling better on a daily basis and his edema to his 

bilateral lower extremity is improving.  He remains on Lasix 60 mg IV every 12 

hours and Zaroxolyn 5 mg by mouth daily which is being managed by nephrology.  

He remains on Rocephin for suspected UTI, his urine culture results are 

negative.  He remains on remote telemetry which is showing atrial fibrillation 

heart rate 70.





Objective





- Vital Signs


Vital signs: 


                                   Vital Signs











Temp  98.1 F   04/14/20 08:00


 


Pulse  82   04/14/20 08:00


 


Resp  18   04/14/20 08:00


 


BP  119/56   04/14/20 08:00


 


Pulse Ox  97   04/14/20 08:00








                                 Intake & Output











 04/13/20 04/14/20 04/14/20





 18:59 06:59 18:59


 


Intake Total 125 360 240


 


Output Total 1800 4700 


 


Balance -8454 -9510 606


 


Weight 116.8 kg 113 kg 


 


Intake:   


 


  Oral 125 360 240


 


Output:   


 


  Urine 1800 4700 


 


Other:   


 


  Voiding Method Indwelling Catheter Indwelling Catheter Indwelling Catheter














- Constitutional


Constitutional Comment(s): 





This is an 80-year-old gentleman who is sitting up to the bedside chair.  He is 

alert and oriented 3.  Oxygen saturations are 99% on 2 L nasal cannula and he 

is in no acute distress.


General appearance: Present: cooperative, no acute distress, obese





- EENT


Eyes: Present: PERRLA.  Absent: scleral icterus





- Neck


Details: 





Neck is supple, no JVD.


Neck: Absent: lymphadenopathy





- Respiratory


Details: 





Lung sounds with few scattered crackles throughout.  Respirations are 

symmetrical and nonlabored.





- Cardiovascular


Details: 





Irregular rhythm and controlled rate.  S1 and S2 present, negative for S3, 

gallop or murmur.





- Gastrointestinal


Gastrointestinal Comment(s): 





Abdomen is soft, nontender and nondistended.  Active bowel sounds present in all

4 abdominal quadrants.  No guarding or rigidity.





- Integumentary


Integumentary Comment(s): 





Skin is warm and dry.  No clubbing or cyanosis is present.


Integumentary: Absent: cellulitis, rash





- Neurologic


Neurologic: Present: CNII-XII intact





- Musculoskeletal


Musculoskeletal: Present: generalized weakness, strength equal bilaterally





- Psychiatric


Psychiatric: Present: A&O x's 3, appropriate affect, intact judgment & insight





- Allied health notes


Allied health notes reviewed: nursing





- Labs


CBC & Chem 7: 


                                 04/06/20 06:24





                                 04/14/20 05:59


Labs: 


                  Abnormal Lab Results - Last 24 Hours (Table)











  04/13/20 04/13/20 04/13/20 Range/Units





  11:29 16:33 20:08 


 


Potassium     (3.5-5.1)  mmol/L


 


Carbon Dioxide     (22-30)  mmol/L


 


BUN     (9-20)  mg/dL


 


Creatinine     (0.66-1.25)  mg/dL


 


Glucose     (74-99)  mg/dL


 


POC Glucose (mg/dL)  166 H  214 H  240 H  (75-99)  mg/dL














  04/14/20 04/14/20 Range/Units





  05:51 05:59 


 


Potassium   3.2 L  (3.5-5.1)  mmol/L


 


Carbon Dioxide   32 H  (22-30)  mmol/L


 


BUN   86 H  (9-20)  mg/dL


 


Creatinine   2.03 H  (0.66-1.25)  mg/dL


 


Glucose   183 H  (74-99)  mg/dL


 


POC Glucose (mg/dL)  237 H   (75-99)  mg/dL














Assessment and Plan


Assessment: 





1.  Acute exacerbation of chronic congestive heart failure with systolic 

dysfunction





2.  Acute kidney injury likely related to cardiorenal syndrome, diuretic 

therapy, nephrology is following





3.  Acute urinary tract infection, placed on ceftriaxone, urine culture negative





4.  Ischemic cardiomyopathy, with EF of less than 20%, status post AICD impla

ntation





5.  Diabetes mellitus





6.  History of chronic persistent atrial fibrillation on oral anticoagulation 

Eliquis





7.  History of chronic kidney disease stage III at baseline, history of right 

hydronephrosis and patient follows with urology





8.  Coronary artery disease status post bypass grafting





9.  Hypertension





10.  Hyperlipidemia





11.  History of bladder cancer


Plan: 





1.  The patient was seen and examined at his bedside with Dr. Quinn.


2.  Repeat chest x-ray in a.m. 4/15/20


3.  Wean oxygen as tolerated to keep sats greater than or equal to 92%. 

Currently on 2 L nasal cannula, oxygen saturation is 99%


4.  Atrial fibrillation and CHF management per cardiology recommendations.


5.  Discontinue Rocephin, urine culture negative.


6.  Continue to monitor daily weights.


7.  Lasix and Zaroxolyn management per nephrology recommendations.


8.  We will continue to follow patient and more recommendations to follow based 

on patient's clinical course.





I, the cosigning physician, performed a history and physical examination on the 

patient.  Lungs few scattered crackles throughout, he is maintaining O2 

saturation in the 90s on 2 L nasal cannula.  I discussed the plan and assessment

of care with Nj Gonzalez NP.  I attest that the above note is dictated by him.


Time with Patient: Less than 30

## 2020-04-14 NOTE — P.PN
Subjective





Patient is seen in follow-up for acute kidney injury on chronic kidney disease. 

He is currently maintained on IV Lasix and metolazone.  Urine output over 4 L 

last 24 hours.  Edema slowly improving.





Vital signs are stable.


General: The patient appeared well nourished and normally developed. 


HEENT: Head exam is unremarkable. Neck is without jugular venous distension.


LUNGS: Lungs are clear to auscultation and percussion. Breath sounds decreased.


HEART: Rate and Rhythm are regular. 


ABDOMEN: Nontender.  Nondistended.


EXTREMITITES: 2+ edema.





Objective





- Vital Signs


Vital signs: 


                                   Vital Signs











Temp  98 F   04/14/20 04:00


 


Pulse  60   04/14/20 04:00


 


Resp  19   04/14/20 04:00


 


BP  118/60   04/14/20 04:00


 


Pulse Ox  99   04/14/20 04:00








                                 Intake & Output











 04/13/20 04/14/20 04/14/20





 18:59 06:59 18:59


 


Intake Total 125 360 


 


Output Total 1800 4700 


 


Balance -7104 -2130 


 


Weight 116.8 kg 113 kg 


 


Intake:   


 


  Oral 125 360 


 


Output:   


 


  Urine 1800 4700 


 


Other:   


 


  Voiding Method Indwelling Catheter Indwelling Catheter 














- Labs


CBC & Chem 7: 


                                 04/06/20 06:24





                                 04/14/20 05:59


Labs: 


                  Abnormal Lab Results - Last 24 Hours (Table)











  04/13/20 04/13/20 04/13/20 Range/Units





  11:29 16:33 20:08 


 


Potassium     (3.5-5.1)  mmol/L


 


Carbon Dioxide     (22-30)  mmol/L


 


BUN     (9-20)  mg/dL


 


Creatinine     (0.66-1.25)  mg/dL


 


Glucose     (74-99)  mg/dL


 


POC Glucose (mg/dL)  166 H  214 H  240 H  (75-99)  mg/dL














  04/14/20 04/14/20 Range/Units





  05:51 05:59 


 


Potassium   3.2 L  (3.5-5.1)  mmol/L


 


Carbon Dioxide   32 H  (22-30)  mmol/L


 


BUN   86 H  (9-20)  mg/dL


 


Creatinine   2.03 H  (0.66-1.25)  mg/dL


 


Glucose   183 H  (74-99)  mg/dL


 


POC Glucose (mg/dL)  237 H   (75-99)  mg/dL














Assessment and Plan


Plan: 





Assessment:


1.  Acute kidney injury secondary to ATN secondary to cardiorenal syndrome.  

Renal function stable with creatinine in the range of 2-2.5.


2.  Chronic kidney disease stage III with baseline creatinine in the range of 

0.9-1.2.


3.  Acute on chronic systolic CHF with ejection fraction of 25%.


4.  Volume overload.


5.  Atrophic right kidney with mass.  Patient will follow-up with urology 

outpatient.


6.  Insulin-dependent diabetes mellitus.


7.  Hypokalemia secondary to diuresis.





Plan:


Continue IV Lasix 60 mg twice daily.


Maintain metolazone 5 mg once daily.


Low-salt diet and 1500 mL fluid restriction.


Daily weights.


Repeat electrolytes in the morning.


Replace potassium.  60 mEq today.

## 2020-04-15 LAB
ANION GAP SERPL CALC-SCNC: 9 MMOL/L
BUN SERPL-SCNC: 109 MG/DL (ref 9–20)
CALCIUM SPEC-MCNC: 9.3 MG/DL (ref 8.4–10.2)
CHLORIDE SERPL-SCNC: 99 MMOL/L (ref 98–107)
CO2 SERPL-SCNC: 30 MMOL/L (ref 22–30)
GLUCOSE BLD-MCNC: 244 MG/DL (ref 75–99)
GLUCOSE BLD-MCNC: 249 MG/DL (ref 75–99)
GLUCOSE BLD-MCNC: 274 MG/DL (ref 75–99)
GLUCOSE BLD-MCNC: 291 MG/DL (ref 75–99)
GLUCOSE BLD-MCNC: 294 MG/DL (ref 75–99)
GLUCOSE SERPL-MCNC: 226 MG/DL (ref 74–99)
MAGNESIUM SPEC-SCNC: 2.1 MG/DL (ref 1.6–2.3)
POTASSIUM SERPL-SCNC: 3.6 MMOL/L (ref 3.5–5.1)
SODIUM SERPL-SCNC: 138 MMOL/L (ref 137–145)

## 2020-04-15 RX ADMIN — CARVEDILOL SCH: 12.5 TABLET, FILM COATED ORAL at 16:26

## 2020-04-15 RX ADMIN — ATORVASTATIN CALCIUM SCH MG: 80 TABLET, FILM COATED ORAL at 20:32

## 2020-04-15 RX ADMIN — APIXABAN SCH MG: 2.5 TABLET, FILM COATED ORAL at 11:11

## 2020-04-15 RX ADMIN — MIDODRINE HYDROCHLORIDE SCH MG: 5 TABLET ORAL at 07:00

## 2020-04-15 RX ADMIN — INSULIN ASPART SCH UNIT: 100 INJECTION, SOLUTION INTRAVENOUS; SUBCUTANEOUS at 13:28

## 2020-04-15 RX ADMIN — Medication SCH MG: at 20:36

## 2020-04-15 RX ADMIN — INSULIN ASPART SCH UNIT: 100 INJECTION, SOLUTION INTRAVENOUS; SUBCUTANEOUS at 20:35

## 2020-04-15 RX ADMIN — CARVEDILOL SCH MG: 12.5 TABLET, FILM COATED ORAL at 07:00

## 2020-04-15 RX ADMIN — CARVEDILOL SCH: 12.5 TABLET, FILM COATED ORAL at 16:27

## 2020-04-15 RX ADMIN — VENLAFAXINE HYDROCHLORIDE SCH MG: 75 TABLET ORAL at 20:36

## 2020-04-15 RX ADMIN — FUROSEMIDE SCH: 10 INJECTION, SOLUTION INTRAMUSCULAR; INTRAVENOUS at 11:06

## 2020-04-15 RX ADMIN — INSULIN DETEMIR SCH UNIT: 100 INJECTION, SOLUTION SUBCUTANEOUS at 11:12

## 2020-04-15 RX ADMIN — INSULIN DETEMIR SCH UNIT: 100 INJECTION, SOLUTION SUBCUTANEOUS at 20:35

## 2020-04-15 RX ADMIN — MIDODRINE HYDROCHLORIDE SCH MG: 5 TABLET ORAL at 11:11

## 2020-04-15 RX ADMIN — CYANOCOBALAMIN TAB 500 MCG SCH MCG: 500 TAB at 11:11

## 2020-04-15 RX ADMIN — METOLAZONE SCH: 5 TABLET ORAL at 11:07

## 2020-04-15 RX ADMIN — VENLAFAXINE HYDROCHLORIDE SCH MG: 75 TABLET ORAL at 11:12

## 2020-04-15 RX ADMIN — APIXABAN SCH MG: 2.5 TABLET, FILM COATED ORAL at 20:31

## 2020-04-15 RX ADMIN — ASPIRIN 81 MG CHEWABLE TABLET SCH MG: 81 TABLET CHEWABLE at 11:11

## 2020-04-15 RX ADMIN — MIDODRINE HYDROCHLORIDE SCH MG: 5 TABLET ORAL at 16:29

## 2020-04-15 RX ADMIN — Medication SCH UNIT: at 11:11

## 2020-04-15 RX ADMIN — AMIODARONE HYDROCHLORIDE SCH MG: 200 TABLET ORAL at 11:11

## 2020-04-15 RX ADMIN — INSULIN ASPART SCH UNIT: 100 INJECTION, SOLUTION INTRAVENOUS; SUBCUTANEOUS at 16:29

## 2020-04-15 NOTE — P.PN
Subjective


Progress Note Date: 04/15/20





This is a pleasant 80-year-old  gentleman who follows regularly with 

Dr. Fleming in the office.  He has a known history of coronary artery 

disease with prior bypass surgery, persistent atrial fibrillation, ischemic 

cardiomyopathy with prior AICD implant, this was performed in February 2019, h

istory of diabetes, hypertension, hyperlipidemia.  Patient also has history of 

kidney and bladder cancer with prior chemo and radiation.  Presents to the 

hospital with at least one week duration of progressively worsening shortness of

breath.  He states that he also noticed a significant increase in his bilateral 

lower extremity edema.  The patient denies any fever or chills at home.  Chest 

x-ray on presentation here showed congestive cardiac failure.  EKG showed atrial

fibrillation with a rapid ventricular response.  Blood pressure 106/70 with a 

heart rate of 120, afebrile, 98% on room air.  White blood cell count is normal,

hemoglobin 9.9, platelet count 236.  Sodium 139, potassium 4.6, chloride 106, 

CO2 20, BUN 54, creatinine 1.8.  Plasma lactic acid 2.3 on admission, 1.4 this 

morning, BNP level 2710.  Troponin 0.02, 0.03, 0.03.  Patient was initiated on 

IV Lasix in the emergency room.  He has been overall diuresing quite well and 

his weight is down today.  Patient continues to be quite short of breath, 

continues to have lower extremity edema.  The patient is currently on Eliquis 5 

mg one tablet by mouth twice a day, he is 80 years of age and his creatinine on 

admission was 2, we will monitor that creatinine closely, if it remains in the 2

range we will consider decreasing the Eliquis at 2-1/2 mg one tablet by mouth 

twice a day.  We will continue the patient on IV Lasix.  We will increase the 

dose of Coreg, patient is not currently on an ACE inhibitor or an angiotensin 

blocker because of the creatinine.





04/07/2020


Patient was seen and examined this morning, he continues to feel significantly 

short of breath, states he does not notice much improvement from his admission 

here.  Labs are pending.  Blood pressure 95/50, heart rate 108.





04/08/2020


Patient seen and examined this morning, states that he feels more short of 

breath today than he did on arrival here.  His urine output is minimal.  

Continues to be hypotensive.  Sodium 135, potassium 4.9, BUN 79, creatinine 2.7.

 We will start the patient on dobutamine drip at 2.5 g, after 10 minutes we'll 

increase to 5 mics.  Obtain a repeat stat chest x-ray.  This was discussed by 

Dr. KARINE Urias with nephrology this morning as well.  





04/09/2020





Patient seen and examined this morning, sitting up in the chair at bedside.  He 

states he does not feel a lot different than yesterday, maybe mildly better.  

This is the first day the patient has acknowledged to feeling some mild 

improvement in his breathing.  He diuresed much better through the night last 

night after being initiated on the dobutamine.  Blood pressure 92/40 with a 

heart rate in the 70s.  Laboratory data from today, sodium 139, potassium 4.0, 

BUN 78, creatinine 2.4.








04/14/2020


Patient seen and examined this morning, still complains of feeling short of 

breath, but does admit to feeling mildly better overall.  He diuresed over 4 L 

in the past 24 hours.  Sodium 139, potassium 3.2, BUN 86, creatinine 2.0.  We 

will continue the patient at this time on IV Lasix.  Repeat lytes BUN and 

creatinine tomorrow.  Replace potassium.








04/15/2020


Patient seen and examined this morning sitting up in his chair at bedside.  

Overall he does state that his breathing is improving every day.  Blood pressure

104/50 with a heart rate in the 70s.  He continued to diurese well through the 

night last night.  Laboratory data from today, sodium 138, potassium 3.6, BUN 

109 and creatinine 1.7








Objective





- Vital Signs


Vital signs: 


                                   Vital Signs











Temp  97.6 F   04/15/20 11:19


 


Pulse  76   04/15/20 11:19


 


Resp  16   04/15/20 11:19


 


BP  81/49   04/15/20 11:19


 


Pulse Ox  99   04/15/20 11:19








                                 Intake & Output











 04/14/20 04/15/20 04/15/20





 18:59 06:59 18:59


 


Intake Total 240  


 


Output Total 901 2002 


 


Balance -661 -2002 


 


Weight  114.2 kg 


 


Intake:   


 


  Oral 240  


 


Output:   


 


  Urine 900 2000 


 


  Stool 1 2 


 


Other:   


 


  Voiding Method Indwelling Catheter Indwelling Catheter 














- Exam





PHYSICAL EXAMINATION: 





GENERAL: 80-year-old  gentleman in no acute distress at the time of my 

examination





HEENT: Head is atraumatic, normocephalic.  Pupils equal, round.  Sclera 

anicteric. Conjunctiva are clear.  Mucous membranes of the mouth are moist.  

Neck is supple.  There is  elevated jugular venous pressure.  No carotid  bruit 

is heard.





HEART EXAMINATION: Heart S1-S2 irregularly irregular a systolic murmur is heard





CHEST EXAMINATION: Lungs show improvement in air entry bilaterally 





ABDOMEN:  Soft, obese, nontender. Bowel sounds are heard. No organomegaly noted.


 


EXTREMITIES: 2+ peripheral pulses with trace - 1+  evidence of peripheral edema 

and no calf tenderness noted.





NEUROLOGIC patient is awake, alert and oriented 3 


 


.





- Labs


CBC & Chem 7: 


                                 04/06/20 06:24





                                 04/15/20 05:31


Labs: 


                  Abnormal Lab Results - Last 24 Hours (Table)











  04/14/20 04/14/20 04/15/20 Range/Units





  12:11 20:35 05:31 


 


BUN    109 H*  (9-20)  mg/dL


 


Creatinine    1.78 H  (0.66-1.25)  mg/dL


 


Glucose    226 H  (74-99)  mg/dL


 


POC Glucose (mg/dL)  301 H  262 H   (75-99)  mg/dL














  04/15/20 04/15/20 Range/Units





  06:04 10:28 


 


BUN    (9-20)  mg/dL


 


Creatinine    (0.66-1.25)  mg/dL


 


Glucose    (74-99)  mg/dL


 


POC Glucose (mg/dL)  244 H  249 H  (75-99)  mg/dL














Assessment and Plan


Plan: 


Assessment and plan


#1 systolic congestive heart failure acute on chronic


#2 coronary artery disease with prior bypass surgery


#3 chronic persistent atrial fibrillation


#4 hypertension


#5 diabetes


#6 hyperlipidemia


#7 ischemic cardiomyopathy with prior AICD


#8 acute on chronic renal insufficiency





Plan


From cardiology's perspective, we will recommend to discontinue the IV Lasix, 

put the patient on 40 mg of by mouth Lasix twice a day, decrease his Zaroxolyn 

to 2-1/2 mg daily, obtain consult with physical therapy.  We will check her lyte

s BUN and creatinine in the morning.





DNP note has been reviewed, I agree with a documented findings and plan of care.

 Patient was seen and examined.

## 2020-04-15 NOTE — P.PN
Subjective





Patient is seen in follow-up for acute kidney injury on chronic kidney disease. 

He is currently maintained on IV Lasix and metolazone.  Urine output is good.  

Edema gradually improving.





Vital signs are stable.


General: The patient appeared well nourished and normally developed. 


HEENT: Head exam is unremarkable. Neck is without jugular venous distension.


LUNGS: Lungs are clear to auscultation and percussion. Breath sounds decreased.


HEART: Rate and Rhythm are regular. 


ABDOMEN: Nontender.  Nondistended.


EXTREMITITES: 2+ edema.





Objective





- Vital Signs


Vital signs: 


                                   Vital Signs











Temp  97.6 F   04/15/20 11:19


 


Pulse  76   04/15/20 11:19


 


Resp  16   04/15/20 11:46


 


BP  81/49   04/15/20 11:19


 


Pulse Ox  99   04/15/20 11:19








                                 Intake & Output











 04/14/20 04/15/20 04/15/20





 18:59 06:59 18:59


 


Intake Total 240  


 


Output Total 901 2002 


 


Balance -661 -2002 


 


Weight  114.2 kg 


 


Intake:   


 


  Oral 240  


 


Output:   


 


  Urine 900 2000 


 


  Stool 1 2 


 


Other:   


 


  Voiding Method Indwelling Catheter Indwelling Catheter Indwelling Catheter














- Labs


CBC & Chem 7: 


                                 04/06/20 06:24





                                 04/15/20 05:31


Labs: 


                  Abnormal Lab Results - Last 24 Hours (Table)











  04/14/20 04/14/20 04/15/20 Range/Units





  12:11 20:35 05:31 


 


BUN    109 H*  (9-20)  mg/dL


 


Creatinine    1.78 H  (0.66-1.25)  mg/dL


 


Glucose    226 H  (74-99)  mg/dL


 


POC Glucose (mg/dL)  301 H  262 H   (75-99)  mg/dL














  04/15/20 04/15/20 04/15/20 Range/Units





  06:04 10:28 11:28 


 


BUN     (9-20)  mg/dL


 


Creatinine     (0.66-1.25)  mg/dL


 


Glucose     (74-99)  mg/dL


 


POC Glucose (mg/dL)  244 H  249 H  274 H  (75-99)  mg/dL














Assessment and Plan


Plan: 





Assessment:


1.  Acute kidney injury secondary to ATN secondary to cardiorenal syndrome.  

Renal function improving.  Creatinine 1.78 today.


2.  Chronic kidney disease stage III with baseline creatinine in the range of 

0.9-1.2.


3.  Acute on chronic systolic CHF with ejection fraction of 25%.


4.  Volume overload. improving with diuresis.


5.  Atrophic right kidney with mass.  Patient will follow-up with urology 

outpatient.


6.  Insulin-dependent diabetes mellitus.


7.  Hypokalemia secondary to diuresis. better posterior placement.  Magnesium 

normal.





Plan:


Lasix changed to 40 mg orally twice daily starting today.


Maintain metolazone.


Low-salt diet and 1500 mL fluid restriction.


Daily weights.


Repeat electrolytes in the morning.


Replace potassium.  40 mEq today.


I will also had maintenance potassium supplementation.

## 2020-04-15 NOTE — P.PN
Progress Note - Text


Progress Note Date: 04/15/20





Chief Complaint: Shortness of breath





Patient is a 80-year-old patient of Dr. Duckworth.   history of chronic atrial 

fibrillation on anticoagulation with Eliquis, coronary artery disease status 

post bypass graft, cardiomyopathy status post AICD placement, history of nephrec

guy and unilateral kidney, CK D stage III, hypertension, hyperlipidemia, GERD, 

diabetes type 2 and history of bipolar/depression came to ER with complaints of 

worsening shortness of breath and exertional dyspnea and bilateral lower 

extremity increases swelling for the past 2 weeks.  Patient does have minimal 

cough without any sputum production.  No sputum production.  No complaints of 

fever or chills.  Denied any sick contacts at home.  No recent travel.





Admitted with CHF exacerbation, atrial fibrillation with a rapid ventricular 

rate.  Started on IV Lasixdrip and IV amiodarone.then IV dobutamine was added.  

Switch to IV Lasix bolus.


Today-.  Changed by cardiology from IV Lasix to by mouth Lasix.  Tired.  Blood 

pressure in 80 systolic by standing.





Review of systems: Was done for constitutional, cardiovascular, GI, pulmonary. 

relevant finding as above





Active Medications





Amiodarone HCl (Cordarone)  200 mg PO DAILY [facility]


Apixaban (Eliquis)  2.5 mg PO BID [facility]


   Last Admin: 04/15/20 11:11 Dose:  2.5 mg


   Documented by: 


Aspirin (Aspirin)  81 mg PO DAILY [facility]


   Last Admin: 04/15/20 11:11 Dose:  81 mg


   Documented by: 


Atorvastatin Calcium (Lipitor)  80 mg PO HS [facility]


   Last Admin: 04/14/20 21:05 Dose:  80 mg


   Documented by: 


Carvedilol (Coreg)  12.5 mg PO BID-W/MEALS [facility]


   Last Admin: 04/15/20 16:27 Dose:  Not Given


   Documented by: 


Cholecalciferol (Vitamin D3 (25 Mcg = 1000 Iu))  2,000 unit PO DAILY [facility]


   Last Admin: 04/15/20 11:11 Dose:  2,000 unit


   Documented by: 


Cyanocobalamin (Vitamin B-12)  1,000 mcg PO DAILY [facility]


   Last Admin: 04/15/20 11:11 Dose:  1,000 mcg


   Documented by: 


Furosemide (Lasix)  40 mg PO BID@0900,1600 [facility]


   Last Admin: 04/15/20 15:45 Dose:  Not Given


   Documented by: 


Guaifenesin (Mucinex)  1,200 mg PO Q12HR [facility]


   Last Admin: 04/15/20 11:11 Dose:  1,200 mg


   Documented by: 


Sodium Chloride (Saline 0.9%)  1,000 mls @ 75 mls/hr IV .[id number] [facility]


   Stop: 04/15/20 22:30


   Last Admin: 04/15/20 11:19 Dose:  75 mls/hr


   Documented by: 


Sodium Chloride (Saline 0.9%)  1,000 mls @ 100 mls/hr IV .Q10H [facility]


   Stop: 04/15/20 22:30


   Last Admin: 04/15/20 16:25 Dose:  100 mls/hr


   Documented by: 


Sodium Chloride (Saline 0.9%)  1,000 mls @ 50 mls/hr IV .Q20H [facility]


Insulin Aspart (Novolog)  0 unit SQ [facility]; Protocol


   Last Admin: 04/15/20 16:29 Dose:  7 unit


   Documented by: 


Insulin Detemir (Levemir)  40 unit SQ [facility]


   Last Admin: 04/14/20 21:06 Dose:  40 unit


   Documented by: 


Insulin [facility] (Levemir)  [facility]


   Last Admin: 04/15/20 11:12 Dose:  10 unit


   Documented by: 


Lamotrigine (Lamictal)  100 mg PO [facility]


   Last Admin: 04/14/20 21:05 Dose:  100 mg


   Documented by: 


Melatonin (Melatonin)  3 mg PO [facility]


   Last Admin: 04/14/20 21:05 Dose:  3 mg


   Documented by: 


Metolazone (Zaroxolyn)  2.5 mg PO DAILY [facility]


Midodrine (Proamatine)  10 mg PO AC-TID [facility]


   Last Admin: 04/15/20 16:29 Dose:  10 mg


   Documented by: 


Ondansetron HCl (Zofran)  4 mg IVP Q6HR PRN


   PRN Reason: Nausea And Vomiting


   Last Admin: 04/10/20 16:43 Dose:  4 mg


   Documented by: 


Potassium Chloride (K-Dur 20)  20 meq PO DAILY [facility]


Venlafaxine HCl (Effexor)  75 mg PO BID [facility]


   Last Admin: 04/15/20 11:12 Dose:  75 mg


   Documented by: 











On examination:


VITAL SIGNS: 97.6, 76, 16, 81/49, 99% on room air


GENERAL APPEARANCE: up in recliner, tired


HEENT: Normal external appearance of nose and ear.  Oral cavity normal


EYES: Pupils equal. Conjunctiva normal. 


NECK: JVD raised. Mass not palpable. 


RESPIRATORY: Respiratory effort increased.  Decreased breath sounds.


CARDIOVASCULAR: Heart sounds irregular, edema decreased


ABDOMEN: Soft. Liver and spleen not palpable. No tenderness. No mass palpable.  

Cortes catheter-hematuria cleared


PSYCHIATRY: Alert and oriented x3. Mood and affect tired





INVESTIGATIONS, reviewed in the clinical context:


[id number], creatinine 1.78





Previous testing:


White count 8.2 hemoglobin 10.6 potassium 5.2 bun 52 creatinine 2.0 troponin I 

0.0-3


EKG-possible A. fib


Chest x-ray film personally reviewed by me-pulmonary edema cardiomegaly


BUN/creatinine on February 22-1.58





Assessment:


Acute on chronic congestive heart failure exacerbation from systolic 

dysfunction, EF less than 30% per cardiology, improved


-Hypotension multifactorial


Ischemic cardiomyopathy


Persistent atrial fibrillation on anticoagulation with Eliquis, rate better 

controlled


Acute kidney injury secondary to cardiorenal syndrome, 


-chronic kidney disease stage III.


Mild hyperkalemia secondary to acute kidney injury


Lactic acidosis-type II


History of nephrectomy due to renal cancer and bladder cancer history


Diabetes type 2.  Insulin-dependent and also on metformin and glipizide.


Hyperlipidemia


Coronary artery disease with history of multiple stents placement


Bipolar disorder and depression


GERD


Obesity with BMI 34.9


-





Plan:


Continue patient switched over to oral diuretic.  Was dizzy on standing.  

Cardiology did order some IV fluids.  Hold all diuretics for 24 hours.  

Hopefully discharge to [facility]CF tomorrow.

## 2020-04-15 NOTE — P.PN
Subjective


Progress Note Date: 04/15/20


Principal diagnosis: 





This is an 80-year-old male patient of Dr. Duckworth, with known history of severe 

ischemic cardiomyopathy, and ejection fraction of less than 20%, status post AI

CD placement, hypertension, diabetes mellitus, hyperlipidemia, persistent atrial

fibrillation, coronary artery disease status post bypass grafting, ex-smoker, 

chronic congestive heart failure with systolic dysfunction, history of kidney 

and bladder cancer status post chemo and radiation, who presented to the 

emergency department on 04/05/2020 with complaints of worsening exertional 

dyspnea, and increased swelling in his lower extremities.  He denied any chest 

pain, palpitations, no fever or chills, no nausea vomiting, no diaphoresis, no 

cough or phlegm production.  Admission chest x-ray showed creased vascular 

congestion, and pulmonary edema and a small left pleural effusion.  Admission 

blood work was negative for any leukocytosis, white blood cell count was 8.2, 

hemoglobin is 10.6, mild lymphopenia, INR is 1.3, potassium is 5.2, the rest of 

the electrolytes were within normal limits, BUN is 52 creatinine is 2.0, patient

does have history of chronic kidney disease stage III, this showed signs of 

infection although patient denies any symptoms, troponins were 0.031, 0.026, 

0.031, proBNP was 2710.  We were asked to see the patient in consultation.  

Patient has been started on infusion of Lasix, early infusing at a rate of 10 mg

per hour, and dobutamine drip infusing at 5 mics per kilo per minute.  A is on 

oral Eliquis for history of chronic atrial fibrillation he remains in A. fib 

with a controlled rate of 93 bpm.  He is sitting up in the recliner, in no acute

distress, on 4 L of oxygen with a pulse ox of 95-98%, he is been afebrile, he 

has not diuresed significantly despite the Lasix infusion, and today's blood 

work shows worsening of his renal profile would be on up to 79 and creatinine is

up to 2.7.  Nephrology is following. 





04/09/2020, the patient was seen and examined at his bedside on the cardiac 

stepdown unit with Dr. Trevino.  He reports he is slightly better today, denies 

any complaints of pain or shortness of breath just sitting.  He is sitting up to

the bedside chair and is in no acute distress.  Lasix drip at 10 mg per hour and

dopamine drip at 2.5 mcg/kg/m continue infusing.  He remained hemodynamically 

stable and currently has oxygen saturations are 96% on 4 L nasal cannula.  

Repeat chest x-ray was completed this morning which demonstrated cardiomegaly 

with mild prominence of pulmonary vasculature marking with diffuse increasing 

lung field markings compatible with congestive heart failure.  He has been 

afebrile the last 24 hours.  Lab results today show a BUN 78, creatinine 2.48 

and a BNP level 1740.  A urine culture was sent yesterday with results pending.





On 04/10/2020 the patient was seen and examined with Dr. rTevino.  Denies pain, 

shortness of breath.  Sitting up in bed and appears comfortable.  Remains 

afebrile.  Hematologically stable.  Currently on 4 L nasal cannula with oxygen 

saturation 97%.  Continues on dobutamine at 2.5 mcg and Lasix at 10 mg an hour. 

Chest x-ray seems to be improving a bit, still has atelectasis present.  BUN 84,

creatinine 2.37.  Urine culture negative.  Fluid balance -4 L for the last 48 

hours.





the patient is seen today 04/11/2020 in follow-up on the selective care unit.  

He is currently awake and alert in no acute distress.  Denies any worsening 

shortness of breath, cough or congestion.  He is still quite edematous 

especially the lower extremities.he had remained in a negative balance.currently

maintaining O2 saturations in the high 90s on 3 L/m per nasal cannula.  He is 

afebrile.  Hemodynamically stable.urine culture reveals no growth.sodium 141.  

Potassium 3.8.  Bicarb 23.  Creatinine 2.52.  He is continued on dobutamine at 

2.5 mg/kg/m.  Lasix drip at 5 mg per hour.  Antibiotics in the form of 

ceftriaxone.  Anticoagulated with Eliquis.





The patient is seen today 04/12/2020 in follow-up on the selective care unit.  

Sitting up in a chair at the bedside.  Awake and alert in no acute distress.  

Breathing a bit easier today compared to yesterday.  Maintaining O2 saturations 

at 99% on 2 L/m per nasal cannula.  He's been afebrile.  Urine culture revealed 

no growth.  Sodium 141.  Potassium 3.8.  Creatinine 2.23.  Dobutamine has been 

discontinued.  He is on a Lasix drip at 5 mg per hour.  Currently in a positive 

balance.  Weight is down 2 kg.  Cortes catheter remains in place. Antibiotics in 

the form of ceftriaxone.  Anticoagulated with Eliquis.





On 04/13/2020 patient seen in follow-up on selective care unit, he is awake and 

alert, in no acute distress, sitting up in the recliner, he is currently on 2 L 

of oxygen, his pulse ox 96%, his been afebrile, hemodynamically patient is 

stable, he is currently off dobutamine and Lasix drip, his breathing has 

significantly improved, lower extremity edema improved.  Today's chest x-ray 

showed extensive interstitial and alveolar infiltrates with some improvement.  

There were also small pleural effusions no new labs today, patient remains on 

diuretics at 60 mg every 12 hours, nephrology is managing, he remains on 

Rocephin for possible urinary tract infection, his been afebrile, no urinary 

symptoms.





On 04/14/2020 patient seen in follow-up on [facility] care unit, he is awake and 

alert, in no acute distress, sitting up in the recliner.  Oxygen saturations are

currently 99% on 2 L nasal cannula.  He remains hemodynamically stable and is 

currently in no acute distress.  He's been afebrile the last 24 hours.  

Continues to remain off the dobutamine and Lasix drip and is on 0.9% normal 

saline at 20 mL per hour.  There was no repeat chest x-ray today.  The patient 

reports overall he is feeling better on a daily basis and his edema to his 

bilateral lower extremity is improving.  He remains on Lasix 60 mg IV every 12 

hours and Zaroxolyn 5 mg by mouth daily which is being managed by nephrology.  

He remains on Rocephin for suspected UTI, his urine culture results are 

negative.  He remains on remote telemetry which is showing atrial fibrillation 

heart rate 70.





On 04/14/2020 patient seen in follow-up on [facility] care unit, he is awake and 

alert, in no acute distress, sitting up in the recliner with his feet elevated. 

The patient reports he is feeling improved each day.  Denies any complaints of 

shortness of breath sitting there and the chair.  Oxygen saturations are 98% on 

2 L nasal cannula.  0.9% normal saline was infusing at 20 mL per hour.  He did 

not have a repeat chest x-ray today.  He remains hemodynamically stable and has 

been afebrile the last 24 hours.  He continues to receive Lasix 40 mg by mouth 

twice a day and Zaroxolyn 2.5 mg by mouth daily which is being managed by 

nephrology and cardiology.  Lab results today show a , creatinine 1.78.  

His antibiotics were discontinued yesterday as his urine culture results were 

negative.  He remains on remote telemetry which is showing atrial fibrillation 

heart rate 90.





Objective





- Vital Signs


Vital signs: 


                                   Vital Signs











Temp  97.6 F   04/15/20 11:19


 


Pulse  76   04/15/20 11:19


 


Resp  16   04/15/20 11:46


 


BP  81/49   04/15/20 11:19


 


Pulse Ox  99   04/15/20 11:19








                                 Intake & Output











 04/14/20 04/15/20 04/15/20





 18:59 06:59 18:59


 


Intake Total 240  


 


Output Total 901 2002 


 


Balance -661 -2002 


 


Weight  114.2 kg 


 


Intake:   


 


  Oral 240  


 


Output:   


 


  Urine 900 2000 


 


  Stool 1 2 


 


Other:   


 


  Voiding Method Indwelling Catheter Indwelling Catheter Indwelling Catheter














- Exam





This is an 80-year-old gentleman who is sitting up to the bedside chair.  He is 

alert and oriented 3.  Oxygen saturations are 98% on 2 L nasal cannula and he 

is in no acute distress.





- Constitutional


General appearance: Present: cooperative, no acute distress, obese





- EENT


Eyes: Present: PERRLA.  Absent: scleral icterus


ENT: Present: hearing grossly normal, normal oropharynx





- Neck


Details: 





Neck is supple, no JVD.


Neck: Absent: lymphadenopathy





- Respiratory


Details: 





Lung sounds with few scattered crackles throughout.  Respirations are 

symmetrical and nonlabored.








- Cardiovascular


Details: 





Irregular rhythm and controlled rate.  S1 and S2 present, negative for S3, 

gallop or murmur.








- Gastrointestinal


Gastrointestinal Comment(s): 





Abdomen is soft, nontender and nondistended.  Active bowel sounds present in all

4 abdominal quadrants.  No guarding or rigidity.


General gastrointestinal: Absent: organomegaly





- Integumentary


Integumentary Comment(s): 





Skin is warm and dry.  No clubbing or cyanosis is present.  +1 to +2 edema to 

his bilateral lower extremities.


Integumentary: Absent: rash





- Neurologic


Neurologic: Present: CNII-XII intact





- Musculoskeletal


Musculoskeletal: Present: generalized weakness, strength equal bilaterally





- Psychiatric


Psychiatric: Present: A&O x's 3, appropriate affect, intact judgment & insight





- Allied health notes


Allied health notes reviewed: nursing





- Labs


CBC & Chem 7: 


                                 04/06/20 06:24





                                 04/15/20 05:31


Labs: 


                  Abnormal Lab Results - Last 24 Hours (Table)











  04/14/20 04/15/20 04/15/20 Range/Units





  20:35 05:31 06:04 


 


BUN   109 H*   (9-20)  mg/dL


 


Creatinine   1.78 H   (0.66-1.25)  mg/dL


 


Glucose   226 H   (74-99)  mg/dL


 


POC Glucose (mg/dL)  262 H   244 H  (75-99)  mg/dL














  04/15/20 04/15/20 Range/Units





  10:28 11:28 


 


BUN    (9-20)  mg/dL


 


Creatinine    (0.66-1.25)  mg/dL


 


Glucose    (74-99)  mg/dL


 


POC Glucose (mg/dL)  249 H  274 H  (75-99)  mg/dL














Assessment and Plan


Assessment: 





1.  Acute exacerbation of chronic congestive heart failure with systolic 

dysfunction





2.  Acute kidney injury likely related to cardiorenal syndrome, diuretic 

therapy, nephrology is following





3.  Acute urinary tract infection, placed on ceftriaxone, urine culture negative





4.  Ischemic cardiomyopathy, with EF of less than 20%, status post AICD impl

antation





5.  Diabetes mellitus





6.  History of chronic persistent atrial fibrillation on oral anticoagulation 

Eliquis





7.  History of chronic kidney disease stage III at baseline, history of right hy

dronephrosis and patient follows with urology





8.  Coronary artery disease status post bypass grafting





9.  Hypertension





10.  Hyperlipidemia





11.  History of bladder cancer


Plan: 





1.  The patient was seen and examined at his bedside with Dr. Quinn.


2.  Repeat chest x-ray in a.m. 4/16/20.


3.  Wean oxygen as tolerated to keep sats greater than or equal to 92%. 

Currently on 2 L nasal cannula, oxygen saturation is 98%


4.  Atrial fibrillation and CHF management per cardiology recommendations.


5.  Continue to monitor daily weights.


6.  Lasix and Zaroxolyn management per nephrology recommendations.


7.  We will continue to follow patient and more recommendations to follow based 

on patient's clinical course.








I, the cosigning physician, performed a history and physical examination on the 

patient.  Lungs few scattered crackles throughout, he is maintaining O2 

saturation in the 90s on 2 L nasal cannula.  I discussed the plan and assessment

of care with Nj Gonzalez NP.  I attest that the above note is dictated by him.


Time with Patient: Less than 30

## 2020-04-16 LAB
ANION GAP SERPL CALC-SCNC: 10 MMOL/L
BUN SERPL-SCNC: 122 MG/DL (ref 9–20)
CALCIUM SPEC-MCNC: 8.6 MG/DL (ref 8.4–10.2)
CHLORIDE SERPL-SCNC: 102 MMOL/L (ref 98–107)
CO2 SERPL-SCNC: 28 MMOL/L (ref 22–30)
ERYTHROCYTE [DISTWIDTH] IN BLOOD BY AUTOMATED COUNT: 1.81 M/UL (ref 4.3–5.9)
ERYTHROCYTE [DISTWIDTH] IN BLOOD BY AUTOMATED COUNT: 2.37 M/UL (ref 4.3–5.9)
ERYTHROCYTE [DISTWIDTH] IN BLOOD: 19.5 % (ref 11.5–15.5)
ERYTHROCYTE [DISTWIDTH] IN BLOOD: 19.7 % (ref 11.5–15.5)
GLUCOSE BLD-MCNC: 180 MG/DL (ref 75–99)
GLUCOSE BLD-MCNC: 184 MG/DL (ref 75–99)
GLUCOSE BLD-MCNC: 210 MG/DL (ref 75–99)
GLUCOSE BLD-MCNC: 210 MG/DL (ref 75–99)
GLUCOSE BLD-MCNC: 268 MG/DL (ref 75–99)
GLUCOSE SERPL-MCNC: 166 MG/DL (ref 74–99)
HCT VFR BLD AUTO: 14.2 % (ref 39–53)
HCT VFR BLD AUTO: 19.2 % (ref 39–53)
HGB BLD-MCNC: 4.2 GM/DL (ref 13–17.5)
HGB BLD-MCNC: 6.1 GM/DL (ref 13–17.5)
MAGNESIUM SPEC-SCNC: 2 MG/DL (ref 1.6–2.3)
MCH RBC QN AUTO: 22.9 PG (ref 25–35)
MCH RBC QN AUTO: 25.7 PG (ref 25–35)
MCHC RBC AUTO-ENTMCNC: 29.3 G/DL (ref 31–37)
MCHC RBC AUTO-ENTMCNC: 31.8 G/DL (ref 31–37)
MCV RBC AUTO: 78.2 FL (ref 80–100)
MCV RBC AUTO: 81 FL (ref 80–100)
PLATELET # BLD AUTO: 239 K/UL (ref 150–450)
PLATELET # BLD AUTO: 241 K/UL (ref 150–450)
POTASSIUM SERPL-SCNC: 3.5 MMOL/L (ref 3.5–5.1)
SODIUM SERPL-SCNC: 140 MMOL/L (ref 137–145)
WBC # BLD AUTO: 12.7 K/UL (ref 3.8–10.6)
WBC # BLD AUTO: 19.1 K/UL (ref 3.8–10.6)

## 2020-04-16 RX ADMIN — PANTOPRAZOLE SODIUM SCH MG: 40 TABLET, DELAYED RELEASE ORAL at 09:44

## 2020-04-16 RX ADMIN — INSULIN ASPART SCH UNIT: 100 INJECTION, SOLUTION INTRAVENOUS; SUBCUTANEOUS at 17:09

## 2020-04-16 RX ADMIN — MIDODRINE HYDROCHLORIDE SCH MG: 5 TABLET ORAL at 11:18

## 2020-04-16 RX ADMIN — VENLAFAXINE HYDROCHLORIDE SCH MG: 75 TABLET ORAL at 09:45

## 2020-04-16 RX ADMIN — POTASSIUM CHLORIDE SCH MEQ: 20 TABLET, EXTENDED RELEASE ORAL at 09:44

## 2020-04-16 RX ADMIN — INSULIN ASPART SCH UNIT: 100 INJECTION, SOLUTION INTRAVENOUS; SUBCUTANEOUS at 07:04

## 2020-04-16 RX ADMIN — VENLAFAXINE HYDROCHLORIDE SCH MG: 75 TABLET ORAL at 21:19

## 2020-04-16 RX ADMIN — MIDODRINE HYDROCHLORIDE SCH MG: 5 TABLET ORAL at 17:09

## 2020-04-16 RX ADMIN — ATORVASTATIN CALCIUM SCH MG: 80 TABLET, FILM COATED ORAL at 21:14

## 2020-04-16 RX ADMIN — INSULIN DETEMIR SCH UNIT: 100 INJECTION, SOLUTION SUBCUTANEOUS at 09:48

## 2020-04-16 RX ADMIN — Medication SCH MG: at 21:14

## 2020-04-16 RX ADMIN — CYANOCOBALAMIN TAB 500 MCG SCH MCG: 500 TAB at 09:44

## 2020-04-16 RX ADMIN — INSULIN ASPART SCH UNIT: 100 INJECTION, SOLUTION INTRAVENOUS; SUBCUTANEOUS at 21:15

## 2020-04-16 RX ADMIN — INSULIN DETEMIR SCH UNIT: 100 INJECTION, SOLUTION SUBCUTANEOUS at 21:14

## 2020-04-16 RX ADMIN — Medication SCH UNIT: at 09:44

## 2020-04-16 RX ADMIN — MIDODRINE HYDROCHLORIDE SCH MG: 5 TABLET ORAL at 07:04

## 2020-04-16 RX ADMIN — CARVEDILOL SCH MG: 12.5 TABLET, FILM COATED ORAL at 07:04

## 2020-04-16 RX ADMIN — INSULIN ASPART SCH UNIT: 100 INJECTION, SOLUTION INTRAVENOUS; SUBCUTANEOUS at 11:18

## 2020-04-16 NOTE — P.PN
Progress Note - Text


Progress Note Date: 04/16/20





Chief Complaint: Shortness of breath





Patient is a 80-year-old patient of Dr. Duckworth.   history of chronic atrial 

fibrillation on anticoagulation with Eliquis, coronary artery disease status 

post bypass graft, cardiomyopathy status post AICD placement, history of nephrec

guy and unilateral kidney, CK D stage III, hypertension, hyperlipidemia, GERD, 

diabetes type 2 and history of bipolar/depression came to ER with complaints of 

worsening shortness of breath and exertional dyspnea and bilateral lower 

extremity increases swelling for the past 2 weeks.  Patient does have minimal 

cough without any sputum production.  No sputum production.  No complaints of 

fever or chills.  Denied any sick contacts at home.  No recent travel.





Admitted with CHF exacerbation, atrial fibrillation with a rapid ventricular 

rate.  Started on IV Lasixdrip and IV amiodarone.then IV dobutamine was added.  

Switch to IV Lasix bolus.


Today-.  Patient's blood pressure had been running low.  This morning again 

hypotensive.  Lasix was held.  Cardiology ordered a hemoglobin came back at 4.2.

 2 units of blood was ordered.  Patient was moved to the ICU.  Tired. 





Review of systems: Patient tired to give any further history





Active Medications





Atorvastatin Calcium (Lipitor)  80 mg PO [facility]


   Last Admin: 04/15/20 20:32 Dose:  80 mg


   Documented by: 


Cholecalciferol (Vitamin D3 (25 Mcg = 1000 Iu))  2,000 unit PO DAILY [facility]


   Last Admin: 04/16/20 09:44 Dose:  2,000 unit


   Documented by: 


Cyanocobalamin (Vitamin B-12)  1,000 mcg PO DAILY [facility]


   Last Admin: 04/16/20 09:44 Dose:  1,000 mcg


   Documented by: 


Insulin Aspart (Novolog)  0 unit SQ ACHS [facility]; Protocol


   Last Admin: 04/16/20 11:18 Dose:  3 unit


   Documented by: 


Insulin Detemir (Levemir)  40 unit SQ [facility]


   Last Admin: 04/15/20 20:35 Dose:  40 unit


   Documented by: 


Insulin [facility]emir (Levemir)  [facility]


   Last Admin: 04/16/20 09:48 Dose:  10 unit


   Documented by: 


Lamotrigine (Lamictal)  100 mg PO [facility]


   Last Admin: 04/15/20 20:36 Dose:  100 mg


   Documented by: 


Melatonin (Melatonin)  3 mg PO [facility]


   Last Admin: 04/15/20 20:36 Dose:  3 mg


   Documented by: 


Midodrine (Proamatine)  10 mg PO AC-TID [facility]


   Last Admin: 04/16/20 11:18 Dose:  10 mg


   Documented by: 


Ondansetron HCl (Zofran)  4 mg IVP Q6HR PRN


   PRN Reason: Nausea And Vomiting


   Last Admin: 04/10/20 16:43 Dose:  4 mg


   Documented by: 


Pantoprazole Sodium (Protonix)  40 mg PO AC-BRKFST [facility]


   Last Admin: 04/16/20 09:44 Dose:  40 mg


   Documented by: 


Potassium Chloride (K-Dur 20)  20 meq PO DAILY [facility]


   Last Admin: 04/16/20 09:44 Dose:  20 meq


   Documented by: 


Venlafaxine HCl (Effexor)  75 mg PO BID [facility]


   Last Admin: 04/16/20 09:45 Dose:  75 mg


   Documented by: 











On examination:


VITAL SIGNS: 97.5, 114, 26, 72/32, 100%


GENERAL APPEARANCE: Tired, lethargic


HEENT: Normal external appearance of nose and ear.  Oral cavity dry


EYES: Pupils equal. Conjunctiva pale. 


NECK: JVD unable to assess Mass not palpable. 


RESPIRATORY: Respiratory effort increased.  Decreased breath sounds.


CARDIOVASCULAR: Heart sounds irregular, edema decreased


ABDOMEN: Soft. Liver and spleen not palpable. No tenderness. No mass palpable.  

Cortes catheter-hematuria cleared


PSYCHIATRY: Lethargic, bit delirious





INVESTIGATIONS, reviewed in the clinical context:


White count 12.7 hemoglobin 4.2 bun 122 creatinine 1.83





Previous testing:


White count 8.2 hemoglobin 10.6 potassium 5.2 bun 52 creatinine 2.0 troponin I 

0.0-3


EKG-possible A. fib


Chest x-ray film personally reviewed by me-pulmonary edema cardiomegaly


BUN/creatinine on February 22-1.58





Assessment:


Acute on chronic congestive heart failure exacerbation from systolic 

dysfunction, EF less than 30% 


-Acute blood loss anemia-source undetermined


-Hypotension multifactorial


Ischemic cardiomyopathy


Persistent atrial fibrillation on anticoagulation with Eliquis, rate better 

controlled


Acute kidney injury secondary to cardiorenal syndrome, 


-chronic kidney disease stage III.


Mild hyperkalemia secondary to acute kidney injury


Lactic acidosis-type II


History of nephrectomy due to renal cancer and bladder cancer history


Diabetes type 2.  Insulin-dependent and also on metformin and glipizide.


Hyperlipidemia


Coronary artery disease with history of multiple stents placement


Bipolar disorder and depression


GERD


Obesity with BMI 34.9


-





Plan:


Patient moved to the ICU earlier today.  2 units of blood were ordered.  Eliquis

was held.  Also beta blocker and amiodarone was held.  Prognosis guarded.  We 

will consult GI.

## 2020-04-16 NOTE — P.PN
Subjective


Progress Note Date: 04/16/20





This is a pleasant 80-year-old  gentleman who follows regularly with 

Dr. Fleming in the office.  He has a known history of coronary artery 

disease with prior bypass surgery, persistent atrial fibrillation, ischemic 

cardiomyopathy with prior AICD implant, this was performed in February 2019, h

istory of diabetes, hypertension, hyperlipidemia.  Patient also has history of 

kidney and bladder cancer with prior chemo and radiation.  Presents to the 

hospital with at least one week duration of progressively worsening shortness of

breath.  He states that he also noticed a significant increase in his bilateral 

lower extremity edema.  The patient denies any fever or chills at home.  Chest 

x-ray on presentation here showed congestive cardiac failure.  EKG showed atrial

fibrillation with a rapid ventricular response.  Blood pressure 106/70 with a 

heart rate of 120, afebrile, 98% on room air.  White blood cell count is normal,

hemoglobin 9.9, platelet count 236.  Sodium 139, potassium 4.6, chloride 106, 

CO2 20, BUN 54, creatinine 1.8.  Plasma lactic acid 2.3 on admission, 1.4 this 

morning, BNP level 2710.  Troponin 0.02, 0.03, 0.03.  Patient was initiated on 

IV Lasix in the emergency room.  He has been overall diuresing quite well and 

his weight is down today.  Patient continues to be quite short of breath, 

continues to have lower extremity edema.  The patient is currently on Eliquis 5 

mg one tablet by mouth twice a day, he is 80 years of age and his creatinine on 

admission was 2, we will monitor that creatinine closely, if it remains in the 2

range we will consider decreasing the Eliquis at 2-1/2 mg one tablet by mouth 

twice a day.  We will continue the patient on IV Lasix.  We will increase the 

dose of Coreg, patient is not currently on an ACE inhibitor or an angiotensin 

blocker because of the creatinine.





04/07/2020


Patient was seen and examined this morning, he continues to feel significantly 

short of breath, states he does not notice much improvement from his admission 

here.  Labs are pending.  Blood pressure 95/50, heart rate 108.





04/08/2020


Patient seen and examined this morning, states that he feels more short of 

breath today than he did on arrival here.  His urine output is minimal.  

Continues to be hypotensive.  Sodium 135, potassium 4.9, BUN 79, creatinine 2.7.

 We will start the patient on dobutamine drip at 2.5 g, after 10 minutes we'll 

increase to 5 mics.  Obtain a repeat stat chest x-ray.  This was discussed by 

Dr. KARINE Urias with nephrology this morning as well.  





04/09/2020





Patient seen and examined this morning, sitting up in the chair at bedside.  He 

states he does not feel a lot different than yesterday, maybe mildly better.  

This is the first day the patient has acknowledged to feeling some mild 

improvement in his breathing.  He diuresed much better through the night last 

night after being initiated on the dobutamine.  Blood pressure 92/40 with a 

heart rate in the 70s.  Laboratory data from today, sodium 139, potassium 4.0, 

BUN 78, creatinine 2.4.








04/14/2020


Patient seen and examined this morning, still complains of feeling short of 

breath, but does admit to feeling mildly better overall.  He diuresed over 4 L 

in the past 24 hours.  Sodium 139, potassium 3.2, BUN 86, creatinine 2.0.  We 

will continue the patient at this time on IV Lasix.  Repeat lytes BUN and 

creatinine tomorrow.  Replace potassium.








04/15/2020


Patient seen and examined this morning sitting up in his chair at bedside.  

Overall he does state that his breathing is improving every day.  Blood pressure

104/50 with a heart rate in the 70s.  He continued to diurese well through the 

night last night.  Laboratory data from today, sodium 138, potassium 3.6, BUN 

109 and creatinine 1.7








04/16/2020


Patient was seen and examined this morning, quite hypotensive in the early 

morning hours.  Breathing otherwise stable.  Had good urine output through the 

night last night.  He was given a bolus of IV fluids by Dr. Urias.  His 

laboratory data from today, sodium 140, potassium 3.5, , creatinine 1.8. 

Patient did not have a CBC.  We will requested a stat CBC be performed and the 

hemoglobin came back at 4.2 with a hematocrit of 14.2 and a platelet count of 

241.  Patient was ordered 2 units of packed red blood cells, we will discontinue

the Eliquis, discontinue the patient's aspirin, because of the hypotension we 

will also stop the amiodarone and Coreg this morning.  Was recommended by Dr. KARINE Urias that the patient be urgently transferred to the intensive care unit.  

The wife has been notified of the patient's status.








Objective





- Vital Signs


Vital signs: 


                                   Vital Signs











Temp  97.7 F   04/16/20 08:00


 


Pulse  121 H  04/16/20 08:00


 


Resp  18   04/16/20 08:05


 


BP  89/56   04/16/20 09:01


 


Pulse Ox  100   04/16/20 08:00








                                 Intake & Output











 04/15/20 04/16/20 04/16/20





 18:59 06:59 18:59


 


Intake Total 360  


 


Output Total  2 500


 


Balance 360 -2 -500


 


Weight  118 kg 


 


Intake:   


 


  Oral 360  


 


Output:   


 


  Urine   500


 


    Uretheral (Cortes)   500


 


  Stool  2 


 


Other:   


 


  Voiding Method Indwelling Catheter Indwelling Catheter Indwelling Catheter














- Exam





PHYSICAL EXAMINATION: 





GENERAL: 80-year-old  gentleman in no acute distress at the time of my 

examination





HEENT: Head is atraumatic, normocephalic.  Pupils equal, round.  Sclera 

anicteric. Conjunctiva are clear.  Mucous membranes of the mouth are moist.  

Neck is supple.  There is  elevated jugular venous pressure.  No carotid  bruit 

is heard.





HEART EXAMINATION: Heart S1-S2 irregularly irregular a systolic murmur is heard





CHEST EXAMINATION: Lungs show improvement in air entry bilaterally 





ABDOMEN:  Soft, obese, nontender. Bowel sounds are heard. No organomegaly noted.


 


EXTREMITIES: 2+ peripheral pulses with trace - 1+  evidence of peripheral edema 

and no calf tenderness noted.





NEUROLOGIC patient is awake, alert and oriented 3 


 


.





- Labs


CBC & Chem 7: 


                                 04/16/20 05:55





                                 04/16/20 05:55


Labs: 


                  Abnormal Lab Results - Last 24 Hours (Table)











  04/15/20 04/15/20 04/15/20 Range/Units





  11:28 16:16 20:11 


 


WBC     (3.8-10.6)  k/uL


 


RBC     (4.30-5.90)  m/uL


 


Hgb     (13.0-17.5)  gm/dL


 


Hct     (39.0-53.0)  %


 


MCV     (80.0-100.0)  fL


 


MCH     (25.0-35.0)  pg


 


MCHC     (31.0-37.0)  g/dL


 


RDW     (11.5-15.5)  %


 


BUN     (9-20)  mg/dL


 


Creatinine     (0.66-1.25)  mg/dL


 


Glucose     (74-99)  mg/dL


 


POC Glucose (mg/dL)  274 H  291 H  294 H  (75-99)  mg/dL


 


Plasma Lactic Acid Jeanmarie     (0.7-2.0)  mmol/L


 


Crossmatch     














  04/16/20 04/16/20 04/16/20 Range/Units





  05:55 05:55 06:03 


 


WBC   12.7 H   (3.8-10.6)  k/uL


 


RBC   1.81 L   (4.30-5.90)  m/uL


 


Hgb   4.2 L* D   (13.0-17.5)  gm/dL


 


Hct   14.2 L*   (39.0-53.0)  %


 


MCV   78.2 L   (80.0-100.0)  fL


 


MCH   22.9 L   (25.0-35.0)  pg


 


MCHC   29.3 L   (31.0-37.0)  g/dL


 


RDW   19.7 H   (11.5-15.5)  %


 


BUN  122 H*    (9-20)  mg/dL


 


Creatinine  1.83 H    (0.66-1.25)  mg/dL


 


Glucose  166 H    (74-99)  mg/dL


 


POC Glucose (mg/dL)    210 H  (75-99)  mg/dL


 


Plasma Lactic Acid Jeanmarie     (0.7-2.0)  mmol/L


 


Crossmatch     














  04/16/20 04/16/20 04/16/20 Range/Units





  09:12 09:40 09:48 


 


WBC     (3.8-10.6)  k/uL


 


RBC     (4.30-5.90)  m/uL


 


Hgb     (13.0-17.5)  gm/dL


 


Hct     (39.0-53.0)  %


 


MCV     (80.0-100.0)  fL


 


MCH     (25.0-35.0)  pg


 


MCHC     (31.0-37.0)  g/dL


 


RDW     (11.5-15.5)  %


 


BUN     (9-20)  mg/dL


 


Creatinine     (0.66-1.25)  mg/dL


 


Glucose     (74-99)  mg/dL


 


POC Glucose (mg/dL)    184 H  (75-99)  mg/dL


 


Plasma Lactic Acid Jeanmarie   5.2 H*   (0.7-2.0)  mmol/L


 


Crossmatch  See Detail    














  04/16/20 Range/Units





  10:32 


 


WBC   (3.8-10.6)  k/uL


 


RBC   (4.30-5.90)  m/uL


 


Hgb   (13.0-17.5)  gm/dL


 


Hct   (39.0-53.0)  %


 


MCV   (80.0-100.0)  fL


 


MCH   (25.0-35.0)  pg


 


MCHC   (31.0-37.0)  g/dL


 


RDW   (11.5-15.5)  %


 


BUN   (9-20)  mg/dL


 


Creatinine   (0.66-1.25)  mg/dL


 


Glucose   (74-99)  mg/dL


 


POC Glucose (mg/dL)  180 H  (75-99)  mg/dL


 


Plasma Lactic Acid Jeanmarie   (0.7-2.0)  mmol/L


 


Crossmatch   














Assessment and Plan


Plan: 


Assessment and plan


#1 systolic congestive heart failure acute on chronic


#2 coronary artery disease with prior bypass surgery


#3 chronic persistent atrial fibrillation


#4 hypertension


#5 diabetes


#6 hyperlipidemia


#7 ischemic cardiomyopathy with prior AICD


#8 acute on chronic renal insufficiency


#9 acute anemia





Plan


Stat CBC showed a hemoglobin of 4.2, patient will receive 2 unit of packed red 

blood cells, he is also receiving IV fluids for hypotension this morning.  We 

will discontinue the Eliquis and the baby aspirin, hold the Coreg and amiodarone

at this time.  Patient will also be transferred to the intensive care unit and 

managed by the intensivist.  We will continue to follow.


DNP note has been reviewed, I agree with a documented findings and plan of care.

 Patient was seen and examined.

## 2020-04-16 NOTE — XR
EXAMINATION TYPE: XR chest 1V portable

 

DATE OF EXAM: 4/16/2020

 

HISTORY: Shortness of breath.

 

COMPARISON: 4/13/2020

 

TECHNIQUE: Single view of the chest is submitted.

 

FINDINGS:

Demonstrated are scattered senescent parenchymal change.  

 

Scattered interstitial and alveolar infiltrates appear essentially unchanged.

 

The heart is stable.

 

Hilar and mediastinal structures are within normal limits.  

 

Degenerative changes are seen of the dorsal spine. 

 

 IMPRESSION: 

 

1.  Stable Chest.

## 2020-04-16 NOTE — P.PN
Subjective


Progress Note Date: 04/16/20


Principal diagnosis: 





Exertional dyspnea, anasarca





This is an 80-year-old male patient of Dr. Duckworth, with known history of severe 

ischemic cardiomyopathy, and ejection fraction of less than 20%, status post 

AICD placement, hypertension, diabetes mellitus, hyperlipidemia, persistent 

atrial fibrillation, coronary artery disease status post bypass grafting, ex-

smoker, chronic congestive heart failure with systolic dysfunction, history of 

kidney and bladder cancer status post chemo and radiation, who presented to the 

emergency department on 04/05/2020 with complaints of worsening exertional 

dyspnea, and increased swelling in his lower extremities.  He denied any chest 

pain, palpitations, no fever or chills, no nausea vomiting, no diaphoresis, no 

cough or phlegm production.  Admission chest x-ray showed creased vascular 

congestion, and pulmonary edema and a small left pleural effusion.  Admission 

blood work was negative for any leukocytosis, white blood cell count was 8.2, 

hemoglobin is 10.6, mild lymphopenia, INR is 1.3, potassium is 5.2, the rest of 

the electrolytes were within normal limits, BUN is 52 creatinine is 2.0, patient

does have history of chronic kidney disease stage III, this showed signs of 

infection although patient denies any symptoms, troponins were 0.031, 0.026, 

0.031, proBNP was 2710.  We were asked to see the patient in consultation.  

Patient has been started on infusion of Lasix, early infusing at a rate of 10 mg

per hour, and dobutamine drip infusing at 5 mics per kilo per minute.  A is on 

oral Eliquis for history of chronic atrial fibrillation he remains in A. fib 

with a controlled rate of 93 bpm.  He is sitting up in the recliner, in no acute

distress, on 4 L of oxygen with a pulse ox of 95-98%, he is been afebrile, he 

has not diuresed significantly despite the Lasix infusion, and today's blood 

work shows worsening of his renal profile would be on up to 79 and creatinine is

up to 2.7.  Nephrology is following. 





04/09/2020, the patient was seen and examined at his bedside on the cardiac 

stepdown unit with Dr. Trevino.  He reports he is slightly better today, denies 

any complaints of pain or shortness of breath just sitting.  He is sitting up to

the bedside chair and is in no acute distress.  Lasix drip at 10 mg per hour and

dopamine drip at 2.5 mcg/kg/m continue infusing.  He remained hemodynamically 

stable and currently has oxygen saturations are 96% on 4 L nasal cannula.  

Repeat chest x-ray was completed this morning which demonstrated cardiomegaly 

with mild prominence of pulmonary vasculature marking with diffuse increasing 

lung field markings compatible with congestive heart failure.  He has been 

afebrile the last 24 hours.  Lab results today show a BUN 78, creatinine 2.48 

and a BNP level 1740.  A urine culture was sent yesterday with results pending.





On 04/10/2020 the patient was seen and examined with Dr. Trevino.  Denies pain, 

shortness of breath.  Sitting up in bed and appears comfortable.  Remains 

afebrile.  Hematologically stable.  Currently on 4 L nasal cannula with oxygen 

saturation 97%.  Continues on dobutamine at 2.5 mcg and Lasix at 10 mg an hour. 

Chest x-ray seems to be improving a bit, still has atelectasis present.  BUN 84,

creatinine 2.37.  Urine culture negative.  Fluid balance -4 L for the last 48 

hours.





the patient is seen today 04/11/2020 in follow-up on the selective care unit.  

He is currently awake and alert in no acute distress.  Denies any worsening 

shortness of breath, cough or congestion.  He is still quite edematous 

especially the lower extremities.he had remained in a negative balance.currently

maintaining O2 saturations in the high 90s on 3 L/m per nasal cannula.  He is 

afebrile.  Hemodynamically stable.urine culture reveals no growth.sodium 141.  

Potassium 3.8.  Bicarb 23.  Creatinine 2.52.  He is continued on dobutamine at 

2.5 mg/kg/m.  Lasix drip at 5 mg per hour.  Antibiotics in the form of 

ceftriaxone.  Anticoagulated with Eliquis.





The patient is seen today 04/12/2020 in follow-up on the selective care unit.  

Sitting up in a chair at the bedside.  Awake and alert in no acute distress.  

Breathing a bit easier today compared to yesterday.  Maintaining O2 saturations 

at 99% on 2 L/m per nasal cannula.  He's been afebrile.  Urine culture revealed 

no growth.  Sodium 141.  Potassium 3.8.  Creatinine 2.23.  Dobutamine has been 

discontinued.  He is on a Lasix drip at 5 mg per hour.  Currently in a positive 

balance.  Weight is down 2 kg.  Cortes catheter remains in place. Antibiotics in 

the form of ceftriaxone.  Anticoagulated with Eliquis.





On 04/13/2020 patient seen in follow-up on [facility] care unit, he is awake and 

alert, in no acute distress, sitting up in the recliner, he is currently on 2 L 

of oxygen, his pulse ox 96%, his been afebrile, hemodynamically patient is 

stable, he is currently off dobutamine and Lasix drip, his breathing has 

significantly improved, lower extremity edema improved.  Today's chest x-ray 

showed extensive interstitial and alveolar infiltrates with some improvement.  

There were also small pleural effusions no new labs today, patient remains on 

diuretics at 60 mg every 12 hours, nephrology is managing, he remains on 

Rocephin for possible urinary tract infection, his been afebrile, no urinary 

symptoms.





On 04/14/2020 patient seen in follow-up on [facility] care unit, he is awake and 

alert, in no acute distress, sitting up in the recliner.  Oxygen saturations are

currently 99% on 2 L nasal cannula.  He remains hemodynamically stable and is 

currently in no acute distress.  He's been afebrile the last 24 hours.  

Continues to remain off the dobutamine and Lasix drip and is on 0.9% normal 

saline at 20 mL per hour.  There was no repeat chest x-ray today.  The patient 

reports overall he is feeling better on a daily basis and his edema to his 

bilateral lower extremity is improving.  He remains on Lasix 60 mg IV every 12 

hours and Zaroxolyn 5 mg by mouth daily which is being managed by nephrology.  ROSY collier remains on Rocephin for suspected UTI, his urine culture results are negative.

 He remains on remote telemetry which is showing atrial fibrillation heart rate 

70.





On 04/15/2020 patient seen in follow-up on [facility] care unit, he is awake and 

alert, in no acute distress, sitting up in the recliner with his feet elevated. 

The patient reports he is feeling improved each day.  Denies any complaints of 

shortness of breath sitting there and the chair.  Oxygen saturations are 98% on 

2 L nasal cannula.  0.9% normal saline was infusing at 20 mL per hour.  He did 

not have a repeat chest x-ray today.  He remains hemodynamically stable and has 

been afebrile the last 24 hours.  He continues to receive Lasix 40 mg by mouth 

twice a day and Zaroxolyn 2.5 mg by mouth daily which is being managed by ne

phrology and cardiology.  Lab results today show a , creatinine 1.78.  

His antibiotics were discontinued yesterday as his urine culture results were 

negative.  He remains on remote telemetry which is showing atrial fibrillation 

heart rate 90.





On 04/16/2020 the patient was seen in follow-up on the cardiac stepdown unit 

with Dr. Quinn.  He was awake although sleepy, lying in bed in no acute 

distress.  Denies any pain or shortness of breath.  General overall weakness 

present.  Currently on 2 L nasal cannula with oxygen saturation 100%.  Remains 

afebrile.  Heart rate in the low 100 100s, irregular, remains in atrial 

fibrillation.  Blood pressure 70s over 40s with means in the 50s.  IV fluid 

boluses given per cardiology.  White blood cell count 12.7, hemoglobin 4.2, BUN 

122, creatinine 1.83, lactic acid drawn 5.2.  Patient was transferred back to 

the intensive care unit with orders for transfusion of 2 units packed red blood 

cells.  Chest x-ray appears stable and unchanged.  No visualized evidence of 

acute GI bleeding.  Patient had been on Eliquis for anticoagulation secondary to

atrial fibrillation management, discontinued this morning.  Remains no CODE 

STATUS.





Objective





- Vital Signs


Vital signs: 


                                   Vital Signs











Temp  97.5 F L  04/16/20 11:20


 


Pulse  114 H  04/16/20 11:20


 


Resp  26 H  04/16/20 11:20


 


BP  72/32   04/16/20 11:20


 


Pulse Ox  100   04/16/20 11:20








                                 Intake & Output











 04/15/20 04/16/20 04/16/20





 18:59 06:59 18:59


 


Intake Total 360  0


 


Output Total  2 500


 


Balance 360 -2 -500


 


Weight  118 kg 


 


Intake:   


 


  Oral 360  


 


  Blood Product   0


 


    Rc As-1  Unit   0





    [id number]   


 


Output:   


 


  Urine   500


 


    Uretheral (Cortes)   500


 


  Stool  2 


 


Other:   


 


  Voiding Method Indwelling Catheter Indwelling Catheter Indwelling Catheter














- Constitutional


Constitutional Comment(s): 





Appears comfortable but very weak.  Sleepy but does arouse.


General appearance: Present: cooperative, no acute distress





- Respiratory


Details: 





Lungs sounds diminished bilaterally with a few scattered rhonchi present.  

Respirations even, nonlabored.  Currently on 2 L nasal cannula with oxygen 

saturation 100%.





- Cardiovascular


Details: 





S1, S2 present.  Tachycardic, irregular rate and rhythm, atrial fibrillation 

with rapid ventricular response.  Palpable peripheral pulses bilaterally.  1-2+ 

bilateral lower extremity edema present.





- Gastrointestinal


Gastrointestinal Comment(s): 





Abdomen soft, nontender, nondistended.  No organomegaly.  Active bowel sounds 

present 4 quadrants.





- Genitourinary


Genitourinary Comment(s): 





Cortes present draining clear yellow urine.





- Integumentary


Integumentary Comment(s): 





Skin is warm and dry.  Patient appears pale





- Neurologic


Neurologic: Present: CNII-XII intact





- Musculoskeletal


Musculoskeletal: Present: generalized weakness





- Psychiatric


Psychiatric: Present: A&O x's 3, appropriate affect





- Allied health notes


Allied health notes reviewed: nursing





- Labs


CBC & Chem 7: 


                                 04/16/20 05:55





                                 04/16/20 05:55


Labs: 


                  Abnormal Lab Results - Last 24 Hours (Table)











  04/15/20 04/15/20 04/16/20 Range/Units





  16:16 20:11 05:55 


 


WBC     (3.8-10.6)  k/uL


 


RBC     (4.30-5.90)  m/uL


 


Hgb     (13.0-17.5)  gm/dL


 


Hct     (39.0-53.0)  %


 


MCV     (80.0-100.0)  fL


 


MCH     (25.0-35.0)  pg


 


MCHC     (31.0-37.0)  g/dL


 


RDW     (11.5-15.5)  %


 


BUN    122 H*  (9-20)  mg/dL


 


Creatinine    1.83 H  (0.66-1.25)  mg/dL


 


Glucose    166 H  (74-99)  mg/dL


 


POC Glucose (mg/dL)  291 H  294 H   (75-99)  mg/dL


 


Plasma Lactic Acid Jeanmarie     (0.7-2.0)  mmol/L


 


Crossmatch     














  04/16/20 04/16/20 04/16/20 Range/Units





  05:55 06:03 09:12 


 


WBC  12.7 H    (3.8-10.6)  k/uL


 


RBC  1.81 L    (4.30-5.90)  m/uL


 


Hgb  4.2 L* D    (13.0-17.5)  gm/dL


 


Hct  14.2 L*    (39.0-53.0)  %


 


MCV  78.2 L    (80.0-100.0)  fL


 


MCH  22.9 L    (25.0-35.0)  pg


 


MCHC  29.3 L    (31.0-37.0)  g/dL


 


RDW  19.7 H    (11.5-15.5)  %


 


BUN     (9-20)  mg/dL


 


Creatinine     (0.66-1.25)  mg/dL


 


Glucose     (74-99)  mg/dL


 


POC Glucose (mg/dL)   210 H   (75-99)  mg/dL


 


Plasma Lactic Acid Jeanmarie     (0.7-2.0)  mmol/L


 


Crossmatch    See Detail  














  04/16/20 04/16/20 04/16/20 Range/Units





  09:40 09:48 10:32 


 


WBC     (3.8-10.6)  k/uL


 


RBC     (4.30-5.90)  m/uL


 


Hgb     (13.0-17.5)  gm/dL


 


Hct     (39.0-53.0)  %


 


MCV     (80.0-100.0)  fL


 


MCH     (25.0-35.0)  pg


 


MCHC     (31.0-37.0)  g/dL


 


RDW     (11.5-15.5)  %


 


BUN     (9-20)  mg/dL


 


Creatinine     (0.66-1.25)  mg/dL


 


Glucose     (74-99)  mg/dL


 


POC Glucose (mg/dL)   184 H  180 H  (75-99)  mg/dL


 


Plasma Lactic Acid Jeanmarie  5.2 H*    (0.7-2.0)  mmol/L


 


Crossmatch     














- Imaging and Cardiology


Chest x-ray: report reviewed, image reviewed





Assessment and Plan


Assessment: 





1.  Acute exacerbation of chronic congestive heart failure with systolic 

dysfunction





2.  Acute kidney injury likely related to cardiorenal syndrome, diuretic 

therapy, nephrology is following





3.  Acute urinary tract infection, placed on ceftriaxone, urine culture negative





4.  Ischemic cardiomyopathy, with EF of less than 20%, status post AICD 

implantation





5.  Diabetes mellitus





6.  History of chronic persistent atrial fibrillation on oral anticoagulation





7.  History of chronic kidney disease stage III at baseline, history of right hy

dronephrosis and patient follows with urology





8.  Coronary artery disease status post bypass grafting





9.  Hypertension





10.  Hyperlipidemia





11.  History of bladder cancer





12.  Acute anemia, unknown etiology, patient was on Eliquis, no visual evidence 

of GIB


  


Plan: 





The patient was seen and examined at his bedside with Dr. Quinn.


Repeat chest x-ray in a.m.


Wean oxygen as tolerated to keep sats greater than or equal to 92%.


Atrial fibrillation and CHF management per cardiology recommendations.


Lasix and Zaroxolyn management per nephrology recommendations.


Will transfuse 2 units PRBCs and repeat Hgb


Continue to monitor daily weights


More recommendations to follow





I, the cosigning physician, performed a history & physical examination of the pa

chava. Lungs sounds were diminished in the bases.  Maintaining good O2 

saturations in the 90s on 2 L nasal cannula.  I discussed the assessment and 

plan of care with my nurse practitioner, Elif Fong. I attest to the above note 

as dictated by her.


Time with Patient: Greater than 30

## 2020-04-16 NOTE — P.PN
Subjective





Patient is seen in follow-up for acute kidney injury on chronic kidney disease. 

Renal function is stable.  Edema slowly improving.  Patient was noted to be 

quite lethargic this morning.  Hemoglobin was 4.2.  No active bleeding. He is 

scheduled to receive 2 units of packed red cell transfusion today.





Vital signs: Blood pressure on the lower side.  Afebrile.


General: The patient appeared well nourished and normally developed. 


HEENT: Head exam is unremarkable. Neck is without jugular venous distension.


LUNGS: Lungs are clear to auscultation and percussion. Breath sounds decreased.


HEART: Rate and Rhythm are regular. 


ABDOMEN: Nontender.  Nondistended.


EXTREMITITES: 2+ edema.





Objective





- Vital Signs


Vital signs: 


                                   Vital Signs











Temp  97.7 F   04/16/20 08:00


 


Pulse  121 H  04/16/20 08:00


 


Resp  18   04/16/20 08:05


 


BP  89/56   04/16/20 09:01


 


Pulse Ox  100   04/16/20 08:00








                                 Intake & Output











 04/15/20 04/16/20 04/16/20





 18:59 06:59 18:59


 


Intake Total 360  


 


Output Total  2 500


 


Balance 360 -2 -500


 


Weight  118 kg 


 


Intake:   


 


  Oral 360  


 


Output:   


 


  Urine   500


 


    Uretheral (Cortes)   500


 


  Stool  2 


 


Other:   


 


  Voiding Method Indwelling Catheter Indwelling Catheter Indwelling Catheter














- Labs


CBC & Chem 7: 


                                 04/16/20 05:55





                                 04/16/20 05:55


Labs: 


                  Abnormal Lab Results - Last 24 Hours (Table)











  04/15/20 04/15/20 04/15/20 Range/Units





  11:28 16:16 20:11 


 


WBC     (3.8-10.6)  k/uL


 


RBC     (4.30-5.90)  m/uL


 


Hgb     (13.0-17.5)  gm/dL


 


Hct     (39.0-53.0)  %


 


MCV     (80.0-100.0)  fL


 


MCH     (25.0-35.0)  pg


 


MCHC     (31.0-37.0)  g/dL


 


RDW     (11.5-15.5)  %


 


BUN     (9-20)  mg/dL


 


Creatinine     (0.66-1.25)  mg/dL


 


Glucose     (74-99)  mg/dL


 


POC Glucose (mg/dL)  274 H  291 H  294 H  (75-99)  mg/dL


 


Plasma Lactic Acid Jeanmarie     (0.7-2.0)  mmol/L


 


Crossmatch     














  04/16/20 04/16/20 04/16/20 Range/Units





  05:55 05:55 06:03 


 


WBC   12.7 H   (3.8-10.6)  k/uL


 


RBC   1.81 L   (4.30-5.90)  m/uL


 


Hgb   4.2 L* D   (13.0-17.5)  gm/dL


 


Hct   14.2 L*   (39.0-53.0)  %


 


MCV   78.2 L   (80.0-100.0)  fL


 


MCH   22.9 L   (25.0-35.0)  pg


 


MCHC   29.3 L   (31.0-37.0)  g/dL


 


RDW   19.7 H   (11.5-15.5)  %


 


BUN  122 H*    (9-20)  mg/dL


 


Creatinine  1.83 H    (0.66-1.25)  mg/dL


 


Glucose  166 H    (74-99)  mg/dL


 


POC Glucose (mg/dL)    210 H  (75-99)  mg/dL


 


Plasma Lactic Acid Jeanmarie     (0.7-2.0)  mmol/L


 


Crossmatch     














  04/16/20 04/16/20 04/16/20 Range/Units





  09:12 09:40 09:48 


 


WBC     (3.8-10.6)  k/uL


 


RBC     (4.30-5.90)  m/uL


 


Hgb     (13.0-17.5)  gm/dL


 


Hct     (39.0-53.0)  %


 


MCV     (80.0-100.0)  fL


 


MCH     (25.0-35.0)  pg


 


MCHC     (31.0-37.0)  g/dL


 


RDW     (11.5-15.5)  %


 


BUN     (9-20)  mg/dL


 


Creatinine     (0.66-1.25)  mg/dL


 


Glucose     (74-99)  mg/dL


 


POC Glucose (mg/dL)    184 H  (75-99)  mg/dL


 


Plasma Lactic Acid Jeanmarie   5.2 H*   (0.7-2.0)  mmol/L


 


Crossmatch  See Detail    














  04/16/20 Range/Units





  10:32 


 


WBC   (3.8-10.6)  k/uL


 


RBC   (4.30-5.90)  m/uL


 


Hgb   (13.0-17.5)  gm/dL


 


Hct   (39.0-53.0)  %


 


MCV   (80.0-100.0)  fL


 


MCH   (25.0-35.0)  pg


 


MCHC   (31.0-37.0)  g/dL


 


RDW   (11.5-15.5)  %


 


BUN   (9-20)  mg/dL


 


Creatinine   (0.66-1.25)  mg/dL


 


Glucose   (74-99)  mg/dL


 


POC Glucose (mg/dL)  180 H  (75-99)  mg/dL


 


Plasma Lactic Acid Jeanmarie   (0.7-2.0)  mmol/L


 


Crossmatch   














Assessment and Plan


Plan: 





Assessment:


1.  Acute kidney injury secondary to ATN secondary to cardiorenal syndrome.  

Renal function stable.  Creatinine 1.83 today.


2.  Chronic kidney disease stage III with baseline creatinine in the range of 

0.9-1.2.


3.  Acute on chronic systolic CHF with ejection fraction of 25%.


4.  Volume overload. Improving with diuresis.


5.  Atrophic right kidney with mass.  Patient will follow-up with urology 

outpatient.


6.  Insulin-dependent diabetes mellitus.


7.  Hypokalemia secondary to diuresis. Better posterior placement.  Magnesium 

normal.


8.  Acute blood loss anemia.  Hemoglobin 4.2 this morning.





Plan:


Patient scheduled to receive 2 units of packed red cell transfusion with Lasix 

of 40 mg in between.


Maintain midodrine.


Hep-Lock IV fluids.  Patient is currently hypervolemic.


Continue to monitor renal function and urine output.

## 2020-04-17 LAB
ANION GAP SERPL CALC-SCNC: 9 MMOL/L
ANION GAP SERPL CALC-SCNC: 9 MMOL/L
BASOPHILS # BLD AUTO: 0 K/UL (ref 0–0.2)
BASOPHILS NFR BLD AUTO: 0 %
BUN SERPL-SCNC: 120 MG/DL (ref 9–20)
BUN SERPL-SCNC: 124 MG/DL (ref 9–20)
CALCIUM SPEC-MCNC: 8.8 MG/DL (ref 8.4–10.2)
CALCIUM SPEC-MCNC: 9 MG/DL (ref 8.4–10.2)
CHLORIDE SERPL-SCNC: 101 MMOL/L (ref 98–107)
CHLORIDE SERPL-SCNC: 102 MMOL/L (ref 98–107)
CO2 SERPL-SCNC: 28 MMOL/L (ref 22–30)
CO2 SERPL-SCNC: 31 MMOL/L (ref 22–30)
EOSINOPHIL # BLD AUTO: 0.2 K/UL (ref 0–0.7)
EOSINOPHIL NFR BLD AUTO: 1 %
ERYTHROCYTE [DISTWIDTH] IN BLOOD BY AUTOMATED COUNT: 2.66 M/UL (ref 4.3–5.9)
ERYTHROCYTE [DISTWIDTH] IN BLOOD BY AUTOMATED COUNT: 2.98 M/UL (ref 4.3–5.9)
ERYTHROCYTE [DISTWIDTH] IN BLOOD BY AUTOMATED COUNT: 3.11 M/UL (ref 4.3–5.9)
ERYTHROCYTE [DISTWIDTH] IN BLOOD: 18.6 % (ref 11.5–15.5)
ERYTHROCYTE [DISTWIDTH] IN BLOOD: 18.7 % (ref 11.5–15.5)
ERYTHROCYTE [DISTWIDTH] IN BLOOD: 19 % (ref 11.5–15.5)
GLUCOSE BLD-MCNC: 102 MG/DL (ref 75–99)
GLUCOSE BLD-MCNC: 127 MG/DL (ref 75–99)
GLUCOSE BLD-MCNC: 157 MG/DL (ref 75–99)
GLUCOSE BLD-MCNC: 159 MG/DL (ref 75–99)
GLUCOSE SERPL-MCNC: 112 MG/DL (ref 74–99)
GLUCOSE SERPL-MCNC: 160 MG/DL (ref 74–99)
HBV SURFACE AB SERPL IA-ACNC: 3.5 MIU/ML
HCT VFR BLD AUTO: 21.7 % (ref 39–53)
HCT VFR BLD AUTO: 24.5 % (ref 39–53)
HCT VFR BLD AUTO: 25.6 % (ref 39–53)
HGB BLD-MCNC: 6.9 GM/DL (ref 13–17.5)
HGB BLD-MCNC: 7.9 GM/DL (ref 13–17.5)
HGB BLD-MCNC: 8.1 GM/DL (ref 13–17.5)
LYMPHOCYTES # SPEC AUTO: 1.6 K/UL (ref 1–4.8)
LYMPHOCYTES NFR SPEC AUTO: 9 %
MCH RBC QN AUTO: 25.9 PG (ref 25–35)
MCH RBC QN AUTO: 26.2 PG (ref 25–35)
MCH RBC QN AUTO: 26.4 PG (ref 25–35)
MCHC RBC AUTO-ENTMCNC: 31.7 G/DL (ref 31–37)
MCHC RBC AUTO-ENTMCNC: 31.8 G/DL (ref 31–37)
MCHC RBC AUTO-ENTMCNC: 32.2 G/DL (ref 31–37)
MCV RBC AUTO: 81.7 FL (ref 80–100)
MCV RBC AUTO: 82.1 FL (ref 80–100)
MCV RBC AUTO: 82.2 FL (ref 80–100)
MONOCYTES # BLD AUTO: 1.2 K/UL (ref 0–1)
MONOCYTES NFR BLD AUTO: 7 %
NEUTROPHILS # BLD AUTO: 14.7 K/UL (ref 1.3–7.7)
NEUTROPHILS NFR BLD AUTO: 81 %
PLATELET # BLD AUTO: 238 K/UL (ref 150–450)
PLATELET # BLD AUTO: 244 K/UL (ref 150–450)
PLATELET # BLD AUTO: 271 K/UL (ref 150–450)
POTASSIUM SERPL-SCNC: 2.9 MMOL/L (ref 3.5–5.1)
POTASSIUM SERPL-SCNC: 3.2 MMOL/L (ref 3.5–5.1)
SODIUM SERPL-SCNC: 139 MMOL/L (ref 137–145)
SODIUM SERPL-SCNC: 141 MMOL/L (ref 137–145)
WBC # BLD AUTO: 17.8 K/UL (ref 3.8–10.6)
WBC # BLD AUTO: 18.1 K/UL (ref 3.8–10.6)
WBC # BLD AUTO: 18.9 K/UL (ref 3.8–10.6)

## 2020-04-17 RX ADMIN — INSULIN ASPART SCH: 100 INJECTION, SOLUTION INTRAVENOUS; SUBCUTANEOUS at 06:52

## 2020-04-17 RX ADMIN — PANTOPRAZOLE SODIUM SCH MG: 40 INJECTION, POWDER, FOR SOLUTION INTRAVENOUS at 21:08

## 2020-04-17 RX ADMIN — FUROSEMIDE SCH MG: 20 TABLET ORAL at 17:05

## 2020-04-17 RX ADMIN — INSULIN DETEMIR SCH UNIT: 100 INJECTION, SOLUTION SUBCUTANEOUS at 10:33

## 2020-04-17 RX ADMIN — INSULIN ASPART SCH: 100 INJECTION, SOLUTION INTRAVENOUS; SUBCUTANEOUS at 17:45

## 2020-04-17 RX ADMIN — POTASSIUM CHLORIDE SCH: 20 TABLET, EXTENDED RELEASE ORAL at 10:32

## 2020-04-17 RX ADMIN — Medication SCH UNIT: at 10:33

## 2020-04-17 RX ADMIN — POTASSIUM CHLORIDE SCH MEQ: 20 TABLET, EXTENDED RELEASE ORAL at 21:08

## 2020-04-17 RX ADMIN — MIDODRINE HYDROCHLORIDE SCH MG: 5 TABLET ORAL at 12:23

## 2020-04-17 RX ADMIN — CYANOCOBALAMIN TAB 500 MCG SCH MCG: 500 TAB at 10:34

## 2020-04-17 RX ADMIN — MIDODRINE HYDROCHLORIDE SCH MG: 5 TABLET ORAL at 17:05

## 2020-04-17 RX ADMIN — POTASSIUM CHLORIDE SCH MEQ: 20 TABLET, EXTENDED RELEASE ORAL at 21:45

## 2020-04-17 RX ADMIN — INSULIN ASPART SCH UNIT: 100 INJECTION, SOLUTION INTRAVENOUS; SUBCUTANEOUS at 12:23

## 2020-04-17 RX ADMIN — POTASSIUM CHLORIDE SCH MEQ: 20 TABLET, EXTENDED RELEASE ORAL at 22:35

## 2020-04-17 RX ADMIN — Medication SCH MG: at 21:08

## 2020-04-17 RX ADMIN — VENLAFAXINE HYDROCHLORIDE SCH MG: 75 TABLET ORAL at 10:33

## 2020-04-17 RX ADMIN — ATORVASTATIN CALCIUM SCH MG: 80 TABLET, FILM COATED ORAL at 21:08

## 2020-04-17 RX ADMIN — VENLAFAXINE HYDROCHLORIDE SCH MG: 75 TABLET ORAL at 21:08

## 2020-04-17 RX ADMIN — POTASSIUM CHLORIDE SCH MEQ: 20 TABLET, EXTENDED RELEASE ORAL at 23:36

## 2020-04-17 RX ADMIN — PANTOPRAZOLE SODIUM SCH MG: 40 TABLET, DELAYED RELEASE ORAL at 06:52

## 2020-04-17 RX ADMIN — INSULIN DETEMIR SCH UNIT: 100 INJECTION, SOLUTION SUBCUTANEOUS at 21:08

## 2020-04-17 RX ADMIN — MIDODRINE HYDROCHLORIDE SCH MG: 5 TABLET ORAL at 06:52

## 2020-04-17 RX ADMIN — INSULIN ASPART SCH: 100 INJECTION, SOLUTION INTRAVENOUS; SUBCUTANEOUS at 21:09

## 2020-04-17 NOTE — PN
PROGRESS NOTE



Juan José Tariq is an elderly gentleman with ischemic cardiomyopathy and a Bi-V ICD.  He has

underlying atrial fibrillation.  Yesterday, he had hypotension. Laboratory data

revealed that his hemoglobin was less than 5.  He received 4 units of packed RBCs.

This morning, he is not on any Levophed.  His blood pressure is about 95 to 100

systolic.  He is holding his own at this time.  His hemoglobin has come up.  There is

no obvious bleeding.  We have requested Gastroenterology to see the patient in this

regard.  He is not in any distress.  Denies chest pain.  Complains of feeling weak.



Blood pressure is 104/60, pulse rate is 70, Irregular.

HEENT: Unremarkable. Fundus was not examined by me.

Neck is supple.  There is no JVD.

Heart exam reveals S1, S2 with a short systolic murmur.

Lungs reveal diminished air entry.

Abdomen is soft.

Lower extremities reveal mild edema.

Central nervous system grossly no focal deficits.



IMPRESSION:

1. Acute gastrointestinal bleed which seems to have stopped, status post 4 units of

    packed RBC transfusion.  Awaiting GI evaluation and input.

2. Ischemic cardiomyopathy.

3. Chronic persistent atrial fibrillation.

4. Status post biventricular pacemaker.



RECOMMENDATIONS:

I am recommending that we will give a 40 mg Lasix IV push after this transfusion.  I

will place him on oral Lasix 40 mg b.i.d., check a CBC and BMP at 6 p.m. and 6 a.m.

Overall prognosis for this patient is somewhat poor.  I discussed my thoughts in detail

with the patient.





MMJOSEL / IJN: [id number] / Job#: [id number]

## 2020-04-17 NOTE — P.PN
Progress Note - Text


Progress Note Date: 04/17/20





Chief Complaint: Shortness of breath





Patient is a 80-year-old patient of Dr. Duckworth.   history of chronic atrial 

fibrillation on anticoagulation with Eliquis, coronary artery disease status 

post bypass graft, cardiomyopathy status post AICD placement, history of nephrec

guy and unilateral kidney, CK D stage III, hypertension, hyperlipidemia, GERD, 

diabetes type 2 and history of bipolar/depression came to ER with complaints of 

worsening shortness of breath and exertional dyspnea and bilateral lower 

extremity increases swelling for the past 2 weeks.  Patient does have minimal 

cough without any sputum production.  No sputum production.  No complaints of 

fever or chills.  Denied any sick contacts at home.  No recent travel.





Admitted with CHF exacerbation, atrial fibrillation with a rapid ventricular 

rate.  Started on IV Lasixdrip and IV amiodarone.then IV dobutamine was added.  

Switch to IV Lasix bolus.  April 16 patient became hypotensive.  Hemoglobin 

dropped to 4.2.  Moved to the ICU.  Receive 3 units of blood.  Also had some 

dark stools.


Today-.  Sitting on chair.  Tired.  Not much of an appetite.  Hemoglobin 6.9.  

Another unit of blood ordered.  Seen by GI.  Pending endoscopy.  Tomorrow





Review of systems: Was done for constitutional, cardiovascular, GI, pulmonary. 

relevant finding as above





Active Medications





Atorvastatin Calcium (Lipitor)  80 mg PO HS [facility]


   Last Admin: 04/16/20 21:14 Dose:  80 mg


   Documented by: 


Cholecalciferol (Vitamin D3 (25 Mcg = 1000 Iu))  2,000 unit PO DAILY [facility]


   Last Admin: 04/17/20 10:33 Dose:  2,000 unit


   Documented by: 


Cyanocobalamin (Vitamin B-12)  1,000 mcg PO DAILY [facility]


   Last Admin: 04/17/20 10:34 Dose:  1,000 mcg


   Documented by: 


Furosemide (Lasix)  40 mg PO BID@0900,1600 [facility]


Insulin Aspart (Novolog)  0 unit SQ [facility]; Protocol


   Last Admin: 04/17/20 12:23 Dose:  2 unit


   Documented by: 


Insulin Detemir (Levemir)  40 unit SQ [facility]


   Last Admin: 04/16/20 21:14 Dose:  40 unit


   Documented by: 


[facility]ulin [facility]mir)  [facility]


   Last Admin: 04/17/20 10:33 Dose:  10 unit


   Documented by: 


Lamotrigine (Lamictal)  100 mg PO [facility]


   Last Admin: 04/16/20 21:14 Dose:  100 mg


   Documented by: 


Melatonin (Melatonin)  3 mg PO [facility]


   Last Admin: 04/16/20 21:14 Dose:  3 mg


   Documented by: 


Midodrine (Proamatine)  10 mg PO AC-TID [facility]


   Last Admin: 04/17/20 12:23 Dose:  10 mg


   Documented by: 


Ondansetron HCl (Zofran)  4 mg IVP Q6HR PRN


   PRN Reason: Nausea And Vomiting


   Last Admin: 04/10/20 16:43 Dose:  4 mg


   Documented by: 


Pantoprazole Sodium (Protonix)  40 mg IVP BID [facility]


Potassium Chloride (K-Dur 20)  20 meq PO DAILY [facility]


   Last Admin: 04/17/20 10:32 Dose:  Not Given


   Documented by: 


Potassium Chloride (K-Dur 20)  60 meq PO ONCE ONE


   Stop: 04/17/20 16:01


Venlafaxine HCl (Effexor)  75 mg PO BID [facility]


   Last Admin: 04/17/20 10:33 Dose:  75 mg


   Documented by: 











On examination:


VITAL SIGNS: 97.3, 73, 13, 107/63, 97%


GENERAL APPEARANCE: Sitting up in a chair, tired, weak


HEENT: Normal external appearance of nose and ear.  Oral cavity dry


EYES: Pupils equal. Conjunctiva pale. 


NECK: JVD unable to assess Mass not palpable. 


RESPIRATORY: Respiratory effort increased.  Decreased breath sounds.


CARDIOVASCULAR: Heart sounds irregular, edema decreased


ABDOMEN: Soft. Liver and spleen not palpable. No tenderness. No mass palpable.  

Cortes catheter-hematuria cleared


PSYCHIATRY: Answering questions





INVESTIGATIONS, reviewed in the clinical context:


White count 7.8 hemoglobin 6.9 bun 124 creatinine 2.04 potassium 2.9





Previous testing:


White count 8.2 hemoglobin 10.6 potassium 5.2 bun 52 creatinine 2.0 troponin I 

0.0-3


EKG-possible A. fib


Chest x-ray film personally reviewed by me-pulmonary edema cardiomegaly


BUN/creatinine on February 22-1.58





Assessment:


Acute on chronic congestive heart failure exacerbation from systolic 

dysfunction, EF less than 30% 


-Acute blood loss anemia-black tarry stools, pending endoscopy, 1 unit of blood 

to be transfused today.


-Severe hypokalemia from diuresis


-Hypotension multifactorial


Ischemic cardiomyopathy


Persistent atrial fibrillation on anticoagulation with Eliquis, rate better 

controlled


Acute kidney injury secondary to cardiorenal syndrome, 


-chronic kidney disease stage III.


Mild hyperkalemia secondary to acute kidney injury


Lactic acidosis-type II


History of nephrectomy due to renal cancer and bladder cancer history


Diabetes type 2.  Insulin-dependent and also on metformin and glipizide.


Hyperlipidemia


Coronary artery disease with history of multiple stents placement


Bipolar disorder and depression


GERD


Obesity with BMI 34.9


-





Plan:


Another unit of blood ordered.  Potassium being replaced.  Remains in the ICU.  

Prognosis guarded.  EGD colonoscopy tomorrow.

## 2020-04-17 NOTE — P.PN
Subjective


Progress Note Date: 04/17/20


80-year-old male patient of Dr. Duckworth, with known history of severe ischemic 

cardiomyopathy, and ejection fraction of less than 20%, status post AICD 

placement, hypertension, diabetes mellitus, hyperlipidemia, persistent atrial 

fibrillation, coronary artery disease status post bypass grafting, ex-smoker, 

chronic congestive heart failure with systolic dysfunction, history of kidney 

and bladder cancer status post chemo and radiation, who presented to the 

emergency department on 04/05/2020 with complaints of worsening exertional 

dyspnea, and increased swelling in his lower extremities.  He denied any chest 

pain, palpitations, no fever or chills, no nausea vomiting, no diaphoresis, no 

cough or phlegm production.  Admission chest x-ray showed creased vascular 

congestion, and pulmonary edema and a small left pleural effusion.  Admission 

blood work was negative for any leukocytosis, white blood cell count was 8.2, 

hemoglobin is 10.6, mild lymphopenia, INR is 1.3, potassium is 5.2, the rest of 

the electrolytes were within normal limits, BUN is 52 creatinine is 2.0, patient

does have history of chronic kidney disease stage III, this showed signs of 

infection although patient denies any symptoms, troponins were 0.031, 0.026, 

0.031, proBNP was 2710.  We were asked to see the patient in consultation.  

Patient has been started on infusion of Lasix, early infusing at a rate of 10 mg

per hour, and dobutamine drip infusing at 5 mics per kilo per minute.  A is on 

oral Eliquis for history of chronic atrial fibrillation he remains in A. fib 

with a controlled rate of 93 bpm.  He is sitting up in the recliner, in no acute

distress, on 4 L of oxygen with a pulse ox of 95-98%, he is been afebrile, he 

has not diuresed significantly despite the Lasix infusion, and today's blood 

work shows worsening of his renal profile would be on up to 79 and creatinine is

up to 2.7.  Nephrology is following. 





On 04/13/2020 patient seen in follow-up on selective care unit, he is awake and 

alert, in no acute distress, sitting up in the recliner, he is currently on 2 L 

of oxygen, his pulse ox 96%, his been afebrile, hemodynamically patient is 

stable, he is currently off dobutamine and Lasix drip, his breathing has 

significantly improved, lower extremity edema improved.  Today's chest x-ray 

showed extensive interstitial and alveolar infiltrates with some improvement.  

There were also small pleural effusions no new labs today, patient remains on 

diuretics at 60 mg every 12 hours, nephrology is managing, he remains on 

Rocephin for possible urinary tract infection, his been afebrile, no urinary 

symptoms.





On 04/17/2020 patient seen in follow-up in intensive care unit, he is up in the 

recliner, he is awake and alert and oriented 3, she is a very weak, and sort of

slow to respond, however his mentation is appropriate.  He denies any worsening 

dyspnea, she did complain of some abdominal discomfort, his abdomen is soft, 

nontender, and last night he did have 2 very small black stools.  Patient 

received 3 units of packed red blood cells yesterday for a hemoglobin of 4.2.  

Today's labs show hemoglobin of 6.9, and patient is receiving an additional unit

of packed red blood cells, white count of 17.8, platelet count is 238, potassium

is 2.9, sodium is 139, chloride is 102, B1 is 124, creatinine is 2.04.  But 

pressure did improve with fluid boluses and blood transfusion.  This morning 

blood pressure is 104/59, he does get midodrine 10 mg 3 times daily, he was 

given a total of 500 mL and IV fluid boluses, and his maintenance IV fluids are 

infusing at a rate of 10 ML per hour.  Lung sounds are clear, diminished at the 

bases.  He is on PPI therapy, GI consultation has been requested.  He is nothing

by mouth currently for a possibility of endoscopic study, waiting to be 

evaluated by GI service








Objective





- Vital Signs


Vital signs: 


                                   Vital Signs











Temp  96.3 F L  04/17/20 06:34


 


Pulse  68   04/17/20 07:00


 


Resp  11 L  04/17/20 07:00


 


BP  104/59   04/17/20 07:00


 


Pulse Ox  98   04/17/20 07:00








                                 Intake & Output











 04/16/20 04/17/20 04/17/20





 18:59 06:59 18:59


 


Intake Total 1300 1140 200


 


Output Total 1505 1654 1050


 


Balance -605 -007 -001


 


Weight  117.8 kg 117.8 kg


 


Intake:   


 


  Intake, IV Titration 320 180 





  Amount   


 


    Sodium Chloride 0.9% 1, 320 180 





    000 ml @ 100 mls/hr IV .   





    Q10H [facility] Rx#:[id number]   


 


  Oral  240 200


 


  Blood Product 930 620 


 


    Rc As-1  Unit 310  





    [id number]   


 


    Rc As-1  Unit 0 310 





    [id number]   


 


    Rc As-1  Unit  0 





    [id number]   


 


    Rc As-3  Unit  310 





    [id number]   


 


  Other 50 100 


 


    Rc As-1  Unit 50  





    [id number]   


 


    Rc As-1  Unit  100 





    [id number]   


 


Output:   


 


  Urine 1505 1650 1050


 


    Uretheral (Cortes) 500  


 


  Stool  4 


 


Other:   


 


  Voiding Method Indwelling Catheter Indwelling Catheter Indwelling Catheter














- Exam


GENERAL EXAM: Alert, very pleasant, 80-year-old  male, on 2 L of oxygen

with a pulse ox of 96 %, sitting up in the recliner, appears quite weak, pale 

but in no acute distress


HEAD: Normocephalic/atraumatic.


EYES: Normal reaction of pupils, equal size.  Conjunctiva pink, sclera white.


NOSE: Clear with pink turbinates.


THROAT: No erythema or exudates.


NECK: No masses, no JVD, no thyroid enlargement, no adenopathy.


CHEST: No chest wall deformity.  Symmetrical expansion. 


LUNGS: Equal air entry with fine basilar crackles at bilateral bases


CVS: Irregular rate and rhythm, normal S1 and S2, no gallops, no murmurs, no 

rubs


ABDOMEN: Soft, nontender.  No hepatosplenomegaly, normal bowel sounds, no 

guarding or rigidity.


EXTREMITIES: No clubbing, significant 2+ lower extremity edema, no cyanosis, 2+ 

pulses and upper and lower extremities.


MUSCULOSKELETAL: Muscle strength and tone normal.


SPINE: No scoliosis or deformity


SKIN: No rashes


CENTRAL NERVOUS SYSTEM: Alert and oriented -3.  No focal deficits, tone is n

ormal in all 4 extremities.


PSYCHIATRIC: Alert and oriented -3.  Appropriate affect.  Intact judgment and 

insight.











- Labs


CBC & Chem 7: 


                                 04/17/20 04:35





                                 04/17/20 04:35


Labs: 


                  Abnormal Lab Results - Last 24 Hours (Table)











  04/16/20 04/16/20 04/16/20 Range/Units





  09:12 14:52 16:43 


 


WBC     (3.8-10.6)  k/uL


 


RBC     (4.30-5.90)  m/uL


 


Hgb     (13.0-17.5)  gm/dL


 


Hct     (39.0-53.0)  %


 


RDW     (11.5-15.5)  %


 


Potassium     (3.5-5.1)  mmol/L


 


BUN     (9-20)  mg/dL


 


Creatinine     (0.66-1.25)  mg/dL


 


Glucose     (74-99)  mg/dL


 


POC Glucose (mg/dL)    210 H  (75-99)  mg/dL


 


Plasma Lactic Acid Jeanmarie   5.3 H*   (0.7-2.0)  mmol/L


 


Crossmatch  See Detail    














  04/16/20 04/16/20 04/16/20 Range/Units





  19:19 21:04 21:39 


 


WBC    19.1 H  (3.8-10.6)  k/uL


 


RBC    2.37 L  (4.30-5.90)  m/uL


 


Hgb    6.1 L* D  (13.0-17.5)  gm/dL


 


Hct    19.2 L*  (39.0-53.0)  %


 


RDW    19.5 H  (11.5-15.5)  %


 


Potassium     (3.5-5.1)  mmol/L


 


BUN     (9-20)  mg/dL


 


Creatinine     (0.66-1.25)  mg/dL


 


Glucose     (74-99)  mg/dL


 


POC Glucose (mg/dL)   268 H   (75-99)  mg/dL


 


Plasma Lactic Acid Jeanmarie  3.0 H*    (0.7-2.0)  mmol/L


 


Crossmatch     














  04/17/20 04/17/20 04/17/20 Range/Units





  04:35 04:35 06:47 


 


WBC   17.8 H   (3.8-10.6)  k/uL


 


RBC   2.66 L   (4.30-5.90)  m/uL


 


Hgb   6.9 L*   (13.0-17.5)  gm/dL


 


Hct   21.7 L   (39.0-53.0)  %


 


RDW   19.0 H   (11.5-15.5)  %


 


Potassium  2.9 L    (3.5-5.1)  mmol/L


 


BUN  124 H*    (9-20)  mg/dL


 


Creatinine  2.04 H    (0.66-1.25)  mg/dL


 


Glucose  160 H    (74-99)  mg/dL


 


POC Glucose (mg/dL)    157 H  (75-99)  mg/dL


 


Plasma Lactic Acid Jeanmarie     (0.7-2.0)  mmol/L


 


Crossmatch     














  04/17/20 Range/Units





  11:22 


 


WBC   (3.8-10.6)  k/uL


 


RBC   (4.30-5.90)  m/uL


 


Hgb   (13.0-17.5)  gm/dL


 


Hct   (39.0-53.0)  %


 


RDW   (11.5-15.5)  %


 


Potassium   (3.5-5.1)  mmol/L


 


BUN   (9-20)  mg/dL


 


Creatinine   (0.66-1.25)  mg/dL


 


Glucose   (74-99)  mg/dL


 


POC Glucose (mg/dL)  159 H  (75-99)  mg/dL


 


Plasma Lactic Acid Jeanmarie   (0.7-2.0)  mmol/L


 


Crossmatch   














Assessment and Plan


Plan: 


 Assessment:





#1.  Acute exacerbation of chronic congestive heart failure with systolic 

dysfunction





#2.  Acute kidney injury likely related to cardiorenal syndrome, diuretic 

therapy, nephrology is following





#3.  Acute GI blood loss anemia, requiring transfusion of packed red blood 

cells, patient's hemoglobin was down to 4.1 and patient started passing some 

black tarry bowel movements, was on Eliquis which is on hold.  Patient received 

4 units of packed red blood cells





#4.  Hypotension, related to acute GI bleed, improved with fluids and blood 

transfusions





#5.  Acute urinary tract infection, although patient is asymptomatic, we'll 

start on empiric Rocephin





#6.  Ischemic cardiomyopathy, with EF of less than 20%, status post AICD 

implantation





#7.  Diabetes mellitus





#8.  History of chronic persistent atrial fibrillation on oral anticoagulation





#9.  History of chronic kidney disease stage III at baseline, history of right 

hydronephrosis and patient follows with urology





#10.  Coronary artery disease status post bypass grafting





#11.  Hypertension





#12.  Hyperlipidemia





#13.  History of bladder cancer





Plan:





Patient is receiving additional unit of packed red blood cells for today's 

hemoglobin of 6.9, continue PPI therapy, Eliquis is on hold, patient starting to

pass small black stools, GI evaluation is underway, continue to keep patient 

nothing by mouth for possibility of endoscopic study.  Blood pressure has 

improved, no worsening shortness of breath no complaints of chest pain, patient 

does appear to be pale and fatigued.  But no acute distress.  Diuretics remain 

on hold, renal function has worsened on today's labs.  Oxygenation is stable she

remains on 2 L of oxygen with pulse ox of 94%, will continue to monitor the 

patient in the intensive care unit today.  Await further input from GI service. 

Code status is DO NOT RESUSCITATE continue supportive treatment





I performed a history & physical examination of the patient and discussed their 

management with my nurse practitioner, Coco Lanza.  I reviewed the nurse 

practitioner's note and agree with the documented findings and plan of care.  

Lung sounds are positive for fine rales.  The findings and the impression was 

discussed with the patient.  I attest to the documentation by the nurse 

practitioner. 





Time with Patient: Less than 30

## 2020-04-17 NOTE — P.PN
Subjective





Patient is seen in follow-up for acute kidney injury on chronic kidney disease. 

Renal function is a little worse today.  Edema is improved.  He is nonoliguric. 

Patient is receiving fourth unit of packed red cell transfusion today.  

Hemoglobin was 6.9 this morning.  Scheduled for endoscopy tomorrow.  He is still

quite lethargic.  





Vital signs: Blood pressure on the lower side.  Afebrile.


General: The patient appeared well nourished and normally developed. 


HEENT: Head exam is unremarkable. Neck is without jugular venous distension.


LUNGS: Lungs are clear to auscultation and percussion. Breath sounds decreased.


HEART: Rate and Rhythm are regular. 


ABDOMEN: Nontender.  Nondistended.


EXTREMITITES: 1+ edema.





Objective





- Vital Signs


Vital signs: 


                                   Vital Signs











Temp  96.3 F L  04/17/20 06:34


 


Pulse  68   04/17/20 07:00


 


Resp  11 L  04/17/20 07:00


 


BP  104/59   04/17/20 07:00


 


Pulse Ox  98   04/17/20 07:00








                                 Intake & Output











 04/16/20 04/17/20 04/17/20





 18:59 06:59 18:59


 


Intake Total 1300 1140 


 


Output Total 1505 1654 50


 


Balance -205 -514 -50


 


Weight  117.8 kg 117.8 kg


 


Intake:   


 


  Intake, IV Titration 320 180 





  Amount   


 


    Sodium Chloride 0.9% 1, 320 180 





    000 ml @ 100 mls/hr IV .   





    Q10H [facility] Rx#:[id number]   


 


  Oral  240 


 


  Blood Product 930 620 


 


    Rc As-1  Unit 310  





    [id number]   


 


    Rc As-1  Unit 0 310 





    [id number]   


 


    Rc As-1  Unit  0 





    [id number]   


 


    Rc As-3  Unit  310 





    [id number]   


 


  Other 50 100 


 


    Rc As-1  Unit 50  





    [id number]   


 


    Rc As-1  Unit  100 





    [id number]   


 


Output:   


 


  Urine 1505 1650 50


 


    Uretheral ([place]) 500  


 


  Stool  4 


 


Other:   


 


  Voiding Method Indwelling Catheter Indwelling Catheter Indwelling Catheter














- Labs


CBC & Chem 7: 


                                 04/17/20 04:35





                                 04/17/20 04:35


Labs: 


                  Abnormal Lab Results - Last 24 Hours (Table)











  04/16/20 04/16/20 04/16/20 Range/Units





  09:12 14:52 16:43 


 


WBC     (3.8-10.6)  k/uL


 


RBC     (4.30-5.90)  m/uL


 


Hgb     (13.0-17.5)  gm/dL


 


Hct     (39.0-53.0)  %


 


RDW     (11.5-15.5)  %


 


Potassium     (3.5-5.1)  mmol/L


 


BUN     (9-20)  mg/dL


 


Creatinine     (0.66-1.25)  mg/dL


 


Glucose     (74-99)  mg/dL


 


POC Glucose (mg/dL)    210 H  (75-99)  mg/dL


 


Plasma Lactic Acid Jeanmarie   5.3 H*   (0.7-2.0)  mmol/L


 


Crossmatch  See Detail    














  04/16/20 04/16/20 04/16/20 Range/Units





  19:19 21:04 21:39 


 


WBC    19.1 H  (3.8-10.6)  k/uL


 


RBC    2.37 L  (4.30-5.90)  m/uL


 


Hgb    6.1 L* D  (13.0-17.5)  gm/dL


 


Hct    19.2 L*  (39.0-53.0)  %


 


RDW    19.5 H  (11.5-15.5)  %


 


Potassium     (3.5-5.1)  mmol/L


 


BUN     (9-20)  mg/dL


 


Creatinine     (0.66-1.25)  mg/dL


 


Glucose     (74-99)  mg/dL


 


POC Glucose (mg/dL)   268 H   (75-99)  mg/dL


 


Plasma Lactic Acid Jeanmarie  3.0 H*    (0.7-2.0)  mmol/L


 


Crossmatch     














  04/17/20 04/17/20 04/17/20 Range/Units





  04:35 04:35 06:47 


 


WBC   17.8 H   (3.8-10.6)  k/uL


 


RBC   2.66 L   (4.30-5.90)  m/uL


 


Hgb   6.9 L*   (13.0-17.5)  gm/dL


 


Hct   21.7 L   (39.0-53.0)  %


 


RDW   19.0 H   (11.5-15.5)  %


 


Potassium  2.9 L    (3.5-5.1)  mmol/L


 


BUN  124 H*    (9-20)  mg/dL


 


Creatinine  2.04 H    (0.66-1.25)  mg/dL


 


Glucose  160 H    (74-99)  mg/dL


 


POC Glucose (mg/dL)    157 H  (75-99)  mg/dL


 


Plasma Lactic Acid Jeanmarie     (0.7-2.0)  mmol/L


 


Crossmatch     














Assessment and Plan


Plan: 





Assessment:


1.  Acute kidney injury secondary to ATN secondary to cardiorenal syndrome and 

acute blood loss anemia.  Renal function is a little worse today.  Creatinine 

2.03.


2.  Chronic kidney disease stage III with baseline creatinine in the range of 

0.9-1.2.


3.  Acute on chronic systolic CHF with ejection fraction of 25%.


4.  Volume overload. Improving with diuresis.


5.  Atrophic right kidney with mass.  Patient will follow-up with urology 

outpatient.


6.  Insulin-dependent diabetes mellitus.


7.  Hypokalemia secondary to diuresis. Better posterior placement.  Magnesium 

normal.


8.  Acute GI bleed.  Currently receiving his fourth unit of blood transfusion.  

Scheduled for endoscopy tomorrow.


9.  Hypokalemia secondary to diuresis.





Plan:


Patient scheduled to receive 2 units of packed red cell transfusion with Lasix 

of 40 mg in between.


Maintain Lasix 40 mg orally twice daily.


Maintain midodrine.


Potassium is being replaced.


Continue to monitor renal function and urine output.


I will also give him a dose of IV DDAVP today.

## 2020-04-17 NOTE — CONS
CONSULTATION



DATE OF DICTATION:

04/17/2020



REASON FOR CONSULTATION:

Severe symptomatic anemia and black colored stools for 2 days.



HISTORY OF PRESENT ILLNESS:

The patient is an 80-year-old pleasant white male with history of _____ ischemic

cardiomyopathy with an ejection fraction of 20%, history of AICD implantation,

hypertension, diabetes mellitus, hyperlipidemia, atrial fibrillation on Eliquis and

coronary artery disease was admitted to the hospital because of exertional dyspnea,

increasing swelling of his lower extremity on 5th of April of 2020. He has been in the

intensive care unit all along. At the time of admission to the hospital, hemoglobin was

11 and gradually decreased and yesterday the hemoglobin was as low as 6.5 g/dL.

Received 2 units of blood transfusions.  Repeat CBC was 6.7 and receiving 2 more units

of blood transfusion.  Repeat CBC following the fourth unit is still pending.  During

the course of hospitalization, he developed worsening of his chronic kidney disease and

at one point was noted to have mild elevation of troponins and cardiology was

consulted.



The patient presently remains in the intensive care unit, doing well. He denies any

complaints. He reports no abdominal pain. He reports no nausea, vomiting. He denies any

rectal bleeding. He had some black tarry stools for the last 2 days duration.



PAST MEDICAL HISTORY:

His past medical history was significant for hypertension, hyperlipidemia, diabetes

mellitus, coronary artery disease, congestive heart failure, and gastroesophageal

reflux disease.



MEDICATIONS AT HOME:

Eliquis, aspirin, Lipitor, Coreg, Lasix, Glucotrol, Lantus, Effexor, Lamictal, vitamin

D, B.



SOCIAL HISTORY:

No smoking.  No alcohol use.



FAMILY HISTORY:

Unremarkable.



PAST SURGICAL HISTORY:

CABG.



PHYSICAL EXAMINATION:

On physical examination, he appears comfortable.  No apparent distress.

Vital signs are stable. Blood pressure is 104/59, pulse rate 68, temperature 96.3.

HEENT:  Examination unremarkable. Conjunctivae pink. Sclerae anicteric. Oral cavity no

lesions.

NECK:  No JVD or lymph node enlargement.

CHEST: Decreased breath sounds bilaterally.

HEART:  Regular rate and rhythm.

ABDOMEN:  Soft. Bowel sounds are positive.  No organomegaly.

EXTREMITIES: 2+ pedal edema.

SKIN: No rashes.

NEURO: He is alert and oriented x3.  No focal deficits.



LABS:

Labs from today: WBC 19.1, hemoglobin 6.1, platelets 239.  He receives 2 units of blood

transfusion, repeat hemoglobin was 6.9 and he is currently receiving 2 more units of

blood transfusion. BUN went up to 109 yesterday and today it is 124, creatinine is

2.04. Hemoglobin 2 days ago was 4.2 g/dL.



IMPRESSION:

1. Acute _____ anemia with hemoglobin of 4.2, requiring a total of 4 units of blood

    transfusion, presently remains on Eliquis for atrial fibrillation, currently on

    hold for the last 36 hours. He received a total of 4 units of blood transfusion,

    last hemoglobin was 6.9 g/dL.  He has been having black tarry stools for the last 2

    days duration. _____hemoglobin from 11 at the time of admission to the hospital 10

    days ago.

2. Atrial fibrillation on Eliquis, currently on hold.

3. History of congestive heart failure.

4. Coronary artery disease, status post CABG several years ago.

5. Chronic kidney disease, acute worsening of renal parameters.



RECOMMENDATIONS:

1. Hold Eliquis and aspirin.

2. Agree with blood transfusion.

3. Continue with Protonix 40 mg twice daily.

4. Will proceed with an EGD and colonoscopy tomorrow.  Discussed with the patient and

    he is agreeable to it.

Thank you for this consultation.





MMODL / IJN: [id number] / Job#: [id number]

## 2020-04-18 LAB
ANION GAP SERPL CALC-SCNC: 10 MMOL/L
ANION GAP SERPL CALC-SCNC: 9 MMOL/L
BASOPHILS # BLD AUTO: 0 K/UL (ref 0–0.2)
BASOPHILS NFR BLD AUTO: 0 %
BUN SERPL-SCNC: 106 MG/DL (ref 9–20)
BUN SERPL-SCNC: 113 MG/DL (ref 9–20)
CALCIUM SPEC-MCNC: 8.7 MG/DL (ref 8.4–10.2)
CALCIUM SPEC-MCNC: 8.9 MG/DL (ref 8.4–10.2)
CHLORIDE SERPL-SCNC: 101 MMOL/L (ref 98–107)
CHLORIDE SERPL-SCNC: 101 MMOL/L (ref 98–107)
CO2 SERPL-SCNC: 32 MMOL/L (ref 22–30)
CO2 SERPL-SCNC: 32 MMOL/L (ref 22–30)
EOSINOPHIL # BLD AUTO: 0.2 K/UL (ref 0–0.7)
EOSINOPHIL NFR BLD AUTO: 1 %
ERYTHROCYTE [DISTWIDTH] IN BLOOD BY AUTOMATED COUNT: 2.69 M/UL (ref 4.3–5.9)
ERYTHROCYTE [DISTWIDTH] IN BLOOD BY AUTOMATED COUNT: 2.73 M/UL (ref 4.3–5.9)
ERYTHROCYTE [DISTWIDTH] IN BLOOD BY AUTOMATED COUNT: 3.1 M/UL (ref 4.3–5.9)
ERYTHROCYTE [DISTWIDTH] IN BLOOD: 18.8 % (ref 11.5–15.5)
ERYTHROCYTE [DISTWIDTH] IN BLOOD: 19.3 % (ref 11.5–15.5)
ERYTHROCYTE [DISTWIDTH] IN BLOOD: 19.4 % (ref 11.5–15.5)
GLUCOSE BLD-MCNC: 110 MG/DL (ref 75–99)
GLUCOSE BLD-MCNC: 135 MG/DL (ref 75–99)
GLUCOSE BLD-MCNC: 154 MG/DL (ref 75–99)
GLUCOSE BLD-MCNC: 204 MG/DL (ref 75–99)
GLUCOSE BLD-MCNC: 92 MG/DL (ref 75–99)
GLUCOSE SERPL-MCNC: 132 MG/DL (ref 74–99)
GLUCOSE SERPL-MCNC: 74 MG/DL (ref 74–99)
HCT VFR BLD AUTO: 22.4 % (ref 39–53)
HCT VFR BLD AUTO: 22.8 % (ref 39–53)
HCT VFR BLD AUTO: 25.9 % (ref 39–53)
HGB BLD-MCNC: 7 GM/DL (ref 13–17.5)
HGB BLD-MCNC: 7.2 GM/DL (ref 13–17.5)
HGB BLD-MCNC: 8.2 GM/DL (ref 13–17.5)
INR PPP: 1.3 (ref ?–1.2)
LYMPHOCYTES # SPEC AUTO: 1.1 K/UL (ref 1–4.8)
LYMPHOCYTES NFR SPEC AUTO: 8 %
MCH RBC QN AUTO: 26 PG (ref 25–35)
MCH RBC QN AUTO: 26.3 PG (ref 25–35)
MCH RBC QN AUTO: 26.5 PG (ref 25–35)
MCHC RBC AUTO-ENTMCNC: 31.3 G/DL (ref 31–37)
MCHC RBC AUTO-ENTMCNC: 31.6 G/DL (ref 31–37)
MCHC RBC AUTO-ENTMCNC: 31.6 G/DL (ref 31–37)
MCV RBC AUTO: 83 FL (ref 80–100)
MCV RBC AUTO: 83.5 FL (ref 80–100)
MCV RBC AUTO: 83.6 FL (ref 80–100)
MONOCYTES # BLD AUTO: 1.2 K/UL (ref 0–1)
MONOCYTES NFR BLD AUTO: 9 %
NEUTROPHILS # BLD AUTO: 11 K/UL (ref 1.3–7.7)
NEUTROPHILS NFR BLD AUTO: 80 %
PLATELET # BLD AUTO: 230 K/UL (ref 150–450)
PLATELET # BLD AUTO: 247 K/UL (ref 150–450)
PLATELET # BLD AUTO: 271 K/UL (ref 150–450)
POTASSIUM SERPL-SCNC: 3.4 MMOL/L (ref 3.5–5.1)
POTASSIUM SERPL-SCNC: 4 MMOL/L (ref 3.5–5.1)
PT BLD: 13.4 SEC (ref 9–12)
SODIUM SERPL-SCNC: 142 MMOL/L (ref 137–145)
SODIUM SERPL-SCNC: 143 MMOL/L (ref 137–145)
WBC # BLD AUTO: 13.8 K/UL (ref 3.8–10.6)
WBC # BLD AUTO: 15.1 K/UL (ref 3.8–10.6)
WBC # BLD AUTO: 17.8 K/UL (ref 3.8–10.6)

## 2020-04-18 PROCEDURE — 0DB78ZX EXCISION OF STOMACH, PYLORUS, VIA NATURAL OR ARTIFICIAL OPENING ENDOSCOPIC, DIAGNOSTIC: ICD-10-PCS

## 2020-04-18 PROCEDURE — 0W3P8ZZ CONTROL BLEEDING IN GASTROINTESTINAL TRACT, VIA NATURAL OR ARTIFICIAL OPENING ENDOSCOPIC: ICD-10-PCS

## 2020-04-18 PROCEDURE — 3E0G8GC INTRODUCTION OF OTHER THERAPEUTIC SUBSTANCE INTO UPPER GI, VIA NATURAL OR ARTIFICIAL OPENING ENDOSCOPIC: ICD-10-PCS

## 2020-04-18 PROCEDURE — 30233N1 TRANSFUSION OF NONAUTOLOGOUS RED BLOOD CELLS INTO PERIPHERAL VEIN, PERCUTANEOUS APPROACH: ICD-10-PCS

## 2020-04-18 RX ADMIN — INSULIN ASPART SCH: 100 INJECTION, SOLUTION INTRAVENOUS; SUBCUTANEOUS at 06:50

## 2020-04-18 RX ADMIN — CYANOCOBALAMIN TAB 500 MCG SCH MCG: 500 TAB at 12:17

## 2020-04-18 RX ADMIN — INSULIN ASPART SCH: 100 INJECTION, SOLUTION INTRAVENOUS; SUBCUTANEOUS at 12:21

## 2020-04-18 RX ADMIN — MIDODRINE HYDROCHLORIDE SCH MG: 5 TABLET ORAL at 12:21

## 2020-04-18 RX ADMIN — POTASSIUM CHLORIDE SCH MEQ: 20 TABLET, EXTENDED RELEASE ORAL at 16:05

## 2020-04-18 RX ADMIN — VENLAFAXINE HYDROCHLORIDE SCH MG: 75 TABLET ORAL at 20:44

## 2020-04-18 RX ADMIN — Medication SCH MG: at 20:44

## 2020-04-18 RX ADMIN — MIDODRINE HYDROCHLORIDE SCH MG: 5 TABLET ORAL at 18:10

## 2020-04-18 RX ADMIN — INSULIN DETEMIR SCH UNIT: 100 INJECTION, SOLUTION SUBCUTANEOUS at 20:44

## 2020-04-18 RX ADMIN — FUROSEMIDE SCH MG: 20 TABLET ORAL at 12:15

## 2020-04-18 RX ADMIN — INSULIN ASPART SCH UNIT: 100 INJECTION, SOLUTION INTRAVENOUS; SUBCUTANEOUS at 20:44

## 2020-04-18 RX ADMIN — FUROSEMIDE SCH MG: 20 TABLET ORAL at 16:05

## 2020-04-18 RX ADMIN — PANTOPRAZOLE SODIUM SCH MG: 40 INJECTION, POWDER, FOR SOLUTION INTRAVENOUS at 20:44

## 2020-04-18 RX ADMIN — INSULIN DETEMIR SCH UNIT: 100 INJECTION, SOLUTION SUBCUTANEOUS at 12:15

## 2020-04-18 RX ADMIN — POTASSIUM CHLORIDE SCH MEQ: 20 TABLET, EXTENDED RELEASE ORAL at 12:17

## 2020-04-18 RX ADMIN — VENLAFAXINE HYDROCHLORIDE SCH MG: 75 TABLET ORAL at 12:17

## 2020-04-18 RX ADMIN — ATORVASTATIN CALCIUM SCH MG: 80 TABLET, FILM COATED ORAL at 20:44

## 2020-04-18 RX ADMIN — PANTOPRAZOLE SODIUM SCH MG: 40 INJECTION, POWDER, FOR SOLUTION INTRAVENOUS at 08:46

## 2020-04-18 RX ADMIN — Medication SCH UNIT: at 12:15

## 2020-04-18 RX ADMIN — INSULIN ASPART SCH UNIT: 100 INJECTION, SOLUTION INTRAVENOUS; SUBCUTANEOUS at 18:08

## 2020-04-18 RX ADMIN — POTASSIUM CHLORIDE SCH MEQ: 20 TABLET, EXTENDED RELEASE ORAL at 18:08

## 2020-04-18 RX ADMIN — MIDODRINE HYDROCHLORIDE SCH: 5 TABLET ORAL at 08:00

## 2020-04-18 NOTE — PN
PROGRESS NOTE



Juan José Tariq is an 80-year-old gentleman with ischemic cardiomyopathy, GI bleeding.  He

still has some melenic stool, hemodynamically stable, not on any pressors.  Hemoglobin

today is 8.1.  His heart rate is in the 70s, atrial fibrillation.  JVD is evident 1 cm,

no carotid bruit, S1, S2 with irregular rhythm, short systolic murmur.  Lungs reveal

diminished air entry. Abdomen is soft.  Lower extremities reveal diminished pulses.

Central nervous system grossly, no focal deficits.



I am recommending that EGD can be performed today.  Cardiac-wise, we will continue his

current medical regimen.  I suspect he has an upper GI bleeding, probably a gastric or

duodenal ulcer.  However, hemoglobin has been stable.  Cardiac-wise same medications.

He will have endoscopy today.





MMODL / IJN: [id number] / Job#: [id number]

## 2020-04-18 NOTE — P.PN
Subjective


Progress Note Date: 04/18/20


Principal diagnosis: 





Exertional dyspnea, anasarca





This is an 80-year-old male patient of Dr. Duckworth, with known history of severe 

ischemic cardiomyopathy, and ejection fraction of less than 20%, status post 

AICD placement, hypertension, diabetes mellitus, hyperlipidemia, persistent 

atrial fibrillation, coronary artery disease status post bypass grafting, ex-

smoker, chronic congestive heart failure with systolic dysfunction, history of 

kidney and bladder cancer status post chemo and radiation, who presented to the 

emergency department on 04/05/2020 with complaints of worsening exertional 

dyspnea, and increased swelling in his lower extremities.  He denied any chest 

pain, palpitations, no fever or chills, no nausea vomiting, no diaphoresis, no 

cough or phlegm production.  Admission chest x-ray showed creased vascular 

congestion, and pulmonary edema and a small left pleural effusion.  Admission 

blood work was negative for any leukocytosis, white blood cell count was 8.2, 

hemoglobin is 10.6, mild lymphopenia, INR is 1.3, potassium is 5.2, the rest of 

the electrolytes were within normal limits, BUN is 52 creatinine is 2.0, patient

does have history of chronic kidney disease stage III, this showed signs of 

infection although patient denies any symptoms, troponins were 0.031, 0.026, 

0.031, proBNP was 2710.  We were asked to see the patient in consultation.  

Patient has been started on infusion of Lasix, early infusing at a rate of 10 mg

per hour, and dobutamine drip infusing at 5 mics per kilo per minute.  A is on 

oral Eliquis for history of chronic atrial fibrillation he remains in A. fib 

with a controlled rate of 93 bpm.  He is sitting up in the recliner, in no acute

distress, on 4 L of oxygen with a pulse ox of 95-98%, he is been afebrile, he 

has not diuresed significantly despite the Lasix infusion, and today's blood 

work shows worsening of his renal profile would be on up to 79 and creatinine is

up to 2.7.  Nephrology is following. 





The patient is seen today 04/18/2020 in follow-up in the intensive care unit.  

He is currently awake and alert in no acute distress.  Maintaining O2 

saturations up to 100% on 2 L/m per nasal cannula.  He's been afebrile.  

Hemodynamically stable.  He was transferred to the intensive care unit for 

severe anemia.  He is now status post 4 units packed red blood cells. He did 

undergo EGD this morning.  He was found to have actively bleeding duodenal ulcer

in the second portion of the duodenum just distal to the duodenal sweep with a 

visible vessel status post injection epinephrine followed by Endo Clip placemen

t.  Two other nonbleeding duodenal ulcers noted in the duodenal bulb.  Antral 

erosive gastritis.  He remains on Protonix 40 mg twice a day.  Continue to 

monitor hemoglobin and transfuse as needed.  White count 17.8.  Hemoglobin 8.2. 

Platelet count 271.  INR 1.3.  Sodium 143.  Potassium 4.0.  Creatinine 1.82.  He

is on oral diuretics now.  Remaining in a negative balance.





Objective





- Vital Signs


Vital signs: 


                                   Vital Signs











Temp  98 F   04/18/20 08:58


 


Pulse  79   04/18/20 11:00


 


Resp  21   04/18/20 11:00


 


BP  107/63   04/18/20 11:00


 


Pulse Ox  99   04/18/20 11:00








                                 Intake & Output











 04/17/20 04/18/20 04/18/20





 18:59 06:59 18:59


 


Intake Total 560 1890 200


 


Output Total 1625 1327 700


 


Balance -1065 563 -500


 


Weight 117.8 kg 118.2 kg 


 


Intake:   


 


  IV   200


 


  Intake, IV Titration 50  





  Amount   


 


    Desmopressin Acetate 24 50  





    mcg In Sodium Chloride 0.   





    9% 50 ml @ 200 mls/hr   





    IVPB ONCE ONE Rx#:   





    [id number]   


 


  Oral 200 1890 


 


  Blood Product 310  


 


    Rc As-1  Unit 310  





    [id number]   


 


Output:   


 


  Urine 1625 1320 700


 


  Stool  7 


 


Other:   


 


  Voiding Method Indwelling Catheter Indwelling Catheter 


 


  # Bowel Movements 1 4 1














- Exam





GENERAL EXAM: Alert, 80-year-old gentleman, awake and alert, on 2 L nasal 

cannula with an O2 saturation of 99%, comfortable in no apparent distress.


HEAD: Normocephalic.


EYES: Normal reaction of pupils, equal size.


NOSE: Clear with pink turbinates.


THROAT: No erythema or exudates.


NECK: No masses, no JVD.


CHEST: No chest wall deformity.


LUNGS: Equal air entry with crackles through the mid lung fields and lower lung 

fields.


CVS: S1 and S2 normal with no audible murmur, irregular rhythm.


ABDOMEN: No hepatosplenomegaly, normal bowel sounds, no guarding or rigidity.


SPINE: No scoliosis or deformity


SKIN: No rashes


CENTRAL NERVOUS SYSTEM: No focal deficits, tone is normal in all 4 extremities.


EXTREMITIES: There is 1-2+ peripheral edema.  No clubbing, no cyanosis.  

Peripheral pulses are intact.





- Labs


CBC & Chem 7: 


                                 04/18/20 04:11





                                 04/18/20 04:11


Labs: 


                  Abnormal Lab Results - Last 24 Hours (Table)











  04/16/20 04/17/20 04/17/20 Range/Units





  09:12 11:22 13:16 


 


WBC    18.1 H  (3.8-10.6)  k/uL


 


RBC    3.11 L  (4.30-5.90)  m/uL


 


Hgb    8.1 L  (13.0-17.5)  gm/dL


 


Hct    25.6 L  (39.0-53.0)  %


 


RDW    18.6 H  (11.5-15.5)  %


 


Neutrophils #    14.7 H  (1.3-7.7)  k/uL


 


Monocytes #    1.2 H  (0-1.0)  k/uL


 


PT     (9.0-12.0)  sec


 


INR     (<1.2)  


 


Potassium     (3.5-5.1)  mmol/L


 


Carbon Dioxide     (22-30)  mmol/L


 


BUN     (9-20)  mg/dL


 


Creatinine     (0.66-1.25)  mg/dL


 


Glucose     (74-99)  mg/dL


 


POC Glucose (mg/dL)   159 H   (75-99)  mg/dL


 


Crossmatch  See Detail    














  04/17/20 04/17/20 04/17/20 Range/Units





  13:16 17:16 18:38 


 


WBC    18.9 H  (3.8-10.6)  k/uL


 


RBC    2.98 L  (4.30-5.90)  m/uL


 


Hgb    7.9 L  (13.0-17.5)  gm/dL


 


Hct    24.5 L  (39.0-53.0)  %


 


RDW    18.7 H  (11.5-15.5)  %


 


Neutrophils #     (1.3-7.7)  k/uL


 


Monocytes #     (0-1.0)  k/uL


 


PT     (9.0-12.0)  sec


 


INR     (<1.2)  


 


Potassium  3.3 L    (3.5-5.1)  mmol/L


 


Carbon Dioxide     (22-30)  mmol/L


 


BUN     (9-20)  mg/dL


 


Creatinine     (0.66-1.25)  mg/dL


 


Glucose     (74-99)  mg/dL


 


POC Glucose (mg/dL)   127 H   (75-99)  mg/dL


 


Crossmatch     














  04/17/20 04/17/20 04/18/20 Range/Units





  18:38 21:09 04:11 


 


WBC    17.8 H  (3.8-10.6)  k/uL


 


RBC    3.10 L  (4.30-5.90)  m/uL


 


Hgb    8.2 L  (13.0-17.5)  gm/dL


 


Hct    25.9 L  (39.0-53.0)  %


 


RDW    18.8 H  (11.5-15.5)  %


 


Neutrophils #     (1.3-7.7)  k/uL


 


Monocytes #     (0-1.0)  k/uL


 


PT     (9.0-12.0)  sec


 


INR     (<1.2)  


 


Potassium  3.2 L    (3.5-5.1)  mmol/L


 


Carbon Dioxide  31 H    (22-30)  mmol/L


 


BUN  120 H*    (9-20)  mg/dL


 


Creatinine  1.87 H    (0.66-1.25)  mg/dL


 


Glucose  112 H    (74-99)  mg/dL


 


POC Glucose (mg/dL)   102 H   (75-99)  mg/dL


 


Crossmatch     














  04/18/20 04/18/20 04/18/20 Range/Units





  04:11 08:35 10:28 


 


WBC     (3.8-10.6)  k/uL


 


RBC     (4.30-5.90)  m/uL


 


Hgb     (13.0-17.5)  gm/dL


 


Hct     (39.0-53.0)  %


 


RDW     (11.5-15.5)  %


 


Neutrophils #     (1.3-7.7)  k/uL


 


Monocytes #     (0-1.0)  k/uL


 


PT    13.4 H  (9.0-12.0)  sec


 


INR    1.3 H  (<1.2)  


 


Potassium     (3.5-5.1)  mmol/L


 


Carbon Dioxide  32 H    (22-30)  mmol/L


 


BUN  113 H*    (9-20)  mg/dL


 


Creatinine  1.82 H    (0.66-1.25)  mg/dL


 


Glucose     (74-99)  mg/dL


 


POC Glucose (mg/dL)   110 H   (75-99)  mg/dL


 


Crossmatch     














Assessment and Plan


Assessment: 





1.  Acute exacerbation of chronic congestive heart failure with systolic 

dysfunction





2.  Acute kidney injury likely related to cardiorenal syndrome, diuretic 

therapy, nephrology is following





3.  Acute urinary tract infection, placed on ceftriaxone, urine culture negative





4.  Ischemic cardiomyopathy, with EF of less than 20%, status post AICD 

implantation





5.  Diabetes mellitus





6.  History of chronic persistent atrial fibrillation on oral anticoagulation





7.  History of chronic kidney disease stage III at baseline, history of right 

hydronephrosis and patient follows with urology





8.  Coronary artery disease status post bypass grafting





9.  Hypertension





10.  Hyperlipidemia





11.  History of bladder cancer





12.  Acute anemia requiring 4 units of packed red blood cells this admission.  

EGD today for 18 2020 revealed an actively bleeding duodenal ulcer in the second

portion of the duodenum just distal to the distal duodenal sweep with a visible 

vessel, status post injection of epinephrine followed by Endo Clip.  There were 

2 other nonbleeding duodenal ulcers noted as well.  Antral erosive gastritis.





Plan:





The patient was seen and evaluated by Dr. Quinn  


EGD results noted


Current hemoglobin 8.2


Not requiring any pressors


We'll continue to monitor him closely here in the intensive care unit another 24

hours


Continue to monitor hemoglobin


We'll continue to follow and make further recommendations based on his clinical 

status





I, the cosigning physician, performed a history & physical examination of the 

patient. Lungs sounds with crackles in the mid and lower lung fields 

bilaterally.  Maintaining good O2 saturations in the 90s on 2 L/m per nasal c

annula.  I discussed the assessment and plan of care with my nurse practitioner,

Kavya Llanos. I attest to the above note as dictated by her.


Time with Patient: Greater than 30

## 2020-04-18 NOTE — P.PN
Subjective


Progress Note Date: 04/18/20





This is an 80-year-old male with significant cardiomyopathy ejection fraction 

25% atrial fibrillation chronic heart failure was admitted with worsening 

shortness of breath and is being followed up with acute kidney injury from 

cardiorenal syndrome.  He also had significant anemia and blood loss.  Creatini

ne has been going up somewhat.





Admission creatinine on 04/05/2020 was 22.7 on 04/08/2020 and then started to 

improve and is currently on 1.82.





He has been transfused and is on Lasix.  Also has a kidney mass on the right 

than atrophy daily and previously has had hydronephrosis.





Patient is on midodrine, blood pressure in the 90s to 101/62.





Currently he is awake alert and responds and follows commands.  He is on O2 

nasal cannula and is comfortable.  His atrial fibrillation.  Not on pressors





Objective





- Vital Signs


Vital signs: 


                                   Vital Signs











Temp  97.9 F   04/18/20 04:00


 


Pulse  80   04/18/20 07:00


 


Resp  17   04/18/20 07:00


 


BP  101/62   04/18/20 07:00


 


Pulse Ox  99   04/18/20 07:00








                                 Intake & Output











 04/17/20 04/18/20 04/18/20





 18:59 06:59 18:59


 


Intake Total 560 1890 


 


Output Total 1625 1327 100


 


Balance -1065 563 -100


 


Weight 117.8 kg 118.2 kg 


 


Intake:   


 


  Intake, IV Titration 50  





  Amount   


 


    Desmopressin Acetate 24 50  





    mcg In Sodium Chloride 0.   





    9% 50 ml @ 200 mls/hr   





    IVPB ONCE ONE Rx#:   





    [id number]   


 


  Oral 200 1890 


 


  Blood Product 310  


 


    Rc As-1  Unit 310  





    [id number]   


 


Output:   


 


  Urine 1625 1320 100


 


  Stool  7 


 


Other:   


 


  Voiding Method Indwelling Catheter Indwelling Catheter 


 


  # Bowel Movements 1 4 








On examination awake alert oriented





HEENT exam no JVP neck is supple no facial asymmetry





Except clear to auscultation good air entry bilaterally





Heart sounds unremarkable atrial fibrillation.





Abdomen soft nontender extremity examination reveals mild edema





Neurologically awake alert oriented





- Labs


CBC & Chem 7: 


                                 04/18/20 04:11





                                 04/18/20 04:11


Labs: 


                  Abnormal Lab Results - Last 24 Hours (Table)











  04/16/20 04/17/20 04/17/20 Range/Units





  09:12 11:22 13:16 


 


WBC    18.1 H  (3.8-10.6)  k/uL


 


RBC    3.11 L  (4.30-5.90)  m/uL


 


Hgb    8.1 L  (13.0-17.5)  gm/dL


 


Hct    25.6 L  (39.0-53.0)  %


 


RDW    18.6 H  (11.5-15.5)  %


 


Neutrophils #    14.7 H  (1.3-7.7)  k/uL


 


Monocytes #    1.2 H  (0-1.0)  k/uL


 


Potassium     (3.5-5.1)  mmol/L


 


Carbon Dioxide     (22-30)  mmol/L


 


BUN     (9-20)  mg/dL


 


Creatinine     (0.66-1.25)  mg/dL


 


Glucose     (74-99)  mg/dL


 


POC Glucose (mg/dL)   159 H   (75-99)  mg/dL


 


Crossmatch  See Detail    














  04/17/20 04/17/20 04/17/20 Range/Units





  13:16 17:16 18:38 


 


WBC    18.9 H  (3.8-10.6)  k/uL


 


RBC    2.98 L  (4.30-5.90)  m/uL


 


Hgb    7.9 L  (13.0-17.5)  gm/dL


 


Hct    24.5 L  (39.0-53.0)  %


 


RDW    18.7 H  (11.5-15.5)  %


 


Neutrophils #     (1.3-7.7)  k/uL


 


Monocytes #     (0-1.0)  k/uL


 


Potassium  3.3 L    (3.5-5.1)  mmol/L


 


Carbon Dioxide     (22-30)  mmol/L


 


BUN     (9-20)  mg/dL


 


Creatinine     (0.66-1.25)  mg/dL


 


Glucose     (74-99)  mg/dL


 


POC Glucose (mg/dL)   127 H   (75-99)  mg/dL


 


Crossmatch     














  04/17/20 04/17/20 04/18/20 Range/Units





  18:38 21:09 04:11 


 


WBC    17.8 H  (3.8-10.6)  k/uL


 


RBC    3.10 L  (4.30-5.90)  m/uL


 


Hgb    8.2 L  (13.0-17.5)  gm/dL


 


Hct    25.9 L  (39.0-53.0)  %


 


RDW    18.8 H  (11.5-15.5)  %


 


Neutrophils #     (1.3-7.7)  k/uL


 


Monocytes #     (0-1.0)  k/uL


 


Potassium  3.2 L    (3.5-5.1)  mmol/L


 


Carbon Dioxide  31 H    (22-30)  mmol/L


 


BUN  120 H*    (9-20)  mg/dL


 


Creatinine  1.87 H    (0.66-1.25)  mg/dL


 


Glucose  112 H    (74-99)  mg/dL


 


POC Glucose (mg/dL)   102 H   (75-99)  mg/dL


 


Crossmatch     














  04/18/20 Range/Units





  04:11 


 


WBC   (3.8-10.6)  k/uL


 


RBC   (4.30-5.90)  m/uL


 


Hgb   (13.0-17.5)  gm/dL


 


Hct   (39.0-53.0)  %


 


RDW   (11.5-15.5)  %


 


Neutrophils #   (1.3-7.7)  k/uL


 


Monocytes #   (0-1.0)  k/uL


 


Potassium   (3.5-5.1)  mmol/L


 


Carbon Dioxide  32 H  (22-30)  mmol/L


 


BUN  113 H*  (9-20)  mg/dL


 


Creatinine  1.82 H  (0.66-1.25)  mg/dL


 


Glucose   (74-99)  mg/dL


 


POC Glucose (mg/dL)   (75-99)  mg/dL


 


Crossmatch   














Assessment and Plan


Assessment: 





Impression





1.  Acute kidney injury secondary to cardiorenal syndrome improving with 

diuretics, urine output is 2952, intake is 2450, creatinine is improved to 1.8





2.  Chronic kidney disease, stage III with a creatinine 1.2, GFR elevated in the

40s to 50s





3.  Mild metabolic alkalosis, secondary to diuresis





4.  Anemia of GI blood loss, going for EGD colonoscopy today





Recommendation





1.  Maintain Lasix 40 twice a day by mouth





2.  Maintain midodrine





3.  Watch metabolic alkalosis





4.  Pending EGD and colonoscopy.





5.  Transfuse as needed

## 2020-04-18 NOTE — P.PCN
Date of Procedure: 04/18/20


Procedure(s) Performed: 


BRIEF HISTORY: Patient is a 80-year-old, pleasant, white male admitted hospital 

with exacerbation of congestive heart failure and urinary mucosa was very this 

was noted to have significant drop in hemoglobin and black tarry stools.  He 

dropped his hemoglobin of 4.5 g/dL and received 4 units of blood transfusion.  

He is scheduled for an upper endoscopy and possible colonoscopy today.. 





PROCEDURE PERFORMED: EGD with injection epinephrine, Endo Clip placement and 

biopsy.





PREOPERATIVE DIAGNOSIS: Acute upper GI bleed





IV sedation per anesthesia. 





PROCEDURE: After informed consent was obtained, the patient  was brought into 

the endoscopy unit. IV sedation was administered by Anesthesia under continuous 

monitoring. Initially the Olympus GIF-140 video endoscope was inserted into the 

mouth. Esophagus intubated without any difficulty. It was gradually advanced 

into the stomach and duodenum and carefully examined.  In the second portion of 

the duodenum just distal to the duodenal sweep there was a 1 cm ulcer with a 

visible vessel and active bleeding noted.  Large clot was noted in this area 

following this there was active oozing identified.  I injected 10 mL of 1 in 

10,000 epinephrine at the base of the ulcer with significant decrease in 

bleeding. 4 endoclips were placed on the visible vessel and good hemostasis was 

achieved.  Adjacent to this area there was another 1 cm ulcer with a clean base 

and the duodenal bulb there was a 1.5 cm ulcer with a clean base noted.  The 

scope at this time was withdrawn to the stomach, adequately insufflated with 

air, and upon careful examination, mucosa of the antrum had erosive gastritis 

and biopsies were done from this area.  The body, cardia and the fundus appeared

normal. The scope was then withdrawn into the esophagus. The GE junction was 

located at 39 cm from the incisors. The esophagus appeared normal. There were no

erosions or ulcerations seen and the patient tolerated the procedure well. 





IMPRESSION: 


1.  Actively bleeding duodenal ulcer in the second portion of the duodenum just 

distal to the duodenal sweep with a visible vessel status post injection 

epinephrine followed by Endo Clip placement as described above.


2.  2 other nonbleeding duodenal ulcers noted in the duodenal bulb and duodenal 

sweep with a clean base


3.  Antral erosive gastritis.





RECOMMENDATIONS: The findings of this examination were discussed with the 

patient.  At this time continue with Protonix 40 mg twice daily.  Monitor CBC 

every 8 hours and transfuse as needed.  Start him on a clear liquid diet..

## 2020-04-18 NOTE — P.PN
Progress Note - Text


Progress Note Date: 04/18/20





Chief Complaint: Shortness of breath





Patient is a 80-year-old patient of Dr. Duckworth.   history of chronic atrial 

fibrillation on anticoagulation with Eliquis, coronary artery disease status 

post bypass graft, cardiomyopathy status post AICD placement, history of nephrec

guy and unilateral kidney, CK D stage III, hypertension, hyperlipidemia, GERD, 

diabetes type 2 and history of bipolar/depression came to ER with complaints of 

worsening shortness of breath and exertional dyspnea and bilateral lower 

extremity increases swelling for the past 2 weeks.  Patient does have minimal 

cough without any sputum production.  No sputum production.  No complaints of 

fever or chills.  Denied any sick contacts at home.  No recent travel.





Admitted with CHF exacerbation, atrial fibrillation with a rapid ventricular 

rate.  Started on IV Lasixdrip and IV amiodarone.then IV dobutamine was added.  

Switch to IV Lasix bolus.  April 16 patient became hypotensive.  Hemoglobin 

dropped to 4.2.  Moved to the ICU.  Receive 3 units of blood.  Also had some 

dark stools.


Today-.  Tired sitting up to chair.  Did undergo EGD this morning.  Found to 

have an actively bleeding duodenal ulcer in the second portion of duodenum.  

Epinephrine injection was given followed by Endo Clip.  2 other nonbleeding 

duodenal ulcers were found.





Review of systems: Was attempted for constitutional, cardiovascular, GI, 

pulmonary. relevant finding as above





Active Medications





Atorvastatin Calcium (Lipitor)  80 mg PO HS [facility]


   Last Admin: 04/17/20 21:08 Dose:  80 mg


   Documented by: 


Cholecalciferol (Vitamin D3 (25 Mcg = 1000 Iu))  2,000 unit PO DAILY [facility]


   Last Admin: 04/18/20 12:15 Dose:  2,000 unit


   Documented by: 


Cyanocobalamin (Vitamin B-12)  1,000 mcg PO DAILY [facility]


   Last Admin: 04/18/20 12:17 Dose:  1,000 mcg


   Documented by: 


Furosemide (Lasix)  40 mg PO BID@0900,1600 [facility]


   Last Admin: 04/18/20 12:15 Dose:  40 mg


   Documented by: 


Potassium Chloride 10 meq/ IV (Solution)  100 mls @ 100 mls/hr IVPB Q1HR [facility]; 

Protocol


   Stop: 04/18/20 19:59


Insulin Aspart (Novolog)  0 unit SQ ACHS [facility]; Protocol


   Last Admin: 04/18/20 12:21 Dose:  Not Given


   Documented by: 


Insulin Detemir (Levemir)  40 unit SQ HS [facility]


   Last Admin: 04/17/20 21:08 Dose:  40 unit


   Documented by: 


Insulin Detemir (Levemir)  10 unit SQ QAM [facility]


   Last Admin: 04/18/20 12:15 Dose:  10 unit


   Documented by: 


Lamotrigine (Lamictal)  100 mg PO [facility]


   Last Admin: 04/17/20 21:08 Dose:  100 mg


   Documented by: 


Melatonin (Melatonin)  3 mg PO [facility]


   Last Admin: 04/17/20 21:08 Dose:  3 mg


   Documented by: 


Midodrine (Proamatine)  10 mg PO AC-TID [facility]


   Last Admin: 04/18/20 12:21 Dose:  10 mg


   Documented by: 


Ondansetron HCl (Zofran)  4 mg IVP Q6HR PRN


   PRN Reason: Nausea And Vomiting


   Last Admin: 04/10/20 16:43 Dose:  4 mg


   Documented by: 


Pantoprazole Sodium (Protonix)  40 mg IVP BID [facility]


   Last Admin: 04/18/20 08:46 Dose:  40 mg


   Documented by: 


Potassium Chloride (K-Dur 20)  20 meq PO DAILY [facility]


   Last Admin: 04/18/20 12:17 Dose:  20 meq


   Documented by: 


Venlafaxine HCl (Effexor)  75 mg PO BID [facility]


   Last Admin: 04/18/20 12:17 Dose:  75 mg


   Documented by: 








On examination:


VITAL SIGNS: 98.2, 92, 17, 109/58, 98% on 2 L


GENERAL APPEARANCE: Sitting up in a chair, tired, sleepy


HEENT: Normal external appearance of nose and ear.  Oral cavity dry


EYES: Pupils equal. Conjunctiva pale. 


NECK: JVD unable to assess Mass not palpable. 


RESPIRATORY: Respiratory effort increased.  Decreased breath sounds.


CARDIOVASCULAR: Heart sounds irregular, edema decreased


ABDOMEN: Soft. Liver and spleen not palpable. No tenderness. No mass palpable.  

Cortes catheter-hematuria cleared


PSYCHIATRY: Answering questions





INVESTIGATIONS, reviewed in the clinical context:


White count 13.8 hemoglobin 7 progression 3.4 106 creatinine 1.69


EGD/April 18/-bleeding duodenal ulcer that was given epinephrine and Endo Clip. 

2 other duodenal ulcers nonbleeding.





Previous testing:


White count 8.2 hemoglobin 10.6 potassium 5.2 bun 52 creatinine 2.0 troponin I 

0.0-3


EKG-possible A. fib


Chest x-ray film personally reviewed by me-pulmonary edema cardiomegaly


BUN/creatinine on February 22-1.58





Assessment:


-Acute on chronic congestive heart failure exacerbation from systolic 

dysfunction, EF less than 30% 


-Bleeding duodenal ulcer given epinephrine injection with Endo Clip.  2 other 

nonbleeding duodenal ulcers


-Acute blood loss anemia-black tarry stools, from above-requiring a total of 4 

units of blood


-Severe hypokalemia from diuresis, better


-Hypotension multifactorial


Ischemic cardiomyopathy


Persistent atrial fibrillation on anticoagulation with Eliquis, rate better 

controlled


Acute kidney injury secondary to cardiorenal syndrome, 


-chronic kidney disease stage III.


Mild hyperkalemia secondary to acute kidney injury


Lactic acidosis-type II


History of nephrectomy due to renal cancer and bladder cancer history


Diabetes type 2.  Insulin-dependent and also on metformin and glipizide.


Hyperlipidemia


Coronary artery disease with history of multiple stents placement


Bipolar disorder and depression


GERD


Obesity with BMI 34.9


-





Plan:


Patient received a total of 4 units of blood.  EGD findings as above.  Repeat 

hemoglobin in the morning.  Diet as per GI.

## 2020-04-19 LAB
ANION GAP SERPL CALC-SCNC: 2 MMOL/L
BASOPHILS # BLD AUTO: 0 K/UL (ref 0–0.2)
BASOPHILS NFR BLD AUTO: 0 %
BUN SERPL-SCNC: 96 MG/DL (ref 9–20)
CALCIUM SPEC-MCNC: 8.7 MG/DL (ref 8.4–10.2)
CELLS COUNTED: 200
CHLORIDE SERPL-SCNC: 103 MMOL/L (ref 98–107)
CO2 SERPL-SCNC: 37 MMOL/L (ref 22–30)
EOSINOPHIL # BLD AUTO: 0.4 K/UL (ref 0–0.7)
EOSINOPHIL # BLD MANUAL: 0.45 K/UL (ref 0–0.7)
EOSINOPHIL NFR BLD AUTO: 4 %
ERYTHROCYTE [DISTWIDTH] IN BLOOD BY AUTOMATED COUNT: 2.64 M/UL (ref 4.3–5.9)
ERYTHROCYTE [DISTWIDTH] IN BLOOD BY AUTOMATED COUNT: 2.77 M/UL (ref 4.3–5.9)
ERYTHROCYTE [DISTWIDTH] IN BLOOD BY AUTOMATED COUNT: 3.01 M/UL (ref 4.3–5.9)
ERYTHROCYTE [DISTWIDTH] IN BLOOD: 18.3 % (ref 11.5–15.5)
ERYTHROCYTE [DISTWIDTH] IN BLOOD: 18.6 % (ref 11.5–15.5)
ERYTHROCYTE [DISTWIDTH] IN BLOOD: 19.7 % (ref 11.5–15.5)
GLUCOSE BLD-MCNC: 105 MG/DL (ref 75–99)
GLUCOSE BLD-MCNC: 120 MG/DL (ref 75–99)
GLUCOSE BLD-MCNC: 140 MG/DL (ref 75–99)
GLUCOSE BLD-MCNC: 78 MG/DL (ref 75–99)
GLUCOSE SERPL-MCNC: 71 MG/DL (ref 74–99)
HCT VFR BLD AUTO: 22.4 % (ref 39–53)
HCT VFR BLD AUTO: 23.5 % (ref 39–53)
HCT VFR BLD AUTO: 25.6 % (ref 39–53)
HGB BLD-MCNC: 6.9 GM/DL (ref 13–17.5)
HGB BLD-MCNC: 7.4 GM/DL (ref 13–17.5)
HGB BLD-MCNC: 8.1 GM/DL (ref 13–17.5)
LYMPHOCYTES # BLD MANUAL: 1.7 K/UL (ref 1–4.8)
LYMPHOCYTES # SPEC AUTO: 1 K/UL (ref 1–4.8)
LYMPHOCYTES NFR SPEC AUTO: 10 %
MCH RBC QN AUTO: 26.1 PG (ref 25–35)
MCH RBC QN AUTO: 26.8 PG (ref 25–35)
MCH RBC QN AUTO: 27.1 PG (ref 25–35)
MCHC RBC AUTO-ENTMCNC: 30.9 G/DL (ref 31–37)
MCHC RBC AUTO-ENTMCNC: 31.6 G/DL (ref 31–37)
MCHC RBC AUTO-ENTMCNC: 31.8 G/DL (ref 31–37)
MCV RBC AUTO: 84.5 FL (ref 80–100)
MCV RBC AUTO: 85 FL (ref 80–100)
MCV RBC AUTO: 85.2 FL (ref 80–100)
MONOCYTES # BLD AUTO: 0.9 K/UL (ref 0–1)
MONOCYTES # BLD MANUAL: 0.9 K/UL (ref 0–1)
MONOCYTES NFR BLD AUTO: 9 %
NEUTROPHILS # BLD AUTO: 7 K/UL (ref 1.3–7.7)
NEUTROPHILS NFR BLD AUTO: 74 %
NEUTROPHILS NFR BLD MANUAL: 74 %
NEUTS SEG # BLD MANUAL: 8.36 K/UL (ref 1.3–7.7)
PLATELET # BLD AUTO: 219 K/UL (ref 150–450)
PLATELET # BLD AUTO: 231 K/UL (ref 150–450)
PLATELET # BLD AUTO: 233 K/UL (ref 150–450)
POTASSIUM SERPL-SCNC: 3.3 MMOL/L (ref 3.5–5.1)
SODIUM SERPL-SCNC: 142 MMOL/L (ref 137–145)
WBC # BLD AUTO: 11.3 K/UL (ref 3.8–10.6)
WBC # BLD AUTO: 12.7 K/UL (ref 3.8–10.6)
WBC # BLD AUTO: 9.5 K/UL (ref 3.8–10.6)

## 2020-04-19 RX ADMIN — MIDODRINE HYDROCHLORIDE SCH MG: 5 TABLET ORAL at 17:09

## 2020-04-19 RX ADMIN — INSULIN DETEMIR SCH UNIT: 100 INJECTION, SOLUTION SUBCUTANEOUS at 20:48

## 2020-04-19 RX ADMIN — POTASSIUM CHLORIDE SCH MEQ: 20 TABLET, EXTENDED RELEASE ORAL at 08:37

## 2020-04-19 RX ADMIN — ATORVASTATIN CALCIUM SCH MG: 80 TABLET, FILM COATED ORAL at 20:25

## 2020-04-19 RX ADMIN — INSULIN DETEMIR SCH: 100 INJECTION, SOLUTION SUBCUTANEOUS at 09:18

## 2020-04-19 RX ADMIN — CYANOCOBALAMIN TAB 500 MCG SCH MCG: 500 TAB at 08:37

## 2020-04-19 RX ADMIN — LOSARTAN POTASSIUM SCH MG: 25 TABLET, FILM COATED ORAL at 21:24

## 2020-04-19 RX ADMIN — FUROSEMIDE SCH MG: 40 TABLET ORAL at 09:48

## 2020-04-19 RX ADMIN — CARVEDILOL SCH MG: 3.12 TABLET, FILM COATED ORAL at 17:09

## 2020-04-19 RX ADMIN — MIDODRINE HYDROCHLORIDE SCH MG: 5 TABLET ORAL at 12:45

## 2020-04-19 RX ADMIN — INSULIN ASPART SCH UNIT: 100 INJECTION, SOLUTION INTRAVENOUS; SUBCUTANEOUS at 20:49

## 2020-04-19 RX ADMIN — POTASSIUM CHLORIDE SCH MEQ: 20 TABLET, EXTENDED RELEASE ORAL at 06:40

## 2020-04-19 RX ADMIN — INSULIN ASPART SCH: 100 INJECTION, SOLUTION INTRAVENOUS; SUBCUTANEOUS at 06:49

## 2020-04-19 RX ADMIN — PANTOPRAZOLE SODIUM SCH MG: 40 INJECTION, POWDER, FOR SOLUTION INTRAVENOUS at 20:25

## 2020-04-19 RX ADMIN — MIDODRINE HYDROCHLORIDE SCH MG: 5 TABLET ORAL at 06:49

## 2020-04-19 RX ADMIN — Medication SCH MG: at 20:26

## 2020-04-19 RX ADMIN — PANTOPRAZOLE SODIUM SCH MG: 40 INJECTION, POWDER, FOR SOLUTION INTRAVENOUS at 08:38

## 2020-04-19 RX ADMIN — CARVEDILOL SCH MG: 3.12 TABLET, FILM COATED ORAL at 06:49

## 2020-04-19 RX ADMIN — SPIRONOLACTONE SCH MG: 25 TABLET, FILM COATED ORAL at 09:48

## 2020-04-19 RX ADMIN — INSULIN ASPART SCH: 100 INJECTION, SOLUTION INTRAVENOUS; SUBCUTANEOUS at 14:59

## 2020-04-19 RX ADMIN — VENLAFAXINE HYDROCHLORIDE SCH MG: 75 TABLET ORAL at 08:36

## 2020-04-19 RX ADMIN — VENLAFAXINE HYDROCHLORIDE SCH MG: 75 TABLET ORAL at 20:26

## 2020-04-19 RX ADMIN — FUROSEMIDE SCH MG: 20 TABLET ORAL at 17:09

## 2020-04-19 RX ADMIN — INSULIN ASPART SCH: 100 INJECTION, SOLUTION INTRAVENOUS; SUBCUTANEOUS at 16:48

## 2020-04-19 RX ADMIN — Medication SCH UNIT: at 08:37

## 2020-04-19 NOTE — PN
PROGRESS NOTE



DATE OF DICTATION:

04/19/2020



The patient is an 80-year-old, pleasant, white male admitted to the hospital with

exacerbation of congestive heart failure, chronic renal insufficiency, and has been in

the intensive care unit for the last six days.  He underwent an upper endoscopy for

significant drop in hemoglobin and black, tarry stools.  Upper endoscopy revealed

multiple duodenal ulcers, one of which had active bleeding, status post injection of

epinephrine and Endoclip placement.  The patient says that he is doing well.  He had no

further episodes of bleeding.  He dropped his hemoglobin to 6.8 g/dL this morning and

is receiving another unit of blood transfusion.  Overall, he is feeling better.  He

reports no abdominal pain.



PHYSICAL EXAMINATION:

Appears comfortable.  No apparent distress.

VITAL SIGNS:  Stable.  Blood pressure 93/64, pulse rate 69, temperature 97.8.

HEENT:  Unremarkable.  Conjunctivae pale.  Sclerae anicteric.  Oral cavity, no lesions.

NECK:  No JVD or lymph node enlargement.

CHEST:  Clear to auscultation.

HEART:  Regular rate and rhythm.

ABDOMEN:  Soft, nontender, nondistended.  Bowel sounds are positive.  No organomegaly.

EXTREMITIES:  No pedal edema.

NEUROLOGIC:  He is alert and oriented x3.  No focal deficits.



LABS:

BUN 96, creatinine 1.76.  WBC 12.7, hemoglobin 6.9, platelets are 233.



IMPRESSION:

1. Acute upper GI bleed, status post EGD yesterday that revealed multiple duodenal

    ulcers, one of which had active bleeding, status post injection of epinephrine and

    Endoclip placement and clinically doing well.  No further bleeding since yesterday.

    He has received a total of 4  units of PRBC transfusion.  This morning hemoglobin

    was 6.9.  He is receiving the fifth unit of blood transfusion.  Hemodynamically

    stable.

2. History of cardiomyopathy, status post AICD implantation.

3. Chronic kidney disease stage 3.  Nephrology following the patient closely.

4. Congestive heart failure, improving.

5. History of diabetes mellitus.

6. Hypertension.



RECOMMENDATIONS:

1. Continue with Protonix 40 mg q.12 hours.

2. Monitor CBC every 12 hours and transfuse if needed.

3. Continue with clear liquid diet today.

4. If hemoglobin remains stable, we will _____ tomorrow.

5. Continue to hold off anticoagulation.  We will follow with you closely.



Thank you for this consultation.





MMODL / IJN: [id number] / Job#: [id number]

## 2020-04-19 NOTE — PN
PROGRESS NOTE



Juan José Tariq underwent endoscopy yesterday.  There was a significant bleeding duodenal

ulcer which was cauterized and clipped.  He is doing well today.  Hemoglobin again came

down to 6.9, probably as a result of ongoing bleeding yesterday. He is receiving a unit

of blood.  He is in atrial fib, rate is controlled.  He has ischemic cardiomyopathy

with a biventricular ICD.  I am recommending Coreg 3.125 mg b.i.d., decrease the Lasix

to 40 mg in the morning, 20 in the afternoon and leave him on losartan at 25 mg at

bedtime.  His blood pressure is decent.  Urine output is fairly good.



S1-S1 S2 heard normally.  Regular rate and rhythm noted.  Short systolic murmur noted.

Lungs revealed improved air entry.  Abdomen is soft.  Lower extremity edema has

improved.



Prognosis remains guarded.  Hemoglobin will be followed closely and we will check his

CBC and BMP as well.





MMODL / IJN: [id number] / Job#: [id number]

## 2020-04-19 NOTE — P.PN
Subjective


Progress Note Date: 04/19/20


Principal diagnosis: 





Exertional dyspnea, anasarca





This is an 80-year-old male patient of Dr. Duckworth, with known history of severe 

ischemic cardiomyopathy, and ejection fraction of less than 20%, status post 

AICD placement, hypertension, diabetes mellitus, hyperlipidemia, persistent 

atrial fibrillation, coronary artery disease status post bypass grafting, ex-

smoker, chronic congestive heart failure with systolic dysfunction, history of 

kidney and bladder cancer status post chemo and radiation, who presented to the 

emergency department on 04/05/2020 with complaints of worsening exertional 

dyspnea, and increased swelling in his lower extremities.  He denied any chest 

pain, palpitations, no fever or chills, no nausea vomiting, no diaphoresis, no 

cough or phlegm production.  Admission chest x-ray showed creased vascular 

congestion, and pulmonary edema and a small left pleural effusion.  Admission 

blood work was negative for any leukocytosis, white blood cell count was 8.2, 

hemoglobin is 10.6, mild lymphopenia, INR is 1.3, potassium is 5.2, the rest of 

the electrolytes were within normal limits, BUN is 52 creatinine is 2.0, patient

does have history of chronic kidney disease stage III, this showed signs of 

infection although patient denies any symptoms, troponins were 0.031, 0.026, 

0.031, proBNP was 2710.  We were asked to see the patient in consultation.  

Patient has been started on infusion of Lasix, early infusing at a rate of 10 mg

per hour, and dobutamine drip infusing at 5 mics per kilo per minute.  A is on 

oral Eliquis for history of chronic atrial fibrillation he remains in A. fib 

with a controlled rate of 93 bpm.  He is sitting up in the recliner, in no acute

distress, on 4 L of oxygen with a pulse ox of 95-98%, he is been afebrile, he 

has not diuresed significantly despite the Lasix infusion, and today's blood 

work shows worsening of his renal profile would be on up to 79 and creatinine is

up to 2.7.  Nephrology is following. 





The patient is seen today 04/18/2020 in follow-up in the intensive care unit.  

He is currently awake and alert in no acute distress.  Maintaining O2 

saturations up to 100% on 2 L/m per nasal cannula.  He's been afebrile.  

Hemodynamically stable.  He was transferred to the intensive care unit for 

severe anemia.  He is now status post 4 units packed red blood cells. He did 

undergo EGD this morning.  He was found to have actively bleeding duodenal ulcer

in the second portion of the duodenum just distal to the duodenal sweep with a 

visible vessel status post injection epinephrine followed by Endo Clip placemen

t.  Two other nonbleeding duodenal ulcers noted in the duodenal bulb.  Antral 

erosive gastritis.  He remains on Protonix 40 mg twice a day.  Continue to 

monitor hemoglobin and transfuse as needed.  White count 17.8.  Hemoglobin 8.2. 

Platelet count 271.  INR 1.3.  Sodium 143.  Potassium 4.0.  Creatinine 1.82.  He

is on oral diuretics now.  Remaining in a negative balance.





The patient is seen today 04/19/2020 in follow-up in the intensive care unit.  

He is currently awake and alert in no acute distress. Sitting up in a chair at 

the bedside.  Maintaining O2 saturations at 100% on 2 L/m per nasal cannula.  He

remains in A. fib.  Controlled ventricular response.  He's been afebrile.  

Hemoglobin did drop to 6.9 again today.  White count 12.7.  Platelets 233.  

Sodium 142.  Potassium 3.3.  Bicarb 37.  Creatinine 1.76.  Receiving his fifth 

unit of blood this admission.  Remains on Protonix 40 mg IV twice a day.  On 

oral diuretics now.





Objective





- Vital Signs


Vital signs: 


                                   Vital Signs











Temp  97.8 F   04/19/20 09:20


 


Pulse  62   04/19/20 10:00


 


Resp  15   04/19/20 10:00


 


BP  94/50   04/19/20 10:00


 


Pulse Ox  100   04/19/20 10:00








                                 Intake & Output











 04/18/20 04/19/20 04/19/20





 18:59 06:59 18:59


 


Intake Total 1000 300 770


 


Output Total 2500 2000 650


 


Balance -1500 -1700 120


 


Intake:   


 


    


 


  Oral 800 300 150


 


  Blood Product  0 620


 


    Rc As-1  Unit  0 310





    [id number]   


 


Output:   


 


  Urine 2500 2000 650


 


Other:   


 


  Voiding Method Indwelling Catheter Indwelling Catheter Indwelling Catheter


 


  # Bowel Movements 1  














- Exam





GENERAL EXAM: Alert, 80-year-old gentleman, awake and alert, on 2 L nasal 

cannula with an O2 saturation of 100%, comfortable in no apparent distress.


HEAD: Normocephalic.


EYES: Normal reaction of pupils, equal size.


NOSE: Clear with pink turbinates.


THROAT: No erythema or exudates.


NECK: No masses, no JVD.


CHEST: No chest wall deformity.


LUNGS: Equal air entry with crackles through the mid lung fields and lower lung 

fields.


CVS: S1 and S2 normal with no audible murmur, irregular rhythm.


ABDOMEN: No hepatosplenomegaly, normal bowel sounds, no guarding or rigidity.


SPINE: No scoliosis or deformity


SKIN: No rashes


CENTRAL NERVOUS SYSTEM: No focal deficits, tone is normal in all 4 extremities.


EXTREMITIES: There is 1-2+ peripheral edema.  No clubbing, no cyanosis.  

Peripheral pulses are intact.





- Labs


CBC & Chem 7: 


                                 04/19/20 03:58





                                 04/19/20 03:58


Labs: 


                  Abnormal Lab Results - Last 24 Hours (Table)











  04/16/20 04/18/20 04/18/20 Range/Units





  09:12 12:04 14:21 


 


WBC     (3.8-10.6)  k/uL


 


RBC     (4.30-5.90)  m/uL


 


Hgb     (13.0-17.5)  gm/dL


 


Hct     (39.0-53.0)  %


 


MCHC     (31.0-37.0)  g/dL


 


RDW     (11.5-15.5)  %


 


Neutrophils #     (1.3-7.7)  k/uL


 


Monocytes #     (0-1.0)  k/uL


 


Potassium    3.4 L  (3.5-5.1)  mmol/L


 


Carbon Dioxide    32 H  (22-30)  mmol/L


 


BUN    106 H*  (9-20)  mg/dL


 


Creatinine    1.69 H  (0.66-1.25)  mg/dL


 


Glucose    132 H  (74-99)  mg/dL


 


POC Glucose (mg/dL)   135 H   (75-99)  mg/dL


 


Crossmatch  See Detail    














  04/18/20 04/18/20 04/18/20 Range/Units





  14:21 17:44 20:36 


 


WBC  13.8 H    (3.8-10.6)  k/uL


 


RBC  2.69 L    (4.30-5.90)  m/uL


 


Hgb  7.0 L    (13.0-17.5)  gm/dL


 


Hct  22.4 L    (39.0-53.0)  %


 


MCHC     (31.0-37.0)  g/dL


 


RDW  19.3 H    (11.5-15.5)  %


 


Neutrophils #  11.0 H    (1.3-7.7)  k/uL


 


Monocytes #  1.2 H    (0-1.0)  k/uL


 


Potassium     (3.5-5.1)  mmol/L


 


Carbon Dioxide     (22-30)  mmol/L


 


BUN     (9-20)  mg/dL


 


Creatinine     (0.66-1.25)  mg/dL


 


Glucose     (74-99)  mg/dL


 


POC Glucose (mg/dL)   154 H  204 H  (75-99)  mg/dL


 


Crossmatch     














  04/18/20 04/19/20 04/19/20 Range/Units





  22:10 03:58 03:58 


 


WBC  15.1 H  12.7 H   (3.8-10.6)  k/uL


 


RBC  2.73 L  2.64 L   (4.30-5.90)  m/uL


 


Hgb  7.2 L  6.9 L*   (13.0-17.5)  gm/dL


 


Hct  22.8 L  22.4 L   (39.0-53.0)  %


 


MCHC   30.9 L   (31.0-37.0)  g/dL


 


RDW  19.4 H  19.7 H   (11.5-15.5)  %


 


Neutrophils #     (1.3-7.7)  k/uL


 


Monocytes #     (0-1.0)  k/uL


 


Potassium    3.3 L  (3.5-5.1)  mmol/L


 


Carbon Dioxide    37 H  (22-30)  mmol/L


 


BUN    96 H  (9-20)  mg/dL


 


Creatinine    1.76 H  (0.66-1.25)  mg/dL


 


Glucose    71 L  (74-99)  mg/dL


 


POC Glucose (mg/dL)     (75-99)  mg/dL


 


Crossmatch     














  04/19/20 Range/Units





  08:30 


 


WBC   (3.8-10.6)  k/uL


 


RBC   (4.30-5.90)  m/uL


 


Hgb   (13.0-17.5)  gm/dL


 


Hct   (39.0-53.0)  %


 


MCHC   (31.0-37.0)  g/dL


 


RDW   (11.5-15.5)  %


 


Neutrophils #   (1.3-7.7)  k/uL


 


Monocytes #   (0-1.0)  k/uL


 


Potassium   (3.5-5.1)  mmol/L


 


Carbon Dioxide   (22-30)  mmol/L


 


BUN   (9-20)  mg/dL


 


Creatinine   (0.66-1.25)  mg/dL


 


Glucose   (74-99)  mg/dL


 


POC Glucose (mg/dL)  105 H  (75-99)  mg/dL


 


Crossmatch   














Assessment and Plan


Assessment: 





1.  Acute exacerbation of chronic congestive heart failure with systolic 

dysfunction





2.  Acute anemia requiring 4 units of packed red blood cells this admission.  

EGD performed on 04/18/2020 revealed an actively bleeding duodenal ulcer in the 

second portion of the duodenum just distal to the distal duodenal sweep with a 

visible vessel, status post injection of epinephrine followed by Endo Clip.  

There were 2 other nonbleeding duodenal ulcers noted as well.  Antral erosive 

gastritis.  Another drop in hemoglobin today 04/19/2020 to 6.9.  Receiving his 

fifth unit of packed blood cells.





3. Acute kidney injury likely related to cardiorenal syndrome, diuretic therapy,

nephrology is following





4.  Acute urinary tract infection, placed on ceftriaxone, urine culture negative





5.  Ischemic cardiomyopathy, with EF of less than 20%, status post AICD 

implantation





6.  History of chronic persistent atrial fibrillation on oral anticoagulation





7.  History of chronic kidney disease stage III at baseline, history of right 

hydronephrosis and patient follows with urology





8.  Coronary artery disease status post bypass grafting





9.  Hypertension





10.  Hyperlipidemia





11.  History of bladder cancer





12.  Diabetes mellitus





Plan:





The patient was seen and evaluated by Dr. Quinn  


Current hemoglobin 6.9 and receiving his fifth unit of packed red blood cells 

this admission


Continue to monitor hemoglobin


Transfer out to selective care unit today


We'll continue to follow and make further recommendations based on his clinical 

status





I, the cosigning physician, performed a history & physical examination of the 

patient. Lungs sounds with crackles in the mid and lower lung fields 

bilaterally.  Maintaining good O2 saturations in the 90s on 2 L/m per nasal 

cannula.  I discussed the assessment and plan of care with my nurse pract

samuel, Kavya Llanos. I attest to the above note as dictated by her.

## 2020-04-19 NOTE — P.PN
Subjective


Progress Note Date: 04/19/20





This is an 80-year-old male, seen in consultation because of cardiorenal 

syndrome with significant cardiomyopathy ejection fraction 25% atrial 

fibrillation chronic heart failure was admitted with worsening shortness of 

breath. 


He also had significant anemia and blood loss.  He is being transfused





Admission creatinine on 04/05/2020 was 2.7 on 04/08/2020 and then started to 

improve and is currently on 1.82 and further down to 1.76 this morning.  He has 

responded very well with 4500 mL of urine 


He has a kidney mass on the right and atrophy and previously has had 

hydronephrosis.





Patient is on midodrine, blood pressure in the  110s systolic to 120 systolic 

heart rate in the 80-90


Currently he is awake alert and responds and follows commands.  He is on O2 

nasal cannula and is comfortable.  His atrial fibrillation.  Not on pressors





Objective





- Vital Signs


Vital signs: 


                                   Vital Signs











Temp  96.2 F L  04/19/20 07:07


 


Pulse  93   04/19/20 07:07


 


Resp  14   04/19/20 07:07


 


BP  124/77   04/19/20 07:07


 


Pulse Ox  99   04/19/20 07:07








                                 Intake & Output











 04/18/20 04/19/20 04/19/20





 18:59 06:59 18:59


 


Intake Total 1000 300 


 


Output Total 2500 2000 150


 


Balance -1500 -1700 -150


 


Intake:   


 


    


 


  Oral 800 300 


 


  Blood Product  0 


 


    Rc As-1  Unit  0 





    [id number]   


 


Output:   


 


  Urine 2500 2000 150


 


Other:   


 


  Voiding Method Indwelling Catheter Indwelling Catheter Indwelling Catheter


 


  # Bowel Movements 1  








Exam general awake alert oriented but weak and tired.





HEENT exam no JVP neck is supple no facial asymmetry





Lungs are significant for bilateral fine crackles with good air entry 

bilaterally





Heart sounds are unremarkable.  Ventricular fibrillation





Abdomen soft nontender





Extremity exam was 2+ edema.











Logically awake alert oriented but very tired and weak





- Labs


CBC & Chem 7: 


                                 04/19/20 03:58





                                 04/19/20 03:58


Labs: 


                  Abnormal Lab Results - Last 24 Hours (Table)











  04/16/20 04/18/20 04/18/20 Range/Units





  09:12 08:35 10:28 


 


WBC     (3.8-10.6)  k/uL


 


RBC     (4.30-5.90)  m/uL


 


Hgb     (13.0-17.5)  gm/dL


 


Hct     (39.0-53.0)  %


 


MCHC     (31.0-37.0)  g/dL


 


RDW     (11.5-15.5)  %


 


Neutrophils #     (1.3-7.7)  k/uL


 


Monocytes #     (0-1.0)  k/uL


 


PT    13.4 H  (9.0-12.0)  sec


 


INR    1.3 H  (<1.2)  


 


Potassium     (3.5-5.1)  mmol/L


 


Carbon Dioxide     (22-30)  mmol/L


 


BUN     (9-20)  mg/dL


 


Creatinine     (0.66-1.25)  mg/dL


 


Glucose     (74-99)  mg/dL


 


POC Glucose (mg/dL)   110 H   (75-99)  mg/dL


 


Crossmatch  See Detail    














  04/18/20 04/18/20 04/18/20 Range/Units





  12:04 14:21 14:21 


 


WBC    13.8 H  (3.8-10.6)  k/uL


 


RBC    2.69 L  (4.30-5.90)  m/uL


 


Hgb    7.0 L  (13.0-17.5)  gm/dL


 


Hct    22.4 L  (39.0-53.0)  %


 


MCHC     (31.0-37.0)  g/dL


 


RDW    19.3 H  (11.5-15.5)  %


 


Neutrophils #    11.0 H  (1.3-7.7)  k/uL


 


Monocytes #    1.2 H  (0-1.0)  k/uL


 


PT     (9.0-12.0)  sec


 


INR     (<1.2)  


 


Potassium   3.4 L   (3.5-5.1)  mmol/L


 


Carbon Dioxide   32 H   (22-30)  mmol/L


 


BUN   106 H*   (9-20)  mg/dL


 


Creatinine   1.69 H   (0.66-1.25)  mg/dL


 


Glucose   132 H   (74-99)  mg/dL


 


POC Glucose (mg/dL)  135 H    (75-99)  mg/dL


 


Crossmatch     














  04/18/20 04/18/20 04/18/20 Range/Units





  17:44 20:36 22:10 


 


WBC    15.1 H  (3.8-10.6)  k/uL


 


RBC    2.73 L  (4.30-5.90)  m/uL


 


Hgb    7.2 L  (13.0-17.5)  gm/dL


 


Hct    22.8 L  (39.0-53.0)  %


 


MCHC     (31.0-37.0)  g/dL


 


RDW    19.4 H  (11.5-15.5)  %


 


Neutrophils #     (1.3-7.7)  k/uL


 


Monocytes #     (0-1.0)  k/uL


 


PT     (9.0-12.0)  sec


 


INR     (<1.2)  


 


Potassium     (3.5-5.1)  mmol/L


 


Carbon Dioxide     (22-30)  mmol/L


 


BUN     (9-20)  mg/dL


 


Creatinine     (0.66-1.25)  mg/dL


 


Glucose     (74-99)  mg/dL


 


POC Glucose (mg/dL)  154 H  204 H   (75-99)  mg/dL


 


Crossmatch     














  04/19/20 04/19/20 Range/Units





  03:58 03:58 


 


WBC  12.7 H   (3.8-10.6)  k/uL


 


RBC  2.64 L   (4.30-5.90)  m/uL


 


Hgb  6.9 L*   (13.0-17.5)  gm/dL


 


Hct  22.4 L   (39.0-53.0)  %


 


MCHC  30.9 L   (31.0-37.0)  g/dL


 


RDW  19.7 H   (11.5-15.5)  %


 


Neutrophils #    (1.3-7.7)  k/uL


 


Monocytes #    (0-1.0)  k/uL


 


PT    (9.0-12.0)  sec


 


INR    (<1.2)  


 


Potassium   3.3 L  (3.5-5.1)  mmol/L


 


Carbon Dioxide   37 H  (22-30)  mmol/L


 


BUN   96 H  (9-20)  mg/dL


 


Creatinine   1.76 H  (0.66-1.25)  mg/dL


 


Glucose   71 L  (74-99)  mg/dL


 


POC Glucose (mg/dL)    (75-99)  mg/dL


 


Crossmatch    














Assessment and Plan


Assessment: 





Impression





1.  Acute kidney injury secondary to cardiorenal syndrome improving with 

diuretics, urine output is 4500 mL  creatinine is improved to 1.8, further to 

1.7





2.  Chronic kidney disease, stage III with a creatinine 1.2, GFR elevated in the

40s to 50s





3.  Mild metabolic alkalosis, secondary to diuresis.  Bicarb is up from 32-37 





4.  Anemia of GI blood loss, EGD done yesterday 04/18/2020  showed 


   a)Actively bleeding duodenal ulcer in the second portion of the duodenum just

distal to the duodenal sweep with a visible vessel status post injection       

                  epinephrine followed by Endo Clip placement as described 

above.


          b)2 other nonbleeding duodenal ulcers noted in the duodenal bulb and 

duodenal sweep with a clean base


          c)Antral erosive gastritis.








Recommendation





1.  Maintain Lasix and would slow down as his having more metabolic alkalosis.  

Recommend  20 mg twice a day by mouth





2.  Maintain midodrine





3.  Watch metabolic alkalosis





5.  Transfuse as needed

## 2020-04-19 NOTE — P.PN
Progress Note - Text


Progress Note Date: 04/19/20





Chief Complaint: Shortness of breath





Patient is a 80-year-old patient of Dr. Duckworth.   history of chronic atrial 

fibrillation on anticoagulation with Eliquis, coronary artery disease status 

post bypass graft, cardiomyopathy status post AICD placement, history of nephrec

guy and unilateral kidney, CK D stage III, hypertension, hyperlipidemia, GERD, 

diabetes type 2 and history of bipolar/depression came to ER with complaints of 

worsening shortness of breath and exertional dyspnea and bilateral lower 

extremity increases swelling for the past 2 weeks.  Patient does have minimal 

cough without any sputum production.  No sputum production.  No complaints of 

fever or chills.  Denied any sick contacts at home.  No recent travel.





Admitted with CHF exacerbation, atrial fibrillation with a rapid ventricular 

rate.  Started on IV Lasixdrip and IV amiodarone.then IV dobutamine was added.  

Switch to IV Lasix bolus.  April 16 patient became hypotensive.  Hemoglobin 

dropped to 4.2.  Moved to the ICU.  Also had some dark stools. EGD on April 18-

Found to have an actively bleeding duodenal ulcer in the second portion of 

duodenum.  Epinephrine injection was given followed by Endo Clip.  2 other 

nonbleeding duodenal ulcers were found.  Since admission received a total of 5 

units of blood.


Today-.  ICU: Started on clear liquid diet.  No further bleeding.  Tired.  





Review of systems: Was attempted for constitutional, cardiovascular, GI, 

pulmonary. relevant finding as above





Active Medications





Atorvastatin Calcium (Lipitor)  80 mg PO HS [facility]


   Last Admin: 04/18/20 20:44 Dose:  80 mg


   Documented by: 


Carvedilol (Coreg)  3.125 mg PO BID-W/MEALS [facility]


   Last Admin: 04/19/20 06:49 Dose:  3.125 mg


   Documented by: 


Cholecalciferol (Vitamin D3 (25 Mcg = 1000 Iu))  2,000 unit PO DAILY [facility]


   Last Admin: 04/19/20 08:37 Dose:  2,000 unit


   Documented by: 


Cyanocobalamin (Vitamin B-12)  1,000 mcg PO DAILY [facility]


   Last Admin: 04/19/20 08:37 Dose:  1,000 mcg


   Documented by: 


Furosemide (Lasix)  40 mg PO DAILY [facility]


   Last Admin: 04/19/20 09:48 Dose:  40 mg


   Documented by: 


Furosemide (Lasix)  20 mg PO DAILY@1700 [facility]


Insulin Aspart (Novolog)  0 unit SQ ACHS [facility]; Protocol


   Last Admin: 04/19/20 14:59 Dose:  Not Given


   Documented by: 


Insulin Detemir (Levemir)  40 unit SQ [facility]


   Last Admin: 04/18/20 20:44 Dose:  40 unit


   Documented by: 


Insulin [facility] ([facility]vemir)  [facility]


   Last Admin: 04/19/20 09:18 Dose:  Not Given


   Documented by: 


Lamotrigine (Lamictal)  100 mg PO [facility]


   Last Admin: 04/18/20 20:44 Dose:  100 mg


   Documented by: 


Losartan Potassium (Cozaar)  25 mg PO [facility]


Melatonin (Melatonin)  3 mg PO [facility]


   Last Admin: 04/18/20 20:44 Dose:  3 mg


   Documented by: 


Midodrine (Proamatine)  10 mg PO AC-TID [facility]


   Last Admin: 04/19/20 06:49 Dose:  10 mg


   Documented by: 


Ondansetron HCl (Zofran)  4 mg IVP Q6HR PRN


   PRN Reason: Nausea And Vomiting


   Last Admin: 04/10/20 16:43 Dose:  4 mg


   Documented by: 


Pantoprazole Sodium (Protonix)  40 mg IVP BID [facility]


   Last Admin: 04/19/20 08:38 Dose:  40 mg


   Documented by: 


Potassium Chloride (K-Dur 20)  20 meq PO DAILY [facility]


   Last Admin: 04/19/20 08:37 Dose:  20 meq


   Documented by: 


Spironolactone (Aldactone)  25 mg PO DAILY [facility]


   Last Admin: 04/19/20 09:48 Dose:  25 mg


   Documented by: 


Venlafaxine HCl (Effexor)  75 mg PO BID [facility]


   Last Admin: 04/19/20 08:36 Dose:  75 mg


   Documented by: 








On examination:


VITAL SIGNS: 97.8, 69, 15, 93/64, 100% on 2 L


GENERAL APPEARANCE: Propped up in bed,, tired,


HEENT: Normal external appearance of nose and ear.  Oral cavity dry


EYES: Pupils equal. Conjunctiva pale. 


NECK: JVD unable to assess Mass not palpable. 


RESPIRATORY: Respiratory effort increased.  Decreased breath sounds.


CARDIOVASCULAR: Heart sounds irregular, edema decreased


ABDOMEN: Soft. Liver and spleen not palpable. No tenderness. No mass palpable.  

Cortes catheter-hematuria cleared


PSYCHIATRY: Answering questions





INVESTIGATIONS, reviewed in the clinical context:


White count 11.3 hemoglobin 8.1








Previous testing:


White count 8.2 hemoglobin 10.6 potassium 5.2 bun 52 creatinine 2.0 troponin I 

0.0-3


EKG-possible A. fib


Chest x-ray film personally reviewed by me-pulmonary edema cardiomegaly


BUN/creatinine on February 22-1.58


EGD/April 18/-bleeding duodenal ulcer that was given epinephrine and Endo Clip. 

2 other duodenal ulcers nonbleeding.








Assessment:


-Acute on chronic congestive heart failure exacerbation from systolic 

dysfunction, EF less than 30% 


-Bleeding duodenal ulcer given epinephrine injection with Endo Clip.  2 other 

nonbleeding duodenal ulcers


-Acute blood loss anemia-black tarry stools, from above-requiring a total of 5 

units of blood


-Severe hypokalemia from diuresis, better


-Hypotension multifactorial


Ischemic cardiomyopathy


Persistent atrial fibrillation on anticoagulation with Eliquis, rate better 

controlled


Acute kidney injury secondary to cardiorenal syndrome, 


-chronic kidney disease stage III.


Mild hyperkalemia secondary to acute kidney injury


Lactic acidosis-type II


History of nephrectomy due to renal cancer and bladder cancer history


Diabetes type 2.  Insulin-dependent and also on metformin and glipizide.


Hyperlipidemia


Coronary artery disease with history of multiple stents placement


Bipolar disorder and depression


GERD


Obesity with BMI 34.9


-





Plan:


Started on clear liquid diet.  Advance diet as tolerated.  Keep a close eye on 

the hemoglobin.  can be moved out of the ICU.

## 2020-04-20 LAB
ANION GAP SERPL CALC-SCNC: 5 MMOL/L
BUN SERPL-SCNC: 73 MG/DL (ref 9–20)
CALCIUM SPEC-MCNC: 8.7 MG/DL (ref 8.4–10.2)
CELLS COUNTED: 100
CHLORIDE SERPL-SCNC: 95 MMOL/L (ref 98–107)
CO2 SERPL-SCNC: 38 MMOL/L (ref 22–30)
EOSINOPHIL # BLD MANUAL: 0.17 K/UL (ref 0–0.7)
ERYTHROCYTE [DISTWIDTH] IN BLOOD BY AUTOMATED COUNT: 2.88 M/UL (ref 4.3–5.9)
ERYTHROCYTE [DISTWIDTH] IN BLOOD: 18.7 % (ref 11.5–15.5)
GLUCOSE BLD-MCNC: 105 MG/DL (ref 75–99)
GLUCOSE BLD-MCNC: 191 MG/DL (ref 75–99)
GLUCOSE BLD-MCNC: 228 MG/DL (ref 75–99)
GLUCOSE BLD-MCNC: 251 MG/DL (ref 75–99)
GLUCOSE BLD-MCNC: 261 MG/DL (ref 75–99)
GLUCOSE SERPL-MCNC: 62 MG/DL (ref 74–99)
HCT VFR BLD AUTO: 24.5 % (ref 39–53)
HGB BLD-MCNC: 7.5 GM/DL (ref 13–17.5)
LYMPHOCYTES # BLD MANUAL: 0.61 K/UL (ref 1–4.8)
MCH RBC QN AUTO: 26 PG (ref 25–35)
MCHC RBC AUTO-ENTMCNC: 30.5 G/DL (ref 31–37)
MCV RBC AUTO: 85.1 FL (ref 80–100)
MONOCYTES # BLD MANUAL: 0.87 K/UL (ref 0–1)
NEUTROPHILS NFR BLD MANUAL: 81 %
NEUTS SEG # BLD MANUAL: 7.05 K/UL (ref 1.3–7.7)
PLATELET # BLD AUTO: 207 K/UL (ref 150–450)
POTASSIUM SERPL-SCNC: 3.4 MMOL/L (ref 3.5–5.1)
SODIUM SERPL-SCNC: 138 MMOL/L (ref 137–145)
WBC # BLD AUTO: 8.7 K/UL (ref 3.8–10.6)

## 2020-04-20 RX ADMIN — VENLAFAXINE HYDROCHLORIDE SCH MG: 75 TABLET ORAL at 21:15

## 2020-04-20 RX ADMIN — LOSARTAN POTASSIUM SCH MG: 25 TABLET, FILM COATED ORAL at 21:14

## 2020-04-20 RX ADMIN — CYANOCOBALAMIN TAB 500 MCG SCH MCG: 500 TAB at 09:13

## 2020-04-20 RX ADMIN — FUROSEMIDE SCH MG: 20 TABLET ORAL at 18:10

## 2020-04-20 RX ADMIN — METOPROLOL TARTRATE SCH MG: 25 TABLET, FILM COATED ORAL at 21:15

## 2020-04-20 RX ADMIN — PANTOPRAZOLE SODIUM SCH MG: 40 INJECTION, POWDER, FOR SOLUTION INTRAVENOUS at 21:14

## 2020-04-20 RX ADMIN — POTASSIUM CHLORIDE SCH MEQ: 20 TABLET, EXTENDED RELEASE ORAL at 18:10

## 2020-04-20 RX ADMIN — INSULIN DETEMIR SCH UNIT: 100 INJECTION, SOLUTION SUBCUTANEOUS at 09:28

## 2020-04-20 RX ADMIN — Medication SCH UNIT: at 09:13

## 2020-04-20 RX ADMIN — Medication SCH MG: at 21:14

## 2020-04-20 RX ADMIN — INSULIN ASPART SCH: 100 INJECTION, SOLUTION INTRAVENOUS; SUBCUTANEOUS at 09:22

## 2020-04-20 RX ADMIN — MIDODRINE HYDROCHLORIDE SCH MG: 5 TABLET ORAL at 18:10

## 2020-04-20 RX ADMIN — SODIUM FERRIC GLUCONATE COMPLEX SCH MLS/HR: 12.5 INJECTION INTRAVENOUS at 15:13

## 2020-04-20 RX ADMIN — METOPROLOL TARTRATE SCH: 25 TABLET, FILM COATED ORAL at 10:44

## 2020-04-20 RX ADMIN — INSULIN ASPART SCH UNIT: 100 INJECTION, SOLUTION INTRAVENOUS; SUBCUTANEOUS at 12:45

## 2020-04-20 RX ADMIN — SPIRONOLACTONE SCH MG: 25 TABLET, FILM COATED ORAL at 09:13

## 2020-04-20 RX ADMIN — FUROSEMIDE SCH MG: 40 TABLET ORAL at 09:13

## 2020-04-20 RX ADMIN — INSULIN ASPART SCH UNIT: 100 INJECTION, SOLUTION INTRAVENOUS; SUBCUTANEOUS at 17:45

## 2020-04-20 RX ADMIN — MIDODRINE HYDROCHLORIDE SCH MG: 5 TABLET ORAL at 12:45

## 2020-04-20 RX ADMIN — POTASSIUM CHLORIDE SCH MEQ: 20 TABLET, EXTENDED RELEASE ORAL at 09:12

## 2020-04-20 RX ADMIN — ATORVASTATIN CALCIUM SCH MG: 80 TABLET, FILM COATED ORAL at 21:14

## 2020-04-20 RX ADMIN — POTASSIUM CHLORIDE SCH MEQ: 20 TABLET, EXTENDED RELEASE ORAL at 12:46

## 2020-04-20 RX ADMIN — PANTOPRAZOLE SODIUM SCH MG: 40 INJECTION, POWDER, FOR SOLUTION INTRAVENOUS at 09:13

## 2020-04-20 RX ADMIN — VENLAFAXINE HYDROCHLORIDE SCH MG: 75 TABLET ORAL at 09:12

## 2020-04-20 RX ADMIN — INSULIN ASPART SCH UNIT: 100 INJECTION, SOLUTION INTRAVENOUS; SUBCUTANEOUS at 21:15

## 2020-04-20 RX ADMIN — MIDODRINE HYDROCHLORIDE SCH MG: 5 TABLET ORAL at 09:13

## 2020-04-20 NOTE — P.PN
Subjective


Progress Note Date: 04/20/20


Principal diagnosis: 





Anemia of acute blood loss, upper GI bleed, duodenal ulcers





The patient is seen sitting bedside denying any signs or symptoms of GI bleeding

today.  He has tolerated a liquid diet.  No nausea or vomiting.  Patient is 

denying any abdominal pain at this time.





Objective





- Vital Signs


Vital signs: 


                                   Vital Signs











Temp  97.8 F   04/20/20 12:00


 


Pulse  66   04/20/20 12:00


 


Resp  17   04/20/20 12:00


 


BP  99/74   04/20/20 12:00


 


Pulse Ox  99   04/20/20 12:00








                                 Intake & Output











 04/19/20 04/20/20 04/20/20





 18:59 06:59 18:59


 


Intake Total 920  


 


Output Total 975 1275 465


 


Balance -55 -1275 -394


 


Weight  112.8 kg 


 


Intake:   


 


  Oral 300  


 


  Blood Product 620  


 


    Rc As-1  Unit 310  





    [id number]   


 


Output:   


 


  Urine 975 1275 465


 


Other:   


 


  Voiding Method Indwelling Catheter Indwelling Catheter Indwelling Catheter














- Exam





On physical examination, patient appears comfortable in no apparent distress. 


HEAD: Normocephalic, atraumatic. 


EYES: No scleral icterus. No conjunctival injection. 


MOUTH: No lesions, tongue midline. 


NECK: Trachea midline, no gross abnormalities. 


ABDOMEN: Soft, obese. Bowel sounds are positive. No organomegaly.  No guarding 

or rigidity.


EXTREMITIES: No pedal edema. 


SKIN: No rashes, no jaundice. 


NEUROLOGIC: Alert and oriented x3.  No focal deficits. 





- Labs


CBC & Chem 7: 


                                 04/20/20 03:51





                                 04/20/20 03:51


Labs: 


                  Abnormal Lab Results - Last 24 Hours (Table)











  04/19/20 04/19/20 04/19/20 Range/Units





  16:44 19:53 20:36 


 


RBC   2.77 L   (4.30-5.90)  m/uL


 


Hgb   7.4 L   (13.0-17.5)  gm/dL


 


Hct   23.5 L   (39.0-53.0)  %


 


MCHC     (31.0-37.0)  g/dL


 


RDW   18.3 H   (11.5-15.5)  %


 


Lymphocytes # (Manual)     (1.0-4.8)  k/uL


 


Potassium     (3.5-5.1)  mmol/L


 


Chloride     ()  mmol/L


 


Carbon Dioxide     (22-30)  mmol/L


 


BUN     (9-20)  mg/dL


 


Creatinine     (0.66-1.25)  mg/dL


 


Glucose     (74-99)  mg/dL


 


POC Glucose (mg/dL)  120 H   140 H  (75-99)  mg/dL














  04/20/20 04/20/20 04/20/20 Range/Units





  03:51 03:51 06:44 


 


RBC   2.88 L   (4.30-5.90)  m/uL


 


Hgb   7.5 L   (13.0-17.5)  gm/dL


 


Hct   24.5 L   (39.0-53.0)  %


 


MCHC   30.5 L   (31.0-37.0)  g/dL


 


RDW   18.7 H   (11.5-15.5)  %


 


Lymphocytes # (Manual)   0.61 L   (1.0-4.8)  k/uL


 


Potassium  3.4 L    (3.5-5.1)  mmol/L


 


Chloride  95 L    ()  mmol/L


 


Carbon Dioxide  38 H    (22-30)  mmol/L


 


BUN  73 H    (9-20)  mg/dL


 


Creatinine  1.62 H    (0.66-1.25)  mg/dL


 


Glucose  62 L    (74-99)  mg/dL


 


POC Glucose (mg/dL)    105 H  (75-99)  mg/dL














  04/20/20 04/20/20 Range/Units





  09:28 12:32 


 


RBC    (4.30-5.90)  m/uL


 


Hgb    (13.0-17.5)  gm/dL


 


Hct    (39.0-53.0)  %


 


MCHC    (31.0-37.0)  g/dL


 


RDW    (11.5-15.5)  %


 


Lymphocytes # (Manual)    (1.0-4.8)  k/uL


 


Potassium    (3.5-5.1)  mmol/L


 


Chloride    ()  mmol/L


 


Carbon Dioxide    (22-30)  mmol/L


 


BUN    (9-20)  mg/dL


 


Creatinine    (0.66-1.25)  mg/dL


 


Glucose    (74-99)  mg/dL


 


POC Glucose (mg/dL)  191 H  228 H  (75-99)  mg/dL














Assessment and Plan


(1) Duodenal ulcer with hemorrhage


Narrative/Plan: 


80-year-old male with multiple medical comorbidities being treated for fluid 

overload and atrial fibrillation found to have a fall in his hemoglobin.  He was

taken for EGD with findings of gastritis and 3 duodenal ulcers, one which was 

actively bleeding and treated with Endo Clip placement and epinephrine injection

with hemostasis.  No further signs or symptoms of GI bleeding.  Hemoglobin is 

stable today at 7.5.


Current Visit: Yes   Status: Acute   Code(s): K26.4 - CHRONIC OR UNSPECIFIED 

DUODENAL ULCER WITH HEMORRHAGE   SNOMED Code(s): 98506328


   





(2) Anemia associated with acute blood loss


Current Visit: Yes   Status: Acute   Code(s): D62 - ACUTE POSTHEMORRHAGIC ANEMIA

  SNOMED Code(s): 417005382


   





(3) GI bleed


Current Visit: Yes   Status: Acute   Code(s): K92.2 - GASTROINTESTINAL 

HEMORRHAGE, UNSPECIFIED   SNOMED Code(s): 57416261


   


Plan: 





Supportive care


Diet advance to full liquids


IV iron ordered for 3 doses as laboratory evaluation is consistent with an iron 

deficiency anemia, exacerbated by acute GI bleed


Continue Protonix 40 mg IV twice daily


Avoid NSAID use


Continue management of other medical comorbidities


No plans for further endoscopic evaluation at this time


Thank you for allowing us to participate in the care of the patient

## 2020-04-20 NOTE — PN
PROGRESS NOTE



Patient is seen for followup for acute kidney injury, mainly cardiorenal.  Patient is

currently maintained on oral Lasix.  He states he is feeling better.  His weight is

down quite a bit, if this is accurate from 2 days ago by 6 kg.  A 24 hour urine output

about 4.5 L.



PHYSICAL EXAMINATION:

On examination today, blood pressure was 102/60, heart rate 90 per minute, patient is

afebrile.

Examination of the heart S1, S2.  Examination of the lungs, decreased breath sounds at

bases.  Abdomen is soft, obese. Examination of the lower extremities shows chronic

edema, bilateral lower extremities, 2+ bilaterally. CNS exam grossly intact.



LABS:

Show hemoglobin 7.5 sodium 138, potassium 3.4, chloride 95, BUN of 73, creatinine 1.62.



ASSESSMENT:

1. Acute kidney injury cardiorenal, currently improving, continue with current dose of

    Lasix.

2. Metabolic alkalosis secondary to diuresis.  Will monitor for now.

3. Hypokalemia.  We will replace secondary etiology is diuretics.

4. Severe cardiomyopathy, ejection fraction about 20%-25%.

5. Chronic kidney disease, stage III, baseline creatinine 1.2.

6. Gastrointestinal bleed, status post EGD which showed active bleeding, duodenal

    ulcer, antral erosive gastritis, and other nonbleeding duodenal ulcers in the

    duodenal bulb.



PLAN:

Continue current dose of Lasix.  Repeat labs in a.m.  Continue with the midodrine.

Replace potassium.  I will also add Aldactone.  Continue with the Cozaar.





MMODL / IJN: [id number] / Job#: [id number]

## 2020-04-20 NOTE — P.PN
Progress Note - Text


Progress Note Date: 04/20/20





Chief Complaint: Shortness of breath





Patient is a 80-year-old patient of Dr. Duckworth.   history of chronic atrial 

fibrillation on anticoagulation with Eliquis, coronary artery disease status 

post bypass graft, cardiomyopathy status post AICD placement, history of nephrec

guy and unilateral kidney, CK D stage III, hypertension, hyperlipidemia, GERD, 

diabetes type 2 and history of bipolar/depression came to ER with complaints of 

worsening shortness of breath and exertional dyspnea and bilateral lower 

extremity increases swelling for the past 2 weeks.  Patient does have minimal 

cough without any sputum production.  No sputum production.  No complaints of 

fever or chills.  Denied any sick contacts at home.  No recent travel.





Admitted with CHF exacerbation, atrial fibrillation with a rapid ventricular 

rate.  Started on IV Lasixdrip and IV amiodarone.then IV dobutamine was added.  

Switch to IV Lasix bolus.  April 16 patient became hypotensive.  Hemoglobin 

dropped to 4.2.  Moved to the ICU.  Also had some dark stools. EGD on April 18-

Found to have an actively bleeding duodenal ulcer in the second portion of 

duodenum.  Epinephrine injection was given followed by Endo Clip.  2 other 

nonbleeding duodenal ulcers were found.  Since admission received a total of 5 

units of blood.


Today-.  ICU: More awake.  Looks better.  Advance to full liquid diet.  Did walk

a few steps in the room.  





Review of systems: Was attempted for constitutional, cardiovascular, GI, 

pulmonary. relevant finding as above





Active Medications





Atorvastatin Calcium (Lipitor)  80 mg PO HS [facility]


   Last Admin: 04/19/20 20:25 Dose:  80 mg


   Documented by: 


Cholecalciferol (Vitamin D3 (25 Mcg = 1000 Iu))  2,000 unit PO DAILY [facility]


   Last Admin: 04/20/20 09:13 Dose:  2,000 unit


   Documented by: 


Cyanocobalamin (Vitamin B-12)  1,000 mcg PO DAILY [facility]


   Last Admin: 04/20/20 09:13 Dose:  1,000 mcg


   Documented by: 


Furosemide (Lasix)  40 mg PO DAILY [facility]


   Last Admin: 04/20/20 09:13 Dose:  40 mg


   Documented by: 


Furosemide (Lasix)  20 mg PO DAILY@1700 [facility]


   Last Admin: 04/19/20 17:09 Dose:  20 mg


   Documented by: 


Ferric Sodium Gluconate 125 mg (/ Sodium Chloride)  110 mls @ 100 mls/hr IVPB 

DAILY [facility]


   Stop: 04/22/20 10:05


   Last Admin: 04/20/20 15:13 Dose:  100 mls/hr


   Documented by: 


Insulin Aspart (Novolog)  0 unit SQ [facility]; Protocol


   Last Admin: 04/20/20 12:45 Dose:  5 unit


   Documented by: 


Insulin Detemir (Levemir)  28 unit SQ [facility]


Lamotrigine (Lamictal)  100 mg PO [facility]


   Last Admin: 04/19/20 20:25 Dose:  100 mg


   Documented by: 


Losartan Potassium (Cozaar)  25 mg PO [facility]


   Last Admin: 04/19/20 21:24 Dose:  25 mg


   Documented by: 


Melatonin (Melatonin)  3 mg PO [facility]


   Last Admin: 04/19/20 20:26 Dose:  3 mg


   Documented by: 


Metoprolol Tartrate (Lopressor)  12.5 mg PO BID [facility]


   Last Admin: 04/20/20 10:44 Dose:  Not Given


   Documented by: 


Midodrine (Proamatine)  10 mg PO AC-TID [facility]


   Last Admin: 04/20/20 12:45 Dose:  10 mg


   Documented by: 


Miscellaneous Information (Potassium Per Protocol)  1 each MISCELLANE DAILY PRN;

Protocol


   PRN Reason: Per Protocol


Ondansetron HCl (Zofran)  4 mg IVP Q6HR PRN


   PRN Reason: Nausea And Vomiting


   Last Admin: 04/10/20 16:43 Dose:  4 mg


   Documented by: 


Pantoprazole Sodium (Protonix)  40 mg IVP BID [facility]


   Last Admin: 04/20/20 09:13 Dose:  40 mg


   Documented by: 


Potassium Chloride (K-Dur 20)  20 meq PO DAILY [facility]


   Last Admin: 04/20/20 09:12 Dose:  20 meq


   Documented by: 


Spironolactone (Aldactone)  25 mg PO DAILY [facility]


   Last Admin: 04/20/20 09:13 Dose:  25 mg


   Documented by: 


Venlafaxine HCl (Effexor)  75 mg PO BID [facility]


   Last Admin: 04/20/20 09:12 Dose:  75 mg


   Documented by: 








On examination:


VITAL SIGNS: 97.8, 66, 17, 99/74, 99% on 2 L


GENERAL APPEARANCE: BMI 34.7, sitting upon a chair, more awake today


HEENT: Normal external appearance of nose and ear.  Oral cavity normal


EYES: Pupils equal. Conjunctiva pale. 


NECK: JVD unable to assess Mass not palpable. 


RESPIRATORY: Respiratory effort increased.  Decreased breath sounds.


CARDIOVASCULAR: Heart sounds irregular, edema present


ABDOMEN: Soft. Liver and spleen not palpable. No tenderness. No mass palpable.  

Cortes catheter-hematuria cleared


PSYCHIATRY: Answering questions





INVESTIGATIONS, reviewed in the clinical context:


White count 8.7 hemoglobin 7.5 platelets 207in 3.4 bun 73 creatinine 1.62








Previous testing:


White count 8.2 hemoglobin 10.6 potassium 5.2 bun 52 creatinine 2.0 troponin I 

0.0-3


EKG-possible A. fib


Chest x-ray film personally reviewed by me-pulmonary edema cardiomegaly


BUN/creatinine on February 22-1.58


EGD/April 18/-bleeding duodenal ulcer that was given epinephrine and Endo Clip. 

2 other duodenal ulcers nonbleeding.








Assessment:


-Acute on chronic congestive heart failure exacerbation from systolic 

dysfunction, EF less than 30% 


-Bleeding duodenal ulcer given epinephrine injection with Endo Clip.  2 other 

nonbleeding duodenal ulcers


-Acute blood loss anemia-black tarry stools, from above-requiring a total of 5 

units of blood


-Severe hypokalemia from diuresis, better


-Hypotension multifactorial


Ischemic cardiomyopathy


Persistent atrial fibrillation on anticoagulation with Eliquis, rate better 

controlled


Acute kidney injury secondary to cardiorenal syndrome, 


-chronic kidney disease stage III.


Mild hyperkalemia secondary to acute kidney injury


Lactic acidosis-type II


History of nephrectomy due to renal cancer and bladder cancer history


Diabetes type 2.  Insulin-dependent and also on metformin and glipizide.


Hyperlipidemia


Coronary artery disease with history of multiple stents placement


Bipolar disorder and depression


GERD


Obesity with BMI 34.9


-





Plan:


Care was discussed with the patient.  Advance to a full liquid diet today.  If 

hemoglobin remains stable tomorrow hopefully can be discharged to ECF.

## 2020-04-20 NOTE — PN
PROGRESS NOTE



Mr. Tariq is an 80-year-old male with a history of atrial fibrillation who presented with

evidence of anemia and bleeding.  He has a history of severe ischemic cardiomyopathy,

status post ICD implant, history of hypertension, hyperlipidemia, and coronary bypass

grafting.  He was transfused 1 unit of blood yesterday.  He is feeling better this

morning.  His breathing is stable.  He denies any chest pain.  He denies any dizziness

or palpitation.  His blood pressure is on the lower side.  He denies any nausea or

vomiting.  He continues to be on Coreg 3.125 mg twice a day, Lasix 40 in the morning 20

in the afternoon, insulin, losartan 25 mg daily, Protonix and spironolactone 25 mg

daily in addition to Lipitor 80 mg daily.



PHYSICAL EXAMINATION:

Blood pressure running in the 80s to 100 with a heart rate in 60s.

LUNGS: No wheezes.

HEART:  Irregular, irregular, S1, S2. No S3 with systolic murmur.

ABDOMEN:  Soft, nontender.

EXTREMITIES: 1+ edema bilaterally.



LAB DATA:

Lab data revealed a hemoglobin of 7.5, BUN and creatinine 73 and 1.62, which has

improved compared to yesterday.



IMPRESSION:

1. Anemia.  The patient got transfused yesterday with prior episode of active

    bleeding.

2. Exacerbation of congestive heart failure with known history of severe ischemic

    cardiomyopathy.

3. Acute renal kidney injury.

4. Status post ICD implantation.

5. History of chronic persistent atrial fibrillation not anticoagulated at this time.

6. Status post coronary artery bypass grafting.

7. Hypertension.

8. Hyperlipidemia.



RECOMMENDATION:

Since his blood pressure is on the lower side, I will switch him to metoprolol tartrate

12.5 mg twice a day.  Continue rest of his medical regimen.  Continue to follow his

renal function and hemoglobin and depending on his progress, further recommendation

will be made.





MMODL / IJN: [id number] / Job#: [id number]

## 2020-04-20 NOTE — P.PN
Subjective


Progress Note Date: 04/20/20





This is an 80-year-old male patient with severe ischemic cardiomyopathy and 

ejection fraction of less than 20% along with history of AICD placement in 

addition to history of hypertension, diabetes mellitus, hyperlipidemia, atrial 

fibrillation chronic, coronary artery disease with previous bypass surgery and 

previous history of kidney and bladder cancer post-chemoradiation therapy.  He 

came into the hospital on 04/05/2020 because of worsening shortness of breath 

and worsening lower extremity edema and a chest x-ray showing pulmonary vascular

congestion consistent with CHF.  He had a creatinine of 2.0 consistent with 

chronic kidney disease, stage III.  He had some limited troponin elevation in 

the patient's proBNP level was 2710.  He was started on IV Lasix.  He was 

started on IV dobutamine.  He was in atrial fibrillation.  He had long-term 

antibiotic ventilation with Eliquis.  Subsequently, the patient was transferred 

to the intensive care unit for complications related to a drop in hemoglobin 

related to GI bleed.  The patient received a total of 4 units of packed RBC and 

he went and underwent and EGD and he was found to have an active bleeding 

duodenal ulcer in the second portion of the duodenum just distal to the duodenal

sweep with a visible vessel that was injected with epinephrine and Endo Clip was

placed.  2 other nonbleeding duodenal ulcers are also present.  He had also 

erosive gastritis involving the antrum of the stomach.  He was started on IV 

Protonix.  Subsequently, he received a fifth unit of packed RBC for some 

fluctuations in his hemoglobin.  On today's evaluation of 04/20/2020, the 

patient is awake and alert and is taking clear liquid diet.  He is 

hemodynamically stable.  He is on 2 L of oxygen by nasal cannula.  Hemoglobin is

at 7.5.  No signs of any GI bleed and the patient remains on Protonix.





Objective





- Vital Signs


Vital signs: 


                                   Vital Signs











Temp  96.8 F L  04/20/20 04:00


 


Pulse  68   04/20/20 04:00


 


Resp  21   04/20/20 04:00


 


BP  97/50   04/20/20 04:00


 


Pulse Ox  94 L  04/20/20 04:00








                                 Intake & Output











 04/19/20 04/19/20 04/20/20





 06:59 18:59 06:59


 


Intake Total 300 920 


 


Output Total 2000 088 0230


 


Balance -5456 -25 -4974


 


Weight   112.8 kg


 


Intake:   


 


  Oral 300 300 


 


  Blood Product 0 620 


 


    Rc As-1  Unit 0 310 





    [id number]   


 


Output:   


 


  Urine 2000 654 1135


 


Other:   


 


  Voiding Method Indwelling Catheter Indwelling Catheter Indwelling Catheter














- Exam











GENERAL EXAM: Alert, 80-year-old gentleman, awake and alert, on 2 L nasal 

cannula with an O2 saturation of 100%, comfortable in no apparent distress.


HEAD: Normocephalic.


EYES: Normal reaction of pupils, equal size.


NOSE: Clear with pink turbinates.


THROAT: No erythema or exudates.


NECK: No masses, no JVD.


CHEST: No chest wall deformity.


LUNGS: Equal air entry with crackles through the mid lung fields and lower lung 

fields.


CVS: S1 and S2 normal with no audible murmur, irregular rhythm.


ABDOMEN: No hepatosplenomegaly, normal bowel sounds, no guarding or rigidity.


SPINE: No scoliosis or deformity


SKIN: No rashes


CENTRAL NERVOUS SYSTEM: No focal deficits, tone is normal in all 4 extremities.


EXTREMITIES: There is 1-2+ peripheral edema.  No clubbing, no cyanosis.  

Peripheral pulses are intact.





- Labs


CBC & Chem 7: 


                                 04/20/20 03:51





                                 04/20/20 03:51


Labs: 


                  Abnormal Lab Results - Last 24 Hours (Table)











  04/16/20 04/19/20 04/19/20 Range/Units





  09:12 08:30 11:30 


 


WBC    11.3 H  (3.8-10.6)  k/uL


 


RBC    3.01 L  (4.30-5.90)  m/uL


 


Hgb    8.1 L  (13.0-17.5)  gm/dL


 


Hct    25.6 L  (39.0-53.0)  %


 


MCHC     (31.0-37.0)  g/dL


 


RDW    18.6 H  (11.5-15.5)  %


 


Neutrophils # (Manual)    8.36 H  (1.3-7.7)  k/uL


 


Lymphocytes # (Manual)     (1.0-4.8)  k/uL


 


Nucleated RBCs    2 H  (0-0)  /100 WBC


 


Potassium     (3.5-5.1)  mmol/L


 


Chloride     ()  mmol/L


 


Carbon Dioxide     (22-30)  mmol/L


 


BUN     (9-20)  mg/dL


 


Creatinine     (0.66-1.25)  mg/dL


 


Glucose     (74-99)  mg/dL


 


POC Glucose (mg/dL)   105 H   (75-99)  mg/dL


 


Crossmatch  See Detail    














  04/19/20 04/19/20 04/19/20 Range/Units





  16:44 19:53 20:36 


 


WBC     (3.8-10.6)  k/uL


 


RBC   2.77 L   (4.30-5.90)  m/uL


 


Hgb   7.4 L   (13.0-17.5)  gm/dL


 


Hct   23.5 L   (39.0-53.0)  %


 


MCHC     (31.0-37.0)  g/dL


 


RDW   18.3 H   (11.5-15.5)  %


 


Neutrophils # (Manual)     (1.3-7.7)  k/uL


 


Lymphocytes # (Manual)     (1.0-4.8)  k/uL


 


Nucleated RBCs     (0-0)  /100 WBC


 


Potassium     (3.5-5.1)  mmol/L


 


Chloride     ()  mmol/L


 


Carbon Dioxide     (22-30)  mmol/L


 


BUN     (9-20)  mg/dL


 


Creatinine     (0.66-1.25)  mg/dL


 


Glucose     (74-99)  mg/dL


 


POC Glucose (mg/dL)  120 H   140 H  (75-99)  mg/dL


 


Crossmatch     














  04/20/20 04/20/20 Range/Units





  03:51 03:51 


 


WBC    (3.8-10.6)  k/uL


 


RBC   2.88 L  (4.30-5.90)  m/uL


 


Hgb   7.5 L  (13.0-17.5)  gm/dL


 


Hct   24.5 L  (39.0-53.0)  %


 


MCHC   30.5 L  (31.0-37.0)  g/dL


 


RDW   18.7 H  (11.5-15.5)  %


 


Neutrophils # (Manual)    (1.3-7.7)  k/uL


 


Lymphocytes # (Manual)   0.61 L  (1.0-4.8)  k/uL


 


Nucleated RBCs    (0-0)  /100 WBC


 


Potassium  3.4 L   (3.5-5.1)  mmol/L


 


Chloride  95 L   ()  mmol/L


 


Carbon Dioxide  38 H   (22-30)  mmol/L


 


BUN  73 H   (9-20)  mg/dL


 


Creatinine  1.62 H   (0.66-1.25)  mg/dL


 


Glucose  62 L   (74-99)  mg/dL


 


POC Glucose (mg/dL)    (75-99)  mg/dL


 


Crossmatch    














Assessment and Plan


Plan: 








1.  Acute exacerbation of chronic congestive heart failure with systolic 

dysfunction currently on 2 L of oxygen by nasal cannula and the patient is 

hemodynamically stable on no pressors.  Patient is on Lasix or in addition to 

Aldactone.  Patient is on Coreg 3.125 mg by mouth twice a day and Cozaar 25 mg 

by mouth daily.  Midrin is also added for blood pressure control.





2.  upper GI bleed with an acute blood loss anemia requiring a total of 5 units 

of packed RBC during this current admission.  EGD performed on 04/18/2020 

revealed an actively bleeding duodenal ulcer in the second portion of the 

duodenum just distal to the distal duodenal sweep with a visible vessel, status 

post injection of epinephrine followed by Endo Clip.  There were 2 other 

nonbleeding duodenal ulcers noted as well.  Antral erosive gastritis.  she is on

Protonix 40 mg IV push every 12 hours.  GI is on the case.





3. chronic stage III kidney disease with a component of an acute kidney injury 

likely related to cardiorenal syndrome, diuretic therapy, nephrology is 

following, and the patient's creatinine is stable for now and is back to its 

baseline





4.  Acute urinary tract infection, placed on ceftriaxone, urine culture negative





5.  Ischemic cardiomyopathy, with EF of less than 20%, status post AICD 

implantation





6.  History of chronic persistent atrial fibrillation on oral anticoagulation 

with Eliquis which is currently on hold based on underlying GI bleed





7.  History of chronic kidney disease stage III at baseline, history of right 

hydronephrosis and patient follows with urology





8.  Coronary artery disease status post bypass grafting





9.  Hypertension





10.  Hyperlipidemia





11.  History of bladder cancer





12.  Diabetes mellitus currently on Levemir insulin 40 units at bedtime and 10 

units in the morning





Plan





Hemoglobin stable at 7.5


Renal function is improvement in the creatinine is at 1.6


Continue oral diuretics


CHF management


Oxygen 2 L per minute nasal cannula


Protonix


Advance diet as tolerated


Can transferred to selective

## 2020-04-21 VITALS — TEMPERATURE: 98 F | HEART RATE: 62 BPM | RESPIRATION RATE: 26 BRPM

## 2020-04-21 VITALS — DIASTOLIC BLOOD PRESSURE: 54 MMHG | SYSTOLIC BLOOD PRESSURE: 114 MMHG

## 2020-04-21 LAB
ANION GAP SERPL CALC-SCNC: 4 MMOL/L
BASOPHILS # BLD AUTO: 0 K/UL (ref 0–0.2)
BASOPHILS NFR BLD AUTO: 0 %
BUN SERPL-SCNC: 63 MG/DL (ref 9–20)
CALCIUM SPEC-MCNC: 8.3 MG/DL (ref 8.4–10.2)
CHLORIDE SERPL-SCNC: 92 MMOL/L (ref 98–107)
CO2 SERPL-SCNC: 38 MMOL/L (ref 22–30)
EOSINOPHIL # BLD AUTO: 0.3 K/UL (ref 0–0.7)
EOSINOPHIL NFR BLD AUTO: 3 %
ERYTHROCYTE [DISTWIDTH] IN BLOOD BY AUTOMATED COUNT: 2.89 M/UL (ref 4.3–5.9)
ERYTHROCYTE [DISTWIDTH] IN BLOOD: 18.3 % (ref 11.5–15.5)
GLUCOSE BLD-MCNC: 138 MG/DL (ref 75–99)
GLUCOSE BLD-MCNC: 48 MG/DL (ref 75–99)
GLUCOSE BLD-MCNC: 53 MG/DL (ref 75–99)
GLUCOSE BLD-MCNC: 71 MG/DL (ref 75–99)
GLUCOSE SERPL-MCNC: 36 MG/DL (ref 74–99)
HCT VFR BLD AUTO: 25.4 % (ref 39–53)
HGB BLD-MCNC: 7.5 GM/DL (ref 13–17.5)
LYMPHOCYTES # SPEC AUTO: 1.2 K/UL (ref 1–4.8)
LYMPHOCYTES NFR SPEC AUTO: 11 %
MCH RBC QN AUTO: 26 PG (ref 25–35)
MCHC RBC AUTO-ENTMCNC: 29.5 G/DL (ref 31–37)
MCV RBC AUTO: 87.9 FL (ref 80–100)
MONOCYTES # BLD AUTO: 1.3 K/UL (ref 0–1)
MONOCYTES NFR BLD AUTO: 12 %
NEUTROPHILS # BLD AUTO: 7.5 K/UL (ref 1.3–7.7)
NEUTROPHILS NFR BLD AUTO: 70 %
PLATELET # BLD AUTO: 234 K/UL (ref 150–450)
POTASSIUM SERPL-SCNC: 4 MMOL/L (ref 3.5–5.1)
SODIUM SERPL-SCNC: 134 MMOL/L (ref 137–145)
WBC # BLD AUTO: 10.6 K/UL (ref 3.8–10.6)

## 2020-04-21 RX ADMIN — VENLAFAXINE HYDROCHLORIDE SCH MG: 75 TABLET ORAL at 08:17

## 2020-04-21 RX ADMIN — MIDODRINE HYDROCHLORIDE SCH MG: 5 TABLET ORAL at 12:50

## 2020-04-21 RX ADMIN — SODIUM FERRIC GLUCONATE COMPLEX SCH MLS/HR: 12.5 INJECTION INTRAVENOUS at 10:02

## 2020-04-21 RX ADMIN — PANTOPRAZOLE SODIUM SCH MG: 40 INJECTION, POWDER, FOR SOLUTION INTRAVENOUS at 08:17

## 2020-04-21 RX ADMIN — SPIRONOLACTONE SCH MG: 25 TABLET, FILM COATED ORAL at 08:17

## 2020-04-21 RX ADMIN — POTASSIUM CHLORIDE SCH MEQ: 20 TABLET, EXTENDED RELEASE ORAL at 08:17

## 2020-04-21 RX ADMIN — FUROSEMIDE SCH MG: 40 TABLET ORAL at 08:17

## 2020-04-21 RX ADMIN — Medication SCH UNIT: at 08:17

## 2020-04-21 RX ADMIN — CYANOCOBALAMIN TAB 500 MCG SCH MCG: 500 TAB at 08:17

## 2020-04-21 RX ADMIN — INSULIN ASPART SCH: 100 INJECTION, SOLUTION INTRAVENOUS; SUBCUTANEOUS at 12:37

## 2020-04-21 RX ADMIN — INSULIN ASPART SCH: 100 INJECTION, SOLUTION INTRAVENOUS; SUBCUTANEOUS at 07:51

## 2020-04-21 RX ADMIN — MIDODRINE HYDROCHLORIDE SCH MG: 5 TABLET ORAL at 07:53

## 2020-04-21 NOTE — P.PN
Subjective


Progress Note Date: 04/21/20


Principal diagnosis: 





Anemia of acute blood loss, upper GI bleed, duodenal ulcers





The patient is seen lying in bed denying any signs or symptoms of GI bleeding.  

Diet advanced today and tolerating.  No abdominal pain.





Objective





- Vital Signs


Vital signs: 


                                   Vital Signs











Temp  97.7 F   04/21/20 08:00


 


Pulse  63   04/21/20 10:00


 


Resp  21   04/21/20 10:00


 


BP  114/54   04/21/20 10:00


 


Pulse Ox  97   04/21/20 00:00








                                 Intake & Output











 04/20/20 04/21/20 04/21/20





 18:59 06:59 18:59


 


Intake Total  120 340


 


Output Total 465 725 300


 


Balance -465 -605 40


 


Weight  114.8 kg 


 


Intake:   


 


  Intake, IV Titration   100





  Amount   


 


    Sodium Ferric Gluconat-   100





    Sucrose 125 mg In Sodium   





    Chloride 0.9% 100 ml @   





    100 mls/hr IVPB DAILY [facility]   





    Rx#:[id number]   


 


  Oral  120 240


 


Output:   


 


  Urine 465 725 300


 


Other:   


 


  Voiding Method Indwelling Catheter Indwelling Catheter Indwelling Catheter


 


  # Bowel Movements  1 














- Exam





On physical examination, patient appears comfortable in no apparent distress. 


HEAD: Normocephalic, atraumatic. 


EYES: No scleral icterus. No conjunctival injection. 


MOUTH: No lesions, tongue midline. 


NECK: Trachea midline, no gross abnormalities. 


ABDOMEN: Soft, obese. Bowel sounds are positive. No organomegaly.  No guarding 

or rigidity.


EXTREMITIES: No pedal edema. 


SKIN: No rashes, no jaundice. 


NEUROLOGIC: Alert and oriented x3.  No focal deficits. 





- Labs


CBC & Chem 7: 


                                 04/21/20 04:05





                                 04/21/20 04:05


Labs: 


                  Abnormal Lab Results - Last 24 Hours (Table)











  04/20/20 04/20/20 04/20/20 Range/Units





  12:32 17:31 20:11 


 


RBC     (4.30-5.90)  m/uL


 


Hgb     (13.0-17.5)  gm/dL


 


Hct     (39.0-53.0)  %


 


MCHC     (31.0-37.0)  g/dL


 


RDW     (11.5-15.5)  %


 


Monocytes #     (0-1.0)  k/uL


 


Sodium     (137-145)  mmol/L


 


Chloride     ()  mmol/L


 


Carbon Dioxide     (22-30)  mmol/L


 


BUN     (9-20)  mg/dL


 


Creatinine     (0.66-1.25)  mg/dL


 


Glucose     (74-99)  mg/dL


 


POC Glucose (mg/dL)  228 H  261 H  251 H  (75-99)  mg/dL


 


Calcium     (8.4-10.2)  mg/dL














  04/21/20 04/21/20 04/21/20 Range/Units





  04:05 04:05 05:25 


 


RBC  2.89 L    (4.30-5.90)  m/uL


 


Hgb  7.5 L    (13.0-17.5)  gm/dL


 


Hct  25.4 L    (39.0-53.0)  %


 


MCHC  29.5 L    (31.0-37.0)  g/dL


 


RDW  18.3 H    (11.5-15.5)  %


 


Monocytes #  1.3 H    (0-1.0)  k/uL


 


Sodium   134 L   (137-145)  mmol/L


 


Chloride   92 L   ()  mmol/L


 


Carbon Dioxide   38 H   (22-30)  mmol/L


 


BUN   63 H   (9-20)  mg/dL


 


Creatinine   1.57 H   (0.66-1.25)  mg/dL


 


Glucose   36 L*   (74-99)  mg/dL


 


POC Glucose (mg/dL)    48 L  (75-99)  mg/dL


 


Calcium   8.3 L   (8.4-10.2)  mg/dL














  04/21/20 04/21/20 Range/Units





  06:03 06:29 


 


RBC    (4.30-5.90)  m/uL


 


Hgb    (13.0-17.5)  gm/dL


 


Hct    (39.0-53.0)  %


 


MCHC    (31.0-37.0)  g/dL


 


RDW    (11.5-15.5)  %


 


Monocytes #    (0-1.0)  k/uL


 


Sodium    (137-145)  mmol/L


 


Chloride    ()  mmol/L


 


Carbon Dioxide    (22-30)  mmol/L


 


BUN    (9-20)  mg/dL


 


Creatinine    (0.66-1.25)  mg/dL


 


Glucose    (74-99)  mg/dL


 


POC Glucose (mg/dL)  53 L  71 L  (75-99)  mg/dL


 


Calcium    (8.4-10.2)  mg/dL














Assessment and Plan


(1) Duodenal ulcer with hemorrhage


Narrative/Plan: 


80-year-old male with multiple medical comorbidities being treated for fluid 

overload and atrial fibrillation found to have a fall in his hemoglobin.  He was

taken for EGD with findings of gastritis and 3 duodenal ulcers, one which was 

actively bleeding and treated with Endo Clip placement and epinephrine injection

with hemostasis.  No further signs or symptoms of GI bleeding.  Hemoglobin is 

stable today at 7.5.


Status: Acute   Code(s): K26.4 - CHRONIC OR UNSPECIFIED DUODENAL ULCER WITH 

HEMORRHAGE   SNOMED Code(s): 22596036


   





(2) Anemia associated with acute blood loss


Status: Acute   Code(s): D62 - ACUTE POSTHEMORRHAGIC ANEMIA   SNOMED Code(s): 

978337950


   





(3) GI bleed


Status: Acute   Code(s): K92.2 - GASTROINTESTINAL HEMORRHAGE, UNSPECIFIED   

SNOMED Code(s): 95554019


   


Plan: 





Supportive care


Diet advance to full liquids


IV iron ordered for 3 doses as laboratory evaluation is consistent with an iron 

deficiency anemia, exacerbated by acute GI bleed


Continue Protonix 40 mg IV twice daily


Avoid NSAID use


Continue management of other medical comorbidities


No plans for further endoscopic evaluation at this time


Thank you for allowing us to participate in the care of the patient

## 2020-04-21 NOTE — P.DS
Providers


Date of admission: 


04/05/20 13:57





Expected date of discharge: 04/21/20


Attending physician: 


Ramon Chavarria





Consults: 





                                        





04/05/20 13:51


Consult Physician Routine 


   Consulting Provider: Kelsi Fleming


   Consult Reason/Comments: CHF


   Do you want consulting provider notified?: Yes





04/07/20 09:13


Consult Physician Routine 


   Consulting Provider: Maria Fernanda Stevenson


   Consult Reason/Comments: abn creat


   Do you want consulting provider notified?: Yes





04/08/20 13:49


Consult Physician Routine 


   Consulting Provider: Raad Trevino


   Consult Reason/Comments: sob, infiltrates


   Do you want consulting provider notified?: Yes





04/16/20 08:48


Consult Physician Routine 


   Consulting Provider: Ruy Quinn


   Consult Reason/Comments: ICU management


   Do you want consulting provider notified?: Yes





04/16/20 14:31


Consult Physician Routine 


   Consulting Provider: Luana Castrejon


   Consult Reason/Comments: Acute severe anemia


   Do you want consulting provider notified?: Yes











Primary care physician: 


[facility] Course: 





Chief Complaint: Shortness of breath





Patient is a 80-year-old patient of Dr. Duckworth.   history of chronic atrial 

fibrillation on anticoagulation with Eliquis, coronary artery disease status 

post bypass graft, cardiomyopathy status post AICD placement, history of 

nephrectomy and unilateral kidney, CK D stage III, hypertension, hyperlipidemia,

GERD, diabetes type 2 and history of bipolar/depression came to ER with 

complaints of worsening shortness of breath and exertional dyspnea and bilateral

lower extremity increases swelling for the past 2 weeks.  Patient does have 

minimal cough without any sputum production.  No sputum production.  No 

complaints of fever or chills.  Denied any sick contacts at home.  No recent 

travel.





Admitted with CHF exacerbation, atrial fibrillation with a rapid ventricular 

rate.  Started on IV Lasixdrip and IV amiodarone.then IV dobutamine was added.  

Switch to IV Lasix bolus.  April 16 patient became hypotensive.  Hemoglobin 

dropped to 4.2.  Moved to the ICU.  Also had some dark stools. EGD on April 18-

Found to have an actively bleeding duodenal ulcer in the second portion of 

duodenum.  Epinephrine injection was given followed by Endo Clip.  2 other 

nonbleeding duodenal ulcers were found.  Since admission received a total of 5 

units of blood.


Today-.  Comfortable.  Breathing stable.  No further drop in hemoglobin.  

Hemoglobin stable at 7.5.  Appetite improved.  Sugars were low this morning.  

Dose of Levemir to be further cut back.  Discussed with patient.


Discussion and discharge planning more than 35 minutes








Consultants:


 [facility]


Dr. Stevenson and partners from nephrology


Dr. Trevino and colleagues from pulmonary


Dr. BLU Castrejon and partners from GI 








On examination:


VITAL SIGNS: 97.8, 66, 17, 99/74, 99% on 2 L


GENERAL APPEARANCE: BMI 34.7, sitting upon a chair, more awake today


HEENT: Normal external appearance of nose and ear.  Oral cavity normal


EYES: Pupils equal. Conjunctiva pale. 


NECK: JVD unable to assess Mass not palpable. 


RESPIRATORY: Respiratory effort normal Decreased breath sounds.


CARDIOVASCULAR: Heart sounds irregular, edema decreased


ABDOMEN: Soft. Liver and spleen not palpable. No tenderness. No mass palpable.  

Cortes catheter-hematuria cleared


PSYCHIATRY: Answering questions





INVESTIGATIONS, reviewed in the clinical context:


White count 10.6 hemoglobin 7.5 platelets 234 potassium 4 bun 63 creatinine 1.53








Previous testing:


White count 8.2 hemoglobin 10.6 potassium 5.2 bun 52 creatinine 2.0 troponin I 

0.0-3


EKG-possible A. fib


Chest x-ray film personally reviewed by me-pulmonary edema cardiomegaly


BUN/creatinine on February 22-1.58


EGD/April 18/-bleeding duodenal ulcer that was given epinephrine and Endo Clip. 

2 other duodenal ulcers nonbleeding.








Assessment:


-Acute on chronic congestive heart failure exacerbation from systolic 

dysfunction, EF less than 30% POA


-Bleeding duodenal ulcer given epinephrine injection with Endo Clip.  2 other 

nonbleeding duodenal ulcers


-Acute blood loss anemia-black tarry stools, from above-requiring a total of 5 

units of blood


-Severe hypokalemia from diuresis, better


-Hypotension multifactorial


Ischemic cardiomyopathy


Persistent atrial fibrillation on anticoagulation with Eliquis, rate better 

controlled


Acute kidney injury secondary to cardiorenal syndrome, 


-chronic kidney disease stage III.


Mild hyperkalemia secondary to acute kidney injury


Lactic acidosis-type II


History of nephrectomy due to renal cancer and bladder cancer history


Diabetes type 2.  Insulin-dependent and also on metformin and glipizide.


Hyperlipidemia


Coronary artery disease with history of multiple stents placement


Bipolar disorder and depression


GERD


Obesity with BMI 34.9





Labs:


CBC BMP-3 days


-





Disposition:


[facility]





Patient Condition at Discharge: Stable





Plan - Discharge Summary


Discharge Rx Participant: No


New Discharge Prescriptions: 


New


   Spironolactone [Aldactone] 25 mg PO DAILY  tab


   Losartan [Cozaar] 25 mg PO HS  tab


   Potassium Chloride ER [K-Dur 20] 20 meq PO DAILY  tab.er.prt


   Furosemide [Lasix] 20 mg PO DAILY@1700  tab


   Furosemide [Lasix] 40 mg PO DAILY  tab


   Metoprolol Tartrate [Lopressor] 25 mg PO BID  tab


   Melatonin 3 mg PO HS  tablet


   INSULIN ASPART (NovoLOG) [NovoLOG (formulary)] 0 unit SQ ACHS  vial


   Midodrine [ProAmatine] 10 mg PO AC-TID  tab


   Pantoprazole Sodium [Protonix] 40 mg PO BID #1 tablet.dr





Continue


   Venlafaxine HCl [Effexor] 75 mg PO BID  tab


   Atorvastatin [Lipitor] 80 mg PO HS


   Cyanocobalamin (Vitamin B-12) [Vitamin B-12] 1,000 mcg PO DAILY


   lamoTRIgine [LaMICtal] 100 mg PO HS #3 tab





Changed


   Insulin Glargine [Lantus] 20 unit SQ HS #0





Discontinued


   metFORMIN HCL 1,000 mg PO BID


   Apixaban [Eliquis] 5 mg PO BID


   glipiZIDE [Glucotrol] 5 mg PO AC-BID #0 tab


   Aspirin 81 mg PO DAILY 30 Days #30 chew


   Furosemide [Lasix] 40 mg PO BID@0900,1600  tab


   Insulin Glargine [Lantus] 10 unit SQ QAM


   Cholecalciferol [Vitamin D3 (25 Mcg = 1000 Iu)] 2,000 unit PO DAILY





No Action


   Carvedilol [Coreg] 12.5 mg PO AC-BID


Discharge Medication List





Venlafaxine HCl [Effexor] 75 mg PO BID  tab 02/22/20 [Rx]


Atorvastatin [Lipitor] 80 mg PO HS 04/05/20 [History]


Carvedilol [Coreg] 12.5 mg PO AC-BID 04/05/20 [History]


Cyanocobalamin (Vitamin B-12) [Vitamin B-12] 1,000 mcg PO DAILY 04/05/20 

[History]


Furosemide [Lasix] 20 mg PO DAILY@1700  tab 04/21/20 [Rx]


Furosemide [Lasix] 40 mg PO DAILY  tab 04/21/20 [Rx]


INSULIN ASPART (NovoLOG) [NovoLOG (formulary)] 0 unit SQ ACHS  vial 04/21/20 

[Rx]


Insulin Glargine [Lantus] 20 unit SQ HS #0 04/21/20 [Rx]


Losartan [Cozaar] 25 mg PO HS  tab 04/21/20 [Rx]


Melatonin 3 mg PO HS  tablet 04/21/20 [Rx]


Metoprolol Tartrate [Lopressor] 25 mg PO BID  tab 04/21/20 [Rx]


Midodrine [ProAmatine] 10 mg PO AC-TID  tab 04/21/20 [Rx]


Pantoprazole Sodium [Protonix] 40 mg PO BID #1 tablet.dr 04/21/20 [Rx]


Potassium Chloride ER [K-Dur 20] 20 meq PO DAILY  tab.er.prt 04/21/20 [Rx]


Spironolactone [Aldactone] 25 mg PO DAILY  tab 04/21/20 [Rx]


lamoTRIgine [LaMICtal] 100 mg PO HS #3 tab 04/21/20 [Rx]








Follow up Appointment(s)/Referral(s): 


Ruy Duckworth MD [Primary Care Provider] - As Needed


Yaw Dick DO [STAFF PHYSICIAN] - 04/22/20


Vilma Urias MD [STAFF PHYSICIAN] - 1 Week


Luana Castrejon MD [STAFF PHYSICIAN] - 10 Days


Patient Instructions/Handouts:  Acute Kidney Injury (DC), Low-Sodium Diet (DC)


Activity/Diet/Wound Care/Special Instructions: 


CHF





1. Weigh yourself every morning after you urinate. If you gain 2-3 pounds 

overnight or 5 pounds in one week, call your primary physician for guidance on 

your medications. Keep a log of your weights.


2. Avoid salt, or foods with hidden salt. Extra salt makes your heart work 

harder and traps the fluid in your body for longer.


3. Take all of your medications as directed, especially your water pills. NEVER 

skip a dose.


4. Elevate your legs when you are not up moving around to help with circulation 

and prevent swelling.


5. Call your physician if you notice any extra swelling in your legs, ankles, 

feet or abdomen, if you have a new dry cough, if your shortness of breath 

worsens with activity or at rest, or if you feel more fatigued.





CBC BMP-3 days





CODE STATUS: DO NOT RESUSCITATE

## 2020-04-21 NOTE — PN
PROGRESS NOTE



Mr. Tariq is an 80-year-old male with known history of chronic persistent atrial

fibrillation, history of severe ischemic cardiomyopathy, hypertension, hyperlipidemia,

coronary artery bypass grafting, who presented with anemia and GI bleeding requiring

transfusion.  He is feeling better today.  His blood pressure and heart rate are

stable.  He denies any dizziness or palpitation.  He denies any nausea.

Hemodynamically, he is in atrial fibrillation with controlled ventricular response.



He continues to be on Lipitor 80 mg daily, furosemide 40 in the morning 20 in the

afternoon, insulin, losartan 25 mg daily, metoprolol tartrate 12.5 mg twice a day,

spironolactone 25 mg daily.



PHYSICAL EXAMINATION:

Blood pressure 110/70 with a heart rate in the 70s.

LUNGS:  Clear.

HEART:  Irregular regular, S1, S2.  No S3 with a systolic murmur.

ABDOMEN:  Soft, nontender, positive bowel sounds, no organomegaly.

EXTREMITIES:  With 1+ edema.



LAB DATA:

Revealed a hemoglobin of 7.5 which has been stable.  BUN and creatinine of 63 and 1.57.

Potassium 4.0.



IMPRESSION:

1. Gastrointestinal bleeding, stable at this time.  No evidence of active bleeding.

2. Exacerbation of congestive heart failure with known severe ischemic cardiomyopathy.

3. Chronic persistent atrial fibrillation, not anticoagulated.

4. Status post coronary artery bypass grafting.

5. Hypertension.

6. Hyperlipidemia.

7. Renal failure, improving.

8. Status post ICD implantation.



RECOMMENDATION:

I will increase the dose of the metoprolol to 25 mg twice a day.  Continue rest of his

medical regimen, increase his physical activity.  Follow his blood pressure and heart

rate and depending on his progress, further recommendation will be made.





MMODL / IJN: [id number] / Job#: [id number]

## 2020-04-21 NOTE — P.PN
Subjective


Progress Note Date: 04/21/20





This is an 80-year-old male patient with severe ischemic cardiomyopathy and 

ejection fraction of less than 20% along with history of AICD placement in 

addition to history of hypertension, diabetes mellitus, hyperlipidemia, atrial 

fibrillation chronic, coronary artery disease with previous bypass surgery and 

previous history of kidney and bladder cancer post-chemoradiation therapy.  He 

came into the hospital on 04/05/2020 because of worsening shortness of breath 

and worsening lower extremity edema and a chest x-ray showing pulmonary vascular

congestion consistent with CHF.  He had a creatinine of 2.0 consistent with 

chronic kidney disease, stage III.  He had some limited troponin elevation in 

the patient's proBNP level was 2710.  He was started on IV Lasix.  He was 

started on IV dobutamine.  He was in atrial fibrillation.  He had long-term 

antibiotic ventilation with Eliquis.  Subsequently, the patient was transferred 

to the intensive care unit for complications related to a drop in hemoglobin 

related to GI bleed.  The patient received a total of 4 units of packed RBC and 

he went and underwent and EGD and he was found to have an active bleeding 

duodenal ulcer in the second portion of the duodenum just distal to the duodenal

sweep with a visible vessel that was injected with epinephrine and Endo Clip was

placed.  2 other nonbleeding duodenal ulcers are also present.  He had also 

erosive gastritis involving the antrum of the stomach.  He was started on IV 

Protonix.  Subsequently, he received a fifth unit of packed RBC for some 

fluctuations in his hemoglobin.  On today's evaluation of 04/20/2020, the 

patient is awake and alert and is taking clear liquid diet.  He is 

hemodynamically stable.  He is on 2 L of oxygen by nasal cannula.  Hemoglobin is

at 7.5.  No signs of any GI bleed and the patient remains on Protonix.





On today's evaluation of 04/21/2020, the patient remains stable.  He has no 

complaints.  No bleeding.  He is having regular diet.  No nausea.  No vomiting. 

No abdominal pain.  No chest pain.  Slightly hypotensive and the patient is 

currently on Midrin for blood pressure control.  He is back on his oral Lasix 40

mg scheduled and 20 mg on a daily basis.  He is also on Aldactone.  He remains 

on Protonix.  No bleeding complications.  Hemoglobin stable at 7.5 and the 

patient's has no new complaints otherwise for now.  We're waiting for this 

patient to be transferred out of the intensive care unit.





Objective





- Vital Signs


Vital signs: 


                                   Vital Signs











Temp  97.7 F   04/21/20 08:00


 


Pulse  63   04/21/20 10:00


 


Resp  21   04/21/20 10:00


 


BP  114/54   04/21/20 10:00


 


Pulse Ox  97   04/21/20 00:00








                                 Intake & Output











 04/20/20 04/21/20 04/21/20





 18:59 06:59 18:59


 


Intake Total  120 340


 


Output Total 465 725 300


 


Balance -465 -605 40


 


Weight  114.8 kg 


 


Intake:   


 


  Intake, IV Titration   100





  Amount   


 


    Sodium Ferric Gluconat-   100





    Sucrose 125 mg In Sodium   





    Chloride 0.9% 100 ml @   





    100 mls/hr IVPB DAILY [facility]   





    Rx#:[id number]   


 


  Oral  120 240


 


Output:   


 


  Urine 465 725 300


 


Other:   


 


  Voiding Method Indwelling Catheter Indwelling Catheter Indwelling Catheter


 


  # Bowel Movements  1 














- Exam











GENERAL EXAM: Alert, 80-year-old gentleman, awake and alert, on 2 L nasal 

cannula with an O2 saturation of 100%, comfortable in no apparent distress.


HEAD: Normocephalic.


EYES: Normal reaction of pupils, equal size.


NOSE: Clear with pink turbinates.


THROAT: No erythema or exudates.


NECK: No masses, no JVD.


CHEST: No chest wall deformity.


LUNGS: Equal air entry with crackles through the mid lung fields and lower lung 

fields.


CVS: S1 and S2 normal with no audible murmur, irregular rhythm.


ABDOMEN: No hepatosplenomegaly, normal bowel sounds, no guarding or rigidity.


SPINE: No scoliosis or deformity


SKIN: No rashes


CENTRAL NERVOUS SYSTEM: No focal deficits, tone is normal in all 4 extremities.


EXTREMITIES: There is 1-2+ peripheral edema.  No clubbing, no cyanosis.  

Peripheral pulses are intact.





- Labs


CBC & Chem 7: 


                                 04/21/20 04:05





                                 04/21/20 04:05


Labs: 


                  Abnormal Lab Results - Last 24 Hours (Table)











  04/20/20 04/20/20 04/20/20 Range/Units





  12:32 17:31 20:11 


 


RBC     (4.30-5.90)  m/uL


 


Hgb     (13.0-17.5)  gm/dL


 


Hct     (39.0-53.0)  %


 


MCHC     (31.0-37.0)  g/dL


 


RDW     (11.5-15.5)  %


 


Monocytes #     (0-1.0)  k/uL


 


Sodium     (137-145)  mmol/L


 


Chloride     ()  mmol/L


 


Carbon Dioxide     (22-30)  mmol/L


 


BUN     (9-20)  mg/dL


 


Creatinine     (0.66-1.25)  mg/dL


 


Glucose     (74-99)  mg/dL


 


POC Glucose (mg/dL)  228 H  261 H  251 H  (75-99)  mg/dL


 


Calcium     (8.4-10.2)  mg/dL














  04/21/20 04/21/20 04/21/20 Range/Units





  04:05 04:05 05:25 


 


RBC  2.89 L    (4.30-5.90)  m/uL


 


Hgb  7.5 L    (13.0-17.5)  gm/dL


 


Hct  25.4 L    (39.0-53.0)  %


 


MCHC  29.5 L    (31.0-37.0)  g/dL


 


RDW  18.3 H    (11.5-15.5)  %


 


Monocytes #  1.3 H    (0-1.0)  k/uL


 


Sodium   134 L   (137-145)  mmol/L


 


Chloride   92 L   ()  mmol/L


 


Carbon Dioxide   38 H   (22-30)  mmol/L


 


BUN   63 H   (9-20)  mg/dL


 


Creatinine   1.57 H   (0.66-1.25)  mg/dL


 


Glucose   36 L*   (74-99)  mg/dL


 


POC Glucose (mg/dL)    48 L  (75-99)  mg/dL


 


Calcium   8.3 L   (8.4-10.2)  mg/dL














  04/21/20 04/21/20 Range/Units





  06:03 06:29 


 


RBC    (4.30-5.90)  m/uL


 


Hgb    (13.0-17.5)  gm/dL


 


Hct    (39.0-53.0)  %


 


MCHC    (31.0-37.0)  g/dL


 


RDW    (11.5-15.5)  %


 


Monocytes #    (0-1.0)  k/uL


 


Sodium    (137-145)  mmol/L


 


Chloride    ()  mmol/L


 


Carbon Dioxide    (22-30)  mmol/L


 


BUN    (9-20)  mg/dL


 


Creatinine    (0.66-1.25)  mg/dL


 


Glucose    (74-99)  mg/dL


 


POC Glucose (mg/dL)  53 L  71 L  (75-99)  mg/dL


 


Calcium    (8.4-10.2)  mg/dL














Assessment and Plan


Plan: 








1.  Acute exacerbation of chronic congestive heart failure with systolic 

dysfunction currently on 2 L of oxygen by nasal cannula and the patient is 

hemodynamically stable on no pressors.  Patient is on Lasix or in addition to 

Aldactone.  Patient is on Coreg 3.125 mg by mouth twice a day and Cozaar 25 mg 

by mouth daily.  Midrin is also added for blood pressure control.





2.  upper GI bleed with an acute blood loss anemia requiring a total of 5 units 

of packed RBC during this current admission.  EGD performed on 04/18/2020 

revealed an actively bleeding duodenal ulcer in the second portion of the 

duodenum just distal to the distal duodenal sweep with a visible vessel, status 

post injection of epinephrine followed by Endo Clip.  There were 2 other 

nonbleeding duodenal ulcers noted as well.  Antral erosive gastritis.  she is on

Protonix 40 mg IV push every 12 hours.  GI is on the case.





3. chronic stage III kidney disease with a component of an acute kidney injury 

likely related to cardiorenal syndrome, diuretic therapy, nephrology is 

following, and the patient's creatinine is stable for now and is back to its 

baseline





4.  Acute urinary tract infection, placed on ceftriaxone, urine culture negative





5.  Ischemic cardiomyopathy, with EF of less than 20%, status post AICD 

implantation





6.  History of chronic persistent atrial fibrillation on oral anticoagulation 

with Eliquis which is currently on hold based on underlying GI bleed





7.  History of chronic kidney disease stage III at baseline, history of right 

hydronephrosis and patient follows with urology





8.  Coronary artery disease status post bypass grafting





9.  Hypertension





10.  Hyperlipidemia





11.  History of bladder cancer





12.  Diabetes mellitus currently on Levemir insulin 40 units at bedtime and 10 

units in the morning





Plan





Condition remains stable.  The patient shows no signs of any GI bleeding for 

now.  Hemoglobin stable at 7.5.  The patient has a creatinine of 1.5.  The 

patient can be changed and out of the intensive care unit.  No evidence of any 

acute bleeding.  Monitor cardiac ablation for now.  This needs to be cleared 

further by gastroenterology.

## 2020-04-21 NOTE — PN
PROGRESS NOTE



Patient is seen for followup for acute kidney injury, mainly cardiorenal.  Renal

function has improved significantly.  Patient is doing well.



PHYSICAL EXAMINATION:

This morning blood pressure is 114/54, heart rate 63 per minute, he is afebrile.

Examination of the heart S1, S2.  Examination of the lungs, decreased breath sounds at

bases.  Abdomen is soft, nontender.  Examination of the lower extremities shows much

improved edema. CNS exam grossly intact.



LABS:

Show sodium 134, potassium 4.0, CO2 is 38, BUN 63, serum creatinine 1.57.



ASSESSMENT:

1. Acute kidney injury cardiorenal, currently improved.

2. Severe volume overload, now improved.

3. Cardiomyopathy, ejection fraction 20%-25%.

4. Congestive heart failure, systolic, acute on top of chronic, currently improving.

5. Hypokalemia, status post replacement.

6. CKD stage III secondary to nephrosclerosis.



PLAN:

Patient can be discharged from nephrology standpoint.  He will follow up as outpatient

for CKD.





MMODL / IJN: [id number] / Job#: [id number]

## 2020-07-24 ENCOUNTER — HOSPITAL ENCOUNTER (INPATIENT)
Dept: HOSPITAL 47 - EC | Age: 80
LOS: 5 days | Discharge: HOME HEALTH | DRG: 871 | End: 2020-07-29
Attending: HOSPITALIST | Admitting: HOSPITALIST
Payer: MEDICARE

## 2020-07-24 VITALS — BODY MASS INDEX: 36.7 KG/M2

## 2020-07-24 DIAGNOSIS — N30.00: ICD-10-CM

## 2020-07-24 DIAGNOSIS — Z80.42: ICD-10-CM

## 2020-07-24 DIAGNOSIS — R65.21: ICD-10-CM

## 2020-07-24 DIAGNOSIS — K21.9: ICD-10-CM

## 2020-07-24 DIAGNOSIS — I25.10: ICD-10-CM

## 2020-07-24 DIAGNOSIS — N18.3: ICD-10-CM

## 2020-07-24 DIAGNOSIS — Z92.3: ICD-10-CM

## 2020-07-24 DIAGNOSIS — F31.30: ICD-10-CM

## 2020-07-24 DIAGNOSIS — E11.22: ICD-10-CM

## 2020-07-24 DIAGNOSIS — Z20.828: ICD-10-CM

## 2020-07-24 DIAGNOSIS — E78.5: ICD-10-CM

## 2020-07-24 DIAGNOSIS — Z79.4: ICD-10-CM

## 2020-07-24 DIAGNOSIS — K26.9: ICD-10-CM

## 2020-07-24 DIAGNOSIS — Z87.442: ICD-10-CM

## 2020-07-24 DIAGNOSIS — Z96.651: ICD-10-CM

## 2020-07-24 DIAGNOSIS — Z79.01: ICD-10-CM

## 2020-07-24 DIAGNOSIS — Z95.810: ICD-10-CM

## 2020-07-24 DIAGNOSIS — Z85.51: ICD-10-CM

## 2020-07-24 DIAGNOSIS — E11.65: ICD-10-CM

## 2020-07-24 DIAGNOSIS — Z80.9: ICD-10-CM

## 2020-07-24 DIAGNOSIS — Z79.899: ICD-10-CM

## 2020-07-24 DIAGNOSIS — I47.1: ICD-10-CM

## 2020-07-24 DIAGNOSIS — I25.5: ICD-10-CM

## 2020-07-24 DIAGNOSIS — D63.8: ICD-10-CM

## 2020-07-24 DIAGNOSIS — N17.0: ICD-10-CM

## 2020-07-24 DIAGNOSIS — Z95.1: ICD-10-CM

## 2020-07-24 DIAGNOSIS — E66.9: ICD-10-CM

## 2020-07-24 DIAGNOSIS — Z90.5: ICD-10-CM

## 2020-07-24 DIAGNOSIS — I13.0: ICD-10-CM

## 2020-07-24 DIAGNOSIS — E86.0: ICD-10-CM

## 2020-07-24 DIAGNOSIS — E87.1: ICD-10-CM

## 2020-07-24 DIAGNOSIS — I50.23: ICD-10-CM

## 2020-07-24 DIAGNOSIS — Z92.21: ICD-10-CM

## 2020-07-24 DIAGNOSIS — A41.51: Primary | ICD-10-CM

## 2020-07-24 DIAGNOSIS — Z87.891: ICD-10-CM

## 2020-07-24 DIAGNOSIS — Z85.528: ICD-10-CM

## 2020-07-24 DIAGNOSIS — I48.0: ICD-10-CM

## 2020-07-24 LAB
ALBUMIN SERPL-MCNC: 3.2 G/DL (ref 3.5–5)
ALP SERPL-CCNC: 90 U/L (ref 38–126)
ALT SERPL-CCNC: 12 U/L (ref 4–49)
ANION GAP SERPL CALC-SCNC: 18 MMOL/L
APTT BLD: 28.3 SEC (ref 22–30)
AST SERPL-CCNC: 21 U/L (ref 17–59)
BASOPHILS # BLD AUTO: 0 K/UL (ref 0–0.2)
BASOPHILS NFR BLD AUTO: 0 %
BUN SERPL-SCNC: 71 MG/DL (ref 9–20)
CALCIUM SPEC-MCNC: 8.9 MG/DL (ref 8.4–10.2)
CHLORIDE SERPL-SCNC: 100 MMOL/L (ref 98–107)
CK SERPL-CCNC: 87 U/L (ref 55–170)
CO2 SERPL-SCNC: 14 MMOL/L (ref 22–30)
EOSINOPHIL # BLD AUTO: 0 K/UL (ref 0–0.7)
EOSINOPHIL NFR BLD AUTO: 0 %
ERYTHROCYTE [DISTWIDTH] IN BLOOD BY AUTOMATED COUNT: 3.22 M/UL (ref 4.3–5.9)
ERYTHROCYTE [DISTWIDTH] IN BLOOD: 17.3 % (ref 11.5–15.5)
GLUCOSE BLD-MCNC: 304 MG/DL (ref 75–99)
GLUCOSE BLD-MCNC: 319 MG/DL (ref 75–99)
GLUCOSE SERPL-MCNC: 392 MG/DL (ref 74–99)
HCT VFR BLD AUTO: 29.6 % (ref 39–53)
HGB BLD-MCNC: 9.5 GM/DL (ref 13–17.5)
INR PPP: 1.1 (ref ?–1.2)
LYMPHOCYTES # SPEC AUTO: 0.3 K/UL (ref 1–4.8)
LYMPHOCYTES NFR SPEC AUTO: 5 %
MAGNESIUM SPEC-SCNC: 1.6 MG/DL (ref 1.6–2.3)
MCH RBC QN AUTO: 29.5 PG (ref 25–35)
MCHC RBC AUTO-ENTMCNC: 32.1 G/DL (ref 31–37)
MCV RBC AUTO: 91.9 FL (ref 80–100)
MONOCYTES # BLD AUTO: 0.3 K/UL (ref 0–1)
MONOCYTES NFR BLD AUTO: 5 %
NEUTROPHILS # BLD AUTO: 4.6 K/UL (ref 1.3–7.7)
NEUTROPHILS NFR BLD AUTO: 87 %
PH UR: 5.5 [PH] (ref 5–8)
PLATELET # BLD AUTO: 147 K/UL (ref 150–450)
POTASSIUM SERPL-SCNC: 4.7 MMOL/L (ref 3.5–5.1)
PROT SERPL-MCNC: 6.1 G/DL (ref 6.3–8.2)
PROT UR QL: (no result)
PT BLD: 11.5 SEC (ref 9–12)
RBC UR QL: 6 /HPF (ref 0–5)
SODIUM SERPL-SCNC: 132 MMOL/L (ref 137–145)
SP GR UR: 1.01 (ref 1–1.03)
UROBILINOGEN UR QL STRIP: <2 MG/DL (ref ?–2)
WBC # BLD AUTO: 5.3 K/UL (ref 3.8–10.6)
WBC # UR AUTO: 114 /HPF (ref 0–5)

## 2020-07-24 PROCEDURE — 83880 ASSAY OF NATRIURETIC PEPTIDE: CPT

## 2020-07-24 PROCEDURE — 93005 ELECTROCARDIOGRAM TRACING: CPT

## 2020-07-24 PROCEDURE — 83036 HEMOGLOBIN GLYCOSYLATED A1C: CPT

## 2020-07-24 PROCEDURE — 36415 COLL VENOUS BLD VENIPUNCTURE: CPT

## 2020-07-24 PROCEDURE — 99291 CRITICAL CARE FIRST HOUR: CPT

## 2020-07-24 PROCEDURE — 87086 URINE CULTURE/COLONY COUNT: CPT

## 2020-07-24 PROCEDURE — 85025 COMPLETE CBC W/AUTO DIFF WBC: CPT

## 2020-07-24 PROCEDURE — 83735 ASSAY OF MAGNESIUM: CPT

## 2020-07-24 PROCEDURE — 96375 TX/PRO/DX INJ NEW DRUG ADDON: CPT

## 2020-07-24 PROCEDURE — 94640 AIRWAY INHALATION TREATMENT: CPT

## 2020-07-24 PROCEDURE — 87077 CULTURE AEROBIC IDENTIFY: CPT

## 2020-07-24 PROCEDURE — 87040 BLOOD CULTURE FOR BACTERIA: CPT

## 2020-07-24 PROCEDURE — 81001 URINALYSIS AUTO W/SCOPE: CPT

## 2020-07-24 PROCEDURE — 92960 CARDIOVERSION ELECTRIC EXT: CPT

## 2020-07-24 PROCEDURE — 84484 ASSAY OF TROPONIN QUANT: CPT

## 2020-07-24 PROCEDURE — 80048 BASIC METABOLIC PNL TOTAL CA: CPT

## 2020-07-24 PROCEDURE — 80053 COMPREHEN METABOLIC PANEL: CPT

## 2020-07-24 PROCEDURE — 51702 INSERT TEMP BLADDER CATH: CPT

## 2020-07-24 PROCEDURE — 71046 X-RAY EXAM CHEST 2 VIEWS: CPT

## 2020-07-24 PROCEDURE — 82009 KETONE BODYS QUAL: CPT

## 2020-07-24 PROCEDURE — 36556 INSERT NON-TUNNEL CV CATH: CPT

## 2020-07-24 PROCEDURE — 96365 THER/PROPH/DIAG IV INF INIT: CPT

## 2020-07-24 PROCEDURE — 82550 ASSAY OF CK (CPK): CPT

## 2020-07-24 PROCEDURE — 85610 PROTHROMBIN TIME: CPT

## 2020-07-24 PROCEDURE — 83605 ASSAY OF LACTIC ACID: CPT

## 2020-07-24 PROCEDURE — 96361 HYDRATE IV INFUSION ADD-ON: CPT

## 2020-07-24 PROCEDURE — 96368 THER/DIAG CONCURRENT INF: CPT

## 2020-07-24 PROCEDURE — 76770 US EXAM ABDO BACK WALL COMP: CPT

## 2020-07-24 PROCEDURE — 85027 COMPLETE CBC AUTOMATED: CPT

## 2020-07-24 PROCEDURE — 87186 SC STD MICRODIL/AGAR DIL: CPT

## 2020-07-24 PROCEDURE — 85730 THROMBOPLASTIN TIME PARTIAL: CPT

## 2020-07-24 PROCEDURE — 71045 X-RAY EXAM CHEST 1 VIEW: CPT

## 2020-07-24 PROCEDURE — 96366 THER/PROPH/DIAG IV INF ADDON: CPT

## 2020-07-24 RX ADMIN — INSULIN ASPART SCH UNIT: 100 INJECTION, SOLUTION INTRAVENOUS; SUBCUTANEOUS at 22:41

## 2020-07-24 RX ADMIN — NOREPINEPHRINE BITARTRATE SCH MLS/HR: 1 INJECTION, SOLUTION, CONCENTRATE INTRAVENOUS at 20:59

## 2020-07-24 RX ADMIN — IPRATROPIUM BROMIDE AND ALBUTEROL SULFATE SCH ML: .5; 3 SOLUTION RESPIRATORY (INHALATION) at 23:05

## 2020-07-24 RX ADMIN — PANTOPRAZOLE SODIUM SCH MG: 40 INJECTION, POWDER, FOR SOLUTION INTRAVENOUS at 20:55

## 2020-07-24 NOTE — XR
EXAMINATION TYPE: XR chest 2V

 

DATE OF EXAM: 7/24/2020

 

COMPARISON: 4/16/2020

 

HISTORY: Difficulty breathing

 

TECHNIQUE:

 

FINDINGS: There is some coarsening of interstitial markings. There are sternal wires. There is no sil
ss heart failure. There is slight blunting of left costophrenic angle unchanged. There is left axilla
ry pacemaker. There are chest leads.

 

IMPRESSION: There is pleural diaphragmatic scarring at the left lung base. Unchanged. CT scan abdomen
 and pelvis.

 

History acute abdominal pain. Nausea and vomiting for 1 1/2 weeks. Coarse pulmonary interstitial dens
ity probably due to pulmonary fibrosis unchanged. No significant pleural fluid seen to suggest heart 
failure. The pulmonary vascularity is improved compared to last exam.

## 2020-07-24 NOTE — HP
HISTORY AND PHYSICAL



DATE OF SERVICE:

07/24/2020



CHIEF COMPLAINT:

Shortness of breath.



HISTORY OF PRESENT ILLNESS:

This 80-year-old gentleman with a past medical history of multiple medical 
problems,

including history of atrial fibrillation, history of CAD, history of CHF, 
diabetes

mellitus, GERD, hyperlipidemia, being followed by Dr. Duckworth in the outpatient 
setting,

was complaining of shortness of breath. The patient came to [facility] 
and was

admitted for further evaluation and treatment.  Patient also had some fever, low
blood

pressure.  Patient also was found to have tachycardia at the rate of 130, 
possibly

superventricular tachycardia versus atrial fibrillation.  The sugar was also 
elevated

at 392. The patient was admitted for further evaluation, with the possibility of

sepsis, also.  The chest x-ray showed some evidence of CHF.  There is no history
of any

rigor or chills. No history of headache, loss of consciousness, seizures.



PAST MEDICAL HISTORY:

Atrial fibrillation, CAD, CHF, diabetes mellitus, type 2, GERD, hyperlipidemia.



HOME MEDICATIONS:

Lamictal, Effexor, iron sulfate, vitamin D3, Glucophage, Protonix, Lasix, Coreg,

Eliquis, Lantus, Aldactone, ProAmatine, Lopressor, melatonin, Cozaar, vitamin 
B2,

Lipitor.



ALLERGIES:

NONE.



FAMILY HISTORY:

History of carcinoma of the prostate.



SOCIAL HISTORY:

Previous history of smoking.  No current smoking or alcohol intake.



REVIEW OF SYSTEMS:

ENT: Diminished hearing. Diminished vision.

CARDIOVASCULAR SYSTEM: As mentioned earlier.

RESPIRATORY SYSTEM: As mentioned earlier.

GI: No nausea, vomiting.

: No dysuria or retention.

NERVOUS SYSTEM: No numbness, weakness.

ALLERGY/IMMUNOLOGY: No asthma, hayfever.

MUSCULOSKELETAL: As mentioned earlier.

HEMATOLOGY/ONCOLOGY: No history of anemia.

ENDOCRINE: No history of diabetes, hypothyroidism.

CONSTITUTIONAL: As mentioned earlier.

DERMATOLOGY: Negative.

RHEUMATOLOGY: Negative.

PSYCHIATRY: As mentioned earlier.



PHYSICAL EXAMINATION:

Patient alert, oriented x3.  Pulse is 133, irregular. Blood pressure is 81/42,

respiration 18, temperature 98.7, pulse ox 98% on 2 L.

HEENT: Conjunctivae normal.

NECK: No jugular venous distention.

CARDIOVASCULAR SYSTEM:  S1, S2 muffled.

RESPIRATORY SYSTEM: Breath sounds diminished at the bases.  A few scattered 
rhonchi and

crackles.

ABDOMEN: Soft, obese, non-tender.

LEGS: Bilateral leg edema.

NERVOUS SYSTEM: Higher functions as mentioned earlier. Moves all 4 limbs. Mild 
diffuse

weakness.

LYMPHATICS: No lymph node palpable in neck, axillae or groin.  NAUSEA

SKIN: No ulcer, rash, bleeding.

JOINTS: No active deforming arthropathy.



LABS:

WBC 5.3, hemoglobin 9.5, platelets 147.  Sodium 132, creatinine 2.98.  Plasma 
lactic

acid 5.4.  Troponin 0.048.  UA noted.



ASSESSMENT:

1. Shortness of breath; possibly congestive heart failure, acute exacerbation;

    ejection fraction unknown.

2. Possibly acute urinary tract infection with sepsis, present on admission.

3. Elevated lactic acid secondary to sepsis.

4. Diabetes mellitus, type 2, uncontrolled with hyperglycemia with no evidence 
of any

    diabetic ketoacidosis.

5. Hyponatremia.

6. Anemia, normocytic anemia of chronic disease.

7. History atrial fibrillation.

8. History of coronary artery disease.

9. History of diabetes mellitus, type 2.

10.History of gastroesophageal reflux disease.

11.Hyperlipidemia.

12.History of bladder cancer and chemoradiation.

13.Bilateral leg edema.

14.Bipolar depression.

15.Remote history of nicotine dependence.

16.Obesity with body mass index of 34.8.

17.FULL CODE.



RECOMMENDATIONS AND DISCUSSION:

In this 80-year-old gentleman who presented with multiple complex medical 
issues, we

will monitor the patient closely, continue the current medications, continue

symptomatic treatment. I would recommend IV diuretics and broad-spectrum IV

antibiotics.  Monitor blood sugars closely.  Otherwise, we will consult 
Infectious

Disease, Pulmonary and Cardiology.  Overall prognosis is extremely guarded 
because of

multiple complex medical issues. Further recommendations to follow. See orders 
for

further details. Home medication will be resumed after  complete.





MMODL / IJN: [id number] / Job#: [id number]

[facility] Hospital[facility]AN[facility]

## 2020-07-24 NOTE — ED
General Adult HPI





- General


Chief complaint: Shortness of Breath


Stated complaint: Fever, weakness, low O2, vomitting, high sugar


Time Seen by Provider: 20 16:30


Source: patient, family, RN notes reviewed


Mode of arrival: ambulatory


Limitations: no limitations





- History of Present Illness


Initial comments: 





Patient is a pleasant 80-year-old male presenting to the emergency Department 

with complaints of fatigue and difficulty breathing.  Symptoms present over the 

past week.  Patient does feel somewhat short of breath.  Occasional cough.  

Patient does have leg edema that is increased from normal.  Patient does have 

history of atrial fibrillation and CHF.  Blood sugars have been running in the 

mid 300s.  Patient did have borderline fever yesterday.





- Related Data


                                Home Medications











 Medication  Instructions  Recorded  Confirmed


 


Atorvastatin [Lipitor] 80 mg PO HS 20


 


Cyanocobalamin (Vitamin B-12) 1,000 mcg PO DAILY 20





[Vitamin B-12]   


 


Apixaban [Eliquis] 2.5 mg PO BID 20


 


Carvedilol [Coreg] 12.5 mg PO BID-W/MEALS 20


 


Cholecalciferol [Vitamin D3 (25 1,000 unit PO DAILY 20





Mcg = 1000 Iu)]   


 


Ferrous Sulfate [Feosol] 325 mg PO DAILY 20


 


Furosemide [Lasix] 40 mg PO DAILY@1500 20


 


Furosemide [Lasix] 80 mg PO QAM 20


 


Insulin Glargine [Lantus] 46 unit SQ HS 20


 


Midodrine [ProAmatine] 10 mg PO TID@0700,1100,1600 20


 


Pantoprazole Sodium [Protonix] 40 mg PO W/BRKFST 20


 


metFORMIN HCL [Glucophage] 1,000 mg PO BID 20








                                  Previous Rx's











 Medication  Instructions  Recorded


 


Venlafaxine HCl [Effexor] 75 mg PO BID  tab 20


 


Losartan [Cozaar] 25 mg PO HS  tab 20


 


Melatonin 3 mg PO HS  tablet 20


 


Metoprolol Tartrate [Lopressor] 25 mg PO BID  tab 20


 


Spironolactone [Aldactone] 25 mg PO DAILY  tab 20


 


lamoTRIgine [LaMICtal] 100 mg PO HS #3 tab 20











                                    Allergies











Allergy/AdvReac Type Severity Reaction Status Date / Time


 


No Known Allergies Allergy   Verified 20 18:00














Review of Systems


ROS Statement: 


Those systems with pertinent positive or pertinent negative responses have been 

documented in the HPI.





ROS Other: All systems not noted in ROS Statement are negative.


Constitutional: Denies: fever


Eyes: Denies: eye pain


ENT: Denies: ear pain


Respiratory: Reports: cough, dyspnea


Cardiovascular: Denies: chest pain, palpitations


Endocrine: Reports: fatigue


Gastrointestinal: Reports: nausea.  Denies: abdominal pain


Genitourinary: Denies: dysuria


Musculoskeletal: Denies: back pain


Skin: Denies: rash


Neurological: Denies: weakness





Past Medical History


Past Medical History: Atrial Fibrillation, Coronary Artery Disease (CAD), Cancer

, Heart Failure, Diabetes Mellitus, GERD/Reflux, Hyperlipidemia


Additional Past Medical History / Comment(s): See Dr Fleming's H&P, hx 

kidney and bladder cancer- tx with chemo and radiation , "growth on kidney",

 history of CABG, cardiomyopathy, AICD implantation ULCER, KIDNEY STONES


History of Any Multi-Drug Resistant Organisms: None Reported


Past Surgical History: Bladder Surgery, Heart Catheterization, Orthopedic 

Surgery


Additional Past Surgical History / Comment(s): Port-a-cath insertion/later 

removed. Bilateral cataract , ORIF R ankle with 2 screws,"scraped the bladder" 

10-18-16 TOTAL RT KNEE,growth on kidney removed


Past Anesthesia/Blood Transfusion Reactions: No Reported Reaction


Additional Past Anesthesia/Blood Transfusion Reaction / Comment(s): Pt has never

 recieved blood.


Past Psychological History: Bipolar, Depression


Past Drug Use History: None Reported





- Past Family History


  ** Father


Family Medical History: Cancer


Additional Family Medical History / Comment(s): prostate





  ** Mother


Family Medical History: No Reported History


Additional Family Medical History / Comment(s): Mother had bowel perforations.  

She  at 88 yrs of age.





  ** Brother(s)


Family Medical History: Cancer





  ** Sister(s)


Family Medical History: Cancer





General Exam


Limitations: no limitations


General appearance: alert, in no apparent distress


Head exam: Present: normocephalic


Eye exam: Present: normal appearance


Neck exam: Present: normal inspection


Respiratory exam: Present: decreased breath sounds


Cardiovascular Exam: Present: tachycardia, irregular rhythm


GI/Abdominal exam: Present: soft.  Absent: tenderness


Extremities exam: Present: pedal edema (+3 bilateral).  Absent: calf tenderness


Neurological exam: Present: alert.  Absent: motor sensory deficit


Psychiatric exam: Present: normal affect, normal mood


Skin exam: Present: normal color





Course


                                   Vital Signs











  20





  16:20 17:19 17:34


 


Temperature 98.9 F  


 


Pulse Rate 136 H 137 H 133 H


 


Respiratory 24 18 18





Rate   


 


Blood Pressure 78/43 83/57 80/42


 


O2 Sat by Pulse 98 98 97





Oximetry   














  20





  18:50 19:27 20:23


 


Temperature  98.7 F 


 


Pulse Rate  133 H 97


 


Respiratory  18 





Rate   


 


Blood Pressure 83/52 81/54 69/47


 


O2 Sat by Pulse 98 98 





Oximetry   














- Reevaluation(s)


Reevaluation #1: 





20 19:28


Patient ordered 2 L fluid bolus off of ideal body weight is 67 kg which comes 

out to 2 L.


20 20:22


There is concern for septic shock diagnosed at 20 00.  Blood culture and lactic 

acid were ordered.  IV antibiotics were ordered.  Central line was placed.  

Levophed ordered.  Blood pressure remains 74 systolic at this time.





EKG Findings





- EKG Comments:


EKG Findings:: EKG shows rate 134 with a QRS of 122.  Regular rhythm with 

supraventricular appearance.  .  .  Left axis.  Nonspecific intr

aventricular conduction delay.  Lateral ST depression.





Procedures





- Procedures


Initial comment: 





Patient given adenosine for chemical cardioversion for possible SVT.  When heart

 rate slowed down a did have underlying atrial fibrillation.  Heart rate has 

returned to 125.





- Central Line Placement


  ** Right Femoral


Consent Obtained: verbal consent, written consent, emergent situation


Patient Placed on Monitor/Pulse Ox: Yes


MD Prep: mask, gown, gloves


Central Line Prep: Chlorhexidine scrub


Local Anesthesia Used: Lidocaine 1%


Amount of Anesthesia Used (mls): 2


Ultrasound Used for Placement: No


Central Line Lumen Inserted: triple


Central Line Position: good blood return, all ports aspirated, flushed, capped, 

sutured in place with 3-0 nylon


Dressing Applied: Tegaderm


Patient Tolerated Procedure: well, no complications


Complications: none





- Sepsis


  ** Sepsis Focused Exam #1


Time Sepsis Criteria Met: 20:00


Sepsis Focused Exam Date: 20


Sepsis Focused Exam Time: 21:04


Sepsis Focused Exam Complete: Yes


Vital Signs & RN Notes Reviewed: Yes


Capillary Refill: < 2 Seconds: Fingers, Toes


Peripheral Pulses: Normal: Radial (R), Radial (L)


Skin Color: Normal for Patient


Respiratory Exam: normal lung sounds


Cardiovascular Exam: tachycardia





Medical Decision Making





- Medical Decision Making





Patient reevaluated several times.  Patient and family are updated on results an

d plan.  Case was discussed in detail with Dr. Leon, who will admit for Dr. Chavarria, who admits for Dr. Duckworth.  Case was also discussed in detail with Dr. Trevino who will consult for critical care.  He does request 1 more liter of IV 

fluids and agrees with Levaphed





- Lab Data


Result diagrams: 


                                 20 16:57





                                 20 16:56


                                   Lab Results











  20 Range/Units





  16:56 16:57 16:57 


 


WBC   5.3   (3.8-10.6)  k/uL


 


RBC   3.22 L   (4.30-5.90)  m/uL


 


Hgb   9.5 L   (13.0-17.5)  gm/dL


 


Hct   29.6 L   (39.0-53.0)  %


 


MCV   91.9   (80.0-100.0)  fL


 


MCH   29.5   (25.0-35.0)  pg


 


MCHC   32.1   (31.0-37.0)  g/dL


 


RDW   17.3 H   (11.5-15.5)  %


 


Plt Count   147 L   (150-450)  k/uL


 


Neutrophils %   87   %


 


Lymphocytes %   5   %


 


Monocytes %   5   %


 


Eosinophils %   0   %


 


Basophils %   0   %


 


Neutrophils #   4.6   (1.3-7.7)  k/uL


 


Lymphocytes #   0.3 L   (1.0-4.8)  k/uL


 


Monocytes #   0.3   (0-1.0)  k/uL


 


Eosinophils #   0.0   (0-0.7)  k/uL


 


Basophils #   0.0   (0-0.2)  k/uL


 


Hypochromasia   Moderate   


 


Anisocytosis   Slight   


 


PT    11.5  (9.0-12.0)  sec


 


INR    1.1  (<1.2)  


 


APTT    28.3  (22.0-30.0)  sec


 


Sodium  132 L    (137-145)  mmol/L


 


Potassium  4.7    (3.5-5.1)  mmol/L


 


Chloride  100    ()  mmol/L


 


Carbon Dioxide  14 L    (22-30)  mmol/L


 


Anion Gap  18    mmol/L


 


BUN  71 H    (9-20)  mg/dL


 


Creatinine  2.98 H    (0.66-1.25)  mg/dL


 


Est GFR (CKD-EPI)AfAm  22    (>60 ml/min/1.73 sqM)  


 


Est GFR (CKD-EPI)NonAf  19    (>60 ml/min/1.73 sqM)  


 


Glucose  392 H    (74-99)  mg/dL


 


Lactic Ac Sepsis Rflx     


 


Plasma Lactic Acid Jeanmarie     (0.7-2.0)  mmol/L


 


Calcium  8.9    (8.4-10.2)  mg/dL


 


Magnesium  1.6    (1.6-2.3)  mg/dL


 


Total Bilirubin  0.8    (0.2-1.3)  mg/dL


 


AST  21    (17-59)  U/L


 


ALT  12    (4-49)  U/L


 


Alkaline Phosphatase  90    ()  U/L


 


Creatine Kinase  87    ()  U/L


 


Troponin I     (0.000-0.034)  ng/mL


 


NT-Pro-B Natriuret Pep     pg/mL


 


Total Protein  6.1 L    (6.3-8.2)  g/dL


 


Albumin  3.2 L    (3.5-5.0)  g/dL


 


Urine Color     


 


Urine Appearance     (Clear)  


 


Urine pH     (5.0-8.0)  


 


Ur Specific Gravity     (1.001-1.035)  


 


Urine Protein     (Negative)  


 


Urine Glucose (UA)     (Negative)  


 


Urine Ketones     (Negative)  


 


Urine Blood     (Negative)  


 


Urine Nitrite     (Negative)  


 


Urine Bilirubin     (Negative)  


 


Urine Urobilinogen     (<2.0)  mg/dL


 


Ur Leukocyte Esterase     (Negative)  


 


Urine RBC     (0-5)  /hpf


 


Urine WBC     (0-5)  /hpf


 


Amorphous Sediment     (None)  /hpf


 


Urine Bacteria     (None)  /hpf


 


Acetone, Qual  Negative    (Negative)  














  07/24/20 07/24/20 07/24/20 Range/Units





  16:57 16:57 16:57 


 


WBC     (3.8-10.6)  k/uL


 


RBC     (4.30-5.90)  m/uL


 


Hgb     (13.0-17.5)  gm/dL


 


Hct     (39.0-53.0)  %


 


MCV     (80.0-100.0)  fL


 


MCH     (25.0-35.0)  pg


 


MCHC     (31.0-37.0)  g/dL


 


RDW     (11.5-15.5)  %


 


Plt Count     (150-450)  k/uL


 


Neutrophils %     %


 


Lymphocytes %     %


 


Monocytes %     %


 


Eosinophils %     %


 


Basophils %     %


 


Neutrophils #     (1.3-7.7)  k/uL


 


Lymphocytes #     (1.0-4.8)  k/uL


 


Monocytes #     (0-1.0)  k/uL


 


Eosinophils #     (0-0.7)  k/uL


 


Basophils #     (0-0.2)  k/uL


 


Hypochromasia     


 


Anisocytosis     


 


PT     (9.0-12.0)  sec


 


INR     (<1.2)  


 


APTT     (22.0-30.0)  sec


 


Sodium     (137-145)  mmol/L


 


Potassium     (3.5-5.1)  mmol/L


 


Chloride     ()  mmol/L


 


Carbon Dioxide     (22-30)  mmol/L


 


Anion Gap     mmol/L


 


BUN     (9-20)  mg/dL


 


Creatinine     (0.66-1.25)  mg/dL


 


Est GFR (CKD-EPI)AfAm     (>60 ml/min/1.73 sqM)  


 


Est GFR (CKD-EPI)NonAf     (>60 ml/min/1.73 sqM)  


 


Glucose     (74-99)  mg/dL


 


Lactic Ac Sepsis Rflx     


 


Plasma Lactic Acid Jeanmarie  5.4 H*    (0.7-2.0)  mmol/L


 


Calcium     (8.4-10.2)  mg/dL


 


Magnesium     (1.6-2.3)  mg/dL


 


Total Bilirubin     (0.2-1.3)  mg/dL


 


AST     (17-59)  U/L


 


ALT     (4-49)  U/L


 


Alkaline Phosphatase     ()  U/L


 


Creatine Kinase     ()  U/L


 


Troponin I   0.048 H*   (0.000-0.034)  ng/mL


 


NT-Pro-B Natriuret Pep    75546  pg/mL


 


Total Protein     (6.3-8.2)  g/dL


 


Albumin     (3.5-5.0)  g/dL


 


Urine Color     


 


Urine Appearance     (Clear)  


 


Urine pH     (5.0-8.0)  


 


Ur Specific Gravity     (1.001-1.035)  


 


Urine Protein     (Negative)  


 


Urine Glucose (UA)     (Negative)  


 


Urine Ketones     (Negative)  


 


Urine Blood     (Negative)  


 


Urine Nitrite     (Negative)  


 


Urine Bilirubin     (Negative)  


 


Urine Urobilinogen     (<2.0)  mg/dL


 


Ur Leukocyte Esterase     (Negative)  


 


Urine RBC     (0-5)  /hpf


 


Urine WBC     (0-5)  /hpf


 


Amorphous Sediment     (None)  /hpf


 


Urine Bacteria     (None)  /hpf


 


Acetone, Qual     (Negative)  














  20 Range/Units





  17:42 19:09 19:50 


 


WBC     (3.8-10.6)  k/uL


 


RBC     (4.30-5.90)  m/uL


 


Hgb     (13.0-17.5)  gm/dL


 


Hct     (39.0-53.0)  %


 


MCV     (80.0-100.0)  fL


 


MCH     (25.0-35.0)  pg


 


MCHC     (31.0-37.0)  g/dL


 


RDW     (11.5-15.5)  %


 


Plt Count     (150-450)  k/uL


 


Neutrophils %     %


 


Lymphocytes %     %


 


Monocytes %     %


 


Eosinophils %     %


 


Basophils %     %


 


Neutrophils #     (1.3-7.7)  k/uL


 


Lymphocytes #     (1.0-4.8)  k/uL


 


Monocytes #     (0-1.0)  k/uL


 


Eosinophils #     (0-0.7)  k/uL


 


Basophils #     (0-0.2)  k/uL


 


Hypochromasia     


 


Anisocytosis     


 


PT     (9.0-12.0)  sec


 


INR     (<1.2)  


 


APTT     (22.0-30.0)  sec


 


Sodium     (137-145)  mmol/L


 


Potassium     (3.5-5.1)  mmol/L


 


Chloride     ()  mmol/L


 


Carbon Dioxide     (22-30)  mmol/L


 


Anion Gap     mmol/L


 


BUN     (9-20)  mg/dL


 


Creatinine     (0.66-1.25)  mg/dL


 


Est GFR (CKD-EPI)AfAm     (>60 ml/min/1.73 sqM)  


 


Est GFR (CKD-EPI)NonAf     (>60 ml/min/1.73 sqM)  


 


Glucose     (74-99)  mg/dL


 


Lactic Ac Sepsis Rflx  Y    


 


Plasma Lactic Acid Jeanmarie    2.6 H*  (0.7-2.0)  mmol/L


 


Calcium     (8.4-10.2)  mg/dL


 


Magnesium     (1.6-2.3)  mg/dL


 


Total Bilirubin     (0.2-1.3)  mg/dL


 


AST     (17-59)  U/L


 


ALT     (4-49)  U/L


 


Alkaline Phosphatase     ()  U/L


 


Creatine Kinase     ()  U/L


 


Troponin I     (0.000-0.034)  ng/mL


 


NT-Pro-B Natriuret Pep     pg/mL


 


Total Protein     (6.3-8.2)  g/dL


 


Albumin     (3.5-5.0)  g/dL


 


Urine Color   Yellow   


 


Urine Appearance   Cloudy   (Clear)  


 


Urine pH   5.5   (5.0-8.0)  


 


Ur Specific Gravity   1.014   (1.001-1.035)  


 


Urine Protein   2+ H   (Negative)  


 


Urine Glucose (UA)   Negative   (Negative)  


 


Urine Ketones   Negative   (Negative)  


 


Urine Blood   Moderate H   (Negative)  


 


Urine Nitrite   Negative   (Negative)  


 


Urine Bilirubin   Negative   (Negative)  


 


Urine Urobilinogen   <2.0   (<2.0)  mg/dL


 


Ur Leukocyte Esterase   Large H   (Negative)  


 


Urine RBC   6 H   (0-5)  /hpf


 


Urine WBC   114 H   (0-5)  /hpf


 


Amorphous Sediment   Rare H   (None)  /hpf


 


Urine Bacteria   Few H   (None)  /hpf


 


Acetone, Qual     (Negative)  














- Radiology Data


Radiology results: image reviewed (Two-view chest x-ray shows pleural 

diaphragmatic scarring left base unchanged.)





Critical Care Time


Critical Care Time: Yes


Total Critical Care Time: 45





Disposition


Clinical Impression: 


 Congestive heart failure, UTI (urinary tract infection), Septic shock, Hyper

glycemia, Dehydration





Disposition: ADMITTED AS IP TO THIS HOSP


Condition: Critical


Is patient prescribed a controlled substance at d/c from ED?: No


Referrals: 


Ruy Duckworth MD [Primary Care Provider] - 1-2 days


Decision Time: 20:23

## 2020-07-25 LAB
ANION GAP SERPL CALC-SCNC: 13 MMOL/L
BASOPHILS # BLD AUTO: 0 K/UL (ref 0–0.2)
BASOPHILS NFR BLD AUTO: 0 %
BUN SERPL-SCNC: 73 MG/DL (ref 9–20)
CALCIUM SPEC-MCNC: 8.2 MG/DL (ref 8.4–10.2)
CHLORIDE SERPL-SCNC: 103 MMOL/L (ref 98–107)
CO2 SERPL-SCNC: 17 MMOL/L (ref 22–30)
EOSINOPHIL # BLD AUTO: 0 K/UL (ref 0–0.7)
EOSINOPHIL NFR BLD AUTO: 0 %
ERYTHROCYTE [DISTWIDTH] IN BLOOD BY AUTOMATED COUNT: 3.25 M/UL (ref 4.3–5.9)
ERYTHROCYTE [DISTWIDTH] IN BLOOD: 17.3 % (ref 11.5–15.5)
GLUCOSE BLD-MCNC: 195 MG/DL (ref 75–99)
GLUCOSE BLD-MCNC: 231 MG/DL (ref 75–99)
GLUCOSE BLD-MCNC: 280 MG/DL (ref 75–99)
GLUCOSE BLD-MCNC: 291 MG/DL (ref 75–99)
GLUCOSE SERPL-MCNC: 285 MG/DL (ref 74–99)
HCT VFR BLD AUTO: 29.5 % (ref 39–53)
HGB BLD-MCNC: 9.2 GM/DL (ref 13–17.5)
LYMPHOCYTES # SPEC AUTO: 0.3 K/UL (ref 1–4.8)
LYMPHOCYTES NFR SPEC AUTO: 4 %
MAGNESIUM SPEC-SCNC: 1.5 MG/DL (ref 1.6–2.3)
MCH RBC QN AUTO: 28.2 PG (ref 25–35)
MCHC RBC AUTO-ENTMCNC: 31.1 G/DL (ref 31–37)
MCV RBC AUTO: 90.7 FL (ref 80–100)
MONOCYTES # BLD AUTO: 0.3 K/UL (ref 0–1)
MONOCYTES NFR BLD AUTO: 6 %
NEUTROPHILS # BLD AUTO: 5.1 K/UL (ref 1.3–7.7)
NEUTROPHILS NFR BLD AUTO: 87 %
PLATELET # BLD AUTO: 134 K/UL (ref 150–450)
POTASSIUM SERPL-SCNC: 4.6 MMOL/L (ref 3.5–5.1)
SODIUM SERPL-SCNC: 133 MMOL/L (ref 137–145)
WBC # BLD AUTO: 5.9 K/UL (ref 3.8–10.6)

## 2020-07-25 RX ADMIN — VENLAFAXINE HYDROCHLORIDE SCH MG: 75 TABLET ORAL at 00:03

## 2020-07-25 RX ADMIN — APIXABAN SCH MG: 2.5 TABLET, FILM COATED ORAL at 08:47

## 2020-07-25 RX ADMIN — FUROSEMIDE SCH MG: 10 INJECTION, SOLUTION INTRAMUSCULAR; INTRAVENOUS at 00:02

## 2020-07-25 RX ADMIN — AMIODARONE HYDROCHLORIDE SCH MLS/HR: 50 INJECTION, SOLUTION INTRAVENOUS at 17:42

## 2020-07-25 RX ADMIN — IPRATROPIUM BROMIDE AND ALBUTEROL SULFATE SCH ML: .5; 3 SOLUTION RESPIRATORY (INHALATION) at 20:29

## 2020-07-25 RX ADMIN — INSULIN ASPART SCH UNIT: 100 INJECTION, SOLUTION INTRAVENOUS; SUBCUTANEOUS at 21:31

## 2020-07-25 RX ADMIN — APIXABAN SCH MG: 2.5 TABLET, FILM COATED ORAL at 21:31

## 2020-07-25 RX ADMIN — HEPARIN SODIUM SCH MLS/HR: 10000 INJECTION, SOLUTION INTRAVENOUS at 10:22

## 2020-07-25 RX ADMIN — INSULIN ASPART SCH UNIT: 100 INJECTION, SOLUTION INTRAVENOUS; SUBCUTANEOUS at 07:17

## 2020-07-25 RX ADMIN — VENLAFAXINE HYDROCHLORIDE SCH MG: 75 TABLET ORAL at 21:30

## 2020-07-25 RX ADMIN — INSULIN DETEMIR SCH UNIT: 100 INJECTION, SOLUTION SUBCUTANEOUS at 21:32

## 2020-07-25 RX ADMIN — INSULIN ASPART SCH UNIT: 100 INJECTION, SOLUTION INTRAVENOUS; SUBCUTANEOUS at 17:42

## 2020-07-25 RX ADMIN — FUROSEMIDE SCH MG: 10 INJECTION, SOLUTION INTRAMUSCULAR; INTRAVENOUS at 08:47

## 2020-07-25 RX ADMIN — MIDODRINE HYDROCHLORIDE SCH MG: 5 TABLET ORAL at 07:17

## 2020-07-25 RX ADMIN — SPIRONOLACTONE SCH MG: 25 TABLET, FILM COATED ORAL at 08:47

## 2020-07-25 RX ADMIN — Medication SCH MG: at 21:31

## 2020-07-25 RX ADMIN — IPRATROPIUM BROMIDE AND ALBUTEROL SULFATE SCH ML: .5; 3 SOLUTION RESPIRATORY (INHALATION) at 12:19

## 2020-07-25 RX ADMIN — MIDODRINE HYDROCHLORIDE SCH MG: 5 TABLET ORAL at 17:42

## 2020-07-25 RX ADMIN — MIDODRINE HYDROCHLORIDE SCH MG: 5 TABLET ORAL at 12:23

## 2020-07-25 RX ADMIN — INSULIN ASPART SCH UNIT: 100 INJECTION, SOLUTION INTRAVENOUS; SUBCUTANEOUS at 12:24

## 2020-07-25 RX ADMIN — IPRATROPIUM BROMIDE AND ALBUTEROL SULFATE SCH ML: .5; 3 SOLUTION RESPIRATORY (INHALATION) at 08:32

## 2020-07-25 RX ADMIN — AMIODARONE HYDROCHLORIDE SCH MLS/HR: 50 INJECTION, SOLUTION INTRAVENOUS at 10:23

## 2020-07-25 RX ADMIN — Medication SCH MG: at 00:03

## 2020-07-25 RX ADMIN — APIXABAN SCH MG: 2.5 TABLET, FILM COATED ORAL at 00:03

## 2020-07-25 RX ADMIN — NOREPINEPHRINE BITARTRATE SCH: 1 INJECTION, SOLUTION, CONCENTRATE INTRAVENOUS at 20:49

## 2020-07-25 RX ADMIN — PANTOPRAZOLE SODIUM SCH MG: 40 INJECTION, POWDER, FOR SOLUTION INTRAVENOUS at 08:47

## 2020-07-25 RX ADMIN — FUROSEMIDE SCH MG: 10 INJECTION, SOLUTION INTRAMUSCULAR; INTRAVENOUS at 17:42

## 2020-07-25 RX ADMIN — VENLAFAXINE HYDROCHLORIDE SCH MG: 75 TABLET ORAL at 10:23

## 2020-07-25 NOTE — P.CNPUL
History of Present Illness


Consult date: 20


Requesting physician: Clinton Leon


Reason for consult: other (Sepsis and congestive heart failure)


Chief complaint: Shortness of breath.


History of present illness: 





This is an 80-year-old white male with history of multiple medical problems 

including chronic atrial fibrillation, coronary artery disease, type 2 diabetes,

dyslipidemia, patient is primarily a patient of Dr. Duckworth.  Patient came into 

the ER yesterday complaining of few days' history of shortness of breath, low-

grade fever, and upon evaluation in the ER he was noted to be relatively 

hypotensive, tachycardic with a rate of 130, he was in atrial fibrillation with 

RVR.  Blood sugar was 392, and he was noted to have evidence of urinary tract 

infection.  Although the patient has no urinary complaints.  Chest x-ray showed 

evidence of interstitial edema.  Considering the patient was hypotensive, he did

receive 2 L of fluids boluses, he was given antibiotics for presumptive urinary 

tract infection and sepsis.  Patient was admitted to the ICU, and I was asked to

see him on consultation.  Patient is known to have severe LV dysfunction, 

ejection fraction is 20% or less.  He is also known to have history of previous 

myocardial revascularization, CABG 5.  Presently on amiodarone for his atrial 

fibrillation with RVR, he is also on norepinephrine at 0.06 mcg/kg/m, IV fluids 

at 50 MLS per hour, and he is also on Rocephin.  Patient is improving, feeling 

much better since he was admitted.  Initial report on his blood cultures is 

positive for gram-negative rods in the blood.





Review of Systems


Constitutional: Weakness, low-grade fever.  No weight loss.


HEENT: Diminished hearing and diminished vision.


Cardiac: As noted in HPI.


Pulmonary: Some shortness of breath no cough no wheezing no chest pain, no 

hemoptysis.


GI: Denies any nausea vomiting abdominal pain diarrhea melena or hematemesis.


Genitourinary patient had no symptoms related to his UTI no dysuria and no 

frequency no urgency no hematuria.


Musculoskeletal: Negative


Hematologic: Negative


Psychiatric: Negative


Skin: Negative


Neurologic: Negative


ALLERGIC: Negative


Endocrine: Negative








Past Medical History


Past Medical History: Atrial Fibrillation, Coronary Artery Disease (CAD), 

Cancer, Heart Failure, Diabetes Mellitus, GERD/Reflux, Hyperlipidemia, Renal 

Disease


Additional Past Medical History / Comment(s): See Dr Gundlapalli's H&P, hx 

kidney and bladder cancer- tx with chemo and radiation , "growth on kidney",

history of CABG, cardiomyopathy, AICD implantation ULCER, KIDNEY STONES


History of Any Multi-Drug Resistant Organisms: None Reported


Past Surgical History: Bladder Surgery, Heart Catheterization, Orthopedic 

Surgery


Additional Past Surgical History / Comment(s): Port-a-cath insertion/later 

removed. Bilateral cataract , ORIF R ankle with 2 screws,"scraped the bladder" 

10-18-16 TOTAL RT KNEE,growth on kidney removed


Past Anesthesia/Blood Transfusion Reactions: No Reported Reaction


Additional Past Anesthesia/Blood Transfusion Reaction / Comment(s): Pt has never

recieved blood.


Past Psychological History: Bipolar, Depression


Additional Psychological History / Comment(s): Pt is bipolar and states he does 

well with his medications.


Smoking Status: Former smoker


Past Alcohol Use History: None Reported


Additional Past Alcohol Use History / Comment(s): Pt states he started smoking 

at age 16.  He quit sometime in the  or maybe even sooner.  He was less 

than a pack a day smoker.


Past Drug Use History: None Reported





- Past Family History


  ** Father


Family Medical History: Cancer


Additional Family Medical History / Comment(s): prostate





  ** Mother


Family Medical History: No Reported History


Additional Family Medical History / Comment(s): Mother had bowel perforations.  

She  at 88 yrs of age.





  ** Brother(s)


Family Medical History: Cancer





  ** Sister(s)


Family Medical History: Cancer





Medications and Allergies


                                Home Medications











 Medication  Instructions  Recorded  Confirmed  Type


 


Venlafaxine HCl [Effexor] 75 mg PO BID  tab 20 Rx


 


Atorvastatin [Lipitor] 80 mg PO HS 20 History


 


Cyanocobalamin (Vitamin B-12) 1,000 mcg PO DAILY 20 History





[Vitamin B-12]    


 


Losartan [Cozaar] 25 mg PO HS  tab 20 Rx


 


Melatonin 3 mg PO HS  tablet 20 Rx


 


Metoprolol Tartrate [Lopressor] 25 mg PO BID  tab 20 Rx


 


Spironolactone [Aldactone] 25 mg PO DAILY  tab 20 Rx


 


lamoTRIgine [LaMICtal] 100 mg PO HS #3 tab 20 Rx


 


Apixaban [Eliquis] 2.5 mg PO BID 20 History


 


Carvedilol [Coreg] 12.5 mg PO BID-W/MEALS 20 History


 


Cholecalciferol [Vitamin D3 (25 1,000 unit PO DAILY 20 History





Mcg = 1000 Iu)]    


 


Ferrous Sulfate [Feosol] 325 mg PO DAILY 20 History


 


Furosemide [Lasix] 40 mg PO DAILY@1500 20 History


 


Furosemide [Lasix] 80 mg PO QAM 20 History


 


Insulin Glargine [Lantus] 46 unit SQ HS 20 History


 


Midodrine [ProAmatine] 10 mg PO TID@0700,1100,1600 20 History


 


Pantoprazole Sodium [Protonix] 40 mg PO W/BRKFST 20 History


 


metFORMIN HCL [Glucophage] 1,000 mg PO BID 20 History








                                    Allergies











Allergy/AdvReac Type Severity Reaction Status Date / Time


 


No Known Allergies Allergy   Verified 20 18:00














Physical Exam


Vitals: 


                                   Vital Signs











  Temp Pulse Resp BP Pulse Ox


 


 20 10:30   95  15  78/51 


 


 20 10:15   81  17  76/57  91 L


 


 20 10:00   129 H  27 H  77/57  97


 


 20 09:45   128 H  27 H  70/49  97


 


 20 09:30   134 H  18  77/55  97


 


 20 09:15   129 H  18  120/83  96


 


 20 09:00   134 H  16  107/76  97


 


 20 08:45   135 H  13  99/66  97


 


 20 08:30   138 H  15  113/73  97


 


 20 08:15   138 H  17  123/86  98


 


 20 08:00  98.2 F  138 H  14  120/77  99


 


 20 07:45   137 H  15  118/85  99


 


 20 07:00   138 H  21  98/58  98


 


 20 06:45   138 H  25 H  104/58  98


 


 20 06:30   135 H  20  102/70  99


 


 20 06:15   140 H  20  102/85  96


 


 20 06:00   138 H  30 H  115/68  97


 


 20 05:45   137 H  37 H  106/80  96


 


 20 05:30   140 H  24  101/62  96


 


 20 05:15   141 H  30 H  92/78  94 L


 


 20 05:00   141 H  28 H  95/58  98


 


 20 04:45   142 H  32 H  94/53  98


 


 20 04:30   140 H  30 H  87/53  99


 


 20 04:15   142 H  30 H  121/86  100


 


 20 04:00   141 H  28 H  118/76  100


 


 20 03:45   140 H  24  129/74  97


 


 20 03:30   146 H  28 H  111/75  98


 


 20 03:15   144 H  28 H  118/69  99


 


 20 03:00   144 H  17  111/64  100


 


 20 02:45   142 H  18  101/63  100


 


 20 02:30   148 H  11 L  89/57  99


 


 20 02:15   146 H  19  87/64  98


 


 20 02:00   146 H  16  89/57  100


 


 20 01:45   144 H  18  85/48  100


 


 20 01:30   144 H  21  105/49  98


 


 20 01:15   146 H  7 L  103/67  99


 


 20 01:00   144 H  9 L  110/65  97


 


 20 00:45   142 H  35 H  104/65  99


 


 20 00:30   140 H  14  99/66  100


 


 20 00:15   140 H  22   99


 


 20 00:00   138 H  26 H  101/64  100


 


 20 23:45   140 H  28 H   99


 


 20 23:30   137 H  26 H   100


 


 20 23:21  98 F  133 H  25 H  105/78  99


 


 20 23:07   112 H   


 


 20 22:50  98.5 F  132 H  18  111/71  98


 


 20 22:21   103 H  17  93/59  98


 


 20 21:40   134 H  18  96/71 


 


 20 21:24   114 H   106/67 


 


 20 21:20   84  17  73/47 


 


 20 21:00   133 H  20  77/45 


 


 20 20:40   135 H  21  68/41 


 


 20 20:23   97   69/47 


 


 20 20:20   97  17  74/49 


 


 20 20:00   122 H  18  72/48 


 


 20 19:40   134 H  18  70/49 


 


 20 19:27  98.7 F  133 H  18  81/54  98


 


 20 19:20   135 H  20  60/44 


 


 20 19:00   130 H  18  55/32 


 


 20 18:50     83/52  98


 


 20 18:40   135 H  20  98/40 


 


 20 18:20   129 H  18  63/41  95


 


 20 18:00   131 H  19  70/43  96


 


 20 17:40   135 H  18  80/42  97


 


 20 17:34   133 H  18  80/42  97


 


 20 17:20   138 H  22  83/57  98


 


 20 17:19   137 H  18  83/57  98


 


 20 17:00   135 H  19  73/41  96


 


 20 16:40   134 H  20  89/52  95


 


 20 16:37   128 H  13   81 L


 


 20 16:20  98.9 F  136 H  24  78/43  98








                                Intake and Output











 20





 22:59 06:59 14:59


 


Intake Total 4.602 415.261 145.3


 


Output Total  155 165


 


Balance 4.602 260.261 -19.7


 


Intake:   


 


  IV   145.3


 


    0.9 @ 50   50


 


    Amiodarone 360 mg In   33.3





    Dextrose 5% in Water 200   





    ml @ 1 MG/MIN 33.333 mls/   





    hr IV .Q6H [facility] Rx#:   





    [id number]   


 


    Heparin Sod,Pork in 0.45%   12





    NaCl 25,000 unit In 0.45   





    % NaCl 1 250ml.bag @ 1,   





    200 UNIT/HR 12 mls/hr IV   





    .[id number] [facility] Rx#:   





    [id number]   


 


    cefTRIAXone 1 gm In   50





    Sodium Chloride 0.9% 50   





    ml @ 100 mls/hr IVPB ONCE   





    [facility] Rx#:[id number]   


 


  Intake, IV Titration 4.602 15.261 





  Amount   


 


    Norepinephrine 32 mg In 4.602 15.261 





    Sodium Chloride 0.9% 218   





    ml @ 0.05 MCG/KG/MIN 2.36   





    mls/hr IV .Q24H [facility] Rx#:   





    [id number]   


 


  Oral  400 


 


Output:   


 


  Urine  155 165


 


Other:   


 


  Voiding Method  Indwelling Catheter 


 


  Weight 100.698 kg 104.7 kg 














Physical Exam: Revealed an 80-year-old white male in no distress.  Very 

pleasant.  On 2 L nasal cannula.


Head: Atraumatic, normocephalic.


HEENT:[Neck is supple.] [No neck masses.] [No thyromegaly.] [No JVD.]  PERRLA, 

EOMI, no icterus.


Chest: [Symmetrical chest expansion, minimal crackles at the bases no rhonchi 

and no wheezes.


Cardiac Exam: Irregular irregular rhythm.  [Normal S1 and S2, no S3 gallop, 2/6 

systolic murmur thought the precordium.


Abdomen: [Soft, nontender,  no megaly, no rebound, no guarding, normal bowel 

sounds.]


Extremities: [No clubbing, no edema, no cyanosis.]  Good pulses bilaterally.


Psychiatric: Normal mood affect and normal mental status examination.


Neurological Exam: [No focal neurologic deficit.]  Alert and oriented 3.


Skin: No rashes.





Results





- Laboratory Findings


CBC and BMP: 


                                 20 06:40





                                 20 06:40


PT/INR, D-dimer











PT  11.5 sec (9.0-12.0)   20  16:57    


 


INR  1.1  (<1.2)   20  16:57    








Abnormal lab findings: 


                                  Abnormal Labs











  20





  16:56 16:57 16:57


 


RBC   3.22 L 


 


Hgb   9.5 L 


 


Hct   29.6 L 


 


RDW   17.3 H 


 


Plt Count   147 L 


 


Lymphocytes #   0.3 L 


 


Sodium  132 L  


 


Carbon Dioxide  14 L  


 


BUN  71 H  


 


Creatinine  2.98 H  


 


Glucose  392 H  


 


POC Glucose (mg/dL)   


 


Plasma Lactic Acid Jeanmarie    5.4 H*


 


Calcium   


 


Magnesium   


 


Troponin I   


 


Total Protein  6.1 L  


 


Albumin  3.2 L  


 


Urine Protein   


 


Urine Blood   


 


Ur Leukocyte Esterase   


 


Urine RBC   


 


Urine WBC   


 


Amorphous Sediment   


 


Urine Bacteria   














  20





  16:57 19:09 19:50


 


RBC   


 


Hgb   


 


Hct   


 


RDW   


 


Plt Count   


 


Lymphocytes #   


 


Sodium   


 


Carbon Dioxide   


 


BUN   


 


Creatinine   


 


Glucose   


 


POC Glucose (mg/dL)   


 


Plasma Lactic Acid Jeanmarie    2.6 H*


 


Calcium   


 


Magnesium   


 


Troponin I  0.048 H*  


 


Total Protein   


 


Albumin   


 


Urine Protein   2+ H 


 


Urine Blood   Moderate H 


 


Ur Leukocyte Esterase   Large H 


 


Urine RBC   6 H 


 


Urine WBC   114 H 


 


Amorphous Sediment   Rare H 


 


Urine Bacteria   Few H 














  20





  21:45 23:21 06:40


 


RBC    3.25 L


 


Hgb    9.2 L


 


Hct    29.5 L


 


RDW    17.3 H


 


Plt Count    134 L


 


Lymphocytes #    0.3 L


 


Sodium   


 


Carbon Dioxide   


 


BUN   


 


Creatinine   


 


Glucose   


 


POC Glucose (mg/dL)  319 H  304 H 


 


Plasma Lactic Acid Jeanmarie   


 


Calcium   


 


Magnesium   


 


Troponin I   


 


Total Protein   


 


Albumin   


 


Urine Protein   


 


Urine Blood   


 


Ur Leukocyte Esterase   


 


Urine RBC   


 


Urine WBC   


 


Amorphous Sediment   


 


Urine Bacteria   














  20





  06:40 07:10 12:14


 


RBC   


 


Hgb   


 


Hct   


 


RDW   


 


Plt Count   


 


Lymphocytes #   


 


Sodium  133 L  


 


Carbon Dioxide  17 L  


 


BUN  73 H  


 


Creatinine  3.25 H  


 


Glucose  285 H  


 


POC Glucose (mg/dL)   291 H  280 H


 


Plasma Lactic Acid Jeanmarie   


 


Calcium  8.2 L  


 


Magnesium  1.5 L  


 


Troponin I   


 


Total Protein   


 


Albumin   


 


Urine Protein   


 


Urine Blood   


 


Ur Leukocyte Esterase   


 


Urine RBC   


 


Urine WBC   


 


Amorphous Sediment   


 


Urine Bacteria   














- Diagnostic Findings


Chest x-ray: image reviewed (Chest x-ray this morning showed cardiomegaly, and 

mild interstitial edema with small left-sided pleural effusion.)





Assessment and Plan


Assessment: 





Impression:


Acute urinary tract infection


Septic shock and gram-negative bacteremia secondary to UTI


Acute on chronic systolic congestive heart failure and LV dysfunction


Shortness of breath secondary to above.


Elevated lactic acid secondary to sepsis


Type 2 diabetes, no evidence of hyperglycemia or ketoacidosis.


Chronic atrial fibrillation however her rate seems to be poorly controlled at 

present requiring amiodarone.


Anemia of chronic disease.


History of coronary artery disease


History of bladder cancer.  And chemoradiation.


History of bipolar disorder.


Ex smoker.


Obesity with body mass index of 34.8.





Recommendation:


Continue antibiotics.  Patient is presently on Rocephin.


Continue and titrate norepinephrine to maintain adequate blood pressure with a 

mean of above 65.


Continue on amiodarone, and control rate to below 100.


Continue Lasix 40 mg IV push every 8 hours.


Continue GI and DVT prophylaxis.


Resume home meds, however hold blood pressure medications as long as patient is 

requiring norepinephrine.


Adjust antibiotics according to the final culture results from the blood and 

urine.


Continue to monitor in the ICU.


Will follow.


Time with Patient: Greater than 30

## 2020-07-25 NOTE — XR
EXAMINATION TYPE: XR chest 1V portable

 

DATE OF EXAM: 7/25/2020

 

HISTORY: CHF.

 

REFERENCE: Previous study dated 7/24/2020.

 

FINDINGS: There is been a midline sternotomy. There is unipolar pacemaker place on the left.

 

The heart is enlarged. There is apparent elevation of the left hemidiaphragm. There is a small left e
ffusion. There are bibasilar changes of atelectasis or early infiltrate. This may have worsened sligh
tly on the right.

 

IMPRESSION: 

1. CARDIOMEGALY.

2. BIBASILAR INFILTRATE, WORSE ON THE RIGHT AND THE LEFT.

3. LEFT-SIDED EFFUSION. I SUSPECT A SMALLER RIGHT-SIDED EFFUSION.

## 2020-07-25 NOTE — P.CRDCN
History of Present Illness


History of present illness: 





This is Dr. Neumann dictating a consult on this patient


The patient was interviewed and examined 





IMPRESSION / ASSESSMENT: 


Patient being treated for sepsis


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


A. fib with RVR on ELIQUIS 2.5 mg twice a day


Chronic kidney disease





PLAN:


Recommend electrical cardioversion


I will start him on heparin to since he does have cardiomyopathy and bradycardia

wouldn't tomorrow


Continue IV amiodarone loading





Hopefully this will facilitate management of sepsis





HPI patient presented to the emergency room with tiredness fatigue difficulty b

reathing and does complain of some palpitations


He is found to have A. fib with RVR


No chest discomfort





He was also found to be having low-grade fever and hypotensive and tachycardic 

up 130 beats a minute.  He was in A. fib with RVR


He was noted to have UTI without urine tract complaints


He received 2 L of IV fluid bolus, antibiotics for presumptive UTI


He has known severe LV dysfunction ejection fraction 20% status post ICD implant





This morning is norepinephrine was discontinued


Last night IV amiodarone was begun, 





ROS: 


No fever chills or rigors, 


no cough, phlegm or expectoration, 


no nausea, vomiting or diarrhea, 


no hematuria, dysuria, 


no musculoskeletal complaints, 


no strokes or seizures, 


no skin lesions.





EXAMINATION:


On examination he was tachycardic 130 140 beats a minute respirations 16-18


Blood pressure in the 90s


Breath sounds are reduced bilaterally


Heart sounds are tachycardic


Abdomen soft





REVIEW OF LABS, ECG & MEDICAL DATA





Gram-negative rods in the blood


















































Past Medical History


Past Medical History: Atrial Fibrillation, Coronary Artery Disease (CAD), 

Cancer, Heart Failure, Diabetes Mellitus, GERD/Reflux, Hyperlipidemia, Renal 

Disease


Additional Past Medical History / Comment(s): See Dr Fleming's H&P, hx 

kidney and bladder cancer- tx with chemo and radiation , "growth on kidney",

history of CABG, cardiomyopathy, AICD implantation ULCER, KIDNEY STONES


History of Any Multi-Drug Resistant Organisms: None Reported


Past Surgical History: Bladder Surgery, Heart Catheterization, Orthopedic 

Surgery


Additional Past Surgical History / Comment(s): Port-a-cath insertion/later 

removed. Bilateral cataract , ORIF R ankle with 2 screws,"scraped the bladder" 

10-18-16 TOTAL RT KNEE,growth on kidney removed


Past Anesthesia/Blood Transfusion Reactions: No Reported Reaction


Additional Past Anesthesia/Blood Transfusion Reaction / Comment(s): Pt has never

recieved blood.


Past Psychological History: Bipolar, Depression


Additional Psychological History / Comment(s): Pt is bipolar and states he does 

well with his medications.


Smoking Status: Former smoker


Past Alcohol Use History: None Reported


Additional Past Alcohol Use History / Comment(s): Pt states he started smoking 

at age 16.  He quit sometime in the  or maybe even sooner.  He was less 

than a pack a day smoker.


Past Drug Use History: None Reported





- Past Family History


  ** Father


Family Medical History: Cancer


Additional Family Medical History / Comment(s): prostate





  ** Mother


Family Medical History: No Reported History


Additional Family Medical History / Comment(s): Mother had bowel perforations.  

She  at 88 yrs of age.





  ** Brother(s)


Family Medical History: Cancer





  ** Sister(s)


Family Medical History: Cancer





Medications and Allergies


                                Home Medications











 Medication  Instructions  Recorded  Confirmed  Type


 


Venlafaxine HCl [Effexor] 75 mg PO BID  tab 20 Rx


 


Atorvastatin [Lipitor] 80 mg PO HS 20 History


 


Cyanocobalamin (Vitamin B-12) 1,000 mcg PO DAILY 20 History





[Vitamin B-12]    


 


Losartan [Cozaar] 25 mg PO HS  tab 20 Rx


 


Melatonin 3 mg PO HS  tablet 20 Rx


 


Metoprolol Tartrate [Lopressor] 25 mg PO BID  tab 20 Rx


 


Spironolactone [Aldactone] 25 mg PO DAILY  tab 20 Rx


 


lamoTRIgine [LaMICtal] 100 mg PO HS #3 tab 20 Rx


 


Apixaban [Eliquis] 2.5 mg PO BID 20 History


 


Carvedilol [Coreg] 12.5 mg PO BID-W/MEALS 20 History


 


Cholecalciferol [Vitamin D3 (25 1,000 unit PO DAILY 20 History





Mcg = 1000 Iu)]    


 


Ferrous Sulfate [Feosol] 325 mg PO DAILY 20 History


 


Furosemide [Lasix] 40 mg PO DAILY@1500 20 History


 


Furosemide [Lasix] 80 mg PO QAM 20 History


 


Insulin Glargine [Lantus] 46 unit SQ HS 20 History


 


Midodrine [ProAmatine] 10 mg PO TID@0700,1100,1600 20 History


 


Pantoprazole Sodium [Protonix] 40 mg PO W/BRKFST 20 History


 


metFORMIN HCL [Glucophage] 1,000 mg PO BID 20 History








                                    Allergies











Allergy/AdvReac Type Severity Reaction Status Date / Time


 


No Known Allergies Allergy   Verified 20 18:00














Physical Exam


Vitals: 


                                   Vital Signs











  Temp Pulse Resp BP Pulse Ox


 


 20 14:00   101 H  14  88/55  99


 


 20 13:45   96  21  90/62  97


 


 20 13:30   104 H  19  87/58  99


 


 20 13:15   81  15  78/58  98


 


 20 13:00   85  14  80/56  97


 


 20 12:45   123 H  20  75/61  98


 


 20 12:33   100   


 


 20 12:30   103 H  34 H  87/59  98


 


 20 12:21   112 H   


 


 20 12:15   103 H  20  104/75  98


 


 20 12:00   103 H  34 H  90/70  99


 


 20 11:45   103 H  30 H  90/66  97


 


 20 11:30   80  21  89/60  98


 


 20 11:15   90  35 H  89/65  97


 


 20 11:00   86  29 H  79/55  99


 


 20 10:30   95  15  78/51 


 


 20 10:15   81  17  76/57  91 L


 


 20 10:00   129 H  27 H  77/57  97


 


 20 09:45   128 H  27 H  70/49  97


 


 20 09:30   134 H  18  77/55  97


 


 20 09:15   129 H  18  120/83  96


 


 20 09:00   134 H  16  107/76  97


 


 20 08:45   135 H  13  99/66  97


 


 20 08:30   138 H  15  113/73  97


 


 20 08:15   138 H  17  123/86  98


 


 20 08:00  98.2 F  138 H  14  120/77  99


 


 20 07:45   137 H  15  118/85  99


 


 20 07:00   138 H  21  98/58  98


 


 20 06:45   138 H  25 H  104/58  98


 


 20 06:30   135 H  20  102/70  99


 


 20 06:15   140 H  20  102/85  96


 


 20 06:00   138 H  30 H  115/68  97


 


 20 05:45   137 H  37 H  106/80  96


 


 20 05:30   140 H  24  101/62  96


 


 20 05:15   141 H  30 H  92/78  94 L


 


 20 05:00   141 H  28 H  95/58  98


 


 20 04:45   142 H  32 H  94/53  98


 


 20 04:30   140 H  30 H  87/53  99


 


 20 04:15   142 H  30 H  121/86  100


 


 20 04:00   141 H  28 H  118/76  100


 


 20 03:45   140 H  24  129/74  97


 


 20 03:30   146 H  28 H  111/75  98


 


 20 03:15   144 H  28 H  118/69  99


 


 20 03:00   144 H  17  111/64  100


 


 20 02:45   142 H  18  101/63  100


 


 20 02:30   148 H  11 L  89/57  99


 


 20 02:15   146 H  19  87/64  98


 


 20 02:00   146 H  16  89/57  100


 


 20 01:45   144 H  18  85/48  100


 


 20 01:30   144 H  21  105/49  98


 


 20 01:15   146 H  7 L  103/67  99


 


 20 01:00   144 H  9 L  110/65  97


 


 20 00:45   142 H  35 H  104/65  99


 


 20 00:30   140 H  14  99/66  100


 


 20 00:15   140 H  22   99


 


 20 00:00   138 H  26 H  101/64  100


 


 20 23:45   140 H  28 H   99


 


 20 23:30   137 H  26 H   100


 


 20 23:21  98 F  133 H  25 H  105/78  99


 


 20 23:07   112 H   


 


 20 22:50  98.5 F  132 H  18  111/71  98


 


 20 22:21   103 H  17  93/59  98


 


 20 21:40   134 H  18  96/71 


 


 20 21:24   114 H   106/67 


 


 20 21:20   84  17  73/47 


 


 20 21:00   133 H  20  77/45 


 


 20 20:40   135 H  21  68/41 


 


 20 20:23   97   69/47 


 


 20 20:20   97  17  74/49 


 


 20 20:00   122 H  18  72/48 


 


 20 19:40   134 H  18  70/49 


 


 20 19:27  98.7 F  133 H  18  81/54  98


 


 20 19:20   135 H  20  60/44 


 


 20 19:00   130 H  18  55/32 


 


 20 18:50     83/52  98


 


 20 18:40   135 H  20  98/40 


 


 20 18:20   129 H  18  63/41  95


 


 20 18:00   131 H  19  70/43  96


 


 20 17:40   135 H  18  80/42  97


 


 20 17:34   133 H  18  80/42  97


 


 20 17:20   138 H  22  83/57  98


 


 20 17:19   137 H  18  83/57  98


 


 20 17:00   135 H  19  73/41  96


 


 20 16:40   134 H  20  89/52  95


 


 20 16:37   128 H  13   81 L








                                Intake and Output











 20





 06:59 14:59 22:59


 


Intake Total 415.261 431.2 


 


Output Total 155 295 


 


Balance 260.261 136.2 


 


Intake:   


 


  IV  431.2 


 


    0.9 @ 50  200 


 


    Amiodarone 360 mg In  133.2 





    Dextrose 5% in Water 200   





    ml @ 1 MG/MIN 33.333 mls/   





    hr IV .Q6H [facility] Rx#:   





    [id number]   


 


    Heparin Sod,Pork in 0.45%  48 





    NaCl 25,000 unit In 0.45   





    % NaCl 1 250ml.bag @ 1,   





    200 UNIT/HR 12 mls/hr IV   





    .[id number] [facility] Rx#:   





    [id number]   


 


    cefTRIAXone 1 gm In  50 





    Sodium Chloride 0.9% 50   





    ml @ 100 mls/hr IVPB ONCE   





    [facility] Rx#:[id number]   


 


  Intake, IV Titration 15.261  





  Amount   


 


    Norepinephrine 32 mg In 15.261  





    Sodium Chloride 0.9% 218   





    ml @ 0.05 MCG/KG/MIN 2.36   





    mls/hr IV .Q24H [facility] Rx#:   





    [id number]   


 


  Oral 400  


 


Output:   


 


  Urine 155 295 


 


Other:   


 


  Voiding Method Indwelling Catheter  


 


  Weight 104.7 kg 104.7 kg 














Results





                                 20 06:40





                                 20 06:40


                                 Cardiac Enzymes











  20 Range/Units





  16:56 16:57 


 


AST  21   (17-59)  U/L


 


Troponin I   0.048 H*  (0.000-0.034)  ng/mL








                                   Coagulation











  20 Range/Units





  16:57 


 


PT  11.5  (9.0-12.0)  sec


 


APTT  28.3  (22.0-30.0)  sec








                                       CBC











  20 Range/Units





  16:57 06:40 


 


WBC  5.3  5.9  (3.8-10.6)  k/uL


 


RBC  3.22 L  3.25 L  (4.30-5.90)  m/uL


 


Hgb  9.5 L  9.2 L  (13.0-17.5)  gm/dL


 


Hct  29.6 L  29.5 L  (39.0-53.0)  %


 


Plt Count  147 L  134 L  (150-450)  k/uL








                          Comprehensive Metabolic Panel











  20 Range/Units





  16:56 06:40 


 


Sodium  132 L  133 L  (137-145)  mmol/L


 


Potassium  4.7  4.6  (3.5-5.1)  mmol/L


 


Chloride  100  103  ()  mmol/L


 


Carbon Dioxide  14 L  17 L  (22-30)  mmol/L


 


BUN  71 H  73 H  (9-20)  mg/dL


 


Creatinine  2.98 H  3.25 H  (0.66-1.25)  mg/dL


 


Glucose  392 H  285 H  (74-99)  mg/dL


 


Calcium  8.9  8.2 L  (8.4-10.2)  mg/dL


 


AST  21   (17-59)  U/L


 


ALT  12   (4-49)  U/L


 


Alkaline Phosphatase  90   ()  U/L


 


Total Protein  6.1 L   (6.3-8.2)  g/dL


 


Albumin  3.2 L   (3.5-5.0)  g/dL








                               Current Medications











Generic Name Dose Route Start Last Admin





  Trade Name Freq  PRN Reason Stop Dose Admin


 


Acetaminophen  650 mg  20 20:36 





  Tylenol Tab  PO  





  Q4HR PRN  





  Fever and/or Mild Pain  


 


Hydrocodone Bitart/Acetaminophen  1 each  20 19:40 





  [place] 5-325  PO  





  Q6HR PRN  





  Pain  


 


Albuterol/Ipratropium  3 ml  20 22:00  20 12:19





  Duoneb 0.5 Mg-3 Mg/3 Ml Soln  INHALATION   3 ml





  RT-TID [facility]   Administration


 


Apixaban  2.5 mg  20 23:30  20 08:47





  Eliquis  PO   2.5 mg





  BID [facility]   Administration


 


Furosemide  40 mg  20 00:00  20 08:47





  Lasix  IV   40 mg





  Q8HR [facility]   Administration


 


Hydromorphone HCl  0.5 mg  20 19:40 





  Dilaudid  IVP  





  Q6HR PRN  





  Severe Pain  


 


Norepinephrine Bitartrate 32  250 mls @ 2.36 mls/hr  20 20:45  20 

02:10





  mg/ Sodium Chloride  IV   0.12 mcg/kg/min





  .Q24H [facility]   5.664 mls/hr





    Titration





  Protocol  





  0.05 MCG/KG/MIN  


 


Ceftriaxone Sodium 1 gm/  50 mls @ 100 mls/hr  20 09:00  20 08:46





  Sodium Chloride  IVPB   100 mls/hr





  Q12HR [facility]   Administration


 


Heparin Sodium/Sodium Chloride  250 mls @ 12 mls/hr  20 10:00  20 

10:22





  25,000 unit/ Sodium Chloride  IV   1,200 unit/hr





  .[id number] [facility]   12 mls/hr





    Administration





  1,200 UNIT/HR  


 


Amiodarone HCl 360 mg/  200 mls @ 33.333 mls/hr  20 10:10  20 10:23





  Dextrose/Water  IV   1 mg/min





  .Q6H [facility]   33.333 mls/hr





    Administration





  Protocol  





  1 MG/MIN  


 


Insulin Aspart  0 unit  20 21:00  20 12:24





  Novolog  SQ   4 unit





  ACHS [facility]   Administration





  Protocol  


 


Insulin Detemir  46 unit  20 21:04  20 22:33





  Levemir  SQ   46 unit





  HS [facility]   Administration


 


Lamotrigine  100 mg  20 23:30  20 00:04





  Lamictal  PO   100 mg





  HS [facility]   Administration


 


Melatonin  3 mg  20 23:30  20 00:03





  Melatonin  PO   3 mg





  HS [facility]   Administration


 


Midodrine  10 mg  20 07:00  20 12:23





  Proamatine  PO   10 mg





  TID@0700,1100,1600 [facility]   Administration


 


Naloxone HCl  0.2 mg  20 20:36 





  Narcan  IV  





  Q2M PRN  





  Opioid Reversal  


 


Pantoprazole Sodium  40 mg  20 19:45  20 08:47





  Protonix  IVP   40 mg





  DAILY [facility]   Administration


 


Spironolactone  25 mg  20 09:00  20 08:47





  Aldactone  PO   25 mg





  DAILY [facility]   Administration


 


Venlafaxine HCl  75 mg  20 23:30  20 10:23





  Effexor  PO   75 mg





  BID [facility]   Administration








                                Intake and Output











 20





 06:59 14:59 22:59


 


Intake Total 415.261 431.2 


 


Output Total 155 295 


 


Balance 260.261 136.2 


 


Intake:   


 


  IV  431.2 


 


    0.9 @ 50  200 


 


    Amiodarone 360 mg In  133.2 





    Dextrose 5% in Water 200   





    ml @ 1 MG/MIN 33.333 mls/   





    hr IV .Q6H [facility] Rx#:   





    [id number]   


 


    Heparin Sod,Pork in 0.45%  48 





    NaCl 25,000 unit In 0.45   





    % NaCl 1 250ml.bag @ 1,   





    200 UNIT/HR 12 mls/hr IV   





    .[id number] [facility] Rx#:   





    [id number]   


 


    cefTRIAXone 1 gm In  50 





    Sodium Chloride 0.9% 50   





    ml @ 100 mls/hr IVPB ONCE   





    [facility] Rx#:[id number]   


 


  Intake, IV Titration 15.261  





  Amount   


 


    Norepinephrine 32 mg In 15.261  





    Sodium Chloride 0.9% 218   





    ml @ 0.05 MCG/KG/MIN 2.36   





    mls/hr IV .Q24H [facility] Rx#:   





    [id number]   


 


  Oral 400  


 


Output:   


 


  Urine 155 295 


 


Other:   


 


  Voiding Method Indwelling Catheter  


 


  Weight 104.7 kg 104.7 kg 








                                 Patient Weight











 20





 06:59


 


Weight 104.7 kg








                                        





                                 20 06:40 





                                 20 06:40

## 2020-07-26 LAB
ANION GAP SERPL CALC-SCNC: 17 MMOL/L
BUN SERPL-SCNC: 77 MG/DL (ref 9–20)
CALCIUM SPEC-MCNC: 8.2 MG/DL (ref 8.4–10.2)
CELLS COUNTED: 100
CHLORIDE SERPL-SCNC: 103 MMOL/L (ref 98–107)
CO2 SERPL-SCNC: 11 MMOL/L (ref 22–30)
EOSINOPHIL # BLD MANUAL: 0.33 K/UL (ref 0–0.7)
ERYTHROCYTE [DISTWIDTH] IN BLOOD BY AUTOMATED COUNT: 3.44 M/UL (ref 4.3–5.9)
ERYTHROCYTE [DISTWIDTH] IN BLOOD: 17.5 % (ref 11.5–15.5)
GLUCOSE BLD-MCNC: 127 MG/DL (ref 75–99)
GLUCOSE BLD-MCNC: 147 MG/DL (ref 75–99)
GLUCOSE BLD-MCNC: 169 MG/DL (ref 75–99)
GLUCOSE SERPL-MCNC: 134 MG/DL (ref 74–99)
HCT VFR BLD AUTO: 31 % (ref 39–53)
HGB BLD-MCNC: 10 GM/DL (ref 13–17.5)
LYMPHOCYTES # BLD MANUAL: 0.91 K/UL (ref 1–4.8)
MAGNESIUM SPEC-SCNC: 1.5 MG/DL (ref 1.6–2.3)
MCH RBC QN AUTO: 29.2 PG (ref 25–35)
MCHC RBC AUTO-ENTMCNC: 32.3 G/DL (ref 31–37)
MCV RBC AUTO: 90.3 FL (ref 80–100)
MONOCYTES # BLD MANUAL: 0.75 K/UL (ref 0–1)
NEUTROPHILS NFR BLD MANUAL: 73 %
NEUTS SEG # BLD MANUAL: 6.3 K/UL (ref 1.3–7.7)
PLATELET # BLD AUTO: 143 K/UL (ref 150–450)
POTASSIUM SERPL-SCNC: 4.3 MMOL/L (ref 3.5–5.1)
SODIUM SERPL-SCNC: 131 MMOL/L (ref 137–145)
WBC # BLD AUTO: 8.3 K/UL (ref 3.8–10.6)

## 2020-07-26 PROCEDURE — 5A2204Z RESTORATION OF CARDIAC RHYTHM, SINGLE: ICD-10-PCS

## 2020-07-26 RX ADMIN — HEPARIN SODIUM SCH MLS/HR: 10000 INJECTION, SOLUTION INTRAVENOUS at 06:48

## 2020-07-26 RX ADMIN — APIXABAN SCH MG: 2.5 TABLET, FILM COATED ORAL at 11:11

## 2020-07-26 RX ADMIN — NOREPINEPHRINE BITARTRATE SCH: 1 INJECTION, SOLUTION, CONCENTRATE INTRAVENOUS at 20:47

## 2020-07-26 RX ADMIN — PANTOPRAZOLE SODIUM SCH MG: 40 INJECTION, POWDER, FOR SOLUTION INTRAVENOUS at 11:10

## 2020-07-26 RX ADMIN — INSULIN ASPART SCH: 100 INJECTION, SOLUTION INTRAVENOUS; SUBCUTANEOUS at 11:46

## 2020-07-26 RX ADMIN — INSULIN ASPART SCH UNIT: 100 INJECTION, SOLUTION INTRAVENOUS; SUBCUTANEOUS at 17:02

## 2020-07-26 RX ADMIN — APIXABAN SCH MG: 2.5 TABLET, FILM COATED ORAL at 20:41

## 2020-07-26 RX ADMIN — MIDODRINE HYDROCHLORIDE SCH MG: 5 TABLET ORAL at 11:11

## 2020-07-26 RX ADMIN — IPRATROPIUM BROMIDE AND ALBUTEROL SULFATE SCH: .5; 3 SOLUTION RESPIRATORY (INHALATION) at 21:23

## 2020-07-26 RX ADMIN — INSULIN ASPART SCH: 100 INJECTION, SOLUTION INTRAVENOUS; SUBCUTANEOUS at 10:35

## 2020-07-26 RX ADMIN — AMIODARONE HYDROCHLORIDE SCH: 50 INJECTION, SOLUTION INTRAVENOUS at 20:47

## 2020-07-26 RX ADMIN — MIDODRINE HYDROCHLORIDE SCH MG: 5 TABLET ORAL at 06:47

## 2020-07-26 RX ADMIN — INSULIN DETEMIR SCH UNIT: 100 INJECTION, SOLUTION SUBCUTANEOUS at 20:41

## 2020-07-26 RX ADMIN — FUROSEMIDE SCH MG: 10 INJECTION, SOLUTION INTRAMUSCULAR; INTRAVENOUS at 11:11

## 2020-07-26 RX ADMIN — VENLAFAXINE HYDROCHLORIDE SCH MG: 75 TABLET ORAL at 20:41

## 2020-07-26 RX ADMIN — SPIRONOLACTONE SCH MG: 25 TABLET, FILM COATED ORAL at 11:10

## 2020-07-26 RX ADMIN — INSULIN ASPART SCH UNIT: 100 INJECTION, SOLUTION INTRAVENOUS; SUBCUTANEOUS at 20:43

## 2020-07-26 RX ADMIN — AMIODARONE HYDROCHLORIDE SCH MLS/HR: 50 INJECTION, SOLUTION INTRAVENOUS at 11:11

## 2020-07-26 RX ADMIN — MIDODRINE HYDROCHLORIDE SCH MG: 5 TABLET ORAL at 16:22

## 2020-07-26 RX ADMIN — FUROSEMIDE SCH MG: 10 INJECTION, SOLUTION INTRAMUSCULAR; INTRAVENOUS at 16:21

## 2020-07-26 RX ADMIN — AMIODARONE HYDROCHLORIDE SCH MLS/HR: 50 INJECTION, SOLUTION INTRAVENOUS at 00:29

## 2020-07-26 RX ADMIN — IPRATROPIUM BROMIDE AND ALBUTEROL SULFATE SCH ML: .5; 3 SOLUTION RESPIRATORY (INHALATION) at 08:06

## 2020-07-26 RX ADMIN — Medication SCH MG: at 20:41

## 2020-07-26 RX ADMIN — VENLAFAXINE HYDROCHLORIDE SCH MG: 75 TABLET ORAL at 11:10

## 2020-07-26 RX ADMIN — AMIODARONE HYDROCHLORIDE SCH MLS/HR: 50 INJECTION, SOLUTION INTRAVENOUS at 06:47

## 2020-07-26 RX ADMIN — IPRATROPIUM BROMIDE AND ALBUTEROL SULFATE SCH: .5; 3 SOLUTION RESPIRATORY (INHALATION) at 12:48

## 2020-07-26 RX ADMIN — FUROSEMIDE SCH MG: 10 INJECTION, SOLUTION INTRAMUSCULAR; INTRAVENOUS at 00:29

## 2020-07-26 NOTE — P.PCN
Preoperative Diagnosis: 





Procedure


Electrical cardioversion for atrial tachycardia





Indication for the procedure


Symptomatic heart failure with atrial tachycardia, refractory to rate control 

educations as well as rhythm control medications





Patient received IV heparin and IV amiodarone


He has known cardiac myopathy


He is also on ELIQUIS 2.5 mg twice daily


His underlying chronic kidney disease





Procedure


100 J biphasic shock in the AP configuration converted to sinus rhythm


Patient tells procedure well without any acute complications


Following that he was alert and oriented


I spoke to his wife regarding the procedures





Plan


Continue IV amiodarone today


Continue IV heparin today


Tomorrow morning stop IV heparin line tomorrow morning stop IV amiodarone


Switched to by mouth amiodarone


ELIQUIS to continue as before


Maximize heart failure medications current myopathy medications


Try coming off Midodrin and norepinephrine

## 2020-07-26 NOTE — US
EXAMINATION TYPE: US kidneys/renal and bladder

 

DATE OF EXAM: 7/26/2020

 

COMPARISON: Previous study dated 4/7/2020.

 

CLINICAL HISTORY: Urinary tract infection elevated creatinine. UTI

 

EXAM MEASUREMENTS:

 

Right Kidney:  10.4  x 4.3 x 3.6  cm

Left Kidney: 11.7 x 4.9 x 3.6 cm

 

Right Kidney: Hypoechoic area upper pole 2.7 x 2.3 x 2.6 cm.   

Left Kidney: No hydronephrosis or masses seen  

Bladder: Not full

**Bilateral Jets seen: No

 

Lesion in the upper pole of the right kidney does not meet the requirements of a simple cyst.

 

IMPRESSION: THIS STUDY IS DISCORDANT WITH THE PREVIOUS STUDY WHICH DEMONSTRATED A QUESTIONABLE SOLID 
LESION IN THE LOWER POLE OF THE RIGHT KIDNEY. THIS SHOWS LESION IN THE UPPER POLE OF THE RIGHT KIDNEY
. A CT SCAN DATED 3/14/2019 DEMONSTRATED A FATTY SUBCAPSULAR LESION INVOLVING THE MID TO LOWER POLE O
F THE RIGHT KIDNEY. THIS HAS THE SAME HOUNSFIELD APPEARANCE AS THE RETROPERITONEAL FAT. THIS MAY REPR
ESENT A CAPSULAR LIPOMA OR AN ANGIOMYOLIPOMA.

 

IN LIGHT OF THE AMBIGUITY OF THESE TESTS AN MRI OF THE KIDNEYS WOULD BE SUGGESTED.

## 2020-07-26 NOTE — P.PN
Subjective


Progress Note Date: 07/25/20


80-year-old white male with history of multiple medical problems including 

chronic atrial fibrillation, coronary artery disease, type 2 diabetes, 

dyslipidemia, patient is primarily a patient of Dr. Duckworth.  Patient came into 

the ER yesterday complaining of few days' history of shortness of breath, 

low-grade fever, and upon evaluation in the ER he was noted to be relatively 

hypotensive, tachycardic with a rate of 130, he was in atrial fibrillation with 

RVR.  Blood sugar was 392, and he was noted to have evidence of urinary tract 

infection.  Although the patient has no urinary complaints.  Chest x-ray showed 

evidence of interstitial edema.  Considering the patient was hypotensive, he did

receive 2 L of fluids boluses, he was given antibiotics for presumptive urinary 

tract infection and sepsis.  Patient was admitted to the ICU


Presently on amiodarone for his atrial fibrillation with RVR, patient remains 

hypotensive and is on norepinephrine at 0.06 mcg/kg/m, IV fluids at 50 MLS per 

hour, and he is also on Rocephin.  Patient is improving, feeling much better 

since he was admitted.  Blood cultures is positive for gram-negative rods.








Objective





- Vital Signs


Vital signs: 


                                   Vital Signs











Temp  98 F   07/24/20 23:21


 


Pulse  138 H  07/25/20 07:00


 


Resp  21   07/25/20 07:00


 


BP  98/58   07/25/20 07:00


 


Pulse Ox  98   07/25/20 07:00








                                 Intake & Output











 07/24/20 07/25/20 07/25/20





 18:59 06:59 18:59


 


Intake Total  419.863 


 


Output Total  155 35


 


Balance  264.863 -35


 


Weight 100.698 kg 104.7 kg 


 


Intake:   


 


  Intake, IV Titration  19.863 





  Amount   


 


    Norepinephrine 32 mg In  19.863 





    Sodium Chloride 0.9% 218   





    ml @ 0.05 MCG/KG/MIN 2.36   





    mls/hr IV .Q24H [facility] Rx#:   





    [id number]   


 


  Oral  400 


 


Output:   


 


  Urine  155 35


 


Other:   


 


  Voiding Method  Indwelling Catheter 














- Exam


Physical Exam: Revealed an 80-year-old white male in no distress.  Very 

pleasant.  On 2 L nasal cannula.


Head: Atraumatic, normocephalic.


HEENT:[Neck is supple.] [No neck masses.] [No thyromegaly.] [No JVD.]  PERRLA, 

EOMI, no icterus.


Chest: [Symmetrical chest expansion, minimal crackles at the bases no rhonchi 

and no wheezes.


Cardiac Exam: Irregular irregular rhythm.  [Normal S1 and S2, no S3 gallop, 2/6 

systolic murmur thought the precordium.


Abdomen: [Soft, nontender,  no megaly, no rebound, no guarding, normal bowel 

sounds.]


Extremities: [No clubbing, no edema, no cyanosis.]  Good pulses bilaterally.


Psychiatric: Normal mood affect and normal mental status examination.


Neurological Exam: [No focal neurologic deficit.]  Alert and oriented 3.


Skin: No rashes.











- Labs


CBC & Chem 7: 


                                 07/26/20 04:50





                                 07/26/20 04:50


Labs: 


                  Abnormal Lab Results - Last 24 Hours (Table)











  07/24/20 07/24/20 07/24/20 Range/Units





  16:56 16:57 16:57 


 


RBC   3.22 L   (4.30-5.90)  m/uL


 


Hgb   9.5 L   (13.0-17.5)  gm/dL


 


Hct   29.6 L   (39.0-53.0)  %


 


RDW   17.3 H   (11.5-15.5)  %


 


Plt Count   147 L   (150-450)  k/uL


 


Lymphocytes #   0.3 L   (1.0-4.8)  k/uL


 


Sodium  132 L    (137-145)  mmol/L


 


Carbon Dioxide  14 L    (22-30)  mmol/L


 


BUN  71 H    (9-20)  mg/dL


 


Creatinine  2.98 H    (0.66-1.25)  mg/dL


 


Glucose  392 H    (74-99)  mg/dL


 


POC Glucose (mg/dL)     (75-99)  mg/dL


 


Plasma Lactic Acid Jeanmarie    5.4 H*  (0.7-2.0)  mmol/L


 


Calcium     (8.4-10.2)  mg/dL


 


Magnesium     (1.6-2.3)  mg/dL


 


Troponin I     (0.000-0.034)  ng/mL


 


Total Protein  6.1 L    (6.3-8.2)  g/dL


 


Albumin  3.2 L    (3.5-5.0)  g/dL


 


Urine Protein     (Negative)  


 


Urine Blood     (Negative)  


 


Ur Leukocyte Esterase     (Negative)  


 


Urine RBC     (0-5)  /hpf


 


Urine WBC     (0-5)  /hpf


 


Amorphous Sediment     (None)  /hpf


 


Urine Bacteria     (None)  /hpf














  07/24/20 07/24/20 07/24/20 Range/Units





  16:57 19:09 19:50 


 


RBC     (4.30-5.90)  m/uL


 


Hgb     (13.0-17.5)  gm/dL


 


Hct     (39.0-53.0)  %


 


RDW     (11.5-15.5)  %


 


Plt Count     (150-450)  k/uL


 


Lymphocytes #     (1.0-4.8)  k/uL


 


Sodium     (137-145)  mmol/L


 


Carbon Dioxide     (22-30)  mmol/L


 


BUN     (9-20)  mg/dL


 


Creatinine     (0.66-1.25)  mg/dL


 


Glucose     (74-99)  mg/dL


 


POC Glucose (mg/dL)     (75-99)  mg/dL


 


Plasma Lactic Acid Jeanmarie    2.6 H*  (0.7-2.0)  mmol/L


 


Calcium     (8.4-10.2)  mg/dL


 


Magnesium     (1.6-2.3)  mg/dL


 


Troponin I  0.048 H*    (0.000-0.034)  ng/mL


 


Total Protein     (6.3-8.2)  g/dL


 


Albumin     (3.5-5.0)  g/dL


 


Urine Protein   2+ H   (Negative)  


 


Urine Blood   Moderate H   (Negative)  


 


Ur Leukocyte Esterase   Large H   (Negative)  


 


Urine RBC   6 H   (0-5)  /hpf


 


Urine WBC   114 H   (0-5)  /hpf


 


Amorphous Sediment   Rare H   (None)  /hpf


 


Urine Bacteria   Few H   (None)  /hpf














  07/24/20 07/24/20 07/25/20 Range/Units





  21:45 23:21 06:40 


 


RBC    3.25 L  (4.30-5.90)  m/uL


 


Hgb    9.2 L  (13.0-17.5)  gm/dL


 


Hct    29.5 L  (39.0-53.0)  %


 


RDW    17.3 H  (11.5-15.5)  %


 


Plt Count    134 L  (150-450)  k/uL


 


Lymphocytes #    0.3 L  (1.0-4.8)  k/uL


 


Sodium     (137-145)  mmol/L


 


Carbon Dioxide     (22-30)  mmol/L


 


BUN     (9-20)  mg/dL


 


Creatinine     (0.66-1.25)  mg/dL


 


Glucose     (74-99)  mg/dL


 


POC Glucose (mg/dL)  319 H  304 H   (75-99)  mg/dL


 


Plasma Lactic Acid Jeanmarie     (0.7-2.0)  mmol/L


 


Calcium     (8.4-10.2)  mg/dL


 


Magnesium     (1.6-2.3)  mg/dL


 


Troponin I     (0.000-0.034)  ng/mL


 


Total Protein     (6.3-8.2)  g/dL


 


Albumin     (3.5-5.0)  g/dL


 


Urine Protein     (Negative)  


 


Urine Blood     (Negative)  


 


Ur Leukocyte Esterase     (Negative)  


 


Urine RBC     (0-5)  /hpf


 


Urine WBC     (0-5)  /hpf


 


Amorphous Sediment     (None)  /hpf


 


Urine Bacteria     (None)  /hpf














  07/25/20 07/25/20 Range/Units





  06:40 07:10 


 


RBC    (4.30-5.90)  m/uL


 


Hgb    (13.0-17.5)  gm/dL


 


Hct    (39.0-53.0)  %


 


RDW    (11.5-15.5)  %


 


Plt Count    (150-450)  k/uL


 


Lymphocytes #    (1.0-4.8)  k/uL


 


Sodium  133 L   (137-145)  mmol/L


 


Carbon Dioxide  17 L   (22-30)  mmol/L


 


BUN  73 H   (9-20)  mg/dL


 


Creatinine  3.25 H   (0.66-1.25)  mg/dL


 


Glucose  285 H   (74-99)  mg/dL


 


POC Glucose (mg/dL)   291 H  (75-99)  mg/dL


 


Plasma Lactic Acid Jeanmarie    (0.7-2.0)  mmol/L


 


Calcium  8.2 L   (8.4-10.2)  mg/dL


 


Magnesium  1.5 L   (1.6-2.3)  mg/dL


 


Troponin I    (0.000-0.034)  ng/mL


 


Total Protein    (6.3-8.2)  g/dL


 


Albumin    (3.5-5.0)  g/dL


 


Urine Protein    (Negative)  


 


Urine Blood    (Negative)  


 


Ur Leukocyte Esterase    (Negative)  


 


Urine RBC    (0-5)  /hpf


 


Urine WBC    (0-5)  /hpf


 


Amorphous Sediment    (None)  /hpf


 


Urine Bacteria    (None)  /hpf








                      Microbiology - Last 24 Hours (Table)











 07/24/20 18:10 Blood Culture Gram Stain - Preliminary





 Blood 


 


 07/24/20 18:10 Blood Culture - Final





 Blood 


 


 07/24/20 19:09 Urine Culture - Preliminary





 Urine,Voided 














Assessment and Plan


Assessment: 


1.  Septic shock/gram-negative bacteremia


- Patient is currently on IV Rocephin; ID on board and find antibiotic therapy 

will be tapered once patient blood culture and sensitivity report is available


- Patient remains on Prevacid therapy in form of norepinephrine and will be 

titrated as able; continue with midodrine 10 mg by mouth 3 times a day





2.  Acute UTI; continue with IV Rocephin; urine culture is pending





3.  Acute on chronic systolic CHF; left ventricular dysfunction; patient is 

currently treated with Lasix 40 mg IV every 8 hours; Aldactone 25 mg daily; 

monitor strict KINGS's, daily weights, renal function and electrolytes





4.  Atrial fibrillation with RVR; chronic; patient is currently on IV amiodarone

for rate control; patient remains on Eliquis 2.5 mg twice a day





5.  Diabetes mellitus type 2; continue to monitor Accu-Cheks every before meals 

and at bedtime insulin sliding scale





6.  History of coronary artery disease; stable





7.  Anemia; chronic disease; at baseline





8.  Obesity; counseling done on the 28th reduction





DVT prophylaxis; Eliquis


CODE STATUS; full code

## 2020-07-26 NOTE — P.PN
Subjective


Progress Note Date: 07/26/20


Principal diagnosis: 


Septic shock


Gram-negative UTI/bacteremia





80-year-old white male with history of multiple medical problems including 

chronic atrial fibrillation, coronary artery disease, type 2 diabetes, 

dyslipidemia, patient is primarily a patient of Dr. Duckworth.  Patient came into 

the ER yesterday complaining of few days' history of shortness of breath, low-

grade fever, and upon evaluation in the ER he was noted to be relatively 

hypotensive, tachycardic with a rate of 130, he was in atrial fibrillation with 

RVR.  Blood sugar was 392, and he was noted to have evidence of urinary tract 

infection.  Although the patient has no urinary complaints.  Chest x-ray showed 

evidence of interstitial edema.  Considering the patient was hypotensive, he did

receive 2 L of fluids boluses, he was given antibiotics for presumptive urinary 

tract infection and sepsis.  Patient was admitted to the ICU


Presently on amiodarone for his atrial fibrillation with RVR, patient remains 

hypotensive and is on norepinephrine at 0.06 mcg/kg/m, IV fluids at 50 MLS per 

hour, and he is also on Rocephin.  Patient is improving, feeling much better 

since he was admitted.  Blood cultures is positive for gram-negative rods.





7/26/20,


Patient remains in the ICU, remains on antibiotics


- patient developed atrial fibrillation with RVR, and required cardioversion 

with 100 J by cardiology this morning.  Patient is now on amiodarone at 1 g 

drip, norepinephrine which is being weaned off


 Blood cultures are positive for E. coli, and assume his urine culture will be 

positive the same.  Patient had ultrasound of the kidney and bladder, question 

solid lesion in the lower pole of the right kidney and the radiologist is 

recommending MRI of the kidney which will be done at a later date.  Chest x-ray 

continues to show changes of mild congestive heart failure, patient remains on 

diuretics, 


 Remains on Rocephin for his bacteremia and urinary tract infection.





Objective





- Vital Signs


Vital signs: 


                                   Vital Signs











Temp  98 F   07/25/20 16:00


 


Pulse  128 H  07/26/20 08:20


 


Resp  20   07/26/20 06:30


 


BP  110/74   07/26/20 06:30


 


Pulse Ox  96   07/26/20 06:30








                                 Intake & Output











 07/25/20 07/26/20 07/26/20





 18:59 06:59 18:59


 


Intake Total 1107.7 1517.193 


 


Output Total 755 1092 


 


Balance 352.7 425.193 


 


Weight 104.7 kg  


 


Intake:   


 


  .7 715.3 


 


    0.9 @ 50 450 550 


 


    Amiodarone 360 mg In 299.7 33.3 





    Dextrose 5% in Water 200   





    ml @ 1 MG/MIN 33.333 mls/   





    hr IV .Q6H [facility] Rx#:   





    [id number]   


 


    Heparin Sod,Pork in 0.45% 108 132 





    NaCl 25,000 unit In 0.45   





    % NaCl 1 250ml.bag @ 1,   





    200 UNIT/HR 12 mls/hr IV   





    .[id number] [facility] Rx#:   





    [id number]   


 


    cefTRIAXone 1 gm In 50  





    Sodium Chloride 0.9% 50   





    ml @ 100 mls/hr IVPB ONCE   





    [facility] Rx#:[id number]   


 


  Intake, IV Titration 200 771.893 





  Amount   


 


    Amiodarone 360 mg In 200 400 





    Dextrose 5% in Water 200   





    ml @ 1 MG/MIN 33.333 mls/   





    hr IV .Q6H [facility] Rx#:   





    [id number]   


 


    Heparin Sod,Pork in 0.45%  245.2 





    NaCl 25,000 unit In 0.45   





    % NaCl 1 250ml.bag @ 1,   





    200 UNIT/HR 12 mls/hr IV   





    .[id number] [facility] Rx#:   





    [id number]   


 


    Norepinephrine 32 mg In  126.693 





    Sodium Chloride 0.9% 218   





    ml @ 0.05 MCG/KG/MIN 2.36   





    mls/hr IV .Q24H [facility] Rx#:   





    [id number]   


 


  Other  30 


 


Output:   


 


  Urine 755 1092 


 


Other:   


 


  Voiding Method Indwelling Catheter Indwelling Catheter 














- Exam


Physical Exam: Revealed an 80-year-old white male in no distress.  Very 

pleasant.  On 2 L nasal cannula.


Head: Atraumatic, normocephalic.


HEENT:[Neck is supple.] [No neck masses.] [No thyromegaly.] [No JVD.]  PERRLA, 

EOMI, no icterus.


Chest: [Symmetrical chest expansion, minimal crackles at the bases no rhonchi 

and no wheezes.


Cardiac Exam: Irregular irregular rhythm.  [Normal S1 and S2, no S3 gallop, 2/6 

systolic murmur thought the precordium.


Abdomen: [Soft, nontender,  no megaly, no rebound, no guarding, normal bowel 

sounds.]


Extremities: [No clubbing, no edema, no cyanosis.]  Good pulses bilaterally.


Psychiatric: Normal mood affect and normal mental status examination.


Neurological Exam: [No focal neurologic deficit.]  Alert and oriented 3.


Skin: No rashes.











- Labs


CBC & Chem 7: 


                                 07/26/20 04:50





                                 07/26/20 04:50


Labs: 


                  Abnormal Lab Results - Last 24 Hours (Table)











  07/25/20 07/25/20 07/25/20 Range/Units





  12:14 17:14 20:56 


 


RBC     (4.30-5.90)  m/uL


 


Hgb     (13.0-17.5)  gm/dL


 


Hct     (39.0-53.0)  %


 


RDW     (11.5-15.5)  %


 


Plt Count     (150-450)  k/uL


 


Lymphocytes # (Manual)     (1.0-4.8)  k/uL


 


APTT     (22.0-30.0)  sec


 


Sodium     (137-145)  mmol/L


 


Carbon Dioxide     (22-30)  mmol/L


 


BUN     (9-20)  mg/dL


 


Creatinine     (0.66-1.25)  mg/dL


 


Glucose     (74-99)  mg/dL


 


POC Glucose (mg/dL)  280 H  195 H  231 H  (75-99)  mg/dL


 


Calcium     (8.4-10.2)  mg/dL


 


Magnesium     (1.6-2.3)  mg/dL














  07/26/20 07/26/20 07/26/20 Range/Units





  04:50 04:50 05:28 


 


RBC  3.44 L    (4.30-5.90)  m/uL


 


Hgb  10.0 L    (13.0-17.5)  gm/dL


 


Hct  31.0 L    (39.0-53.0)  %


 


RDW  17.5 H    (11.5-15.5)  %


 


Plt Count  143 L    (150-450)  k/uL


 


Lymphocytes # (Manual)  0.91 L    (1.0-4.8)  k/uL


 


APTT    59.6 H  (22.0-30.0)  sec


 


Sodium   131 L   (137-145)  mmol/L


 


Carbon Dioxide   11 L   (22-30)  mmol/L


 


BUN   77 H   (9-20)  mg/dL


 


Creatinine   3.32 H   (0.66-1.25)  mg/dL


 


Glucose   134 H   (74-99)  mg/dL


 


POC Glucose (mg/dL)     (75-99)  mg/dL


 


Calcium   8.2 L   (8.4-10.2)  mg/dL


 


Magnesium   1.5 L   (1.6-2.3)  mg/dL








                      Microbiology - Last 24 Hours (Table)











 07/24/20 18:10 Blood Culture Gram Stain - Preliminary





 Blood Blood Culture - Preliminary





    Escherichia coli


 


 07/24/20 19:09 Urine Culture - Preliminary





 Urine,Voided    Gram Neg Bacilli


 


 07/24/20 18:10 Blood Culture - Final





 Blood 














Assessment and Plan


Assessment: 


1.  Septic shock/gram-negative bacteremia


- Patient is currently on IV Rocephin; ID on board and find antibiotic therapy 

will be tapered once patient blood culture and sensitivity report is available


- Patient remains on Prevacid therapy in form of norepinephrine and will be 

titrated as able; continue with midodrine 10 mg by mouth 3 times a day





2.  Acute UTI; continue with IV Rocephin; urine culture is pending





3.  Acute on chronic systolic CHF; left ventricular dysfunction; patient is 

currently treated with Lasix 40 mg IV every 8 hours; Aldactone 25 mg daily; 

monitor strict KINGS's, daily weights, renal function and electrolytes





4.  Atrial fibrillation with RVR; chronic; patient is currently on IV amiodarone

for rate control; patient remains on Eliquis 2.5 mg twice a day





5.  Diabetes mellitus type 2; continue to monitor Accu-Cheks every before meals 

and at bedtime insulin sliding scale





6.  History of coronary artery disease; stable





7.  Anemia; chronic disease; at baseline





8.  Obesity; counseling done on the 28th reduction





DVT prophylaxis; Eliquis


CODE STATUS; full code








Time with Patient: Greater than 30

## 2020-07-26 NOTE — P.CONS
History of Present Illness





- Reason for Consult


Consult date: 20


Gram-negative bacteremia


Requesting physician: Clinton Leon





- Chief Complaint


Generalized weakness and not feeling well x days





- History of Present Illness


Patient is 80-year-old  male presenting to the ER at [facility] with chief complaints of generalized not feeling well and her energy 

patient also complained of shortness of breath.  Having minimal dry cough but no

chest pain no nausea no vomiting no diarrhea, patient did have some urinary sy

mptoms of difficulty urination and occasional burning or muscle weakness or 

flank pain patient did get a Cortes catheter in the ER for retention  , patient 

of presentation hospital was afebrile he did have normal white count he did have

elevated creatinine the positive UA patient chest x-ray did not show any 

consultation patient has the hospital with UTI subsequently came back positive 

with gram-negative bacilli that prompted this infectious disease consultation 





Review of Systems


Positive point has been  mentioned in the HPI rest of the systems are negative








Past Medical History


Past Medical History: Atrial Fibrillation, Coronary Artery Disease (CAD), 

Cancer, Heart Failure, Diabetes Mellitus, GERD/Reflux, Hyperlipidemia, Renal 

Disease


Additional Past Medical History / Comment(s): See Dr Fleming's H&P, hx 

kidney and bladder cancer- tx with chemo and radiation , "growth on kidney",

history of CABG, cardiomyopathy, AICD implantation ULCER, KIDNEY STONES


History of Any Multi-Drug Resistant Organisms: None Reported


Past Surgical History: Bladder Surgery, Heart Catheterization, Orthopedic 

Surgery


Additional Past Surgical History / Comment(s): Port-a-cath insertion/later 

removed. Bilateral cataract , ORIF R ankle with 2 screws,"scraped the bladder" 

10-18-16 TOTAL RT KNEE,growth on kidney removed


Past Anesthesia/Blood Transfusion Reactions: No Reported Reaction


Additional Past Anesthesia/Blood Transfusion Reaction / Comm: Pt has never 

recieved blood.


Past Psychological History: Bipolar, Depression


Additional Psychological History / Comment(s): Pt is bipolar and states he does 

well with his medications.


Smoking Status: Former smoker


Past Alcohol Use History: None Reported


Additional Past Alcohol Use History / Comment(s): Pt states he started smoking 

at age 16.  He quit sometime in the s or maybe even sooner.  He was less t

joseph a pack a day smoker.


Past Drug Use History: None Reported





- Past Family History


  ** Father


Family Medical History: Cancer


Additional Family Medical History / Comment(s): prostate





  ** Mother


Family Medical History: No Reported History


Additional Family Medical History / Comment(s): Mother had bowel perforations.  

She  at 88 yrs of age.





  ** Brother(s)


Family Medical History: Cancer





  ** Sister(s)


Family Medical History: Cancer





Medications and Allergies


                                Home Medications











 Medication  Instructions  Recorded  Confirmed  Type


 


Venlafaxine HCl [Effexor] 75 mg PO BID  tab 20 Rx


 


Atorvastatin [Lipitor] 80 mg PO HS 20 History


 


Cyanocobalamin (Vitamin B-12) 1,000 mcg PO DAILY 20 History





[Vitamin B-12]    


 


Losartan [Cozaar] 25 mg PO HS  tab 20 Rx


 


Melatonin 3 mg PO HS  tablet 20 Rx


 


Metoprolol Tartrate [Lopressor] 25 mg PO BID  tab 20 Rx


 


Spironolactone [Aldactone] 25 mg PO DAILY  tab 20 Rx


 


lamoTRIgine [LaMICtal] 100 mg PO HS #3 tab 20 Rx


 


Apixaban [Eliquis] 2.5 mg PO BID 20 History


 


Carvedilol [Coreg] 12.5 mg PO BID-W/MEALS 20 History


 


Cholecalciferol [Vitamin D3 (25 1,000 unit PO DAILY 20 History





Mcg = 1000 Iu)]    


 


Ferrous Sulfate [Feosol] 325 mg PO DAILY 20 History


 


Furosemide [Lasix] 40 mg PO DAILY@1500 20 History


 


Furosemide [Lasix] 80 mg PO QAM 20 History


 


Insulin Glargine [Lantus] 46 unit SQ HS 20 History


 


Midodrine [ProAmatine] 10 mg PO TID@0700,1100,1600 20 History


 


Pantoprazole Sodium [Protonix] 40 mg PO W/BRKFST 20 History


 


metFORMIN HCL [Glucophage] 1,000 mg PO BID 20 History








                                    Allergies











Allergy/AdvReac Type Severity Reaction Status Date / Time


 


No Known Allergies Allergy   Verified 20 18:00














Physical Exam


Vitals: 


                                   Vital Signs











  Temp Pulse Resp BP Pulse Ox


 


 20 14:00   101 H  14  88/55  99


 


 20 13:45   96  21  90/62  97


 


 20 13:30   104 H  19  87/58  99


 


 20 13:15   81  15  78/58  98


 


 20 13:00   85  14  80/56  97


 


 20 12:45   123 H  20  75/61  98


 


 20 12:33   100   


 


 20 12:30   103 H  34 H  87/59  98


 


 20 12:21   112 H   


 


 20 12:15   103 H  20  104/75  98


 


 20 12:00   103 H  34 H  90/70  99


 


 20 11:45   103 H  30 H  90/66  97


 


 20 11:30   80  21  89/60  98


 


 20 11:15   90  35 H  89/65  97


 


 20 11:00   86  29 H  79/55  99


 


 20 10:30   95  15  78/51 


 


 20 10:15   81  17  76/57  91 L


 


 20 10:00   129 H  27 H  77/57  97


 


 20 09:45   128 H  27 H  70/49  97


 


 20 09:30   134 H  18  77/55  97


 


 20 09:15   129 H  18  120/83  96


 


 20 09:00   134 H  16  107/76  97


 


 20 08:45   135 H  13  99/66  97


 


 20 08:30   138 H  15  113/73  97


 


 20 08:15   138 H  17  123/86  98


 


 20 08:00  98.2 F  138 H  14  120/77  99


 


 20 07:45   137 H  15  118/85  99


 


 20 07:00   138 H  21  98/58  98


 


 20 06:45   138 H  25 H  104/58  98


 


 07/25/20 06:30   135 H  20  102/70  99


 


 20 06:15   140 H  20  102/85  96


 


 20 06:00   138 H  30 H  115/68  97


 


 20 05:45   137 H  37 H  106/80  96


 


 20 05:30   140 H  24  101/62  96


 


 20 05:15   141 H  30 H  92/78  94 L


 


 20 05:00   141 H  28 H  95/58  98


 


 20 04:45   142 H  32 H  94/53  98


 


 20 04:30   140 H  30 H  87/53  99


 


 20 04:15   142 H  30 H  121/86  100


 


 20 04:00   141 H  28 H  118/76  100


 


 20 03:45   140 H  24  129/74  97


 


 20 03:30   146 H  28 H  111/75  98


 


 20 03:15   144 H  28 H  118/69  99


 


 20 03:00   144 H  17  111/64  100


 


 20 02:45   142 H  18  101/63  100


 


 20 02:30   148 H  11 L  89/57  99


 


 20 02:15   146 H  19  87/64  98


 


 20 02:00   146 H  16  89/57  100


 


 20 01:45   144 H  18  85/48  100


 


 20 01:30   144 H  21  105/49  98


 


 20 01:15   146 H  7 L  103/67  99


 


 20 01:00   144 H  9 L  110/65  97


 


 20 00:45   142 H  35 H  104/65  99


 


 20 00:30   140 H  14  99/66  100


 


 20 00:15   140 H  22   99


 


 20 00:00   138 H  26 H  101/64  100


 


 20 23:45   140 H  28 H   99


 


 20 23:30   137 H  26 H   100


 


 20 23:21  98 F  133 H  25 H  105/78  99


 


 20 23:07   112 H   


 


 20 22:50  98.5 F  132 H  18  111/71  98


 


 20 22:21   103 H  17  93/59  98


 


 20 21:40   134 H  18  96/71 


 


 20 21:24   114 H   106/67 


 


 20 21:20   84  17  73/47 


 


 20 21:00   133 H  20  77/45 


 


 20 20:40   135 H  21  68/41 


 


 20 20:23   97   69/47 


 


 20 20:20   97  17  74/49 


 


 20 20:00   122 H  18  72/48 


 


 20 19:40   134 H  18  70/49 


 


 20 19:27  98.7 F  133 H  18  81/54  98


 


 20 19:20   135 H  20  60/44 


 


 20 19:00   130 H  18  55/32 


 


 20 18:50     83/52  98


 


 20 18:40   135 H  20  98/40 


 


 20 18:20   129 H  18  63/41  95


 


 20 18:00   131 H  19  70/43  96


 


 20 17:40   135 H  18  80/42  97


 


 20 17:34   133 H  18  80/42  97


 


 20 17:20   138 H  22  83/57  98


 


 20 17:19   137 H  18  83/57  98


 


 20 17:00   135 H  19  73/41  96


 


 20 16:40   134 H  20  89/52  95


 


 20 16:37   128 H  13   81 L


 


 20 16:20  98.9 F  136 H  24  78/43  98








                                Intake and Output











 20





 22:59 06:59 14:59


 


Intake Total 4.602 415.261 145.3


 


Output Total  155 165


 


Balance 4.602 260.261 -19.7


 


Intake:   


 


  IV   145.3


 


    0.9 @ 50   50


 


    Amiodarone 360 mg In   33.3





    Dextrose 5% in Water 200   





    ml @ 1 MG/MIN 33.333 mls/   





    hr IV .Q6H [facility] Rx#:   





    [id number]   


 


    Heparin Sod,Pork in 0.45%   12





    NaCl 25,000 unit In 0.45   





    % NaCl 1 250ml.bag @ 1,   





    200 UNIT/HR 12 mls/hr IV   





    .[id number] [facility] Rx#:   





    [id number]   


 


    cefTRIAXone 1 gm In   50





    Sodium Chloride 0.9% 50   





    ml @ 100 mls/hr IVPB ONCE   





    [facility] Rx#:[id number]   


 


  Intake, IV Titration 4.602 15.261 





  Amount   


 


    Norepinephrine 32 mg In 4.602 15.261 





    Sodium Chloride 0.9% 218   





    ml @ 0.05 MCG/KG/MIN 2.36   





    mls/hr IV .Q24H [facility] Rx#:   





    [id number]   


 


  Oral  400 


 


Output:   


 


  Urine  155 165


 


Other:   


 


  Voiding Method  Indwelling Catheter 


 


  Weight 100.698 kg 104.7 kg 











GENERAL DESCRIPTION: An elderly male lying in bed, no distress. No tachypnea or 

accessory muscle of respiration use.


HEENT: Shows Pallor , no scleral icterus. Oral mucous membrane is dry. No 

pharyngeal erythema or thrush


NECK: Trachea central, no thyromegaly.


LUNGS: Unlabored breathing. Clear to auscultation anteriorly. No wheeze or 

crackle.


HEART: S1, S2, regular rate and rhythm. No loud murmur


ABDOMEN: Soft, no tenderness , guarding or rigidity, no organomegaly


EXTREMITIES: No edema of feet.


SKIN: No rash, no masses palpable.


NEUROLOGICAL: The patient is awake, alert, oriented x3, mood and affect normal.

















Results


CBC & Chem 7: 


                                 20 06:40





                                 20 06:40


Labs: 


                  Abnormal Lab Results - Last 24 Hours (Table)











  20 Range/Units





  16:56 16:57 16:57 


 


RBC   3.22 L   (4.30-5.90)  m/uL


 


Hgb   9.5 L   (13.0-17.5)  gm/dL


 


Hct   29.6 L   (39.0-53.0)  %


 


RDW   17.3 H   (11.5-15.5)  %


 


Plt Count   147 L   (150-450)  k/uL


 


Lymphocytes #   0.3 L   (1.0-4.8)  k/uL


 


Sodium  132 L    (137-145)  mmol/L


 


Carbon Dioxide  14 L    (22-30)  mmol/L


 


BUN  71 H    (9-20)  mg/dL


 


Creatinine  2.98 H    (0.66-1.25)  mg/dL


 


Glucose  392 H    (74-99)  mg/dL


 


POC Glucose (mg/dL)     (75-99)  mg/dL


 


Plasma Lactic Acid Jeanmarie    5.4 H*  (0.7-2.0)  mmol/L


 


Calcium     (8.4-10.2)  mg/dL


 


Magnesium     (1.6-2.3)  mg/dL


 


Troponin I     (0.000-0.034)  ng/mL


 


Total Protein  6.1 L    (6.3-8.2)  g/dL


 


Albumin  3.2 L    (3.5-5.0)  g/dL


 


Urine Protein     (Negative)  


 


Urine Blood     (Negative)  


 


Ur Leukocyte Esterase     (Negative)  


 


Urine RBC     (0-5)  /hpf


 


Urine WBC     (0-5)  /hpf


 


Amorphous Sediment     (None)  /hpf


 


Urine Bacteria     (None)  /hpf














  20 Range/Units





  16:57 19:09 19:50 


 


RBC     (4.30-5.90)  m/uL


 


Hgb     (13.0-17.5)  gm/dL


 


Hct     (39.0-53.0)  %


 


RDW     (11.5-15.5)  %


 


Plt Count     (150-450)  k/uL


 


Lymphocytes #     (1.0-4.8)  k/uL


 


Sodium     (137-145)  mmol/L


 


Carbon Dioxide     (22-30)  mmol/L


 


BUN     (9-20)  mg/dL


 


Creatinine     (0.66-1.25)  mg/dL


 


Glucose     (74-99)  mg/dL


 


POC Glucose (mg/dL)     (75-99)  mg/dL


 


Plasma Lactic Acid Jeanmarie    2.6 H*  (0.7-2.0)  mmol/L


 


Calcium     (8.4-10.2)  mg/dL


 


Magnesium     (1.6-2.3)  mg/dL


 


Troponin I  0.048 H*    (0.000-0.034)  ng/mL


 


Total Protein     (6.3-8.2)  g/dL


 


Albumin     (3.5-5.0)  g/dL


 


Urine Protein   2+ H   (Negative)  


 


Urine Blood   Moderate H   (Negative)  


 


Ur Leukocyte Esterase   Large H   (Negative)  


 


Urine RBC   6 H   (0-5)  /hpf


 


Urine WBC   114 H   (0-5)  /hpf


 


Amorphous Sediment   Rare H   (None)  /hpf


 


Urine Bacteria   Few H   (None)  /hpf














  20 Range/Units





  21:45 23:21 06:40 


 


RBC    3.25 L  (4.30-5.90)  m/uL


 


Hgb    9.2 L  (13.0-17.5)  gm/dL


 


Hct    29.5 L  (39.0-53.0)  %


 


RDW    17.3 H  (11.5-15.5)  %


 


Plt Count    134 L  (150-450)  k/uL


 


Lymphocytes #    0.3 L  (1.0-4.8)  k/uL


 


Sodium     (137-145)  mmol/L


 


Carbon Dioxide     (22-30)  mmol/L


 


BUN     (9-20)  mg/dL


 


Creatinine     (0.66-1.25)  mg/dL


 


Glucose     (74-99)  mg/dL


 


POC Glucose (mg/dL)  319 H  304 H   (75-99)  mg/dL


 


Plasma Lactic Acid Jeanmarie     (0.7-2.0)  mmol/L


 


Calcium     (8.4-10.2)  mg/dL


 


Magnesium     (1.6-2.3)  mg/dL


 


Troponin I     (0.000-0.034)  ng/mL


 


Total Protein     (6.3-8.2)  g/dL


 


Albumin     (3.5-5.0)  g/dL


 


Urine Protein     (Negative)  


 


Urine Blood     (Negative)  


 


Ur Leukocyte Esterase     (Negative)  


 


Urine RBC     (0-5)  /hpf


 


Urine WBC     (0-5)  /hpf


 


Amorphous Sediment     (None)  /hpf


 


Urine Bacteria     (None)  /hpf














  20 Range/Units





  06:40 07:10 12:14 


 


RBC     (4.30-5.90)  m/uL


 


Hgb     (13.0-17.5)  gm/dL


 


Hct     (39.0-53.0)  %


 


RDW     (11.5-15.5)  %


 


Plt Count     (150-450)  k/uL


 


Lymphocytes #     (1.0-4.8)  k/uL


 


Sodium  133 L    (137-145)  mmol/L


 


Carbon Dioxide  17 L    (22-30)  mmol/L


 


BUN  73 H    (9-20)  mg/dL


 


Creatinine  3.25 H    (0.66-1.25)  mg/dL


 


Glucose  285 H    (74-99)  mg/dL


 


POC Glucose (mg/dL)   291 H  280 H  (75-99)  mg/dL


 


Plasma Lactic Acid Jeanmarie     (0.7-2.0)  mmol/L


 


Calcium  8.2 L    (8.4-10.2)  mg/dL


 


Magnesium  1.5 L    (1.6-2.3)  mg/dL


 


Troponin I     (0.000-0.034)  ng/mL


 


Total Protein     (6.3-8.2)  g/dL


 


Albumin     (3.5-5.0)  g/dL


 


Urine Protein     (Negative)  


 


Urine Blood     (Negative)  


 


Ur Leukocyte Esterase     (Negative)  


 


Urine RBC     (0-5)  /hpf


 


Urine WBC     (0-5)  /hpf


 


Amorphous Sediment     (None)  /hpf


 


Urine Bacteria     (None)  /hpf








                      Microbiology - Last 24 Hours (Table)











 20 18:10 Blood Culture Gram Stain - Preliminary





 Blood Blood Culture - Preliminary





    Escherichia coli


 


 20 18:10 Blood Culture - Final





 Blood 


 


 20 19:09 Urine Culture - Preliminary





 Urine,Voided 














Assessment and Plan


Assessment: 


1- patient presented to hospital with generalized weakness source is multifac

torial and likely a component of symptomatic urinary tract infection likely from

the abdominal pelvis which is E. coli


2- gram-negative bacteremia source likely urinary tract infection


3- renal insufficiency and high risk of nephrotoxicity





(1) Gram-negative bacteremia


Current Visit: Yes   Status: Acute   Code(s): R78.81 - BACTEREMIA   SNOMED 

Code(s): 079108649072


   





(2) UTI (urinary tract infection)


Current Visit: Yes   Status: Acute   Code(s): N39.0 - URINARY TRACT INFECTION, 

SITE NOT SPECIFIED   SNOMED Code(s): 69030937


   


Plan: 


1- we will increase Rocephin to 2 g daily


2- check an ultrasound of the kidney and bladder area


3- gentle IV fluid


We will follow on clinical condition and cultures to further adjust medication 

if needed


Thank you for this consultation will follow this patient with you





Time with Patient: Greater than 30

## 2020-07-26 NOTE — P.PN
Subjective


Progress Note Date: 07/26/20


Principal diagnosis: 





Septic shock secondary to gram-negative urinary tract infection and gram-

negative bacteremia





This is an 80-year-old white male with history of multiple medical problems 

including chronic atrial fibrillation, coronary artery disease, type 2 diabetes,

dyslipidemia, patient is primarily a patient of Dr. Duckworth.  Patient came into 

the ER yesterday complaining of few days' history of shortness of breath, low-

grade fever, and upon evaluation in the ER he was noted to be relatively 

hypotensive, tachycardic with a rate of 130, he was in atrial fibrillation with 

RVR.  Blood sugar was 392, and he was noted to have evidence of urinary tract 

infection.  Although the patient has no urinary complaints.  Chest x-ray showed 

evidence of interstitial edema.  Considering the patient was hypotensive, he did

receive 2 L of fluids boluses, he was given antibiotics for presumptive urinary 

tract infection and sepsis.  Patient was admitted to the ICU, and I was asked to

see him on consultation.  Patient is known to have severe LV dysfunction, 

ejection fraction is 20% or less.  He is also known to have history of previous 

myocardial revascularization, CABG 5.  Presently on amiodarone for his atrial 

fibrillation with RVR, he is also on norepinephrine at 0.06 mcg/kg/m, IV fluids 

at 50 MLS per hour, and he is also on Rocephin.  Patient is improving, feeling 

much better since he was admitted.  Initial report on his blood cultures is 

positive for gram-negative rods in the blood.





Patient was reevaluated today on 7/26/20, remains in the ICU, remains on 

antibiotics, patient developed atrial fibrillation with RVR, and required 

cardioversion with 100 J by cardiology this morning.  Patient is now on 

amiodarone at 1 g drip, norepinephrine at 0.08 mcg/kg/m, his mean arterial 

pressure is 60.  Blood cultures are positive for E. coli, and assume his urine 

culture will be positive the same.  Patient had ultrasound of the kidney and 

bladder, question solid lesion in the lower pole of the right kidney and the 

radiologist is recommending MRI of the kidney which will be done at a later 

date.  Chest x-ray continues to show changes of mild congestive heart failure, 

patient remains on diuretics, and he is now on few liters nasal cannula with O2 

saturation of 99%.  Blood pressure is 99/58 at present.  And his norepinephrine 

is being titrated with possibly be discontinued later today.  Remains on 

Rocephin for his bacteremia and urinary tract infection.











Objective





- Vital Signs


Vital signs: 


                                   Vital Signs











Temp  98 F   07/25/20 16:00


 


Pulse  95   07/26/20 10:30


 


Resp  25 H  07/26/20 10:30


 


BP  99/58   07/26/20 10:30


 


Pulse Ox  99   07/26/20 10:30








                                 Intake & Output











 07/25/20 07/26/20 07/26/20





 18:59 06:59 18:59


 


Intake Total 1107.7 1567.193 290.554


 


Output Total 755 1192 320


 


Balance 352.7 375.193 -29.446


 


Weight 104.7 kg  


 


Intake:   


 


  .7 765.3 150


 


    0.9 @ 50 450 600 150


 


    Amiodarone 360 mg In 299.7 33.3 





    Dextrose 5% in Water 200   





    ml @ 1 MG/MIN 33.333 mls/   





    hr IV .Q6H [facility] Rx#:   





    [id number]   


 


    Heparin Sod,Pork in 0.45% 108 132 





    NaCl 25,000 unit In 0.45   





    % NaCl 1 250ml.bag @ 1,   





    200 UNIT/HR 12 mls/hr IV   





    .[id number] [facility] Rx#:   





    [id number]   


 


    cefTRIAXone 1 gm In 50  





    Sodium Chloride 0.9% 50   





    ml @ 100 mls/hr IVPB ONCE   





    [facility] Rx#:[id number]   


 


  Intake, IV Titration 200 771.893 140.554





  Amount   


 


    Amiodarone 360 mg In 200 400 140.554





    Dextrose 5% in Water 200   





    ml @ 1 MG/MIN 33.333 mls/   





    hr IV .Q6H [facility] Rx#:   





    [id number]   


 


    Heparin Sod,Pork in 0.45%  245.2 





    NaCl 25,000 unit In 0.45   





    % NaCl 1 250ml.bag @ 1,   





    200 UNIT/HR 12 mls/hr IV   





    .[id number] [facility] Rx#:   





    [id number]   


 


    Norepinephrine 32 mg In  126.693 





    Sodium Chloride 0.9% 218   





    ml @ 0.05 MCG/KG/MIN 2.36   





    mls/hr IV .Q24H [facility] Rx#:   





    [id number]   


 


  Other  30 


 


Output:   


 


  Urine 755 1192 320


 


Other:   


 


  Voiding Method Indwelling Catheter Indwelling Catheter Indwelling Catheter














- Exam





Physical Exam: Revealed an 80-year-old white male in no distress.  On 2 L nasal 

cannula.


Head: Atraumatic, normocephalic.


HEENT:[Neck is supple.] [No neck masses.] [No thyromegaly.] [No JVD.]  PERRLA, 

EOMI, no icterus.


Chest: [Symmetrical chest expansion, continues to have crackles at the bases bi

laterally.  No change compared to yesterday.


Cardiac Exam: Irregular irregular rhythm.  [Normal S1 and S2, no S3 gallop, 2/6 

systolic murmur thought the precordium.


Abdomen: [Soft, nontender,  no megaly, no rebound, no guarding, normal bowel 

sounds.]


Extremities: [No clubbing, no edema, no cyanosis.]  Good pulses bilaterally.


Psychiatric: Normal mood affect and normal mental status examination.


Neurological Exam: [No focal neurologic deficit.]  Alert and oriented 3.


Skin: No rashes.








- Labs


CBC & Chem 7: 


                                 07/26/20 04:50





                                 07/26/20 04:50


Labs: 


                  Abnormal Lab Results - Last 24 Hours (Table)











  07/25/20 07/25/20 07/26/20 Range/Units





  17:14 20:56 04:50 


 


RBC    3.44 L  (4.30-5.90)  m/uL


 


Hgb    10.0 L  (13.0-17.5)  gm/dL


 


Hct    31.0 L  (39.0-53.0)  %


 


RDW    17.5 H  (11.5-15.5)  %


 


Plt Count    143 L  (150-450)  k/uL


 


Lymphocytes # (Manual)    0.91 L  (1.0-4.8)  k/uL


 


APTT     (22.0-30.0)  sec


 


Sodium     (137-145)  mmol/L


 


Carbon Dioxide     (22-30)  mmol/L


 


BUN     (9-20)  mg/dL


 


Creatinine     (0.66-1.25)  mg/dL


 


Glucose     (74-99)  mg/dL


 


POC Glucose (mg/dL)  195 H  231 H   (75-99)  mg/dL


 


Calcium     (8.4-10.2)  mg/dL


 


Magnesium     (1.6-2.3)  mg/dL














  07/26/20 07/26/20 07/26/20 Range/Units





  04:50 05:28 11:45 


 


RBC     (4.30-5.90)  m/uL


 


Hgb     (13.0-17.5)  gm/dL


 


Hct     (39.0-53.0)  %


 


RDW     (11.5-15.5)  %


 


Plt Count     (150-450)  k/uL


 


Lymphocytes # (Manual)     (1.0-4.8)  k/uL


 


APTT   59.6 H   (22.0-30.0)  sec


 


Sodium  131 L    (137-145)  mmol/L


 


Carbon Dioxide  11 L    (22-30)  mmol/L


 


BUN  77 H    (9-20)  mg/dL


 


Creatinine  3.32 H    (0.66-1.25)  mg/dL


 


Glucose  134 H    (74-99)  mg/dL


 


POC Glucose (mg/dL)    127 H  (75-99)  mg/dL


 


Calcium  8.2 L    (8.4-10.2)  mg/dL


 


Magnesium  1.5 L    (1.6-2.3)  mg/dL








                      Microbiology - Last 24 Hours (Table)











 07/24/20 18:10 Blood Culture Gram Stain - Preliminary





 Blood Blood Culture - Preliminary





    Escherichia coli


 


 07/24/20 19:09 Urine Culture - Preliminary





 Urine,Voided    Gram Neg Bacilli














Assessment and Plan


Assessment: 





Impression:


Acute urinary tract infection, most likely secondary to E. coli


Septic shock and E. coli bacteremia secondary to UTI


Acute on chronic systolic congestive heart failure and LV dysfunction


Shortness of breath secondary to above.


Elevated lactic acid secondary to sepsis


Type 2 diabetes, no evidence of hyperglycemia or ketoacidosis.


Chronic atrial fibrillation however her rate seems to be poorly controlled at 

present requiring amiodarone.


Anemia of chronic disease.


History of coronary artery disease


History of bladder cancer.  And chemoradiation.


History of bipolar disorder.


Ex smoker.


Obesity with body mass index of 34.8.


Status post cardioversion on 7/26 by cardiology for atrial fibrillation with RVR





Recommendation:


Continue antibiotics./Rocephin.  


 maintain adequate blood pressure with a mean of above 65.  Titrate 

norepinephrine as directed.


Continue on amiodarone, and control rate to below 100.


Continue Lasix 40 mg IV push every 8 hours.


Continue GI and DVT prophylaxis.


Continue to monitor in the ICU.


Adjust antibiotics according to the final culture results from the blood and u

rine.


Continue to monitor in the ICU.





Time with Patient: Less than 30

## 2020-07-26 NOTE — PN
PROGRESS NOTE



DATE OF SERVICE:

07/26/2020



REASON FOR FOLLOWUP:

E coli bacteremia.



INTERVAL HISTORY:

Patient is currently afebrile.  The patient is breathing comfortably.  Denies having

any chest pain.  No shortness of breath or cough.  No nausea.  No vomiting. No

abdominal pain.  No diarrhea.



PHYSICAL EXAMINATION:

Blood pressure is 92/63 with a pulse of 85, temperature 98.  He is 99% on 2 L nasal

cannula. General description is an elderly male lying in bed in no distress.

Respiratory system: Unlabored breathing.  Clear to auscultation anteriorly.  Heart S1,

S2.  Regular rate and rhythm. Abdomen soft, no tenderness.



LABS:

Hemoglobin is 10.3, white count 8.3, creatinine is 3.32, slightly worse than yesterday.

Blood culture with E coli.  Urine is showing a Gram-negative.  Abdominal ultrasound

with concern for lesion upper pole of the kidney.



DIAGNOSTIC IMPRESSION AND PLAN:

Patient with E coli bacteremia.  Source is likely urinary.  The patient is covered with

Rocephin.  Ultrasound was inconclusive and recommend a MRI.  Will discuss with the

admitting team and monitor clinical course closely.





MMODL / IJN: [id number] / Job#: [id number]

## 2020-07-26 NOTE — XR
EXAMINATION TYPE: XR chest 1V portable

 

DATE OF EXAM: 7/26/2020

 

HISTORY: CHF.

 

REFERENCE: Previous study dated 7/25/2020.

 

FINDINGS: There has been a midline sternotomy. There is a unipolar pacemaker place on the left.

 

The heart is enlarged. There is mild vascular congestion and interstitial change. This may have worse
willow slightly. There is apparent elevation of the left hemidiaphragm. No definite pleural fluid is see
n.

 

IMPRESSION: 

 

CONTINUING CHANGES OF CONGESTIVE HEART FAILURE MAY HAVE WORSENED SLIGHTLY FROM PREVIOUS.

## 2020-07-27 LAB
ANION GAP SERPL CALC-SCNC: 14 MMOL/L
BUN SERPL-SCNC: 80 MG/DL (ref 9–20)
CALCIUM SPEC-MCNC: 8.6 MG/DL (ref 8.4–10.2)
CELLS COUNTED: 100
CHLORIDE SERPL-SCNC: 101 MMOL/L (ref 98–107)
CO2 SERPL-SCNC: 17 MMOL/L (ref 22–30)
EOSINOPHIL # BLD MANUAL: 0.39 K/UL (ref 0–0.7)
ERYTHROCYTE [DISTWIDTH] IN BLOOD BY AUTOMATED COUNT: 3.46 M/UL (ref 4.3–5.9)
ERYTHROCYTE [DISTWIDTH] IN BLOOD: 17.6 % (ref 11.5–15.5)
GLUCOSE BLD-MCNC: 110 MG/DL (ref 75–99)
GLUCOSE BLD-MCNC: 42 MG/DL (ref 75–99)
GLUCOSE BLD-MCNC: 48 MG/DL (ref 75–99)
GLUCOSE BLD-MCNC: 65 MG/DL (ref 75–99)
GLUCOSE BLD-MCNC: 69 MG/DL (ref 75–99)
GLUCOSE BLD-MCNC: 75 MG/DL (ref 75–99)
GLUCOSE BLD-MCNC: 84 MG/DL (ref 75–99)
GLUCOSE BLD-MCNC: 88 MG/DL (ref 75–99)
GLUCOSE SERPL-MCNC: 75 MG/DL (ref 74–99)
HCT VFR BLD AUTO: 30.6 % (ref 39–53)
HGB BLD-MCNC: 9.9 GM/DL (ref 13–17.5)
LYMPHOCYTES # BLD MANUAL: 1.36 K/UL (ref 1–4.8)
MCH RBC QN AUTO: 28.7 PG (ref 25–35)
MCHC RBC AUTO-ENTMCNC: 32.4 G/DL (ref 31–37)
MCV RBC AUTO: 88.6 FL (ref 80–100)
MONOCYTES # BLD MANUAL: 1.75 K/UL (ref 0–1)
NEUTROPHILS NFR BLD MANUAL: 63 %
NEUTS SEG # BLD MANUAL: 6.2 K/UL (ref 1.3–7.7)
PLATELET # BLD AUTO: 177 K/UL (ref 150–450)
POTASSIUM SERPL-SCNC: 4.3 MMOL/L (ref 3.5–5.1)
SODIUM SERPL-SCNC: 132 MMOL/L (ref 137–145)
WBC # BLD AUTO: 9.7 K/UL (ref 3.8–10.6)

## 2020-07-27 RX ADMIN — MIDODRINE HYDROCHLORIDE SCH MG: 5 TABLET ORAL at 09:26

## 2020-07-27 RX ADMIN — SPIRONOLACTONE SCH MG: 25 TABLET, FILM COATED ORAL at 09:26

## 2020-07-27 RX ADMIN — FUROSEMIDE SCH MG: 10 INJECTION, SOLUTION INTRAMUSCULAR; INTRAVENOUS at 09:26

## 2020-07-27 RX ADMIN — INSULIN ASPART SCH: 100 INJECTION, SOLUTION INTRAVENOUS; SUBCUTANEOUS at 16:38

## 2020-07-27 RX ADMIN — AMIODARONE HYDROCHLORIDE SCH MG: 200 TABLET ORAL at 21:52

## 2020-07-27 RX ADMIN — IPRATROPIUM BROMIDE AND ALBUTEROL SULFATE SCH: .5; 3 SOLUTION RESPIRATORY (INHALATION) at 20:23

## 2020-07-27 RX ADMIN — PANTOPRAZOLE SODIUM SCH MG: 40 INJECTION, POWDER, FOR SOLUTION INTRAVENOUS at 09:27

## 2020-07-27 RX ADMIN — MIDODRINE HYDROCHLORIDE SCH MG: 5 TABLET ORAL at 12:02

## 2020-07-27 RX ADMIN — MIDODRINE HYDROCHLORIDE SCH MG: 5 TABLET ORAL at 16:42

## 2020-07-27 RX ADMIN — AMIODARONE HYDROCHLORIDE SCH: 50 INJECTION, SOLUTION INTRAVENOUS at 04:35

## 2020-07-27 RX ADMIN — INSULIN ASPART SCH: 100 INJECTION, SOLUTION INTRAVENOUS; SUBCUTANEOUS at 12:00

## 2020-07-27 RX ADMIN — IPRATROPIUM BROMIDE AND ALBUTEROL SULFATE SCH: .5; 3 SOLUTION RESPIRATORY (INHALATION) at 13:02

## 2020-07-27 RX ADMIN — APIXABAN SCH MG: 2.5 TABLET, FILM COATED ORAL at 21:52

## 2020-07-27 RX ADMIN — VENLAFAXINE HYDROCHLORIDE SCH MG: 75 TABLET ORAL at 09:27

## 2020-07-27 RX ADMIN — APIXABAN SCH MG: 2.5 TABLET, FILM COATED ORAL at 09:26

## 2020-07-27 RX ADMIN — AMIODARONE HYDROCHLORIDE SCH: 50 INJECTION, SOLUTION INTRAVENOUS at 11:59

## 2020-07-27 RX ADMIN — FUROSEMIDE SCH MG: 10 INJECTION, SOLUTION INTRAMUSCULAR; INTRAVENOUS at 16:42

## 2020-07-27 RX ADMIN — Medication SCH MG: at 21:52

## 2020-07-27 RX ADMIN — INSULIN ASPART SCH: 100 INJECTION, SOLUTION INTRAVENOUS; SUBCUTANEOUS at 21:53

## 2020-07-27 RX ADMIN — VENLAFAXINE HYDROCHLORIDE SCH MG: 75 TABLET ORAL at 21:53

## 2020-07-27 RX ADMIN — HEPARIN SODIUM SCH MLS/HR: 10000 INJECTION, SOLUTION INTRAVENOUS at 06:37

## 2020-07-27 RX ADMIN — INSULIN DETEMIR SCH: 100 INJECTION, SOLUTION SUBCUTANEOUS at 22:56

## 2020-07-27 RX ADMIN — IPRATROPIUM BROMIDE AND ALBUTEROL SULFATE SCH ML: .5; 3 SOLUTION RESPIRATORY (INHALATION) at 09:05

## 2020-07-27 RX ADMIN — INSULIN ASPART SCH: 100 INJECTION, SOLUTION INTRAVENOUS; SUBCUTANEOUS at 09:23

## 2020-07-27 RX ADMIN — FUROSEMIDE SCH MG: 10 INJECTION, SOLUTION INTRAMUSCULAR; INTRAVENOUS at 02:35

## 2020-07-27 RX ADMIN — AMIODARONE HYDROCHLORIDE SCH: 50 INJECTION, SOLUTION INTRAVENOUS at 06:54

## 2020-07-27 NOTE — PN
PROGRESS NOTE



Juan José is an 80-year-old gentleman that is in the ICU with hypotension and is on

midodrine.  He was in atrial tachycardia and underwent cardioversion yesterday.



The patient is currently on Eliquis, p.o. amiodarone and midodrine along with

Aldactone.  He is on IV amiodarone which I am going to stop and switch him to p.o.

amiodarone.



PHYSICAL EXAM:

Heart rate is 66 and blood pressure is 96/57, respiratory rate is 18.  Chest exam

reveals good air entry bilaterally.  Heart exam reveals first and second heart sounds.

No gallop.  Abdomen is soft.  Exam of the extremities did not reveal any edema.

Peripheral pulses are felt.



ASSESSMENT:

1. Paroxysmal atrial fibrillation status post cardioversion.

2. Congestive heart failure.



PLAN:

I will continue the Eliquis, switch the amiodarone to p.o. and stop the IV heparin if

she is still on it.





MMODL / IJN: [id number] / Job#: [id number]

## 2020-07-27 NOTE — PN
PROGRESS NOTE



PULMONARY/CRITICAL CARE PROGRESS NOTE:

CRITICAL CARE TIME:  32 minutes.



This is an 80-year-old male who was admitted to the hospital on July 24.  He came with

sepsis, heart failure and atrial fibrillation with RVR.  He also has a history of

previous cardioversion for atrial fibrillation on July 26, obesity, bipolar disorder,

bladder cancer, CAD, anemia of chronic disease, type 2 diabetes, lactic acidemia, among

other things.  Currently, the patient is on 2 L nasal cannula.  He remains on

amiodarone at 1 mg/minute.  He is getting a saline IV KVO.  He does have evidence of E

coli bacteremia and E coli urinary tract infection.  Currently on Rocephin as an

antibiotic.  He was seen by my partner, Dr. Trevino yesterday.



Currently, he is resting relatively comfortable.  He is a bit short of breath.  Denies

any pain.  No nausea vomiting.  No diarrhea.



Current vital signs are reviewed.  Temperature 98, heart rate 66, respiratory rate 22,

blood pressure 96/57 mean only 70, 2 L saturation 98%.  Appears in no acute distress.

Maybe some mild tachypnea.

HEENT: Examination is grossly unremarkable.

NECK:  Supple.  Full range of motion.  No adenopathy.  Neck veins are flat.

CARDIOVASCULAR: Examination reveals regular rhythm and rate.  Heart rate about 66 beats

per minute.  S1, S2 normal.  There is no murmur.  Heart sounds are distant.

LUNGS:  Reveal a few scattered rhonchi.  No wheezes or crackles.  Breath sounds equal.

ABDOMEN:  Soft, bowel sounds are heard.  No masses or tenderness.

EXTREMITIES:  Reveal some mild edema.

SKIN: Without rash.

NEUROLOGIC: Examination is brief but nonfocal.



LABS:

Reviewed.  White count 9.7, hemoglobin 9.9, hematocrit 30.6, platelet count 177,000.

Sodium 132, potassium 4.3, chloride is 101, CO2 is 17, anion gap is 14. BUN and

creatinine were 80 and 3.47.



Microbiology is positive for E coli in the urine from 7/24 and in the blood from 7/24.

Chest x-ray shows a pacemaker in place.



He has bilateral infiltrates and bilateral effusions, left greater than right.  There

may be some basilar atelectasis.  Mild cephalization.



CURRENT MEDICATIONS:

Reviewed.  He is on Tylenol, amiodarone, Eliquis, Rocephin, Lasix, Norco, Dilaudid,

insulin, DuoNeb Lamictal, melatonin, midodrine, Narcan, Protonix, Aldactone, Effexor.



ASSESSMENT:

1. Acute urinary tract infection with septic shock and bacteremia secondary to

    Escherichia coli.

2. Escherichia coli urinary tract infection and bacteremia.

3. Acute on chronic systolic congestive heart failure and LV dysfunction.

4. Shortness of breath secondary to #3.

5. Lactic acidemia secondary to sepsis.

6. Type 2 diabetes mellitus.

7. Chronic atrial fibrillation, poorly controlled.

8. Anemia of chronic disease next history of CAD next history of bladder cancer status

    post chemoradiation.

9. History of bipolar disorder.

10.Previous history of tobacco use.

11.History of chronic kidney disease.

12.Status post cardioversion for atrial fibrillation on July 26.



PLAN:

The patient remains on IV Rocephin.  Blood pressure is better controlled.  He remains

on amiodarone at 1 mg/minute.  He has GI and DVT prophylaxis.  Will continue to

monitor.  Overall prognosis is guarded.  Will continue to follow.





CRITICAL CARE TIME:  32 minutes.





MMODL / IJN: [id number] / Job#: [id number]

## 2020-07-27 NOTE — XR
EXAMINATION TYPE: XR chest 1V portable

 

DATE OF EXAM: 7/27/2020

 

COMPARISON: 7/26/2020

 

INDICATION: CHF

 

TECHNIQUE: Single frontal view of the chest is obtained.

 

FINDINGS:  

The heart size is moderately prominent.  

The pulmonary vasculature is normal.  

There is mild diffuse increased lung markings. Small left pleural effusion is present.  

 

 

IMPRESSION:  

1. Small left pleural effusion.

2. Mild increased lung markings. Correlate for pulmonary edema and atypical pneumonia.

3. Findings are similar to comparison.

## 2020-07-27 NOTE — PN
PROGRESS NOTE



DATE OF SERVICE:

07/27/2020



REASON FOR FOLLOWUP:

E coli bacteremia and urinary tract infection.



INTERVAL HISTORY:

The patient is currently afebrile.  The patient is hemodynamically stable.  The patient

denies having any chest pain or shortness of breath or cough.  No nausea.  No vomiting.

No abdominal pain or diarrhea.



PHYSICAL EXAMINATION:

Blood pressure 94/58 with a pulse of 71, temperature 97.6.  He is 98% on 2 L nasal

cannula.

General description is an elderly male lying in bed in no distress.

RESPIRATORY SYSTEM: Unlabored breathing. Clear to auscultation anteriorly.

HEART: S1, S2.  Regular rate and rhythm.

ABDOMEN: Soft. No tenderness.

EXTREMITIES: No edema of the feet.



LABS:

Hemoglobin is 9.9 with a white count of 9.7, BUN of 80, creatinine 3.47. Blood and

urine have been finalized with and E coli that is a sensitive pathogen.



DIAGNOSTIC IMPRESSION AND PLAN:

Patient with Escherichia coli bacteremia secondary to urinary source.  Patient is

covered with Rocephin 2 grams daily; to continue and monitor his clinical course

closely.





MMODL / IJN: [id number] / Job#: [id number]

## 2020-07-28 LAB
ANION GAP SERPL CALC-SCNC: 11 MMOL/L
BUN SERPL-SCNC: 80 MG/DL (ref 9–20)
CALCIUM SPEC-MCNC: 8.3 MG/DL (ref 8.4–10.2)
CELLS COUNTED: 100
CHLORIDE SERPL-SCNC: 101 MMOL/L (ref 98–107)
CO2 SERPL-SCNC: 20 MMOL/L (ref 22–30)
EOSINOPHIL # BLD MANUAL: 0.29 K/UL (ref 0–0.7)
ERYTHROCYTE [DISTWIDTH] IN BLOOD BY AUTOMATED COUNT: 3.22 M/UL (ref 4.3–5.9)
ERYTHROCYTE [DISTWIDTH] IN BLOOD: 17.6 % (ref 11.5–15.5)
GLUCOSE BLD-MCNC: 137 MG/DL (ref 75–99)
GLUCOSE BLD-MCNC: 139 MG/DL (ref 75–99)
GLUCOSE BLD-MCNC: 231 MG/DL (ref 75–99)
GLUCOSE BLD-MCNC: 241 MG/DL (ref 75–99)
GLUCOSE BLD-MCNC: 459 MG/DL (ref 75–99)
GLUCOSE SERPL-MCNC: 150 MG/DL (ref 74–99)
HCT VFR BLD AUTO: 28.2 % (ref 39–53)
HGB BLD-MCNC: 9.2 GM/DL (ref 13–17.5)
LYMPHOCYTES # BLD MANUAL: 0.95 K/UL (ref 1–4.8)
MCH RBC QN AUTO: 28.5 PG (ref 25–35)
MCHC RBC AUTO-ENTMCNC: 32.4 G/DL (ref 31–37)
MCV RBC AUTO: 87.8 FL (ref 80–100)
MONOCYTES # BLD MANUAL: 0.73 K/UL (ref 0–1)
NEUTROPHILS NFR BLD MANUAL: 73 %
NEUTS SEG # BLD MANUAL: 5.33 K/UL (ref 1.3–7.7)
PLATELET # BLD AUTO: 190 K/UL (ref 150–450)
POTASSIUM SERPL-SCNC: 4.5 MMOL/L (ref 3.5–5.1)
SODIUM SERPL-SCNC: 132 MMOL/L (ref 137–145)
WBC # BLD AUTO: 7.3 K/UL (ref 3.8–10.6)

## 2020-07-28 RX ADMIN — INSULIN ASPART SCH UNIT: 100 INJECTION, SOLUTION INTRAVENOUS; SUBCUTANEOUS at 07:17

## 2020-07-28 RX ADMIN — AMIODARONE HYDROCHLORIDE SCH MG: 200 TABLET ORAL at 09:25

## 2020-07-28 RX ADMIN — INSULIN ASPART SCH UNIT: 100 INJECTION, SOLUTION INTRAVENOUS; SUBCUTANEOUS at 17:15

## 2020-07-28 RX ADMIN — MIDODRINE HYDROCHLORIDE SCH MG: 5 TABLET ORAL at 12:43

## 2020-07-28 RX ADMIN — MIDODRINE HYDROCHLORIDE SCH MG: 5 TABLET ORAL at 17:16

## 2020-07-28 RX ADMIN — INSULIN ASPART SCH UNIT: 100 INJECTION, SOLUTION INTRAVENOUS; SUBCUTANEOUS at 21:27

## 2020-07-28 RX ADMIN — Medication SCH MG: at 21:27

## 2020-07-28 RX ADMIN — PANTOPRAZOLE SODIUM SCH MG: 40 INJECTION, POWDER, FOR SOLUTION INTRAVENOUS at 09:25

## 2020-07-28 RX ADMIN — INSULIN ASPART SCH UNIT: 100 INJECTION, SOLUTION INTRAVENOUS; SUBCUTANEOUS at 12:43

## 2020-07-28 RX ADMIN — APIXABAN SCH MG: 2.5 TABLET, FILM COATED ORAL at 21:27

## 2020-07-28 RX ADMIN — FUROSEMIDE SCH MG: 10 INJECTION, SOLUTION INTRAMUSCULAR; INTRAVENOUS at 17:15

## 2020-07-28 RX ADMIN — IPRATROPIUM BROMIDE AND ALBUTEROL SULFATE SCH: .5; 3 SOLUTION RESPIRATORY (INHALATION) at 11:18

## 2020-07-28 RX ADMIN — INSULIN DETEMIR SCH: 100 INJECTION, SOLUTION SUBCUTANEOUS at 21:32

## 2020-07-28 RX ADMIN — AMIODARONE HYDROCHLORIDE SCH MG: 200 TABLET ORAL at 21:27

## 2020-07-28 RX ADMIN — VENLAFAXINE HYDROCHLORIDE SCH MG: 75 TABLET ORAL at 21:27

## 2020-07-28 RX ADMIN — SPIRONOLACTONE SCH MG: 25 TABLET, FILM COATED ORAL at 09:24

## 2020-07-28 RX ADMIN — IPRATROPIUM BROMIDE AND ALBUTEROL SULFATE SCH: .5; 3 SOLUTION RESPIRATORY (INHALATION) at 19:44

## 2020-07-28 RX ADMIN — VENLAFAXINE HYDROCHLORIDE SCH MG: 75 TABLET ORAL at 09:25

## 2020-07-28 RX ADMIN — MIDODRINE HYDROCHLORIDE SCH MG: 5 TABLET ORAL at 07:17

## 2020-07-28 RX ADMIN — FUROSEMIDE SCH MG: 10 INJECTION, SOLUTION INTRAMUSCULAR; INTRAVENOUS at 09:25

## 2020-07-28 RX ADMIN — FUROSEMIDE SCH MG: 10 INJECTION, SOLUTION INTRAMUSCULAR; INTRAVENOUS at 00:05

## 2020-07-28 RX ADMIN — APIXABAN SCH MG: 2.5 TABLET, FILM COATED ORAL at 09:25

## 2020-07-28 RX ADMIN — IPRATROPIUM BROMIDE AND ALBUTEROL SULFATE SCH: .5; 3 SOLUTION RESPIRATORY (INHALATION) at 07:16

## 2020-07-28 NOTE — P.PN
Progress Note - Text


Progress Note Date: 07/28/20





Patient is a 80-year-old patient of Dr. Duckworth.   history of chronic atrial 

fibrillation on anticoagulation with Eliquis, coronary artery disease status 

post bypass graft, cardiomyopathy status post AICD placement, history of 

nephrectomy and unilateral kidney, CK D stage III, hypertension, hyperlipidemia,

GERD, diabetes type 2 and history of bipolar/depression .bleeding peptic ulcers.


 Patient came into the ER  complaining of few days' history of shortness of 

breath, low-grade fever, and upon evaluation in the ER he was noted to be 

relatively hypotensive, tachycardic with a rate of 130, he was in atrial 

fibrillation with RVR.  Blood sugar was 392, and he was noted to have evidence 

of urinary tract infection.  Although the patient has no urinary complaints.  

Chest x-ray showed evidence of interstitial edema.  Considering the patient was 

hypotensive, he did receive 2 L of fluids boluses, he was given antibiotics for 

presumptive urinary tract infection and sepsis.  Patient was admitted to the ICU


Admitted with-septic shock, gram-negative bacteremia, acute UTI, acute CHF 

exacerbation, atrial tachycardia with rapid ventricular rate.  Patient underwent

cardioversion on July 26..


Today-in the ICU .  eating better  To sit up in a chair.  Telemetry shows sinus 

rhythm.  On 2 L of nasal cannula.





Review of systems: Was done for constitutional, cardiovascular, GI, pulmonary. 

relevant finding as above


Active Medications





Acetaminophen (Tylenol Tab)  650 mg PO Q4HR PRN


   PRN Reason: Fever and/or Mild Pain


   Last Admin: 07/27/20 06:42 Dose:  650 mg


   Documented by: 


Hydrocodone Bitart/Acetaminophen (Norco 5-325)  1 each PO Q6HR PRN


   PRN Reason: Pain


Albuterol/Ipratropium (Duoneb 0.5 Mg-3 Mg/3 Ml Soln)  3 ml INHALATION RT-TID [facility]


   Last Admin: 07/28/20 19:44 Dose:  Not Given


   Documented by: 


Amiodarone HCl (Cordarone)  200 mg PO BID [facility]


   Last Admin: 07/28/20 21:27 Dose:  200 mg


   Documented by: 


Apixaban (Eliquis)  2.5 mg PO BID [facility]


   Last Admin: 07/28/20 21:27 Dose:  2.5 mg


   Documented by: 


Furosemide (Lasix)  40 mg IV Q8HR [facility]


   Last Admin: 07/28/20 17:15 Dose:  40 mg


   Documented by: 


Hydromorphone HCl (Dilaudid)  0.5 mg IVP Q6HR PRN


   PRN Reason: Severe Pain


Ceftriaxone Sodium 2 gm/ (Sodium Chloride)  50 mls @ 100 mls/hr IVPB Q24HR [facility]


   Last Admin: 07/28/20 09:24 Dose:  100 mls/hr


   Documented by: 


Insulin Aspart (Novolog)  0 unit SQ ACHS [facility]; Protocol


   Last Admin: 07/28/20 21:27 Dose:  3 unit


   Documented by: 


Insulin Detemir (Levemir)  28 unit SQ [facility]


Lamotrigine (Lamictal)  100 mg PO [facility]


   Last Admin: 07/28/20 21:27 Dose:  100 mg


   Documented by: 


Melatonin (Melatonin)  3 mg PO [facility]


   Last Admin: 07/28/20 21:27 Dose:  3 mg


   Documented by: 


Midodrine (Proamatine)  10 mg PO TID@0700,1100,1600 [facility]


   Last Admin: 07/28/20 17:16 Dose:  10 mg


   Documented by: 


Naloxone HCl (Narcan)  0.2 mg IV Q2M PRN


   PRN Reason: Opioid Reversal


Pantoprazole Sodium (Protonix)  40 mg IVP DAILY [facility]


   Last Admin: 07/28/20 09:25 Dose:  40 mg


   Documented by: 


Venlafaxine HCl (Effexor)  75 mg PO BID [facility]


   Last Admin: 07/28/20 21:27 Dose:  75 mg


   Documented by: 











On examination:


VITAL SIGNS: 98.2, 84, 15, 140s over 53, 96% on 2 L


GENERAL APPEARANCE: BMI 36.7, laying in bed, awake


HEENT: Normal external appearance of nose and ear.  Oral cavity normal


EYES: Pupils equal. Conjunctiva pale. 


NECK: JVD unable to assess Mass not palpable. 


RESPIRATORY: Respiratory effort increased.  Decreased breath sounds.


CARDIOVASCULAR: Heart sounds irregular, minimal edema


ABDOMEN: Soft. Liver and spleen not palpable. No tenderness. No mass palpable.  

Cortes catheter-


PSYCHIATRY: AO 3, mood and affect normal





INVESTIGATIONS, reviewed in the clinical context:


white count 7.3 hemoglobin 9.2 platelets 190 progression 4.5 bun 8D creatine 

3.18


Accu-Cheks 139, 241, 459


chest x-ray shows questionable venous congestion andpossible infiltrates


Previous testing:


Bun 20 creatinine 1.9 to on May 20/20








Assessment:


-Acute on chronic congestive heart failure exacerbation from systolic 

dysfunction, EF less than 30%


-Acute UTI with cystitis.  Bacteremia from E. coli 


- duodenal ulcers


-normocytic anemia chronic


-Ischemic cardiomyopathy


-atrial tachycardia with a rapid ventricular rate status post cardioversion


-Acute kidney injurypossibly GTN secondary to cardiorenal syndrome, 


-chronic kidney disease stage III.at the baseline creatinine of 1.9


-History of nephrectomy due to renal cancer and bladder cancer history


-Diabetes type 2.  Insulin-dependent uncontrolled with hypo-and hyperglycemia.


-Hyperlipidemia


-Coronary artery disease with history of multiple stents placement


-Bipolar disorder and depression


-GERD


-Obesity 


-





Plan:


patient admits on IV ceftriaxone.also on Cordarone and eliquis.  We'll put the 

patient back on Levemir 28 units starting tonight.  Oral intake 

encouraged.change Lasix to 80 mg twice a day by mouth.

## 2020-07-28 NOTE — P.PN
Subjective


Progress Note Date: 07/28/20


Principal diagnosis: 


Septic shock secondary to gram-negative urinary tract infection and gram-

negative bacteremia, related to E. coli


 On 07/28/2020 patient seen in follow-up in the intensive care unit, he is awake

and alert, in no acute distress, is currently on 2 L of oxygen with a pulse ox 

of 97%, no fever or chills, hemodynamically patient is stable, he is not on any 

pressors, he has 0.9 normal saline infusing at a rate of 10 ML per hour, no 

altered mental status, no difficulty breathing, no cough or congestion, lung 

sounds reveal some crackles and bilateral posterior bases, no rhonchi or 

wheezing.  No complaints of chest pain.  Patient has been cardioverted on 

07/26/2020, he remains in sinus rhythm with a controlled rate.  He remains on 

oral amiodarone and Eliquis, he is on Rocephin for E. coli bacteremia related to

urinary tract infection.  Lasix IV at 40 mg every 8 hours, and he is in -5.1 L 

over last 24 hours, no lower extremity edema.  Today's labs have been reviewed 

showing white blood cell count of 7.3, hemoglobin of 9.2, sodium is 132, 

potassium is 4.5, chloride is 101, CO2 is 20, BUN is 80 and creatinine is 3.18. 

His chest x-ray shows persistent scattered infiltrates throughout both lungs, 

relatively stable, pulmonary venous congestion and elevation of the left 

hemidiaphragm.








Objective





- Vital Signs


Vital signs: 


                                   Vital Signs











Temp  97.5 F L  07/28/20 04:00


 


Pulse  77   07/28/20 07:00


 


Resp  24   07/28/20 07:00


 


BP  98/69   07/28/20 07:00


 


Pulse Ox  97   07/28/20 07:00








                                 Intake & Output











 07/27/20 07/28/20 07/28/20





 18:59 06:59 18:59


 


Intake Total 110 120 10


 


Output Total 1660 3755 275


 


Balance -9322 -2869 -066


 


Weight  106.3 kg 


 


Intake:   


 


   120 10


 


    0.9 @ 50 110 10 


 


    0.9 @10  110 10


 


Output:   


 


  Urine 1660 3755 275


 


Other:   


 


  Voiding Method Indwelling Catheter Indwelling Catheter 














- Exam


 GENERAL EXAM: Alert, very pleasant, 80-year-old white male, on 2 L of oxygen 

with pulse ox of 97%, comfortable in no apparent distress.


HEAD: Normocephalic/atraumatic.


EYES: Normal reaction of pupils, equal size.  Conjunctiva pink, sclera white.


NOSE: Clear with pink turbinates.


THROAT: No erythema or exudates.


NECK: No masses, no JVD, no thyroid enlargement, no adenopathy.


CHEST: No chest wall deformity.  Symmetrical expansion. 


LUNGS: Equal air entry with basilar crackles at posterior bases


CVS: Regular rate and rhythm, normal S1 and S2, no gallops, no murmurs, no rubs


ABDOMEN: Soft, nontender.  No hepatosplenomegaly, normal bowel sounds, no 

guarding or rigidity.


EXTREMITIES: No clubbing, no edema, no cyanosis, 2+ pulses and upper and lower 

extremities.  Chronic venous stasis changes seen on both lower extremities, no 

edema


MUSCULOSKELETAL: Muscle strength and tone normal.


SPINE: No scoliosis or deformity


SKIN: No rashes


CENTRAL NERVOUS SYSTEM: Alert and oriented -3.  No focal deficits, tone is 

normal in all 4 extremities.


PSYCHIATRIC: Alert and oriented -3.  Appropriate affect.  Intact judgment and 

insight.











- Labs


CBC & Chem 7: 


                                 07/28/20 04:30





                                 07/28/20 04:30


Labs: 


                  Abnormal Lab Results - Last 24 Hours (Table)











  07/27/20 07/27/20 07/27/20 Range/Units





  09:12 09:13 09:29 


 


RBC     (4.30-5.90)  m/uL


 


Hgb     (13.0-17.5)  gm/dL


 


Hct     (39.0-53.0)  %


 


RDW     (11.5-15.5)  %


 


Lymphocytes # (Manual)     (1.0-4.8)  k/uL


 


Sodium     (137-145)  mmol/L


 


Carbon Dioxide     (22-30)  mmol/L


 


BUN     (9-20)  mg/dL


 


Creatinine     (0.66-1.25)  mg/dL


 


Glucose     (74-99)  mg/dL


 


POC Glucose (mg/dL)  48 L  42 L  48 L  (75-99)  mg/dL


 


Calcium     (8.4-10.2)  mg/dL














  07/27/20 07/27/20 07/27/20 Range/Units





  09:54 21:32 22:05 


 


RBC     (4.30-5.90)  m/uL


 


Hgb     (13.0-17.5)  gm/dL


 


Hct     (39.0-53.0)  %


 


RDW     (11.5-15.5)  %


 


Lymphocytes # (Manual)     (1.0-4.8)  k/uL


 


Sodium     (137-145)  mmol/L


 


Carbon Dioxide     (22-30)  mmol/L


 


BUN     (9-20)  mg/dL


 


Creatinine     (0.66-1.25)  mg/dL


 


Glucose     (74-99)  mg/dL


 


POC Glucose (mg/dL)  110 H  69 L  65 L  (75-99)  mg/dL


 


Calcium     (8.4-10.2)  mg/dL














  07/28/20 07/28/20 07/28/20 Range/Units





  02:03 04:30 04:30 


 


RBC   3.22 L   (4.30-5.90)  m/uL


 


Hgb   9.2 L   (13.0-17.5)  gm/dL


 


Hct   28.2 L   (39.0-53.0)  %


 


RDW   17.6 H   (11.5-15.5)  %


 


Lymphocytes # (Manual)   0.95 L   (1.0-4.8)  k/uL


 


Sodium    132 L  (137-145)  mmol/L


 


Carbon Dioxide    20 L  (22-30)  mmol/L


 


BUN    80 H  (9-20)  mg/dL


 


Creatinine    3.18 H  (0.66-1.25)  mg/dL


 


Glucose    150 H  (74-99)  mg/dL


 


POC Glucose (mg/dL)  137 H    (75-99)  mg/dL


 


Calcium    8.3 L  (8.4-10.2)  mg/dL














  07/28/20 Range/Units





  07:05 


 


RBC   (4.30-5.90)  m/uL


 


Hgb   (13.0-17.5)  gm/dL


 


Hct   (39.0-53.0)  %


 


RDW   (11.5-15.5)  %


 


Lymphocytes # (Manual)   (1.0-4.8)  k/uL


 


Sodium   (137-145)  mmol/L


 


Carbon Dioxide   (22-30)  mmol/L


 


BUN   (9-20)  mg/dL


 


Creatinine   (0.66-1.25)  mg/dL


 


Glucose   (74-99)  mg/dL


 


POC Glucose (mg/dL)  139 H  (75-99)  mg/dL


 


Calcium   (8.4-10.2)  mg/dL














Assessment and Plan


Plan: 


 Acute urinary tract infection, related to E. coli


Septic shock and E. coli bacteremia secondary to UTI


Acute on chronic systolic congestive heart failure and LV dysfunction


Shortness of breath secondary to above.


Elevated lactic acid secondary to sepsis


Type 2 diabetes, no evidence of hyperglycemia or ketoacidosis.


Chronic atrial fibrillation however her rate seems to be poorly controlled at 

present requiring amiodarone.


Anemia of chronic disease.


History of coronary artery disease


History of bladder cancer.  And chemoradiation.


History of bipolar disorder.


Ex smoker.


Obesity with body mass index of 34.8.


Status post cardioversion on 7/26 by cardiology for atrial fibrillation with 

RVR, he remains in sinus rhythm on 07/28/2020


Hypoglycemia, patient is on Levemir, and sliding-scale insulin and his Levemir 

was held last night.





Plan:





Continue current medical treatment, continue same dose IV diuretics, today's 

chest x-ray shows persistence of bilateral infiltrates, and pulmonary vascular 

congestion, however oxygenation is stable and patient is maintaining stable sa

turations on 2 L of oxygen, and his FiO2 can further be weaned down.  Increase 

activity as tolerated, he remains in sinus mechanism with a controlled rate, he 

remains on oral amiodarone and Eliquis.  No acute events overnight, afebrile, he

is on Rocephin for E. coli bacteremia related to urinary tract infection, no 

acute events overnight, patient can be transferred out of ICU to selective care 

unit.  Patient had episodes of hypoglycemia last night, will defer to the 

hospitalist service to adjust his insulins, his Levemir was held last night, 

patient is tolerating oral diet.





I performed a history & physical examination of the patient and discussed their 

management with my nurse practitioner, Coco Lanza.  I reviewed the nurse 

practitioner's note and agree with the documented findings and plan of care.  

Lung sounds are positive for diffuse wheezes throughout the lung fields.  The 

findings and the impression was discussed with the patient.  I attest to the 

documentation by the nurse practitioner. 





Time with Patient: Less than 30

## 2020-07-28 NOTE — PN
PROGRESS NOTE



FOLLOW-UP NOTE:

Juan José is an 80-year-old gentleman who is admitted to ICU with hypotension, has history

of atrial tachycardia/fibrillation, for which he underwent cardioversion.  This morning

he is doing much better. On exam, heart rate is 80 beats per minute.  Blood pressure is

98/60, respiratory rate is 18. Chest exam reveals good air entry bilaterally. Heart

exam reveals first and second heart sounds.  No gallop.  Examination of extremities

reveals mild edema. Labs show that the creatinine is elevated at 3.1.  Current

medications include antibiotics, Eliquis 2.5 b.i.d., amiodarone 200 b.i.d., Lasix,

Aldactone.



ASSESSMENT:

1. Paroxysmal atrial fibrillation, status post cardioversion.

2. Congestive heart failure.



PLAN:

I will continue the Eliquis, amiodarone. I will switch Lasix to p.o.  Stop the

Aldactone due to renal insufficiency.





MMODL / IJN: [id number] / Job#: [id number]

## 2020-07-28 NOTE — XR
EXAMINATION TYPE: XR chest 1V

 

DATE OF EXAM: 7/28/2020

 

HISTORY: Shortness of breath.

 

COMPARISON: 7/27/2020

 

TECHNIQUE: Single view of the chest is submitted.

 

FINDINGS:

Demonstrated are scattered senescent parenchymal change.  

 

Persistent scattered infiltrates throughout both lung fields unchanged in the interval. Pulmonary nabor
ous congestion. Elevation left hemidiaphragm.

 

The heart is stable.

 

Hilar and mediastinal structures are within normal limits.  

 

Degenerative changes are seen of the dorsal spine. 

 

 IMPRESSION: 

 

1.  Persistent scattered infiltrates throughout both lung fields unchanged in the interval. Pulmonary
 venous congestion. Elevation left hemidiaphragm.

## 2020-07-28 NOTE — PN
PROGRESS NOTE



DATE OF SERVICE:

/07/28/2020



REASON FOR FOLLOWUP:

E coli UTI and bacteremia.



INTERVAL HISTORY:

Patient is currently afebrile.  The patient is breathing comfortably.  Denies having

any chest pain.  No shortness of breath, no cough.  No abdominal pain, no diarrhea.



PHYSICAL EXAMINATION:

Blood pressure 104/58 with a pulse of 77, temperature is 97.6, he is 97% on 2 L.

General description is an elderly male, lying in bed in no distress.

RESPIRATORY SYSTEM: Unlabored breathing, clear to auscultation anteriorly.

HEART:  S1, S2.  Regular rate and rhythm.

ABDOMEN:  Soft, no tenderness.



LABS:

Hemoglobin 9.8, white count 7.3, BUN of 80, creatinine 3.18.



DIAGNOSTIC IMPRESSION AND PLAN:

Patient with an E coli bacteremia.  Source is urinary.  The patient is covered with

Rocephin.  Finish therapy with oral antibiotic on discharge.  Continue supportive care.





MMODL / IJN: [id number] / Job#: [id number]

## 2020-07-29 VITALS — TEMPERATURE: 98.2 F | DIASTOLIC BLOOD PRESSURE: 55 MMHG | SYSTOLIC BLOOD PRESSURE: 107 MMHG

## 2020-07-29 VITALS — RESPIRATION RATE: 18 BRPM

## 2020-07-29 VITALS — HEART RATE: 76 BPM

## 2020-07-29 LAB
ANION GAP SERPL CALC-SCNC: 14 MMOL/L
BUN SERPL-SCNC: 72 MG/DL (ref 9–20)
CALCIUM SPEC-MCNC: 8.7 MG/DL (ref 8.4–10.2)
CHLORIDE SERPL-SCNC: 100 MMOL/L (ref 98–107)
CO2 SERPL-SCNC: 21 MMOL/L (ref 22–30)
ERYTHROCYTE [DISTWIDTH] IN BLOOD BY AUTOMATED COUNT: 3.47 M/UL (ref 4.3–5.9)
ERYTHROCYTE [DISTWIDTH] IN BLOOD: 17.6 % (ref 11.5–15.5)
GLUCOSE BLD-MCNC: 196 MG/DL (ref 75–99)
GLUCOSE BLD-MCNC: 95 MG/DL (ref 75–99)
GLUCOSE SERPL-MCNC: 133 MG/DL (ref 74–99)
HBA1C MFR BLD: 10.5 % (ref 4–6)
HCT VFR BLD AUTO: 30.6 % (ref 39–53)
HGB BLD-MCNC: 9.7 GM/DL (ref 13–17.5)
MCH RBC QN AUTO: 28 PG (ref 25–35)
MCHC RBC AUTO-ENTMCNC: 31.7 G/DL (ref 31–37)
MCV RBC AUTO: 88.3 FL (ref 80–100)
PLATELET # BLD AUTO: 216 K/UL (ref 150–450)
POTASSIUM SERPL-SCNC: 4.1 MMOL/L (ref 3.5–5.1)
SODIUM SERPL-SCNC: 135 MMOL/L (ref 137–145)
WBC # BLD AUTO: 8 K/UL (ref 3.8–10.6)

## 2020-07-29 RX ADMIN — AMIODARONE HYDROCHLORIDE SCH MG: 200 TABLET ORAL at 08:34

## 2020-07-29 RX ADMIN — PANTOPRAZOLE SODIUM SCH MG: 40 INJECTION, POWDER, FOR SOLUTION INTRAVENOUS at 08:35

## 2020-07-29 RX ADMIN — MIDODRINE HYDROCHLORIDE SCH MG: 5 TABLET ORAL at 07:23

## 2020-07-29 RX ADMIN — VENLAFAXINE HYDROCHLORIDE SCH MG: 75 TABLET ORAL at 08:34

## 2020-07-29 RX ADMIN — INSULIN ASPART SCH UNIT: 100 INJECTION, SOLUTION INTRAVENOUS; SUBCUTANEOUS at 12:03

## 2020-07-29 RX ADMIN — MIDODRINE HYDROCHLORIDE SCH MG: 5 TABLET ORAL at 12:03

## 2020-07-29 RX ADMIN — IPRATROPIUM BROMIDE AND ALBUTEROL SULFATE SCH ML: .5; 3 SOLUTION RESPIRATORY (INHALATION) at 11:09

## 2020-07-29 RX ADMIN — APIXABAN SCH MG: 2.5 TABLET, FILM COATED ORAL at 08:34

## 2020-07-29 RX ADMIN — INSULIN ASPART SCH: 100 INJECTION, SOLUTION INTRAVENOUS; SUBCUTANEOUS at 07:12

## 2020-07-29 RX ADMIN — IPRATROPIUM BROMIDE AND ALBUTEROL SULFATE SCH ML: .5; 3 SOLUTION RESPIRATORY (INHALATION) at 07:09

## 2020-07-29 NOTE — P.DS
Providers


Date of admission: 


07/24/20 20:36





Expected date of discharge: 07/29/20


Attending physician: 


Ramon Chavarria





Consults: 





                                        





07/24/20 19:38


Consult Physician Routine 


   Consulting Provider: Kelsi Fleming


   Consult Reason/Comments: chf


   Do you want consulting provider notified?: Yes


Consult Physician Routine 


   Consulting Provider: Raad Trevino


   Consult Reason/Comments: sob


   Do you want consulting provider notified?: Yes





07/24/20 19:39


Consult Physician Routine 


   Consulting Provider: Carlos Lui


   Consult Reason/Comments: sepsis


   Do you want consulting provider notified?: Yes











Primary care physician: 


[facility] Hospital Course: 





Patient is a 80-year-old patient of Dr. Duckworth.   history of chronic atrial 

fibrillation on anticoagulation with Eliquis, coronary artery disease status 

post bypass graft, cardiomyopathy status post AICD placement, history of 

nephrectomy and unilateral kidney, CK D stage III, hypertension, hyperlipidemia,

GERD, diabetes type 2 and history of bipolar/depression .bleeding peptic ulcers.


 Patient came into the ER  complaining of few days' history of shortness of 

breath, low-grade fever, and upon evaluation in the ER he was noted to be rel

atively hypotensive, tachycardic with a rate of 130, he was in atrial 

fibrillation with RVR.  Blood sugar was 392, and he was noted to have evidence 

of urinary tract infection.  Although the patient has no urinary complaints.  

Chest x-ray showed evidence of interstitial edema.  Considering the patient was 

hypotensive, he did receive 2 L of fluids boluses, he was given antibiotics for 

presumptive urinary tract infection and sepsis.  Patient was admitted to the ICU


Admitted with-septic shock, gram-negative bacteremia, acute UTI, acute CHF 

exacerbation, atrial tachycardia with rapid ventricular rate.  Patient underwent

cardioversion on July 26..


Today-i doing much better.  Eating food.  Does not like hospital food.  

Breathing is better.  Up in a chair.  Medical to go home.  Cleared by pulmonary.

 Patient being put on amiodarone, DuoNeb, Keflex.


Discussion and discharge planning more than 35 minutes





Consultation:


[facility]


Dr. Quinn from pulmonary


Dr. Lui from ID











On examination:


VITAL SIGNS: 98.2, 84, 18, 107/55, 97% on room air


GENERAL APPEARANCE: Sitting up in a chair, comfortable


HEENT: Normal external appearance of nose and ear.  Oral cavity normal


EYES: Pupils equal. Conjunctiva pale. 


NECK: JVD unable to assess Mass not palpable. 


RESPIRATORY: Respiratory effort increased.  Decreased breath sounds.


CARDIOVASCULAR: Heart sounds irregular, minimal edema


ABDOMEN: Soft. Liver and spleen not palpable. No tenderness. No mass palpable.  

Cortes catheter-


PSYCHIATRY: AO 3, mood and affect normal





INVESTIGATIONS, reviewed in the clinical context:


White count 8 hemoglobin 9.7 potassium 4.1 bun 72 creatinine 2.65


Accu-Cheks 139, 241, 459


chest x-ray shows questionable venous congestion andpossible infiltrates


Previous testing:


Bun 20 creatinine 1.9 to on May 20/20








Assessment:


-Acute on chronic congestive heart failure exacerbation from systolic 

dysfunction, EF less than 30%-improve


-Acute UTI with cystitis.  Bacteremia from E. coli 


- duodenal ulcers


-normocytic anemia chronic


-Ischemic cardiomyopathy


-atrial tachycardia with a rapid ventricular rate status post cardioversion


-Acute kidney injurypossibly GTN secondary to cardiorenal syndrome, 


-chronic kidney disease stage III.at the baseline creatinine of 1.9


-History of nephrectomy due to renal cancer and bladder cancer history


-Diabetes type 2.  Insulin-dependent uncontrolled with hypo-and hyperglycemia.


-Hyperlipidemia


-Coronary artery disease with history of multiple stents placement


-Bipolar disorder and depression


-GERD


-Obesity 


-





Disposition:


Home with wife





Patient Condition at Discharge: Undetermined





Plan - Discharge Summary


Discharge Rx Participant: Yes


New Discharge Prescriptions: 


New


   Amiodarone [Cordarone] 200 mg PO DAILY #30 tab


   Ipratropium-Albuterol Nebulize [Duoneb 0.5 mg-3 mg/3 ml Soln] 3 ml INHALATION

RT-TID #90 ml


   Cephalexin [Keflex] 250 mg PO Q8HR #21 capsule





Continue


   Venlafaxine HCl [Effexor] 75 mg PO BID  tab


   Atorvastatin [Lipitor] 80 mg PO HS


   Cyanocobalamin (Vitamin B-12) [Vitamin B-12] 1,000 mcg PO DAILY


   Melatonin 3 mg PO HS  tablet


   lamoTRIgine [LaMICtal] 100 mg PO HS #3 tab


   Pantoprazole Sodium [Protonix] 40 mg PO W/BRKFST


   Midodrine [ProAmatine] 10 mg PO TID@0700,1100,1600


   Ferrous Sulfate [Iron (65 MG Elemental)] 325 mg PO DAILY


   Cholecalciferol [Vitamin D3 (25 Mcg = 1000 Iu)] 1,000 unit PO DAILY


   Apixaban [Eliquis] 2.5 mg PO BID





Changed


   Furosemide [Lasix] 40 mg PO BID #0


   Insulin Glargine [Lantus] 30 unit SQ HS #0





Discontinued


   Spironolactone [Aldactone] 25 mg PO DAILY  tab


   Losartan [Cozaar] 25 mg PO HS  tab


   Metoprolol Tartrate [Lopressor] 25 mg PO BID  tab


   metFORMIN HCL [Glucophage] 1,000 mg PO BID


   Furosemide [Lasix] 80 mg PO QAM


   Carvedilol [Coreg] 12.5 mg PO BID-W/MEALS


Discharge Medication List





Venlafaxine HCl [Effexor] 75 mg PO BID  tab 02/22/20 [Rx]


Atorvastatin [Lipitor] 80 mg PO HS 04/05/20 [History]


Cyanocobalamin (Vitamin B-12) [Vitamin B-12] 1,000 mcg PO DAILY 04/05/20 

[History]


Melatonin 3 mg PO HS  tablet 04/21/20 [Rx]


lamoTRIgine [LaMICtal] 100 mg PO HS #3 tab 04/21/20 [Rx]


Apixaban [Eliquis] 2.5 mg PO BID 07/24/20 [History]


Cholecalciferol [Vitamin D3 (25 Mcg = 1000 Iu)] 1,000 unit PO DAILY 07/24/20 

[History]


Ferrous Sulfate [Iron (65 MG Elemental)] 325 mg PO DAILY 07/24/20 [History]


Midodrine [ProAmatine] 10 mg PO TID@0700,1100,1600 07/24/20 [History]


Pantoprazole Sodium [Protonix] 40 mg PO W/BRKFST 07/24/20 [History]


Amiodarone [Cordarone] 200 mg PO DAILY #30 tab 07/29/20 [Rx]


Cephalexin [Keflex] 250 mg PO Q8HR #21 capsule 07/29/20 [Rx]


Furosemide [Lasix] 40 mg PO BID #0 07/29/20 [Rx]


Insulin Glargine [Lantus] 30 unit SQ HS #0 07/29/20 [Rx]


Ipratropium-Albuterol Nebulize [Duoneb 0.5 mg-3 mg/3 ml Soln] 3 ml INHALATION 

RT-TID #90 ml 07/29/20 [Rx]








Follow up Appointment(s)/Referral(s): 


cardiology, [Other] - 10 Days


[facility] [NON-STAFF] - 1-2 Days


[facility] [NON-STAFF] - As Needed


Ruy Duckworth MD [Primary Care Provider] - 1-2 days (Sat August 1st, at 

1130am)


Carlos Enrique Conde DO [STAFF PHYSICIAN] - 10 Days (August 19th at 1140am)


Patient Instructions/Handouts:  Heart Failure (DC), Sepsis (GEN), Diabetic 

Hyperglycemia (DC)


Activity/Diet/Wound Care/Special Instructions: 


cbc/bmp - 5 days


Discharge Disposition: HOME SELF-CARE

## 2020-07-29 NOTE — PN
PROGRESS NOTE



Juan Joés is an 80-year-old gentleman who was admitted to ICU with hypotension, had atrial

tachycardia, fibrillation for which he underwent cardioversion.



CURRENT MEDICATIONS:

Include Cordarone 200 b.i.d., Eliquis 2.5 b.i.d., Lasix 40 b.i.d., insulin, and

midodrine.



On exam, heart rate is 76 beats per minute.  Blood pressure is 107/55, respiratory rate

is 18, O2 saturation is 97% on room air.

Chest exam reveals good air entry bilaterally, heart exam reveals first and second

heart sounds.  No gallop.  No murmur.  Abdomen is soft. Exam of extremities did not

reveal any edema.  Peripheral pulses are felt.



LABS:

Show a hemoglobin of 9.7, platelet count is 216, potassium is 4.1, creatinine is 2.6.



ASSESSMENT:

Acute exacerbation of chronic systolic heart failure, atrial tachycardia fibrillation

status post cardioversion, CAD status post multivessel angioplasty.



PLAN:

Patient will continue current medications.  Hopefully home tomorrow.





MMGEOVANNA / LEELEEN: [id number] / Job#: [id number]

## 2020-07-29 NOTE — P.PN
Subjective


Progress Note Date: 07/29/20


Principal diagnosis: 


Septic shock secondary to gram-negative urinary tract infection and gram-

negative bacteremia, related to E. coli


 On 07/28/2020 patient seen in follow-up in the intensive care unit, he is awake

and alert, in no acute distress, is currently on 2 L of oxygen with a pulse ox 

of 97%, no fever or chills, hemodynamically patient is stable, he is not on any 

pressors, he has 0.9 normal saline infusing at a rate of 10 ML per hour, no 

altered mental status, no difficulty breathing, no cough or congestion, lung 

sounds reveal some crackles and bilateral posterior bases, no rhonchi or 

wheezing.  No complaints of chest pain.  Patient has been cardioverted on 

07/26/2020, he remains in sinus rhythm with a controlled rate.  He remains on 

oral amiodarone and Eliquis, he is on Rocephin for E. coli bacteremia related to

urinary tract infection.  Lasix IV at 40 mg every 8 hours, and he is in -5.1 L 

over last 24 hours, no lower extremity edema.  Today's labs have been reviewed 

showing white blood cell count of 7.3, hemoglobin of 9.2, sodium is 132, 

potassium is 4.5, chloride is 101, CO2 is 20, BUN is 80 and creatinine is 3.18. 

His chest x-ray shows persistent scattered infiltrates throughout both lungs, 

relatively stable, pulmonary venous congestion and elevation of the left 

hemidiaphragm.





On 07/29/2020 patient seen in follow-up in the intensive care unit, he is awake 

and alert, oriented 3, in no acute distress, he has been awaiting a bed on 

selective care unit for last 24 hours, he has remained stable, he denies any 

worsening dyspnea, breathing appears to be comfortable, he is in sinus mechanism

with a rate of 74, he is currently on 2 L of oxygen, his pulse ox is 91-98%, 

lung sounds reveal some coarse inspiratory crackles at bilateral posterior bas

es, he continues on Lasix which was transitioned to oral Lasix 40 mg twice 

daily, he is maintaining negative fluid balance, and he is in -3.6 L net fluid 

balance over last 24 hours, no lower extremity edema, he still has a Cortes 

catheter in place, which we will discontinue today, he is tolerating oral 

intake, no nausea vomiting or diarrhea, today's labs have been reviewed, showing

no leukocytosis, hemoglobin is 9.7, his renal profile is continuing to improve  

BUN down to 72 and creatinine 2.65.  No new chest x-rays today, patient 

continues on oral Cordarone, and Eliquis, patient is on IV Rocephin for E. coli 

bacteremia and urinary tract infection.








Objective





- Vital Signs


Vital signs: 


                                   Vital Signs











Temp  98.2 F   07/29/20 08:00


 


Pulse  84   07/29/20 08:00


 


Resp  18   07/29/20 08:00


 


BP  107/55   07/29/20 08:00


 


Pulse Ox  91 L  07/29/20 08:00








                                 Intake & Output











 07/28/20 07/29/20 07/29/20





 18:59 06:59 18:59


 


Intake Total 261 100 


 


Output Total 2375 1600 


 


Balance -2114 -1500 


 


Weight 106.3 kg 101.1 kg 


 


Intake:   


 


   100 


 


    0.9 @10 211 100 


 


  Intake, IV Titration 50  





  Amount   


 


    cefTRIAXone 2 gm In 50  





    Sodium Chloride 0.9% 50   





    ml @ 100 mls/hr IVPB   





    Q24HR [facility] Rx#:[id number]   


 


Output:   


 


  Urine 2375 1600 


 


Other:   


 


  Voiding Method Indwelling Catheter Indwelling Catheter Indwelling Catheter














- Exam


 GENERAL EXAM: Alert, very pleasant, 80-year-old white male, on 2 L of oxygen 

with pulse ox of 98%, comfortable in no apparent distress.


HEAD: Normocephalic/atraumatic.


EYES: Normal reaction of pupils, equal size.  Conjunctiva pink, sclera white.


NOSE: Clear with pink turbinates.


THROAT: No erythema or exudates.


NECK: No masses, no JVD, no thyroid enlargement, no adenopathy.


CHEST: No chest wall deformity.  Symmetrical expansion. 


LUNGS: Equal air entry with inspiratory crackles at posterior bases


CVS: Regular rate and rhythm, normal S1 and S2, no gallops, no murmurs, no rubs


ABDOMEN: Soft, nontender.  No hepatosplenomegaly, normal bowel sounds, no 

guarding or rigidity.


EXTREMITIES: No clubbing, no edema, no cyanosis, 2+ pulses and upper and lower 

extremities.  Chronic venous stasis changes seen on both lower extremities, no 

edema


MUSCULOSKELETAL: Muscle strength and tone normal.


SPINE: No scoliosis or deformity


SKIN: No rashes


CENTRAL NERVOUS SYSTEM: Alert and oriented -3.  No focal deficits, tone is norm

al in all 4 extremities.


PSYCHIATRIC: Alert and oriented -3.  Appropriate affect.  Intact judgment and 

insight.











- Labs


CBC & Chem 7: 


                                 07/29/20 03:30





                                 07/29/20 03:30


Labs: 


                  Abnormal Lab Results - Last 24 Hours (Table)











  07/28/20 07/28/20 07/28/20 Range/Units





  12:04 16:51 20:34 


 


RBC     (4.30-5.90)  m/uL


 


Hgb     (13.0-17.5)  gm/dL


 


Hct     (39.0-53.0)  %


 


RDW     (11.5-15.5)  %


 


Sodium     (137-145)  mmol/L


 


Carbon Dioxide     (22-30)  mmol/L


 


BUN     (9-20)  mg/dL


 


Creatinine     (0.66-1.25)  mg/dL


 


Glucose     (74-99)  mg/dL


 


POC Glucose (mg/dL)  241 H  459 H  231 H  (75-99)  mg/dL














  07/29/20 07/29/20 Range/Units





  03:30 03:30 


 


RBC  3.47 L   (4.30-5.90)  m/uL


 


Hgb  9.7 L   (13.0-17.5)  gm/dL


 


Hct  30.6 L   (39.0-53.0)  %


 


RDW  17.6 H   (11.5-15.5)  %


 


Sodium   135 L  (137-145)  mmol/L


 


Carbon Dioxide   21 L  (22-30)  mmol/L


 


BUN   72 H  (9-20)  mg/dL


 


Creatinine   2.65 H  (0.66-1.25)  mg/dL


 


Glucose   133 H  (74-99)  mg/dL


 


POC Glucose (mg/dL)    (75-99)  mg/dL














Assessment and Plan


Plan: 


 Acute urinary tract infection, related to E. coli


Septic shock and E. coli bacteremia secondary to UTI


Acute on chronic systolic congestive heart failure and LV dysfunction


Shortness of breath secondary to above.


Elevated lactic acid secondary to sepsis


Type 2 diabetes, no evidence of hyperglycemia or ketoacidosis.


Chronic atrial fibrillation however her rate seems to be poorly controlled at 

present requiring amiodarone.


Anemia of chronic disease.


History of coronary artery disease


History of bladder cancer.  And chemoradiation.


History of bipolar disorder.


Ex smoker.


Obesity with body mass index of 34.8.


Status post cardioversion on 7/26 by cardiology for atrial fibrillation with 

RVR, he remains in sinus rhythm on 07/28/2020


Hypoglycemia, patient is on Levemir, and sliding-scale insulin and his Levemir 

was held last night.





Plan:





Patient is doing well, clinically stable, increase activity as tolerated, 

discontinue Cortes catheter, discontinue central venous catheter, ambulate the 

patient, wean FiO2, patient continues to diurese, breathing easier, vital signs 

have been stable, his been afebrile, from pulmonary perspective patient can be 

considered for discharge home today, will need home oxygen assessment.  If he 

ends up staying another day patient can be downgraded to general medical floor 

with remote telemetry





I performed a history & physical examination of the patient and discussed their 

management with my nurse practitioner, Coco Lanza.  I reviewed the nurse 

practitioner's note and agree with the documented findings and plan of care.  

Lung sounds are positive for diffuse wheezes throughout the lung fields.  The fi

ndings and the impression was discussed with the patient.  I attest to the 

documentation by the nurse practitioner. 





Time with Patient: Less than 30

## 2020-10-08 ENCOUNTER — HOSPITAL ENCOUNTER (INPATIENT)
Dept: HOSPITAL 47 - EC | Age: 80
LOS: 4 days | Discharge: HOME | DRG: 291 | End: 2020-10-12
Attending: HOSPITALIST | Admitting: HOSPITALIST
Payer: MEDICARE

## 2020-10-08 VITALS — BODY MASS INDEX: 30.4 KG/M2

## 2020-10-08 DIAGNOSIS — Z98.42: ICD-10-CM

## 2020-10-08 DIAGNOSIS — Z92.3: ICD-10-CM

## 2020-10-08 DIAGNOSIS — Z85.51: ICD-10-CM

## 2020-10-08 DIAGNOSIS — Z87.11: ICD-10-CM

## 2020-10-08 DIAGNOSIS — I25.5: ICD-10-CM

## 2020-10-08 DIAGNOSIS — Z79.899: ICD-10-CM

## 2020-10-08 DIAGNOSIS — Z85.528: ICD-10-CM

## 2020-10-08 DIAGNOSIS — Z98.41: ICD-10-CM

## 2020-10-08 DIAGNOSIS — I48.0: ICD-10-CM

## 2020-10-08 DIAGNOSIS — I13.0: Primary | ICD-10-CM

## 2020-10-08 DIAGNOSIS — Z95.1: ICD-10-CM

## 2020-10-08 DIAGNOSIS — E11.22: ICD-10-CM

## 2020-10-08 DIAGNOSIS — E66.9: ICD-10-CM

## 2020-10-08 DIAGNOSIS — Z87.440: ICD-10-CM

## 2020-10-08 DIAGNOSIS — Z98.890: ICD-10-CM

## 2020-10-08 DIAGNOSIS — K21.9: ICD-10-CM

## 2020-10-08 DIAGNOSIS — Z79.4: ICD-10-CM

## 2020-10-08 DIAGNOSIS — Z90.5: ICD-10-CM

## 2020-10-08 DIAGNOSIS — Z87.891: ICD-10-CM

## 2020-10-08 DIAGNOSIS — K26.7: ICD-10-CM

## 2020-10-08 DIAGNOSIS — Z96.1: ICD-10-CM

## 2020-10-08 DIAGNOSIS — F31.9: ICD-10-CM

## 2020-10-08 DIAGNOSIS — E78.5: ICD-10-CM

## 2020-10-08 DIAGNOSIS — Z87.442: ICD-10-CM

## 2020-10-08 DIAGNOSIS — Z86.19: ICD-10-CM

## 2020-10-08 DIAGNOSIS — I25.10: ICD-10-CM

## 2020-10-08 DIAGNOSIS — Z80.42: ICD-10-CM

## 2020-10-08 DIAGNOSIS — E11.65: ICD-10-CM

## 2020-10-08 DIAGNOSIS — I50.23: ICD-10-CM

## 2020-10-08 DIAGNOSIS — Z79.01: ICD-10-CM

## 2020-10-08 DIAGNOSIS — N18.30: ICD-10-CM

## 2020-10-08 DIAGNOSIS — Z83.79: ICD-10-CM

## 2020-10-08 DIAGNOSIS — T50.1X5A: ICD-10-CM

## 2020-10-08 DIAGNOSIS — Z95.810: ICD-10-CM

## 2020-10-08 DIAGNOSIS — Z96.651: ICD-10-CM

## 2020-10-08 DIAGNOSIS — N17.9: ICD-10-CM

## 2020-10-08 LAB
ALBUMIN SERPL-MCNC: 3.2 G/DL (ref 3.5–5)
ALP SERPL-CCNC: 126 U/L (ref 38–126)
ALT SERPL-CCNC: 12 U/L (ref 4–49)
ANION GAP SERPL CALC-SCNC: 6 MMOL/L
APTT BLD: 24.9 SEC (ref 22–30)
AST SERPL-CCNC: 20 U/L (ref 17–59)
BASOPHILS # BLD AUTO: 0 K/UL (ref 0–0.2)
BASOPHILS NFR BLD AUTO: 1 %
BUN SERPL-SCNC: 33 MG/DL (ref 9–20)
CALCIUM SPEC-MCNC: 8.7 MG/DL (ref 8.4–10.2)
CHLORIDE SERPL-SCNC: 100 MMOL/L (ref 98–107)
CO2 SERPL-SCNC: 30 MMOL/L (ref 22–30)
EOSINOPHIL # BLD AUTO: 0.2 K/UL (ref 0–0.7)
EOSINOPHIL NFR BLD AUTO: 2 %
ERYTHROCYTE [DISTWIDTH] IN BLOOD BY AUTOMATED COUNT: 3.65 M/UL (ref 4.3–5.9)
ERYTHROCYTE [DISTWIDTH] IN BLOOD: 14.8 % (ref 11.5–15.5)
GLUCOSE BLD-MCNC: 109 MG/DL (ref 75–99)
GLUCOSE BLD-MCNC: 262 MG/DL (ref 75–99)
GLUCOSE SERPL-MCNC: 264 MG/DL (ref 74–99)
HCT VFR BLD AUTO: 33.8 % (ref 39–53)
HGB BLD-MCNC: 10.3 GM/DL (ref 13–17.5)
INR PPP: 1.2 (ref ?–1.2)
LYMPHOCYTES # SPEC AUTO: 0.6 K/UL (ref 1–4.8)
LYMPHOCYTES NFR SPEC AUTO: 8 %
MCH RBC QN AUTO: 28.3 PG (ref 25–35)
MCHC RBC AUTO-ENTMCNC: 30.6 G/DL (ref 31–37)
MCV RBC AUTO: 92.4 FL (ref 80–100)
MONOCYTES # BLD AUTO: 0.8 K/UL (ref 0–1)
MONOCYTES NFR BLD AUTO: 12 %
NEUTROPHILS # BLD AUTO: 5.2 K/UL (ref 1.3–7.7)
NEUTROPHILS NFR BLD AUTO: 76 %
PLATELET # BLD AUTO: 311 K/UL (ref 150–450)
POTASSIUM SERPL-SCNC: 4.5 MMOL/L (ref 3.5–5.1)
PROT SERPL-MCNC: 6.8 G/DL (ref 6.3–8.2)
PT BLD: 11.9 SEC (ref 9–12)
SODIUM SERPL-SCNC: 136 MMOL/L (ref 137–145)
WBC # BLD AUTO: 6.8 K/UL (ref 3.8–10.6)

## 2020-10-08 PROCEDURE — 84484 ASSAY OF TROPONIN QUANT: CPT

## 2020-10-08 PROCEDURE — 36415 COLL VENOUS BLD VENIPUNCTURE: CPT

## 2020-10-08 PROCEDURE — 93005 ELECTROCARDIOGRAM TRACING: CPT

## 2020-10-08 PROCEDURE — 96374 THER/PROPH/DIAG INJ IV PUSH: CPT

## 2020-10-08 PROCEDURE — 99285 EMERGENCY DEPT VISIT HI MDM: CPT

## 2020-10-08 PROCEDURE — 83735 ASSAY OF MAGNESIUM: CPT

## 2020-10-08 PROCEDURE — 71046 X-RAY EXAM CHEST 2 VIEWS: CPT

## 2020-10-08 PROCEDURE — 85025 COMPLETE CBC W/AUTO DIFF WBC: CPT

## 2020-10-08 PROCEDURE — 71045 X-RAY EXAM CHEST 1 VIEW: CPT

## 2020-10-08 PROCEDURE — 80053 COMPREHEN METABOLIC PANEL: CPT

## 2020-10-08 PROCEDURE — 83605 ASSAY OF LACTIC ACID: CPT

## 2020-10-08 PROCEDURE — 85730 THROMBOPLASTIN TIME PARTIAL: CPT

## 2020-10-08 PROCEDURE — 83880 ASSAY OF NATRIURETIC PEPTIDE: CPT

## 2020-10-08 PROCEDURE — 80048 BASIC METABOLIC PNL TOTAL CA: CPT

## 2020-10-08 PROCEDURE — 85610 PROTHROMBIN TIME: CPT

## 2020-10-08 RX ADMIN — NITROGLYCERIN SCH: 20 OINTMENT TOPICAL at 21:53

## 2020-10-08 RX ADMIN — ATORVASTATIN CALCIUM SCH MG: 80 TABLET, FILM COATED ORAL at 21:16

## 2020-10-08 RX ADMIN — FUROSEMIDE SCH MG: 10 INJECTION, SOLUTION INTRAMUSCULAR; INTRAVENOUS at 21:15

## 2020-10-08 RX ADMIN — Medication SCH MG: at 21:53

## 2020-10-08 RX ADMIN — SODIUM CHLORIDE, PRESERVATIVE FREE SCH ML: 5 INJECTION INTRAVENOUS at 21:16

## 2020-10-08 RX ADMIN — APIXABAN SCH MG: 2.5 TABLET, FILM COATED ORAL at 21:16

## 2020-10-08 RX ADMIN — NITROGLYCERIN SCH: 20 OINTMENT TOPICAL at 17:23

## 2020-10-08 RX ADMIN — MIDODRINE HYDROCHLORIDE SCH: 5 TABLET ORAL at 16:18

## 2020-10-08 RX ADMIN — VENLAFAXINE HYDROCHLORIDE SCH MG: 75 TABLET ORAL at 21:17

## 2020-10-08 RX ADMIN — INSULIN ASPART SCH UNIT: 100 INJECTION, SOLUTION INTRAVENOUS; SUBCUTANEOUS at 17:00

## 2020-10-08 NOTE — ED
General Adult HPI





- General


Chief complaint: Shortness of Breath


Stated complaint: Dyspnea


Time Seen by Provider: 10/08/20 12:20


Source: patient, family, RN notes reviewed


Mode of arrival: wheelchair


Limitations: no limitations





- History of Present Illness


Initial comments: 





Patient is a pleasant 80-year-old male presenting to the emergency department fo

r difficulty in breathing.  Symptoms have progressed with the past few days.  

Symptoms worsen with lying flat as well as exertion.  Patient does have leg 

swelling.  No calf pain.  No cough.  No chest pain.  No fever.  Patient does 

have history of similar symptoms previously associated with CHF.  Patient did 

see his doctor prior to arrival and was advised to come to emergency department.





- Related Data


                                Home Medications











 Medication  Instructions  Recorded  Confirmed


 


Atorvastatin [Lipitor] 80 mg PO HS 04/05/20 10/08/20


 


Cyanocobalamin (Vitamin B-12) 1,000 mcg PO DAILY 04/05/20 10/08/20





[Vitamin B-12]   


 


Apixaban [Eliquis] 2.5 mg PO BID 07/24/20 10/08/20


 


Cholecalciferol [Vitamin D3 (25 1,000 unit PO DAILY 07/24/20 10/08/20





Mcg = 1000 Iu)]   


 


Ferrous Sulfate [Iron (65  mg PO DAILY 07/24/20 10/08/20





Elemental)]   


 


Midodrine [ProAmatine] 10 mg PO TID@0700,1100,1600 07/24/20 10/08/20


 


Pantoprazole Sodium [Protonix] 40 mg PO W/BRKFST 07/24/20 10/08/20


 


Insulin Glargine [Lantus] 42 units SQ HS 08/28/20 10/08/20


 


Furosemide [Lasix] 40 mg PO DAILY@1200 10/08/20 10/08/20


 


Furosemide [Lasix] 80 mg PO DAILY 10/08/20 10/08/20








                                  Previous Rx's











 Medication  Instructions  Recorded


 


Venlafaxine HCl [Effexor] 75 mg PO BID  tab 20


 


Melatonin 3 mg PO HS  tablet 20


 


lamoTRIgine [LaMICtal] 100 mg PO HS #3 tab 20


 


Amiodarone [Cordarone] 200 mg PO DAILY #30 tab 20











                                    Allergies











Allergy/AdvReac Type Severity Reaction Status Date / Time


 


No Known Allergies Allergy   Verified 10/08/20 13:27














Review of Systems


ROS Statement: 


Those systems with pertinent positive or pertinent negative responses have been 

documented in the HPI.





ROS Other: All systems not noted in ROS Statement are negative.


Constitutional: Denies: fever


Eyes: Denies: eye pain


ENT: Denies: ear pain


Respiratory: Reports: dyspnea.  Denies: cough


Cardiovascular: Reports: dyspnea on exertion, orthopnea, edema.  Denies: chest 

pain


Endocrine: Reports: fatigue


Gastrointestinal: Denies: abdominal pain


Genitourinary: Denies: dysuria


Musculoskeletal: Denies: back pain


Skin: Denies: rash


Neurological: Denies: weakness





Past Medical History


Past Medical History: Atrial Fibrillation, Coronary Artery Disease (CAD), Cance

r, Heart Failure, Diabetes Mellitus, GERD/Reflux, Hyperlipidemia, Renal Disease


Additional Past Medical History / Comment(s): See Dr Fleming's H&P, hx 

kidney and bladder cancer- tx with chemo and radiation , "growth on kidney",

 history of CABG, cardiomyopathy, AICD implantation ULCER, KIDNEY STONES


History of Any Multi-Drug Resistant Organisms: None Reported


Past Surgical History: Bladder Surgery, Heart Catheterization, Orthopedic 

Surgery


Additional Past Surgical History / Comment(s): Port-a-cath insertion/later 

removed. Bilateral cataract , ORIF R ankle with 2 screws,"scraped the bladder" 

10-18-16 TOTAL RT KNEE,growth on kidney removed


Past Anesthesia/Blood Transfusion Reactions: No Reported Reaction


Additional Past Anesthesia/Blood Transfusion Reaction / Comment(s): Pt has never

 recieved blood.


Past Psychological History: Bipolar, Depression


Smoking Status: Former smoker


Past Alcohol Use History: None Reported


Past Drug Use History: None Reported





- Past Family History


  ** Father


Family Medical History: Cancer


Additional Family Medical History / Comment(s): prostate





  ** Mother


Family Medical History: No Reported History


Additional Family Medical History / Comment(s): Mother had bowel perforations.  

She  at 88 yrs of age.





  ** Brother(s)


Family Medical History: Cancer





  ** Sister(s)


Family Medical History: Cancer





General Exam


Limitations: no limitations


General appearance: alert, in no apparent distress


Head exam: Present: normocephalic


Eye exam: Present: normal appearance


Neck exam: Present: normal inspection


Respiratory exam: Present: wheezes


Cardiovascular Exam: Present: regular rate, normal rhythm


GI/Abdominal exam: Present: soft.  Absent: tenderness


Extremities exam: Present: pedal edema (+3 bilaterally).  Absent: calf 

tenderness


Neurological exam: Present: alert


Psychiatric exam: Present: normal affect, normal mood


Skin exam: Present: normal color





Course


                                   Vital Signs











  10/08/20





  12:12


 


Temperature 97.0 F L


 


Pulse Rate 65


 


Respiratory 18





Rate 


 


Blood Pressure 112/59


 


O2 Sat by Pulse 96





Oximetry 














EKG Findings





- EKG Comments:


EKG Findings:: Sinus rhythm at 70.  First-degree AV block TN of 240.  .  

.  QTc 503.  Left axis.  LVH with repolarization.  Inferior Q waves.





Medical Decision Making





- Medical Decision Making





Patient reevaluated and updated.  Case was discussed in detail with Dr. Chavarria,

 who will admit covering for Dr. Duckworth.





- Lab Data


Result diagrams: 


                                 10/08/20 13:15





                                 10/08/20 13:15


                                   Lab Results











  10/08/20 10/08/20 10/08/20 Range/Units





  13:15 13:15 13:15 


 


WBC  6.8    (3.8-10.6)  k/uL


 


RBC  3.65 L    (4.30-5.90)  m/uL


 


Hgb  10.3 L    (13.0-17.5)  gm/dL


 


Hct  33.8 L    (39.0-53.0)  %


 


MCV  92.4    (80.0-100.0)  fL


 


MCH  28.3    (25.0-35.0)  pg


 


MCHC  30.6 L    (31.0-37.0)  g/dL


 


RDW  14.8    (11.5-15.5)  %


 


Plt Count  311    (150-450)  k/uL


 


Neutrophils %  76    %


 


Lymphocytes %  8    %


 


Monocytes %  12    %


 


Eosinophils %  2    %


 


Basophils %  1    %


 


Neutrophils #  5.2    (1.3-7.7)  k/uL


 


Lymphocytes #  0.6 L    (1.0-4.8)  k/uL


 


Monocytes #  0.8    (0-1.0)  k/uL


 


Eosinophils #  0.2    (0-0.7)  k/uL


 


Basophils #  0.0    (0-0.2)  k/uL


 


Hypochromasia  Slight    


 


PT   11.9   (9.0-12.0)  sec


 


INR   1.2 H   (<1.2)  


 


APTT   24.9   (22.0-30.0)  sec


 


Sodium    136 L  (137-145)  mmol/L


 


Potassium    4.5  (3.5-5.1)  mmol/L


 


Chloride    100  ()  mmol/L


 


Carbon Dioxide    30  (22-30)  mmol/L


 


Anion Gap    6  mmol/L


 


BUN    33 H  (9-20)  mg/dL


 


Creatinine    1.79 H  (0.66-1.25)  mg/dL


 


Est GFR (CKD-EPI)AfAm    41  (>60 ml/min/1.73 sqM)  


 


Est GFR (CKD-EPI)NonAf    35  (>60 ml/min/1.73 sqM)  


 


Glucose    264 H  (74-99)  mg/dL


 


Plasma Lactic Acid Jeanmarie     (0.7-2.0)  mmol/L


 


Calcium    8.7  (8.4-10.2)  mg/dL


 


Total Bilirubin    0.8  (0.2-1.3)  mg/dL


 


AST    20  (17-59)  U/L


 


ALT    12  (4-49)  U/L


 


Alkaline Phosphatase    126  ()  U/L


 


Troponin I     (0.000-0.034)  ng/mL


 


NT-Pro-B Natriuret Pep     pg/mL


 


Total Protein    6.8  (6.3-8.2)  g/dL


 


Albumin    3.2 L  (3.5-5.0)  g/dL














  10/08/20 10/08/20 10/08/20 Range/Units





  13:15 13:15 13:15 


 


WBC     (3.8-10.6)  k/uL


 


RBC     (4.30-5.90)  m/uL


 


Hgb     (13.0-17.5)  gm/dL


 


Hct     (39.0-53.0)  %


 


MCV     (80.0-100.0)  fL


 


MCH     (25.0-35.0)  pg


 


MCHC     (31.0-37.0)  g/dL


 


RDW     (11.5-15.5)  %


 


Plt Count     (150-450)  k/uL


 


Neutrophils %     %


 


Lymphocytes %     %


 


Monocytes %     %


 


Eosinophils %     %


 


Basophils %     %


 


Neutrophils #     (1.3-7.7)  k/uL


 


Lymphocytes #     (1.0-4.8)  k/uL


 


Monocytes #     (0-1.0)  k/uL


 


Eosinophils #     (0-0.7)  k/uL


 


Basophils #     (0-0.2)  k/uL


 


Hypochromasia     


 


PT     (9.0-12.0)  sec


 


INR     (<1.2)  


 


APTT     (22.0-30.0)  sec


 


Sodium     (137-145)  mmol/L


 


Potassium     (3.5-5.1)  mmol/L


 


Chloride     ()  mmol/L


 


Carbon Dioxide     (22-30)  mmol/L


 


Anion Gap     mmol/L


 


BUN     (9-20)  mg/dL


 


Creatinine     (0.66-1.25)  mg/dL


 


Est GFR (CKD-EPI)AfAm     (>60 ml/min/1.73 sqM)  


 


Est GFR (CKD-EPI)NonAf     (>60 ml/min/1.73 sqM)  


 


Glucose     (74-99)  mg/dL


 


Plasma Lactic Acid Jeanmarie  1.4    (0.7-2.0)  mmol/L


 


Calcium     (8.4-10.2)  mg/dL


 


Total Bilirubin     (0.2-1.3)  mg/dL


 


AST     (17-59)  U/L


 


ALT     (4-49)  U/L


 


Alkaline Phosphatase     ()  U/L


 


Troponin I   0.025   (0.000-0.034)  ng/mL


 


NT-Pro-B Natriuret Pep    9900  pg/mL


 


Total Protein     (6.3-8.2)  g/dL


 


Albumin     (3.5-5.0)  g/dL














- Radiology Data


Radiology results: image reviewed (Chest x-ray shows pulmonary edema small right

 effusion.)





Disposition


Clinical Impression: 


 Congestive heart failure





Disposition: ADMITTED AS IP TO THIS HOSP


Is patient prescribed a controlled substance at d/c from ED?: No


Referrals: 


Ruy Duckworth MD [Primary Care Provider] - 1-2 days


Decision Time: 14:08

## 2020-10-08 NOTE — P.HPIM
History of Present Illness


H&P Date: 10/08/20


Chief Complaint: short of breath


history of presenting complaint


Patient is a 80-year-old patient of Dr. Duckworth.   history of chronic atrial 

fibrillation on anticoagulation with Eliquis, coronary artery disease status 

post bypass graft, cardiomyopathy-EF less than 30% status post AICD placement, 

history of nephrectomy and unilateral kidney, CK D stage III, hypertension, hyp

erlipidemia, GERD, diabetes type 2 and history of bipolar/depression .bleeding 

peptic ulcers.


patient now presents in 1 week of increasing shortness of breath.  No cough.  

Increase in lower extremity edema.  Also having trouble sleeping flat having 

positive orthopnea.  Decreased appetite tired rundown.  No fever no chills.





Review of systems:


GEN.: [tired]


EYES: [None]


HEENT: [None]


NECK: [None]


RESPIRATORY: [as above]


CARDIOVASCULAR: [as above]


GASTROINTESTINAL: [None]


GENITOURINARY: [None]


MUSCULOSKELETAL: [None]


LYMPHATICS: [None]


HEMATOLOGICAL: [None]  


PSYCHIATRY: [None]


NEUROLOGICAL: [None]





Past medical history to include:


CHF, EF less than 30%, duodenal ulcer, chronic kidney disease stage III, 

nephrectomy due to renal cancer and bladder cancer, diabetes mellitus type 2, 

hyperlipidemia, coronary artery disease with stent, bipolar disorder with 

depression, GERD, AICD





Social history:


.  occasional use a walker.  This smoke in the past.  No alcohol.





Physical examination:


VITAL SIGNS: 97, 65, 18, 112/59, 96% room air


GENERAL: BMI 31.2, sitting up, tired.


EYES: Pupils equal.  Conjunctiva normal.


HEENT: External appearance of nose and ears normal, oral cavity grossly normal.


NECK: JVDraised; masses not palpable.


HEART: [First and second heart sounds are normal; edema present.  


LUNGS: Respiratory rate increased; basal crackles.  


ABDOMEN: Soft,  nontender, liver spleen not palpable, no masses palpable.  


PSYCH: Alert and oriented x3;  mood  and affect tiredl.  


NEUROLOGICAL: Cranial nerves grossly intact; no facial asymmetry,   power and 

sensation grossly intact. 


LYMPHATICS: No lymph nodes palpable in the axilla and neck





INVESTIGATIONS, reviewed in the clinical context:


White count 6.8 hemoglobin 10.3 platelets 311 potassium 4.5 bun 33 creatinine 

1.79


Lab work from July 29 shows:


BUN 72 creatinine 2.65


EKG tracing personally reviewed by me-sinus rhythm, first-degree AV block, mild 

intraventricular block


Chest x-ray film personally reviewed by me-pulmonary edema, cardiomegaly , 

pacemaker














Assessment:


-Acute on chronic congestive heart failure exacerbation from systolic 

dysfunction, EF less than 30%, POA


- chronicduodenal ulcers


-normocytic anemia chronic, secondary to renal disease


-chronic kidney disease stage III.at the baseline creatinine of 1.9


-History of nephrectomy due to renal cancer and bladder cancer history


-Diabetes type 2.  Insulin-dependent uncontrolled with  hyperglycemia.


-Hyperlipidemia


-Coronary artery disease with history of multiple stents placement


-Bipolar disorder and depression


-GERD


-Obesity 





Plan:


patient be started on IV Lasix.  Follows input and output  Home medications and 

resume.  Follow electrolytes.care was discussed with the patient.  Questions 

were answered.  Follow Accu-Cheks





Past Medical History


Past Medical History: Atrial Fibrillation, Coronary Artery Disease (CAD), 

Cancer, Heart Failure, Diabetes Mellitus, GERD/Reflux, GI Bleed, Hyperlipidemia,

 Hypertension, Renal Disease, Skin Disorder


Additional Past Medical History / Comment(s): Afib RVR, cardiomyopathy, renal 

carcinoma with nephrectomy, chronic renal disease stage III, chronic anemia, 

bladder cancer with removal/chemo/radiation, UTIs, UTI with septic shock, IDDM 

type II, peptic ulcer with bleed, current R foot bunion-being treated by Dr. Duckworth.


History of Any Multi-Drug Resistant Organisms: None Reported


Past Surgical History: AICD, Bladder Surgery, Coronary Bypass/CABG, Heart 

Catheterization, Orthopedic Surgery


Additional Past Surgical History / Comment(s): Nephrectomy, bladder scrapping to

 remove tumor, 7/26/20 cardioversion, 2019 AICD, 2015 CABG 5 vessel, ORIF R 

ankle with screws, total R knee arthroplasty, R carpal tunnel release, 

colonoscopy, bilateral cataract removal/lens implants.


Past Anesthesia/Blood Transfusion Reactions: No Reported Reaction


Additional Past Anesthesia/Blood Transfusion Reaction / Comment(s): PT unsure if

 he received blood with CABG


Type of Cardiac Device: AICD


Device Placement Date:: 2019


Smoking Status: Former smoker





- Past Family History


  ** Father


Family Medical History: Cancer


Additional Family Medical History / Comment(s): Father had cancer-pt cannot 

recall type.





  ** Mother


Family Medical History: No Reported History


Additional Family Medical History / Comment(s): Mother had  "bleeding problems"-

pt cannot elaborate.





  ** Brother(s)


Family Medical History: Cancer





  ** Sister(s)


Family Medical History: Cancer





Medications and Allergies


                                Home Medications











 Medication  Instructions  Recorded  Confirmed  Type


 


Venlafaxine HCl [Effexor] 75 mg PO BID  tab 02/22/20 10/08/20 Rx


 


Atorvastatin [Lipitor] 80 mg PO HS 04/05/20 10/08/20 History


 


Cyanocobalamin (Vitamin B-12) 1,000 mcg PO DAILY 04/05/20 10/08/20 History





[Vitamin B-12]    


 


Melatonin 3 mg PO HS  tablet 04/21/20 10/08/20 Rx


 


lamoTRIgine [LaMICtal] 100 mg PO HS #3 tab 04/21/20 10/08/20 Rx


 


Apixaban [Eliquis] 2.5 mg PO BID 07/24/20 10/08/20 History


 


Cholecalciferol [Vitamin D3 (25 1,000 unit PO DAILY 07/24/20 10/08/20 History





Mcg = 1000 Iu)]    


 


Ferrous Sulfate [Iron (65  mg PO DAILY 07/24/20 10/08/20 History





Elemental)]    


 


Midodrine [ProAmatine] 10 mg PO TID@0700,1100,1600 07/24/20 10/08/20 History


 


Pantoprazole Sodium [Protonix] 40 mg PO W/BRKFST 07/24/20 10/08/20 History


 


Amiodarone [Cordarone] 200 mg PO DAILY #30 tab 07/29/20 10/08/20 Rx


 


Insulin Glargine [Lantus] 42 units SQ HS 08/28/20 10/08/20 History


 


Furosemide [Lasix] 40 mg PO DAILY@1200 10/08/20 10/08/20 History


 


Furosemide [Lasix] 80 mg PO DAILY 10/08/20 10/08/20 History








                                    Allergies











Allergy/AdvReac Type Severity Reaction Status Date / Time


 


No Known Allergies Allergy   Verified 10/08/20 13:27














Physical Exam


Vitals: 


                                   Vital Signs











  Temp Pulse Pulse Resp BP BP Pulse Ox


 


 10/08/20 20:24  97.9 F   60  18   101/57  98


 


 10/08/20 17:00       121/59 


 


 10/08/20 15:00  97.4 F L   77    146/85  98


 


 10/08/20 14:42   61   18  117/67   98


 


 10/08/20 12:12  97.0 F L  65   18  112/59   96








                                Intake and Output











 10/08/20 10/08/20 10/08/20





 06:59 14:59 22:59


 


Other:   


 


  Voiding Method   Toilet


 


  Weight  101.605 kg 101.605 kg














Results


CBC & Chem 7: 


                                 10/08/20 13:15





                                 10/08/20 13:15


Labs: 


                  Abnormal Lab Results - Last 24 Hours (Table)











  10/08/20 10/08/20 10/08/20 Range/Units





  13:15 13:15 13:15 


 


RBC  3.65 L    (4.30-5.90)  m/uL


 


Hgb  10.3 L    (13.0-17.5)  gm/dL


 


Hct  33.8 L    (39.0-53.0)  %


 


MCHC  30.6 L    (31.0-37.0)  g/dL


 


Lymphocytes #  0.6 L    (1.0-4.8)  k/uL


 


INR   1.2 H   (<1.2)  


 


Sodium    136 L  (137-145)  mmol/L


 


BUN    33 H  (9-20)  mg/dL


 


Creatinine    1.79 H  (0.66-1.25)  mg/dL


 


Glucose    264 H  (74-99)  mg/dL


 


POC Glucose (mg/dL)     (75-99)  mg/dL


 


Albumin    3.2 L  (3.5-5.0)  g/dL














  10/08/20 10/08/20 Range/Units





  16:27 20:22 


 


RBC    (4.30-5.90)  m/uL


 


Hgb    (13.0-17.5)  gm/dL


 


Hct    (39.0-53.0)  %


 


MCHC    (31.0-37.0)  g/dL


 


Lymphocytes #    (1.0-4.8)  k/uL


 


INR    (<1.2)  


 


Sodium    (137-145)  mmol/L


 


BUN    (9-20)  mg/dL


 


Creatinine    (0.66-1.25)  mg/dL


 


Glucose    (74-99)  mg/dL


 


POC Glucose (mg/dL)  262 H  109 H  (75-99)  mg/dL


 


Albumin    (3.5-5.0)  g/dL














Thrombosis Risk Factor Assmnt





- Choose All That Apply


Any of the Below Risk Factors Present?: Yes


Each Factor Represents 1 point: Heart failure (<1month), Obesity (BMI >25), 

Swollen legs (current)


Other Risk Factors: Yes


Each Risk Factor Represents 2 Points: Malignancy


Each Risk Factor Represents 3 Points: Age 75 years or older


Other congenital or acquired thrombophilia - If yes, enter type in comment: No


Thrombosis Risk Factor Assessment Total Risk Factor Score: 8


Thrombosis Risk Factor Assessment Level: High Risk

## 2020-10-08 NOTE — XR
EXAMINATION TYPE: XR chest 2V

 

DATE OF EXAM: 10/8/2020

 

CLINICAL HISTORY: Difficulty breathing 

 

TECHNIQUE:  Frontal and lateral views of the chest are obtained.

 

COMPARISON: 7/20/2020 chest radiograph

 

FINDINGS:  Sternotomy wires. Left-sided single-chamber AICD. Low lung volumes accentuates the promine
nt cardiac silhouette. Diffuse airspace opacities and small right pleural effusion. No pneumothorax. 
Degenerative changes of the shoulders. 

 

IMPRESSION: Prominent cardiac silhouette, pulmonary edema, and small right pleural effusion. Findings
 likely represent CHF.

## 2020-10-09 LAB
ANION GAP SERPL CALC-SCNC: 6 MMOL/L
BUN SERPL-SCNC: 36 MG/DL (ref 9–20)
CALCIUM SPEC-MCNC: 8.7 MG/DL (ref 8.4–10.2)
CHLORIDE SERPL-SCNC: 100 MMOL/L (ref 98–107)
CO2 SERPL-SCNC: 31 MMOL/L (ref 22–30)
GLUCOSE BLD-MCNC: 147 MG/DL (ref 75–99)
GLUCOSE BLD-MCNC: 46 MG/DL (ref 75–99)
GLUCOSE BLD-MCNC: 52 MG/DL (ref 75–99)
GLUCOSE BLD-MCNC: 62 MG/DL (ref 75–99)
GLUCOSE BLD-MCNC: 69 MG/DL (ref 75–99)
GLUCOSE BLD-MCNC: 70 MG/DL (ref 75–99)
GLUCOSE BLD-MCNC: 84 MG/DL (ref 75–99)
GLUCOSE BLD-MCNC: 94 MG/DL (ref 75–99)
GLUCOSE SERPL-MCNC: 71 MG/DL (ref 74–99)
POTASSIUM SERPL-SCNC: 4.1 MMOL/L (ref 3.5–5.1)
SODIUM SERPL-SCNC: 137 MMOL/L (ref 137–145)

## 2020-10-09 RX ADMIN — INSULIN ASPART SCH: 100 INJECTION, SOLUTION INTRAVENOUS; SUBCUTANEOUS at 07:44

## 2020-10-09 RX ADMIN — MIDODRINE HYDROCHLORIDE SCH MG: 5 TABLET ORAL at 17:09

## 2020-10-09 RX ADMIN — FUROSEMIDE SCH MG: 10 INJECTION, SOLUTION INTRAMUSCULAR; INTRAVENOUS at 11:22

## 2020-10-09 RX ADMIN — AMIODARONE HYDROCHLORIDE SCH MG: 200 TABLET ORAL at 08:34

## 2020-10-09 RX ADMIN — PANTOPRAZOLE SODIUM SCH MG: 40 TABLET, DELAYED RELEASE ORAL at 08:34

## 2020-10-09 RX ADMIN — NITROGLYCERIN SCH INCH: 20 OINTMENT TOPICAL at 17:10

## 2020-10-09 RX ADMIN — NITROGLYCERIN SCH: 20 OINTMENT TOPICAL at 14:31

## 2020-10-09 RX ADMIN — Medication SCH UNIT: at 08:39

## 2020-10-09 RX ADMIN — VENLAFAXINE HYDROCHLORIDE SCH MG: 75 TABLET ORAL at 08:35

## 2020-10-09 RX ADMIN — Medication SCH MG: at 22:24

## 2020-10-09 RX ADMIN — SODIUM CHLORIDE, PRESERVATIVE FREE SCH ML: 5 INJECTION INTRAVENOUS at 11:54

## 2020-10-09 RX ADMIN — Medication SCH MG: at 08:39

## 2020-10-09 RX ADMIN — VENLAFAXINE HYDROCHLORIDE SCH MG: 75 TABLET ORAL at 22:23

## 2020-10-09 RX ADMIN — FUROSEMIDE SCH MG: 10 INJECTION, SOLUTION INTRAMUSCULAR; INTRAVENOUS at 21:21

## 2020-10-09 RX ADMIN — ACETAMINOPHEN PRN MG: 325 TABLET, FILM COATED ORAL at 15:22

## 2020-10-09 RX ADMIN — NITROGLYCERIN SCH INCH: 20 OINTMENT TOPICAL at 08:39

## 2020-10-09 RX ADMIN — FUROSEMIDE SCH MG: 10 INJECTION, SOLUTION INTRAMUSCULAR; INTRAVENOUS at 04:41

## 2020-10-09 RX ADMIN — SODIUM CHLORIDE, PRESERVATIVE FREE SCH ML: 5 INJECTION INTRAVENOUS at 22:24

## 2020-10-09 RX ADMIN — APIXABAN SCH MG: 2.5 TABLET, FILM COATED ORAL at 08:34

## 2020-10-09 RX ADMIN — CYANOCOBALAMIN TAB 500 MCG SCH MCG: 500 TAB at 08:39

## 2020-10-09 RX ADMIN — APIXABAN SCH MG: 2.5 TABLET, FILM COATED ORAL at 22:24

## 2020-10-09 RX ADMIN — MIDODRINE HYDROCHLORIDE SCH MG: 5 TABLET ORAL at 07:42

## 2020-10-09 RX ADMIN — INSULIN ASPART SCH: 100 INJECTION, SOLUTION INTRAVENOUS; SUBCUTANEOUS at 13:01

## 2020-10-09 RX ADMIN — MIDODRINE HYDROCHLORIDE SCH MG: 5 TABLET ORAL at 11:22

## 2020-10-09 RX ADMIN — INSULIN ASPART SCH: 100 INJECTION, SOLUTION INTRAVENOUS; SUBCUTANEOUS at 17:05

## 2020-10-09 RX ADMIN — NITROGLYCERIN SCH INCH: 20 OINTMENT TOPICAL at 23:14

## 2020-10-09 RX ADMIN — ATORVASTATIN CALCIUM SCH MG: 80 TABLET, FILM COATED ORAL at 22:23

## 2020-10-09 NOTE — P.PN
Progress Note - Text


Progress Note Date: 10/09/20





Chief Complaint: short of breath


history of presenting complaint


Patient is a 80-year-old patient of Dr. Duckworth.   history of chronic atrial 

fibrillation on anticoagulation with Eliquis, coronary artery disease status 

post bypass graft, cardiomyopathy-EF less than 30% status post AICD placement, 

history of nephrectomy and unilateral kidney, CK D stage III, hypertension, 

hyperlipidemia, GERD, diabetes type 2 and history of bipolar/depression 

.bleeding peptic ulcers.


patient now presents in 1 week of increasing shortness of breath.  No cough.  

Increase in lower extremity edema.  Also having trouble sleeping flat having 

positive orthopnea.  Decreased appetite tired rundown.  No fever no chills.


Admitted with CHF exacerbation.  Started on IV Lasix.


Today-sitting at the age of the bed.  Breathing a bit better.  Had a rough spot 

this morning, but breathing.  Patient been drinking quite a bit of water.  

Appetite improving





Review of systems: Was done for constitutional, cardiovascular, GI, pulmonary. 

relevant finding as above





Active Medications





Acetaminophen (Acetaminophen Tab 325 Mg Tab)  650 mg PO Q6HR PRN


   PRN Reason: Fever and/ or Pain


   Last Admin: 10/09/20 15:22 Dose:  650 mg


   Documented by: 


Amiodarone HCl (Amiodarone 200 Mg Tab)  200 mg PO DAILY [facility]


   Last Admin: 10/09/20 08:34 Dose:  200 mg


   Documented by: 


Apixaban (Apixaban 2.5 Mg Tablet)  2.5 mg PO BID [facility]


   Last Admin: 10/09/20 08:34 Dose:  2.5 mg


   Documented by: 


Atorvastatin Calcium (Atorvastatin 80 Mg Tab)  80 mg PO HS [facility]


   Last Admin: 10/08/20 21:16 Dose:  80 mg


   Documented by: 


Cholecalciferol (Cholecalciferol 1,000 Unit Tab)  1,000 unit PO DAILY [facility]


   Last Admin: 10/09/20 08:39 Dose:  1,000 unit


   Documented by: 


Cyanocobalamin (Cyanocobalamin 500 Mcg Tab)  1,000 mcg PO DAILY [facility]


   Last Admin: 10/09/20 08:39 Dose:  1,000 mcg


   Documented by: 


Ferrous Sulfate (Ferrous Sulfate 325 Mg Tab)  325 mg PO DAILY [facility]


   Last Admin: 10/09/20 08:39 Dose:  325 mg


   Documented by: 


Furosemide (Furosemide 10 Mg/Ml 10 Ml Vial)  80 mg IV Q8H [facility]


   Last Admin: 10/09/20 11:22 Dose:  80 mg


   Documented by: 


Insulin Aspart (Insulin Aspart (Novolog) 100 Unit/Ml Vial)  0 unit SQ AC-TID 

[facility]; Protocol


   Last Admin: 10/09/20 17:05 Dose:  Not Given


   Documented by: 


Insulin Detemir (Insulin Detemir (Levemir) 100 Unit/Ml Syr)  42 unit SQ [facility]


   Last Admin: 10/08/20 21:16 Dose:  42 unit


   Documented by: 


Lamotrigine (Lamotrigine 100 Mg Tab)  100 mg PO [facility]


   Last Admin: 10/08/20 21:16 Dose:  100 mg


   Documented by: 


Melatonin (Melatonin 3 Mg Tablet)  3 mg PO [facility]


   Last Admin: 10/08/20 21:53 Dose:  3 mg


   Documented by: 


Midodrine (Midodrine 5 Mg Tab)  10 mg PO TID@0700,1100,1600 [facility]


   Last Admin: 10/09/20 11:22 Dose:  10 mg


   Documented by: 


Nitroglycerin (Nitroglycerin Oint 1 Inch/Gm Packet)  1 inch TOPICAL QID [facility]


   Last Admin: 10/09/20 14:31 Dose:  Not Given


   Documented by: 


Pantoprazole Sodium (Pantoprazole 40 Mg Tablet)  40 mg PO W/BRKFST [facility]


   Last Admin: 10/09/20 08:34 Dose:  40 mg


   Documented by: 


Sodium Chloride (Sodium Chloride 0.9% Flush 10 Ml Syringe)  10 ml IV BID [facility]


   Last Admin: 10/09/20 11:54 Dose:  10 ml


   Documented by: 


Venlafaxine HCl (Venlafaxine Hcl 75 Mg Tab)  75 mg PO BID [facility]


   Last Admin: 10/09/20 08:35 Dose:  75 mg


   Documented by: 








Physical examination:


VITAL SIGNS: 97.8, 64, 16, 103/60, 96% on room air


GENERAL: Sitting in the edge of the bed


EYES: Pupils equal.  Conjunctiva normal.


HEENT: External appearance of nose and ears normal, oral cavity grossly normal.


NECK: JVD raised; masses not palpable.


HEART: [First and second heart sounds are normal; edema present.  


LUNGS: Respiratory rate increased; basal crackles.  


ABDOMEN: Soft,  nontender, liver spleen not palpable, no masses palpable.  


PSYCH: Alert and oriented x3;  mood  and affect tiredl.  








INVESTIGATIONS, reviewed in the clinical context:


Potassium 4.1 bun 36 creatinine 1.91


Admission testing


White count 6.8 hemoglobin 10.3 platelets 311 potassium 4.5 bun 33 creatinine 

1.79


Lab work from July 29 shows:


BUN 72 creatinine 2.65


EKG tracing personally reviewed by me-sinus rhythm, first-degree AV block, mild 

intraventricular block


Chest x-ray film personally reviewed by me-pulmonary edema, cardiomegaly , 

pacemaker














Assessment:


-Acute on chronic congestive heart failure exacerbation from systolic 

dysfunction, EF less than 30%, POA-slow to respond


- chronicduodenal ulcers


-normocytic anemia chronic, secondary to renal disease


-chronic kidney disease stage III.at the baseline creatinine of 1.9


-History of nephrectomy due to renal cancer and bladder cancer history


-Diabetes type 2.  Insulin-dependent uncontrolled with  hyperglycemia.


-Hyperlipidemia


-Coronary artery disease with history of multiple stents placement


-Bipolar disorder and depression


-GERD


-Obesity 





Plan:


Continue IV Lasix.  Follow electrolytes.  Discussed with the patient.  Being 

followed by cardiology.  Other medications to continue.

## 2020-10-09 NOTE — P.CRDCN
History of Present Illness


Consult date: 10/09/20


Chief complaint: Shortness of breath


History of present illness: 





This is a very pleasant 8-year-old gentleman with coronary artery disease as wel

l as severe ischemic cardiomyopathy as well as hypertension and dyslipidemia and

paroxysmal atrial fibrillation presented to the emergency room complaining of 

shortness of breath.  For the last few days he has been experiencing increasing 

in the shortness of breath with exertion and for the last day his shortness of 

breath has gotten worse to the balloon he cannot even walk a few steps before he

feels short of breath.  No symptoms of chest pain or chest discomfort.  The 

shortness of breath is definitely worse once he's laying flat in bed.  He did 

not have any lower extremities edema last few days but when he was seen and 

examined this morning he does have mild bilateral lower extremities edema.  No 

dizziness or lightheadedness and no feeling of heart racing or fluttering or 

syncope.  He states clearly that he was compliant with all his medications and 

also he stated that he is somewhat compliant with his diet on salt intake.  The 

patient somewhat is a poor historian and he is difficult to hear well.  He is 

known to have severe cardiomyopathy.  He underwent an echocardiogram in February 2020 and that revealed severe cardiomyopathy with EF around 20% was mild 

valvular abnormalities including mild aortic stenosis and mild mitral 

regurgitation and mild tricuspid regurgitation.  When he was examined and seen 

this morning he definitely have bilateral rhonchi with diminished breathing 

sounds over the left lung field.  He does have bilateral lower extremity edema. 

The EKG showed sinus rhythm with nonspecific changes.  The chest x-ray did not 

show any acute abnormalities.  The BNP was checked and came in to be around 

10,000.  Currently the patient is on Lasix.  He is known to have chronic kidney 

disease with baseline creatinine around 1.8.  At this point we'll continue the 

current dose of Lasix IV and continue monitor the kidney function and 

electrolytes.  I'm going to repeat the echocardiogram on him as well.  We'll 

continue following up with him.





Past Medical History


Past Medical History: Atrial Fibrillation, Coronary Artery Disease (CAD), 

Cancer, Heart Failure, Diabetes Mellitus, GERD/Reflux, GI Bleed, Hyperlipidemia,

Hypertension, Renal Disease, Skin Disorder


Additional Past Medical History / Comment(s): Afib RVR, cardiomyopathy, renal 

carcinoma with nephrectomy, chronic renal disease stage III, chronic anemia, 

bladder cancer with removal/chemo/radiation, UTIs, UTI with septic shock, IDDM 

type II, peptic ulcer with bleed, current R foot bunion-being treated by Dr. Duckworth.


History of Any Multi-Drug Resistant Organisms: None Reported


Past Surgical History: AICD, Bladder Surgery, Coronary Bypass/CABG, Heart 

Catheterization, Orthopedic Surgery


Additional Past Surgical History / Comment(s): Nephrectomy, bladder scrapping to

remove tumor, 7/26/20 cardioversion, 2019 AICD, 2015 CABG 5 vessel, ORIF R ankle

with screws, total R knee arthroplasty, R carpal tunnel release, colonoscopy, 

bilateral cataract removal/lens implants.


Past Anesthesia/Blood Transfusion Reactions: No Reported Reaction


Additional Past Anesthesia/Blood Transfusion Reaction / Comment(s): PT unsure if

he received blood with CABG


Type of Cardiac Device: AICD


Device Placement Date:: 2019


Smoking Status: Former smoker





- Past Family History


  ** Father


Family Medical History: Cancer


Additional Family Medical History / Comment(s): Father had cancer-pt cannot 

recall type.





  ** Mother


Family Medical History: No Reported History


Additional Family Medical History / Comment(s): Mother had  "bleeding problems"-

pt cannot elaborate.





  ** Brother(s)


Family Medical History: Cancer





  ** Sister(s)


Family Medical History: Cancer





Medications and Allergies


                                Home Medications











 Medication  Instructions  Recorded  Confirmed  Type


 


Venlafaxine HCl [Effexor] 75 mg PO BID  tab 02/22/20 10/08/20 Rx


 


Atorvastatin [Lipitor] 80 mg PO HS 04/05/20 10/08/20 History


 


Cyanocobalamin (Vitamin B-12) 1,000 mcg PO DAILY 04/05/20 10/08/20 History





[Vitamin B-12]    


 


Melatonin 3 mg PO HS  tablet 04/21/20 10/08/20 Rx


 


lamoTRIgine [LaMICtal] 100 mg PO HS #3 tab 04/21/20 10/08/20 Rx


 


Apixaban [Eliquis] 2.5 mg PO BID 07/24/20 10/08/20 History


 


Cholecalciferol [Vitamin D3 (25 1,000 unit PO DAILY 07/24/20 10/08/20 History





Mcg = 1000 Iu)]    


 


Ferrous Sulfate [Iron (65  mg PO DAILY 07/24/20 10/08/20 History





Elemental)]    


 


Midodrine [ProAmatine] 10 mg PO TID@0700,1100,1600 07/24/20 10/08/20 History


 


Pantoprazole Sodium [Protonix] 40 mg PO W/BRKFST 07/24/20 10/08/20 History


 


Amiodarone [Cordarone] 200 mg PO DAILY #30 tab 07/29/20 10/08/20 Rx


 


Insulin Glargine [Lantus] 42 units SQ HS 08/28/20 10/08/20 History


 


Furosemide [Lasix] 40 mg PO DAILY@1200 10/08/20 10/08/20 History


 


Furosemide [Lasix] 80 mg PO DAILY 10/08/20 10/08/20 History








                                    Allergies











Allergy/AdvReac Type Severity Reaction Status Date / Time


 


No Known Allergies Allergy   Verified 10/08/20 13:27














Physical Exam


Vitals: 


                                   Vital Signs











  Temp Pulse Pulse Resp BP BP Pulse Ox


 


 10/09/20 09:00  97.8 F   64  16   103/60  96


 


 10/09/20 03:26  98 F   67  18   113/64  96


 


 10/08/20 20:24  97.9 F   60  18   101/57  98


 


 10/08/20 17:00       121/59 


 


 10/08/20 15:00  97.4 F L   77    146/85  98


 


 10/08/20 14:42   61   18  117/67   98


 


 10/08/20 12:12  97.0 F L  65   18  112/59   96








                                Intake and Output











 10/08/20 10/09/20 10/09/20





 22:59 06:59 14:59


 


Intake Total 450  


 


Balance 450  


 


Intake:   


 


  Oral 450  


 


Other:   


 


  Voiding Method Toilet Toilet 


 


  Weight 101.605 kg 99.1 kg 














- Constitutional


General appearance: no acute distress





- Respiratory


Respiratory: left: diminished





- Cardiovascular


Rhythm: regular


Heart sounds: normal: S1, S2


Abnormal Heart Sounds: systolic murmur





Results





                                 10/08/20 13:15





                                 10/09/20 08:43


                                 Cardiac Enzymes











  10/08/20 10/08/20 Range/Units





  13:15 13:15 


 


AST  20   (17-59)  U/L


 


Troponin I   0.025  (0.000-0.034)  ng/mL








                                   Coagulation











  10/08/20 Range/Units





  13:15 


 


PT  11.9  (9.0-12.0)  sec


 


APTT  24.9  (22.0-30.0)  sec








                                       CBC











  10/08/20 Range/Units





  13:15 


 


WBC  6.8  (3.8-10.6)  k/uL


 


RBC  3.65 L  (4.30-5.90)  m/uL


 


Hgb  10.3 L  (13.0-17.5)  gm/dL


 


Hct  33.8 L  (39.0-53.0)  %


 


Plt Count  311  (150-450)  k/uL








                          Comprehensive Metabolic Panel











  10/08/20 10/09/20 Range/Units





  13:15 08:43 


 


Sodium  136 L  137  (137-145)  mmol/L


 


Potassium  4.5  4.1  (3.5-5.1)  mmol/L


 


Chloride  100  100  ()  mmol/L


 


Carbon Dioxide  30  31 H  (22-30)  mmol/L


 


BUN  33 H  36 H  (9-20)  mg/dL


 


Creatinine  1.79 H  1.91 H  (0.66-1.25)  mg/dL


 


Glucose  264 H  71 L  (74-99)  mg/dL


 


Calcium  8.7  8.7  (8.4-10.2)  mg/dL


 


AST  20   (17-59)  U/L


 


ALT  12   (4-49)  U/L


 


Alkaline Phosphatase  126   ()  U/L


 


Total Protein  6.8   (6.3-8.2)  g/dL


 


Albumin  3.2 L   (3.5-5.0)  g/dL








                               Current Medications











Generic Name Dose Route Start Last Admin





  Trade Name Uzielq  PRN Reason Stop Dose Admin


 


Amiodarone HCl  200 mg  10/09/20 09:00  10/09/20 08:34





  Amiodarone 200 Mg Tab  PO   200 mg





  DAILY MORGAN   Administration


 


Apixaban  2.5 mg  10/08/20 21:00  10/09/20 08:34





  Apixaban 2.5 Mg Tablet  PO   2.5 mg





  BID MORGAN   Administration


 


Atorvastatin Calcium  80 mg  10/08/20 21:00  10/08/20 21:16





  Atorvastatin 80 Mg Tab  PO   80 mg





  HS MORGAN   Administration


 


Cholecalciferol  1,000 unit  10/09/20 09:00  10/09/20 08:39





  Cholecalciferol 1,000 Unit Tab  PO   1,000 unit





  DAILY MORGAN   Administration


 


Cyanocobalamin  1,000 mcg  10/09/20 09:00  10/09/20 08:39





  Cyanocobalamin 500 Mcg Tab  PO   1,000 mcg





  DAILY MORGAN   Administration


 


Ferrous Sulfate  325 mg  10/09/20 09:00  10/09/20 08:39





  Ferrous Sulfate 325 Mg Tab  PO   325 mg





  DAILY MORGAN   Administration


 


Furosemide  80 mg  10/08/20 20:00  10/09/20 04:41





  Furosemide 10 Mg/Ml 10 Ml Vial  IV   80 mg





  Q8H MORGAN   Administration


 


Insulin Aspart  0 unit  10/08/20 17:30  10/09/20 07:44





  Insulin Aspart (Novolog) 100 Unit/Ml Vial  SQ   Not Given





  AC-TID [facility]  





  Protocol  


 


Insulin Detemir  42 unit  10/08/20 21:00  10/08/20 21:16





  Insulin Detemir (Levemir) 100 Unit/Ml Syr  SQ   42 unit





  HS MORGAN   Administration


 


Lamotrigine  100 mg  10/08/20 21:00  10/08/20 21:16





  Lamotrigine 100 Mg Tab  PO   100 mg





  HS MORGAN   Administration


 


Melatonin  3 mg  10/08/20 21:00  10/08/20 21:53





  Melatonin 3 Mg Tablet  PO   3 mg





  HS MORGAN   Administration


 


Midodrine  10 mg  10/08/20 16:00  10/09/20 07:42





  Midodrine 5 Mg Tab  PO   10 mg





  TID@0700,1100,1600 MORGAN   Administration


 


Nitroglycerin  1 inch  10/08/20 18:00  10/09/20 08:39





  Nitroglycerin Oint 1 Inch/Gm Packet  TOPICAL   1 inch





  QID MORGAN   Administration


 


Pantoprazole Sodium  40 mg  10/09/20 07:30  10/09/20 08:34





  Pantoprazole 40 Mg Tablet  PO   40 mg





  W/BRKFST MORGAN   Administration


 


Sodium Chloride  10 ml  10/08/20 21:00  10/08/20 21:16





  Sodium Chloride 0.9% Flush 10 Ml Syringe  IV   10 ml





  BID MORGAN   Administration


 


Venlafaxine HCl  75 mg  10/08/20 21:00  10/09/20 08:35





  Venlafaxine Hcl 75 Mg Tab  PO   75 mg





  BID MORGAN   Administration








                                Intake and Output











 10/08/20 10/09/20 10/09/20





 22:59 06:59 14:59


 


Intake Total 450  


 


Balance 450  


 


Intake:   


 


  Oral 450  


 


Other:   


 


  Voiding Method Toilet Toilet 


 


  Weight 101.605 kg 99.1 kg 








                                        





                                 10/08/20 13:15 





                                 10/09/20 08:43 











Assessment and Plan


Assessment: 





Assessment


#1 acute exacerbation of congestive heart failure with reduced ejection fraction


#2 known severe cardiomyopathy


#3 no coronary artery disease


#4 status post ICD


#5 multiple comorbid conditions





Plan


#1 continue the current dose of Lasix IV


#2 continue monitor the kidney function and electrolytes


#3 he was educated about the importance of following no salt diet


#4 repeat the echocardiogram


#5 continue oral anticoagulation for the atrial fibrillation


#6 follow up with the patient





Thank you for allowing us participate in his care

## 2020-10-10 LAB
ANION GAP SERPL CALC-SCNC: 8 MMOL/L
BUN SERPL-SCNC: 40 MG/DL (ref 9–20)
CALCIUM SPEC-MCNC: 8.9 MG/DL (ref 8.4–10.2)
CHLORIDE SERPL-SCNC: 98 MMOL/L (ref 98–107)
CO2 SERPL-SCNC: 31 MMOL/L (ref 22–30)
GLUCOSE BLD-MCNC: 140 MG/DL (ref 75–99)
GLUCOSE BLD-MCNC: 148 MG/DL (ref 75–99)
GLUCOSE BLD-MCNC: 149 MG/DL (ref 75–99)
GLUCOSE BLD-MCNC: 177 MG/DL (ref 75–99)
GLUCOSE BLD-MCNC: 183 MG/DL (ref 75–99)
GLUCOSE BLD-MCNC: 70 MG/DL (ref 75–99)
GLUCOSE SERPL-MCNC: 122 MG/DL (ref 74–99)
MAGNESIUM SPEC-SCNC: 2.1 MG/DL (ref 1.6–2.3)
POTASSIUM SERPL-SCNC: 4.6 MMOL/L (ref 3.5–5.1)
SODIUM SERPL-SCNC: 137 MMOL/L (ref 137–145)

## 2020-10-10 RX ADMIN — VENLAFAXINE HYDROCHLORIDE SCH MG: 75 TABLET ORAL at 21:18

## 2020-10-10 RX ADMIN — ATORVASTATIN CALCIUM SCH MG: 80 TABLET, FILM COATED ORAL at 21:18

## 2020-10-10 RX ADMIN — MIDODRINE HYDROCHLORIDE SCH: 5 TABLET ORAL at 15:39

## 2020-10-10 RX ADMIN — Medication SCH MG: at 21:18

## 2020-10-10 RX ADMIN — NITROGLYCERIN SCH INCH: 20 OINTMENT TOPICAL at 14:05

## 2020-10-10 RX ADMIN — INSULIN ASPART SCH UNIT: 100 INJECTION, SOLUTION INTRAVENOUS; SUBCUTANEOUS at 17:22

## 2020-10-10 RX ADMIN — MIDODRINE HYDROCHLORIDE SCH MG: 5 TABLET ORAL at 11:07

## 2020-10-10 RX ADMIN — APIXABAN SCH MG: 2.5 TABLET, FILM COATED ORAL at 08:57

## 2020-10-10 RX ADMIN — Medication SCH UNIT: at 08:57

## 2020-10-10 RX ADMIN — ACETAMINOPHEN PRN MG: 325 TABLET, FILM COATED ORAL at 07:25

## 2020-10-10 RX ADMIN — INSULIN ASPART SCH UNIT: 100 INJECTION, SOLUTION INTRAVENOUS; SUBCUTANEOUS at 12:38

## 2020-10-10 RX ADMIN — NITROGLYCERIN SCH INCH: 20 OINTMENT TOPICAL at 08:58

## 2020-10-10 RX ADMIN — PANTOPRAZOLE SODIUM SCH MG: 40 TABLET, DELAYED RELEASE ORAL at 07:19

## 2020-10-10 RX ADMIN — VENLAFAXINE HYDROCHLORIDE SCH MG: 75 TABLET ORAL at 08:57

## 2020-10-10 RX ADMIN — APIXABAN SCH MG: 2.5 TABLET, FILM COATED ORAL at 21:19

## 2020-10-10 RX ADMIN — NITROGLYCERIN SCH INCH: 20 OINTMENT TOPICAL at 18:09

## 2020-10-10 RX ADMIN — SODIUM CHLORIDE, PRESERVATIVE FREE SCH ML: 5 INJECTION INTRAVENOUS at 21:20

## 2020-10-10 RX ADMIN — INSULIN DETEMIR SCH UNIT: 100 INJECTION, SOLUTION SUBCUTANEOUS at 21:19

## 2020-10-10 RX ADMIN — FUROSEMIDE SCH MG: 10 INJECTION, SOLUTION INTRAMUSCULAR; INTRAVENOUS at 11:08

## 2020-10-10 RX ADMIN — CYANOCOBALAMIN TAB 500 MCG SCH MCG: 500 TAB at 08:58

## 2020-10-10 RX ADMIN — SODIUM CHLORIDE, PRESERVATIVE FREE SCH ML: 5 INJECTION INTRAVENOUS at 10:17

## 2020-10-10 RX ADMIN — MIDODRINE HYDROCHLORIDE SCH MG: 5 TABLET ORAL at 07:19

## 2020-10-10 RX ADMIN — FUROSEMIDE SCH MG: 10 INJECTION, SOLUTION INTRAMUSCULAR; INTRAVENOUS at 21:12

## 2020-10-10 RX ADMIN — FUROSEMIDE SCH MG: 10 INJECTION, SOLUTION INTRAMUSCULAR; INTRAVENOUS at 04:45

## 2020-10-10 RX ADMIN — INSULIN ASPART SCH UNIT: 100 INJECTION, SOLUTION INTRAVENOUS; SUBCUTANEOUS at 07:19

## 2020-10-10 RX ADMIN — Medication SCH MG: at 08:56

## 2020-10-10 RX ADMIN — NITROGLYCERIN SCH INCH: 20 OINTMENT TOPICAL at 21:20

## 2020-10-10 RX ADMIN — AMIODARONE HYDROCHLORIDE SCH MG: 200 TABLET ORAL at 08:57

## 2020-10-10 NOTE — P.PN
Subjective





history of presenting complaint


Patient is a 80-year-old patient of Dr. Duckworth.   history of chronic atrial 

fibrillation on anticoagulation with Eliquis, coronary artery disease status 

post bypass graft, cardiomyopathy-EF less than 30% status post AICD placement, 

history of nephrectomy and unilateral kidney, CK D stage III, hypertension, 

hyperlipidemia, GERD, diabetes type 2 and history of bipolar/depression 

.bleeding peptic ulcers.


patient now presents in 1 week of increasing shortness of breath.  No cough.  

Increase in lower extremity edema.  Also having trouble sleeping flat having 

positive orthopnea.  Decreased appetite tired rundown.  No fever no chills.


Admitted with CHF exacerbation.  Started on IV Lasix.


Today-sitting at the age of the bed.  Breathing a bit better.  Had a rough spot 

this morning, but breathing.  Patient been drinking quite a bit of water.  

Appetite improving





10/10/2020


Patient breathing is quiet however he still have significant leg swelling


Cardiologist evaluated the patient and recommended to continue with IV Lasix for

now, Ace wrap is going to be applied to both lower extremities


Vitas looks stable and creatinine is stable at 2.0.  Alk phos is normal and 

sugar is controlled.





Objective





- Vital Signs


Vital signs: 


                                   Vital Signs











Temp  97.6 F   10/10/20 14:56


 


Pulse  73   10/10/20 14:56


 


Resp  18   10/10/20 14:56


 


BP  137/76   10/10/20 14:56


 


Pulse Ox  93 L  10/10/20 14:56








                                 Intake & Output











 10/09/20 10/10/20 10/10/20





 18:59 06:59 18:59


 


Intake Total 486 300 


 


Output Total 600  


 


Balance -114 300 


 


Weight 99.1 kg 97.522 kg 


 


Intake:   


 


  Oral 486 300 


 


Output:   


 


  Urine 600  


 


Other:   


 


  Voiding Method  Toilet 


 


  # Voids 3  2














- Exam





GENERAL: The patient is alert and oriented x3, not in any acute distress. Well 

developed, well nourished. 


HEENT: Pupils are round and equally reacting to light. EOMI. No scleral icterus.

No conjunctival pallor. Normocephalic, atraumatic. No pharyngeal erythema. No 

thyromegaly. 


CARDIOVASCULAR: S1 and S2 present. No murmurs, rubs, or gallops. 


PULMONARY: Chest is clear to auscultation, no wheezing or crackles. 


ABDOMEN: Soft, nontender, nondistended, normoactive bowel sounds. No palpable 

organomegaly. 


MUSCULOSKELETAL: No joint swelling or deformity. 


-EXTREMITIES: No cyanosis, clubbing, .  2-3+ bilateral pitting leg edema


NEUROLOGICAL: Gross neurological examination did not reveal any focal deficits. 


SKIN: No rashes. no petechiae.





- Labs


CBC & Chem 7: 


                                 10/08/20 13:15





                                 10/10/20 07:50


Labs: 


                  Abnormal Lab Results - Last 24 Hours (Table)











  10/09/20 10/09/20 10/09/20 Range/Units





  21:25 23:21 23:59 


 


Carbon Dioxide     (22-30)  mmol/L


 


BUN     (9-20)  mg/dL


 


Creatinine     (0.66-1.25)  mg/dL


 


Glucose     (74-99)  mg/dL


 


POC Glucose (mg/dL)  70 L  62 L  70 L  (75-99)  mg/dL














  10/10/20 10/10/20 10/10/20 Range/Units





  04:41 06:45 07:50 


 


Carbon Dioxide    31 H  (22-30)  mmol/L


 


BUN    40 H  (9-20)  mg/dL


 


Creatinine    2.08 H  (0.66-1.25)  mg/dL


 


Glucose    122 H  (74-99)  mg/dL


 


POC Glucose (mg/dL)  148 H  149 H   (75-99)  mg/dL














  10/10/20 Range/Units





  11:48 


 


Carbon Dioxide   (22-30)  mmol/L


 


BUN   (9-20)  mg/dL


 


Creatinine   (0.66-1.25)  mg/dL


 


Glucose   (74-99)  mg/dL


 


POC Glucose (mg/dL)  183 H  (75-99)  mg/dL














Assessment and Plan


Assessment: 





-Acute on chronic congestive heart failure exacerbation from systolic 

dysfunction, EF less than 30%, POA-slow to respond


- chronicduodenal ulcers


-normocytic anemia chronic, secondary to renal disease


-chronic kidney disease stage III.at the baseline creatinine of 1.9


-History of nephrectomy due to renal cancer and bladder cancer history


-Diabetes type 2.  Insulin-dependent uncontrolled with  hyperglycemia.


-Hyperlipidemia


-Coronary artery disease with history of multiple stents placement


-Bipolar disorder and depression


-GERD


-Obesity 








Plan: 





Continue IV Lasix.  Follow electrolytes.  Discussed with the patient and he 

agrees to [facility].  Being followed by cardiology.  Other medications to 

continue.


DVT prophylaxis: Eliquis


GI prophylaxis PPI

## 2020-10-10 NOTE — P.PN
Subjective


Progress Note Date: 10/10/20


History of present illness: 





This is a very pleasant 8-year-old gentleman with coronary artery disease as 

well as severe ischemic cardiomyopathy as well as hypertension and dyslipidemia 

and paroxysmal atrial fibrillation presented to the emergency room complaining 

of shortness of breath.  For the last few days he has been experiencing 

increasing in the shortness of breath with exertion and for the last day his 

shortness of breath has gotten worse to the balloon he cannot even walk a few 

steps before he feels short of breath.  No symptoms of chest pain or chest 

discomfort.  The shortness of breath is definitely worse once he's laying flat 

in bed.  He did not have any lower extremities edema last few days but when he 

was seen and examined this morning he does have mild bilateral lower extremities

edema.  No dizziness or lightheadedness and no feeling of heart racing or f

luttering or syncope.  He states clearly that he was compliant with all his 

medications and also he stated that he is somewhat compliant with his diet on 

salt intake.  The patient somewhat is a poor historian and he is difficult to 

hear well.  He is known to have severe cardiomyopathy.  He underwent an 

echocardiogram in February 2020 and that revealed severe cardiomyopathy with EF 

around 20% was mild valvular abnormalities including mild aortic stenosis and 

mild mitral regurgitation and mild tricuspid regurgitation.  When he was 

examined and seen this morning he definitely have bilateral rhonchi with 

diminished breathing sounds over the left lung field.  He does have bilateral 

lower extremity edema.  The EKG showed sinus rhythm with nonspecific changes.  

The chest x-ray did not show any acute abnormalities.  The BNP was checked and 

came in to be around 10,000.  Currently the patient is on Lasix.  He is known to

have chronic kidney disease with baseline creatinine around 1.8.  At this point 

we'll continue the current dose of Lasix IV and continue monitor the kidney 

function and electrolytes.  I'm going to repeat the echocardiogram on him as 

well.  We'll continue following up with him.





10/10/2020


Patient continues to have lower extremity edema and crackles on exam.  He has 

been receiving Lasix 80 mg IV every 8 hours.  Creatinine noted to be mildly 

elevated at 2.08 up from 1.8 on admission however wide range of kidney function 

on prior admissions.  Blood pressure controlled with borderline low systolics in

the 100s.  He states he feels well and is wondering if he can go home.





PHYSICAL EXAMINATION


Blood pressure 123/61 heart rate 67 afebrile and maintaining oxygen saturation 

on 2 L nasal cannula


CONSTITUTIONAL: No apparent distress. 


HEENT: Head is normocephalic. Pupils are equal, round. Sclerae anicteric. Mucous

membranes of the mouth are moist.  No JVD. No carotid bruit.


CHEST EXAMINATION: Bilateral crackles at bases.


HEART EXAMINATION: Regular rate and rhythm. S1, S2 heard. No murmurs, gallops or

rub.


ABDOMEN: Soft, nontender. Positive bowel sounds.


EXTREMITIES: 2-3+ lower extremity edema


NEUROLOGIC EXAMINATION: Patient is awake, alert and oriented x3. 





Assessment


#1 acute exacerbation of congestive heart failure with reduced ejection fraction


#2 known severe cardiomyopathy


#3 no coronary artery disease


#4 status post ICD


#5 multiple comorbid conditions


#6 paroxysmal atrial fibrillation, currently sinus rhythm





Plan


Continue the current dose of Lasix IV, monitor creatinine closely with 

increasing creatinine noted.  Continue with Eliquis 2.5 mg twice a day for his 

atrial fibrillation.  Patient has been intolerant to heart failure regimen in 

the past with borderline low blood pressures.  Ideally ACE inhibitor or beta 

blocker.  Intolerance to heart failure regimen may be a sign of end-stage heart 

failure.  Attempt to optimize heart failure regimen as able.  Ins and outs do 

not appear accurate although appears to be losing weight.  Continue diuresis and

supportive care.














Objective





- Vital Signs


Vital signs: 


                                   Vital Signs











Temp  97.6 F   10/10/20 09:00


 


Pulse  67   10/10/20 11:05


 


Resp  18   10/10/20 11:05


 


BP  123/61   10/10/20 11:05


 


Pulse Ox  94 L  10/10/20 11:05








                                 Intake & Output











 10/09/20 10/10/20 10/10/20





 18:59 06:59 18:59


 


Intake Total 486 300 


 


Output Total 600  


 


Balance -114 300 


 


Weight 99.1 kg 97.522 kg 


 


Intake:   


 


  Oral 486 300 


 


Output:   


 


  Urine 600  


 


Other:   


 


  Voiding Method  Toilet 


 


  # Voids 3  














- Labs


CBC & Chem 7: 


                                 10/08/20 13:15





                                 10/10/20 07:50


Labs: 


                  Abnormal Lab Results - Last 24 Hours (Table)











  10/09/20 10/09/20 10/09/20 Range/Units





  21:25 23:21 23:59 


 


Carbon Dioxide     (22-30)  mmol/L


 


BUN     (9-20)  mg/dL


 


Creatinine     (0.66-1.25)  mg/dL


 


Glucose     (74-99)  mg/dL


 


POC Glucose (mg/dL)  70 L  62 L  70 L  (75-99)  mg/dL














  10/10/20 10/10/20 10/10/20 Range/Units





  04:41 06:45 07:50 


 


Carbon Dioxide    31 H  (22-30)  mmol/L


 


BUN    40 H  (9-20)  mg/dL


 


Creatinine    2.08 H  (0.66-1.25)  mg/dL


 


Glucose    122 H  (74-99)  mg/dL


 


POC Glucose (mg/dL)  148 H  149 H   (75-99)  mg/dL














  10/10/20 Range/Units





  11:48 


 


Carbon Dioxide   (22-30)  mmol/L


 


BUN   (9-20)  mg/dL


 


Creatinine   (0.66-1.25)  mg/dL


 


Glucose   (74-99)  mg/dL


 


POC Glucose (mg/dL)  183 H  (75-99)  mg/dL

## 2020-10-11 LAB
ANION GAP SERPL CALC-SCNC: 10 MMOL/L
BUN SERPL-SCNC: 41 MG/DL (ref 9–20)
CALCIUM SPEC-MCNC: 9 MG/DL (ref 8.4–10.2)
CHLORIDE SERPL-SCNC: 97 MMOL/L (ref 98–107)
CO2 SERPL-SCNC: 30 MMOL/L (ref 22–30)
GLUCOSE BLD-MCNC: 119 MG/DL (ref 75–99)
GLUCOSE BLD-MCNC: 135 MG/DL (ref 75–99)
GLUCOSE BLD-MCNC: 155 MG/DL (ref 75–99)
GLUCOSE BLD-MCNC: 170 MG/DL (ref 75–99)
GLUCOSE SERPL-MCNC: 175 MG/DL (ref 74–99)
MAGNESIUM SPEC-SCNC: 2.2 MG/DL (ref 1.6–2.3)
POTASSIUM SERPL-SCNC: 4.5 MMOL/L (ref 3.5–5.1)
SODIUM SERPL-SCNC: 137 MMOL/L (ref 137–145)

## 2020-10-11 RX ADMIN — INSULIN ASPART SCH UNIT: 100 INJECTION, SOLUTION INTRAVENOUS; SUBCUTANEOUS at 07:24

## 2020-10-11 RX ADMIN — MIDODRINE HYDROCHLORIDE SCH: 5 TABLET ORAL at 15:35

## 2020-10-11 RX ADMIN — INSULIN ASPART SCH: 100 INJECTION, SOLUTION INTRAVENOUS; SUBCUTANEOUS at 12:53

## 2020-10-11 RX ADMIN — VENLAFAXINE HYDROCHLORIDE SCH MG: 75 TABLET ORAL at 21:51

## 2020-10-11 RX ADMIN — CYANOCOBALAMIN TAB 500 MCG SCH MCG: 500 TAB at 08:51

## 2020-10-11 RX ADMIN — PANTOPRAZOLE SODIUM SCH MG: 40 TABLET, DELAYED RELEASE ORAL at 07:23

## 2020-10-11 RX ADMIN — Medication SCH UNIT: at 08:51

## 2020-10-11 RX ADMIN — SODIUM CHLORIDE, PRESERVATIVE FREE SCH ML: 5 INJECTION INTRAVENOUS at 22:32

## 2020-10-11 RX ADMIN — Medication SCH MG: at 08:51

## 2020-10-11 RX ADMIN — Medication SCH MG: at 21:51

## 2020-10-11 RX ADMIN — FUROSEMIDE SCH MG: 10 INJECTION, SOLUTION INTRAMUSCULAR; INTRAVENOUS at 11:34

## 2020-10-11 RX ADMIN — MIDODRINE HYDROCHLORIDE SCH MG: 5 TABLET ORAL at 07:23

## 2020-10-11 RX ADMIN — FUROSEMIDE SCH MG: 10 INJECTION, SOLUTION INTRAMUSCULAR; INTRAVENOUS at 04:34

## 2020-10-11 RX ADMIN — APIXABAN SCH MG: 2.5 TABLET, FILM COATED ORAL at 21:51

## 2020-10-11 RX ADMIN — VENLAFAXINE HYDROCHLORIDE SCH MG: 75 TABLET ORAL at 08:51

## 2020-10-11 RX ADMIN — MIDODRINE HYDROCHLORIDE SCH MG: 5 TABLET ORAL at 11:34

## 2020-10-11 RX ADMIN — ATORVASTATIN CALCIUM SCH MG: 80 TABLET, FILM COATED ORAL at 21:51

## 2020-10-11 RX ADMIN — NITROGLYCERIN SCH INCH: 20 OINTMENT TOPICAL at 17:53

## 2020-10-11 RX ADMIN — NITROGLYCERIN SCH INCH: 20 OINTMENT TOPICAL at 12:53

## 2020-10-11 RX ADMIN — NITROGLYCERIN SCH INCH: 20 OINTMENT TOPICAL at 22:32

## 2020-10-11 RX ADMIN — APIXABAN SCH MG: 2.5 TABLET, FILM COATED ORAL at 08:51

## 2020-10-11 RX ADMIN — INSULIN DETEMIR SCH UNIT: 100 INJECTION, SOLUTION SUBCUTANEOUS at 21:52

## 2020-10-11 RX ADMIN — SODIUM CHLORIDE, PRESERVATIVE FREE SCH ML: 5 INJECTION INTRAVENOUS at 08:52

## 2020-10-11 RX ADMIN — NITROGLYCERIN SCH INCH: 20 OINTMENT TOPICAL at 08:51

## 2020-10-11 RX ADMIN — AMIODARONE HYDROCHLORIDE SCH MG: 200 TABLET ORAL at 08:51

## 2020-10-11 RX ADMIN — INSULIN ASPART SCH: 100 INJECTION, SOLUTION INTRAVENOUS; SUBCUTANEOUS at 17:51

## 2020-10-11 NOTE — P.PN
Subjective


Progress Note Date: 10/11/20





This is a very pleasant 80-year-old gentleman with coronary artery disease as 

well as severe ischemic cardiomyopathy as well as hypertension and dyslipidemia 

and paroxysmal atrial fibrillation presented to the emergency room complaining 

of shortness of breath.  For the last few days he has been experiencing 

increasing in the shortness of breath with exertion and for the last day his 

shortness of breath has gotten worse to the balloon he cannot even walk a few 

steps before he feels short of breath.  No symptoms of chest pain or chest 

discomfort.  The shortness of breath is definitely worse once he's laying flat 

in bed.  He did not have any lower extremities edema last few days but when he 

was seen and examined this morning he does have mild bilateral lower extremities

edema.  No dizziness or lightheadedness and no feeling of heart racing or 

fluttering or syncope.  He states clearly that he was compliant with all his 

medications and also he stated that he is somewhat compliant with his diet on 

salt intake.  The patient somewhat is a poor historian and he is difficult to 

hear well.  He is known to have severe cardiomyopathy.  He underwent an 

echocardiogram in February 2020 and that revealed severe cardiomyopathy with EF 

around 20% was mild valvular abnormalities including mild aortic stenosis and 

mild mitral regurgitation and mild tricuspid regurgitation.  When he was 

examined and seen this morning he definitely have bilateral rhonchi with 

diminished breathing sounds over the left lung field.  He does have bilateral 

lower extremity edema.  The EKG showed sinus rhythm with nonspecific changes.  

The chest x-ray did not show any acute abnormalities.  The BNP was checked and 

came in to be around 10,000.  Currently the patient is on Lasix.  He is known to

have chronic kidney disease with baseline creatinine around 1.8.  At this point 

we'll continue the current dose of Lasix IV and continue monitor the kidney 

function and electrolytes.  I'm going to repeat the echocardiogram on him as 

well.  We'll continue following up with him.





10/11/2020


Patient seen and examined.  Patient continues to receive IV Lasix and states he 

has been having good urine output.  He believes his lower extremity edema is 

slowly improving.   Denies any chest pain or shortness breath.  Blood pressure 

is borderline low with systolics in the 100s to 110s.  States he is anxious to 

go home.  Still with significant shortness of breath with minimal activity.





PHYSICAL EXAMINATION


Blood pressure 108/59 heart rate 66 afebrile and maintaining oxygen saturation 

on 2 L nasal cannula


CONSTITUTIONAL: No apparent distress. 


HEENT: Head is normocephalic. Pupils are equal, round. Sclerae anicteric. Mucous

membranes of the mouth are moist.  No carotid bruit.


CHEST EXAMINATION: Bilateral crackles at bases.


HEART EXAMINATION: Regular rate and rhythm. S1, S2 heard. No murmurs, gallops or

rub.


ABDOMEN: Soft, nontender. Positive bowel sounds.


EXTREMITIES: 2-3+ lower extremity edema


NEUROLOGIC EXAMINATION: Patient is awake, alert and oriented x3. 





Assessment


#1 acute exacerbation of congestive heart failure with reduced ejection fraction


#2 known severe cardiomyopathy


#3 no coronary artery disease


#4 status post ICD


#5 multiple comorbid conditions


#6 paroxysmal atrial fibrillation, currently sinus rhythm


#7 Acute kidney injury, 1.8-2.2





Plan


Patient's volume status is somewhat hard to assess.  He does have bilateral 

lower extremity edema and crackles on exam.  Unfortunately his BUN and 

creatinine has been continuously increasing and his ins and outs have been an 

accurate.  No significant hypotensive episodes to correlate for ATN.  We will 

hold IV diuretics and transitioned to 80 mg oral once a day and monitor 

response.


Continue with Eliquis 2.5 mg twice a day for his atrial fibrillation.  Patient 

has been intolerant to heart failure regimen in the past with borderline low 

blood pressures.  Ideally ACE inhibitor or beta blocker.  Intolerance to heart 

failure regimen may be a sign of end-stage heart failure.  Attempt to optimize 

heart failure regimen as able.  If Creatinine fails to normalize and still 

ambiguous volume status, may consider right heart catheterization for both 

pressures as well as to measure cardiac output.








Objective





- Vital Signs


Vital signs: 


                                   Vital Signs











Temp  97.7 F   10/11/20 09:00


 


Pulse  66   10/11/20 09:00


 


Resp  16   10/11/20 09:00


 


BP  108/59   10/11/20 11:33


 


Pulse Ox  94 L  10/11/20 11:33








                                 Intake & Output











 10/10/20 10/11/20 10/11/20





 18:59 06:59 18:59


 


Intake Total  200 


 


Balance  200 


 


Weight  97.069 kg 


 


Intake:   


 


  Oral  200 


 


Other:   


 


  Voiding Method  Toilet 


 


  # Voids 2  1














- Labs


CBC & Chem 7: 


                                 10/08/20 13:15





                                 10/11/20 07:04


Labs: 


                  Abnormal Lab Results - Last 24 Hours (Table)











  10/10/20 10/10/20 10/11/20 Range/Units





  17:10 20:12 06:07 


 


Chloride     ()  mmol/L


 


BUN     (9-20)  mg/dL


 


Creatinine     (0.66-1.25)  mg/dL


 


Glucose     (74-99)  mg/dL


 


POC Glucose (mg/dL)  177 H  140 H  170 H  (75-99)  mg/dL














  10/11/20 10/11/20 Range/Units





  07:04 11:58 


 


Chloride  97 L   ()  mmol/L


 


BUN  41 H   (9-20)  mg/dL


 


Creatinine  2.20 H   (0.66-1.25)  mg/dL


 


Glucose  175 H   (74-99)  mg/dL


 


POC Glucose (mg/dL)   119 H  (75-99)  mg/dL

## 2020-10-11 NOTE — P.PN
Subjective





history of presenting complaint


Patient is a 80-year-old patient of Dr. Duckworth.   history of chronic atrial 

fibrillation on anticoagulation with Eliquis, coronary artery disease status 

post bypass graft, cardiomyopathy-EF less than 30% status post AICD placement, 

history of nephrectomy and unilateral kidney, CK D stage III, hypertension, 

hyperlipidemia, GERD, diabetes type 2 and history of bipolar/depression 

.bleeding peptic ulcers.


patient now presents in 1 week of increasing shortness of breath.  No cough.  

Increase in lower extremity edema.  Also having trouble sleeping flat having 

positive orthopnea.  Decreased appetite tired rundown.  No fever no chills.


Admitted with CHF exacerbation.  Started on IV Lasix.


Today-sitting at the age of the bed.  Breathing a bit better.  Had a rough spot 

this morning, but breathing.  Patient been drinking quite a bit of water.  

Appetite improving





10/10/2020


Patient breathing is quiet however he still have significant leg swelling


Cardiologist evaluated the patient and recommended to continue with IV Lasix for

now, Ace wrap is going to be applied to both lower extremities


Vitas looks stable and creatinine is stable at 2.0.  Alk phos is normal and 

sugar is controlled.





10/11/2020


Patient is awake and alert, his breathing quietly, he is saturating 90s on room 

air, he still has leg swelling but improved due to Ace wrap.  His creatinine 

went up to 2.2 today and cardiology team switch him to oral Lasix 80 mg daily.  

Cardiology team did not cleared the patient for discharge today and the summer

mmend keep monitoring.  Patient could not tolerate medications for heart failure

including beta blocker or ACE inhibitor, and now he does not tolerate well 

Lasix, due to his advanced disease.  Cardiologist recommended to keep monitoring

and may consider right heart catheterization


Discussed with the patient and wife and they agreed to stay in the hospital


We'll ask for PT/OT evaluation tomorrow as patient feels wobbly due to his 

length of stay in the hospital





Objective





- Vital Signs


Vital signs: 


                                   Vital Signs











Temp  97.5 F L  10/11/20 20:00


 


Pulse  72   10/11/20 20:00


 


Resp  19   10/11/20 20:00


 


BP  138/63   10/11/20 20:00


 


Pulse Ox  94 L  10/11/20 20:00








                                 Intake & Output











 10/11/20 10/11/20 10/12/20





 06:59 18:59 06:59


 


Intake Total 200  


 


Output Total  300 300


 


Balance 200 -300 -300


 


Weight 97.069 kg  


 


Intake:   


 


  Oral 200  


 


Output:   


 


  Urine  300 300


 


Other:   


 


  Voiding Method Toilet  Toilet


 


  # Voids  2 2














- Exam





GENERAL: The patient is alert and oriented x3, not in any acute distress. Well 

developed, well nourished. 


HEENT: Pupils are round and equally reacting to light. EOMI. No scleral icterus.

No conjunctival pallor. Normocephalic, atraumatic. No pharyngeal erythema. No 

thyromegaly. 


CARDIOVASCULAR: S1 and S2 present. No murmurs, rubs, or gallops. 


PULMONARY: Chest is clear to auscultation, no wheezing or crackles. 


ABDOMEN: Soft, nontender, nondistended, normoactive bowel sounds. No palpable 

organomegaly. 


MUSCULOSKELETAL: No joint swelling or deformity. 


-EXTREMITIES: No cyanosis, clubbing, .  2-3+ bilateral pitting leg edema


NEUROLOGICAL: Gross neurological examination did not reveal any focal deficits. 


SKIN: No rashes. no petechiae.





- Labs


CBC & Chem 7: 


                                 10/08/20 13:15





                                 10/11/20 07:04


Labs: 


                  Abnormal Lab Results - Last 24 Hours (Table)











  10/11/20 10/11/20 10/11/20 Range/Units





  06:07 07:04 11:58 


 


Chloride   97 L   ()  mmol/L


 


BUN   41 H   (9-20)  mg/dL


 


Creatinine   2.20 H   (0.66-1.25)  mg/dL


 


Glucose   175 H   (74-99)  mg/dL


 


POC Glucose (mg/dL)  170 H   119 H  (75-99)  mg/dL














  10/11/20 10/11/20 Range/Units





  17:05 19:59 


 


Chloride    ()  mmol/L


 


BUN    (9-20)  mg/dL


 


Creatinine    (0.66-1.25)  mg/dL


 


Glucose    (74-99)  mg/dL


 


POC Glucose (mg/dL)  135 H  155 H  (75-99)  mg/dL














Assessment and Plan


Assessment: 





-Acute on chronic congestive heart failure exacerbation from systolic 

dysfunction, EF less than 30%, POA-slow to respond


- chronicduodenal ulcers


-normocytic anemia chronic, secondary to renal disease


-chronic kidney disease stage III.at the baseline creatinine of 1.9


-History of nephrectomy due to renal cancer and bladder cancer history


-Diabetes type 2.  Insulin-dependent uncontrolled with  hyperglycemia.


-Hyperlipidemia


-Coronary artery disease with history of multiple stents placement


-Bipolar disorder and depression


-GERD


-Obesity 








Plan: 





Continue oral Lasix.  Follow electrolytes.  Discussed with the patient and he 

agrees to stay in Hospital.  Being followed by cardiology.  Other medications to

continue.


DVT prophylaxis: Eliquis


GI prophylaxis PPI


PT/OT evaluation is ordered and is pending

## 2020-10-11 NOTE — XR
EXAMINATION TYPE: XR chest 1V portable

 

DATE OF EXAM: 10/11/2020

 

COMPARISON: 10/8/2020.

 

HISTORY: Follow-up shortness of breath.

 

TECHNIQUE: Single frontal view of the chest is obtained.

 

FINDINGS:  There is grossly unchanged bilateral diffuse patchy hazy opacities. No significant pleural
 effusion, or pneumothorax seen. Stable cardiomediastinal silhouette and cardiothoracic postsurgical 
changes.   The osseous structures are unchanged.

 

IMPRESSION:  No significant interval change.

## 2020-10-12 VITALS — TEMPERATURE: 97.2 F | HEART RATE: 66 BPM | RESPIRATION RATE: 21 BRPM

## 2020-10-12 VITALS — SYSTOLIC BLOOD PRESSURE: 129 MMHG | DIASTOLIC BLOOD PRESSURE: 72 MMHG

## 2020-10-12 LAB
ANION GAP SERPL CALC-SCNC: 6 MMOL/L
BUN SERPL-SCNC: 37 MG/DL (ref 9–20)
CALCIUM SPEC-MCNC: 8.8 MG/DL (ref 8.4–10.2)
CHLORIDE SERPL-SCNC: 99 MMOL/L (ref 98–107)
CO2 SERPL-SCNC: 33 MMOL/L (ref 22–30)
GLUCOSE BLD-MCNC: 162 MG/DL (ref 75–99)
GLUCOSE BLD-MCNC: 65 MG/DL (ref 75–99)
GLUCOSE BLD-MCNC: 68 MG/DL (ref 75–99)
GLUCOSE BLD-MCNC: 95 MG/DL (ref 75–99)
GLUCOSE SERPL-MCNC: 50 MG/DL (ref 74–99)
MAGNESIUM SPEC-SCNC: 2.1 MG/DL (ref 1.6–2.3)
POTASSIUM SERPL-SCNC: 3.7 MMOL/L (ref 3.5–5.1)
SODIUM SERPL-SCNC: 138 MMOL/L (ref 137–145)

## 2020-10-12 RX ADMIN — VENLAFAXINE HYDROCHLORIDE SCH MG: 75 TABLET ORAL at 08:50

## 2020-10-12 RX ADMIN — MIDODRINE HYDROCHLORIDE SCH MG: 5 TABLET ORAL at 07:30

## 2020-10-12 RX ADMIN — SODIUM CHLORIDE, PRESERVATIVE FREE SCH ML: 5 INJECTION INTRAVENOUS at 10:08

## 2020-10-12 RX ADMIN — INSULIN ASPART SCH: 100 INJECTION, SOLUTION INTRAVENOUS; SUBCUTANEOUS at 07:35

## 2020-10-12 RX ADMIN — Medication SCH UNIT: at 08:51

## 2020-10-12 RX ADMIN — CYANOCOBALAMIN TAB 500 MCG SCH MCG: 500 TAB at 08:50

## 2020-10-12 RX ADMIN — APIXABAN SCH MG: 2.5 TABLET, FILM COATED ORAL at 08:51

## 2020-10-12 RX ADMIN — AMIODARONE HYDROCHLORIDE SCH MG: 200 TABLET ORAL at 08:51

## 2020-10-12 RX ADMIN — Medication SCH MG: at 08:50

## 2020-10-12 RX ADMIN — PANTOPRAZOLE SODIUM SCH MG: 40 TABLET, DELAYED RELEASE ORAL at 07:30

## 2020-10-12 NOTE — P.PN
Subjective


Progress Note Date: 10/12/20





This is a very pleasant 8-year-old gentleman with coronary artery disease as 

well as severe ischemic cardiomyopathy as well as hypertension and dyslipidemia 

and paroxysmal atrial fibrillation presented to the emergency room complaining 

of shortness of breath.  For the last few days he has been experiencing 

increasing in the shortness of breath with exertion and for the last day his 

shortness of breath has gotten worse to the balloon he cannot even walk a few 

steps before he feels short of breath.  No symptoms of chest pain or chest 

discomfort.  The shortness of breath is definitely worse once he's laying flat 

in bed.  He did not have any lower extremities edema last few days but when he 

was seen and examined this morning he does have mild bilateral lower extremities

edema.  No dizziness or lightheadedness and no feeling of heart racing or 

fluttering or syncope.  He states clearly that he was compliant with all his 

medications and also he stated that he is somewhat compliant with his diet on 

salt intake.  The patient somewhat is a poor historian and he is difficult to 

hear well.  He is known to have severe cardiomyopathy.  He underwent an 

echocardiogram in February 2020 and that revealed severe cardiomyopathy with EF 

around 20% was mild valvular abnormalities including mild aortic stenosis and 

mild mitral regurgitation and mild tricuspid regurgitation.  When he was 

examined and seen this morning he definitely have bilateral rhonchi with 

diminished breathing sounds over the left lung field.  He does have bilateral 

lower extremity edema.  The EKG showed sinus rhythm with nonspecific changes.  

The chest x-ray did not show any acute abnormalities.  The BNP was checked and 

came in to be around 10,000.  Currently the patient is on Lasix.  He is known to

have chronic kidney disease with baseline creatinine around 1.8.  At this point 

we'll continue the current dose of Lasix IV and continue monitor the kidney 

function and electrolytes.  I'm going to repeat the echocardiogram on him as 

well.  We'll continue following up with him.





10/12/2020





Patient was seen and examined this morning, diuresed over the weekend, currently

on oral diuretics.  Quite anxious to be discharged home.  Blood pressure 128/70 

with a heart rate in the 60s, 94% on 2 L of oxygen.  Sodium 138, potassium 3.7, 

BUN 37, creatinine 2.1, magnesium 2.1.





Objective





- Vital Signs


Vital signs: 


                                   Vital Signs











Temp  97.2 F L  10/12/20 09:00


 


Pulse  66   10/12/20 09:00


 


Resp  21   10/12/20 09:00


 


BP  129/72   10/12/20 10:09


 


Pulse Ox  94 L  10/12/20 09:00








                                 Intake & Output











 10/11/20 10/12/20 10/12/20





 18:59 06:59 18:59


 


Intake Total  300 


 


Output Total 300  


 


Balance -300 300 


 


Weight  95.708 kg 


 


Intake:   


 


  Oral  300 


 


Output:   


 


  Urine 300  


 


Other:   


 


  Voiding Method  Toilet 


 


  # Voids 2  1














- Exam





PHYSICAL EXAMINATION: 





GENERAL: 80-year-old  gentleman in no acute distress at the time of my 

examination





HEENT: Head is atraumatic, normocephalic.  Pupils equal, round.  Sclera 

anicteric. Conjunctiva are clear.  Mucous membranes of the mouth are moist.  

Neck is supple.  There is no elevated jugular venous pressure.  No carotid  

bruit is heard.





HEART EXAMINATION: Heart S1, S2 normal.  No murmur or gallop heard.





CHEST EXAMINATION: Lungs are clear to auscultation and precussion. No chest wall

tenderness is noted on palpation or with deep breathing.





ABDOMEN:  Soft, nontender. Bowel sounds are heard. No organomegaly noted.


 


EXTREMITIES: 2+ peripheral pulses with trace  evidence of peripheral edema and 

no calf tenderness noted.





NEUROLOGIC patient is awake, alert and oriented 3 .


 


.


 








- Labs


CBC & Chem 7: 


                                 10/08/20 13:15





                                 10/12/20 07:12


Labs: 


                  Abnormal Lab Results - Last 24 Hours (Table)











  10/11/20 10/11/20 10/11/20 Range/Units





  11:58 17:05 19:59 


 


Carbon Dioxide     (22-30)  mmol/L


 


BUN     (9-20)  mg/dL


 


Creatinine     (0.66-1.25)  mg/dL


 


Glucose     (74-99)  mg/dL


 


POC Glucose (mg/dL)  119 H  135 H  155 H  (75-99)  mg/dL














  10/12/20 10/12/20 10/12/20 Range/Units





  06:16 07:12 07:26 


 


Carbon Dioxide   33 H   (22-30)  mmol/L


 


BUN   37 H   (9-20)  mg/dL


 


Creatinine   2.15 H   (0.66-1.25)  mg/dL


 


Glucose   50 L   (74-99)  mg/dL


 


POC Glucose (mg/dL)  65 L   68 L  (75-99)  mg/dL














Assessment and Plan


Plan: 


Assessment and plan


#1 systolic congestive heart failure acute on chronic


#2 ischemic cardiomyopathy with prior AICD


#3 coronary artery disease with prior bypass surgery


#4 hyperlipidemia


#5 hypertension


#6 paroxysmal atrial fibrillation


#7 acute on chronic renal insufficiency


#8 diabetes


#9 history of renal cell carcinoma with prior nephrectomy


#10 chronic anemia


#11 history of bladder cancer








Plan


We will discontinue the Nitropaste and start the patient on Toprol 25 mg daily. 

He may be able to be discharged home today from our perspective.  We will make 

him a follow-up appointment to see Dr. Fleming in the office post discharge.





DNP note has been reviewed, I agree with a documented findings and plan of care.

 Patient was seen and examined.

## 2020-10-12 NOTE — P.DS
Providers


Date of admission: 


10/08/20 22:51





Attending physician: 


Ramon Chavarria





Consults: 





                                        





10/08/20 14:09


Consult Physician Routine 


   Consulting Provider: Ninoska Toth


   Consult Reason/Comments: chf


   Do you want consulting provider notified?: Yes











Primary care physician: 


Ruy Duckworth





[facility] Course: 





Diagnoses:


-Acute on chronic congestive heart failure exacerbation from systolic 

dysfunction, EF less than 30%, close to end stage heart disease


-Acute kidney injury on the top of chronic kidney disease, secondary to Lasix


- chronic duodenal ulcers


-normocytic anemia chronic, secondary to renal disease


-chronic kidney disease stage III.at the baseline creatinine of 1.9


-History of nephrectomy due to renal cancer and bladder cancer history


-Diabetes type 2.  Insulin-dependent uncontrolled with  hyperglycemia.


-Hyperlipidemia


-Coronary artery disease with history of multiple stents placement


-Bipolar disorder and depression


-GERD


-Obesity 





Hospital course:


This is a pleasant 80 years old male with multiple medical problems as below, 

presents because of dyspnea and leg swelling secondary to acute CHF exacerbation

with systolic dysfunction and ejection fraction less than 30%, patient has been 

followed closely by cardiology team and he was treated with IV Lasix, his 

dyspnea improved and his room air, however he still have significant leg 

swelling, Ace wrap is provided.  His creatinine started trending up to 2.2, 

Lasix was switched to oral pills 80 mg daily per cardiology team.  Also patient 

was started on metoprolol 25 mg daily


Glucose was slightly low this morning at 65 and 50, it was corrected to 95 after

he ate his meals, with [place] his Levemir from 30 down to 25 units at bedtime and

patient informed and he agrees


Physical therapy evaluation:recommend 





The patient showed interval improvement and on the day of discharge she denies 

chest pain or dyspnea, no abdominal pain, no change in urine or bowel habits.  

No fever.  Patient also eager to go home.


Patient was cleared for discharge by cardiology team


Problems and management plan were discussed with the patient and he verbalized 

understanding and acceptance


Patient was found stable and can be discharged home however he needs follow-up 

as an outpatient. Patient was instructed to follow up with PCP Dr. rojas within

one week and patient agrees.  Also patient was instructed to follow up with his 

cardiologist Dr. dial in 1-2 weeks, patient agrees to chronic appointments





Gen: patient is a AAOx3, no distress


CVS: S1-S2, RRR, no murmur


Lungs: B/L CTA, no wheezing


Abdomen: soft, no distention, no tenderness, positive bowel sounds


Extremity: 1+ bilateral pitting leg edema





Time spent more than 35 minutes





Plan - Discharge Summary


Discharge Rx Participant: No


New Discharge Prescriptions: 


New


   Insulin Detemir (Levemir) [Levemir] 3,025 unit SQ HS #1 syr


   Metoprolol Succinate (ER) [Toprol XL] 25 mg PO DAILY #30 tab.er.24h





Continue


   Venlafaxine HCl [Effexor] 75 mg PO BID  tab


   Atorvastatin [Lipitor] 80 mg PO HS


   Cyanocobalamin (Vitamin B-12) [Vitamin B-12] 1,000 mcg PO DAILY


   Melatonin 3 mg PO HS  tablet


   lamoTRIgine [LaMICtal] 100 mg PO HS #3 tab


   Pantoprazole Sodium [Protonix] 40 mg PO W/BRKFST


   Midodrine [ProAmatine] 10 mg PO TID@0700,1100,1600


   Ferrous Sulfate [Iron (65 MG Elemental)] 325 mg PO DAILY


   Cholecalciferol [Vitamin D3 (25 Mcg = 1000 Iu)] 1,000 unit PO DAILY


   Apixaban [Eliquis] 2.5 mg PO BID


   Amiodarone [Cordarone] 200 mg PO DAILY #30 tab


   Furosemide [Lasix] 80 mg PO DAILY #30 tab





Discontinued


   Insulin Glargine [Lantus] 42 units SQ HS


   Furosemide [Lasix] 40 mg PO DAILY@1200


Discharge Medication List





Venlafaxine HCl [Effexor] 75 mg PO BID  tab 02/22/20 [Rx]


Atorvastatin [Lipitor] 80 mg PO HS 04/05/20 [History]


Cyanocobalamin (Vitamin B-12) [Vitamin B-12] 1,000 mcg PO DAILY 04/05/20 

[History]


Melatonin 3 mg PO HS  tablet 04/21/20 [Rx]


lamoTRIgine [LaMICtal] 100 mg PO HS #3 tab 04/21/20 [Rx]


Apixaban [Eliquis] 2.5 mg PO BID 07/24/20 [History]


Cholecalciferol [Vitamin D3 (25 Mcg = 1000 Iu)] 1,000 unit PO DAILY 07/24/20 

[History]


Ferrous Sulfate [Iron (65 MG Elemental)] 325 mg PO DAILY 07/24/20 [History]


Midodrine [ProAmatine] 10 mg PO TID@0700,1100,1600 07/24/20 [History]


Pantoprazole Sodium [Protonix] 40 mg PO W/BRKFST 07/24/20 [History]


Amiodarone [Cordarone] 200 mg PO DAILY #30 tab 07/29/20 [Rx]


Furosemide [Lasix] 80 mg PO DAILY #30 tab 10/12/20 [Rx]


Insulin Detemir (Levemir) [Levemir] 3,025 unit SQ HS #1 syr 10/12/20 [Rx]


Metoprolol Succinate (ER) [Toprol XL] 25 mg PO DAILY #30 tab.er.24h 10/12/20 

[Rx]








Follow up Appointment(s)/Referral(s): 


Ruy Duckworth MD [Primary Care Provider] - 1-2 days


Kelsi Fleming MD [STAFF PHYSICIAN] - 1 Week


Activity/Diet/Wound Care/Special Instructions: 


Heart healthy diet





Activities Limited till you see your doctor


Discharge Disposition: HOME WITH HOME HEALTH SERVICES